# Patient Record
Sex: MALE | Race: WHITE | NOT HISPANIC OR LATINO | URBAN - METROPOLITAN AREA
[De-identification: names, ages, dates, MRNs, and addresses within clinical notes are randomized per-mention and may not be internally consistent; named-entity substitution may affect disease eponyms.]

---

## 2017-01-13 ENCOUNTER — AMBULATORY - HEALTHEAST (OUTPATIENT)
Dept: CARDIOLOGY | Facility: CLINIC | Age: 82
End: 2017-01-13

## 2017-01-13 DIAGNOSIS — I72.9 ANEURYSM (H): ICD-10-CM

## 2017-02-06 ENCOUNTER — AMBULATORY - HEALTHEAST (OUTPATIENT)
Dept: CARDIOLOGY | Facility: CLINIC | Age: 82
End: 2017-02-06

## 2017-02-10 ENCOUNTER — COMMUNICATION - HEALTHEAST (OUTPATIENT)
Dept: CARDIOLOGY | Facility: CLINIC | Age: 82
End: 2017-02-10

## 2017-02-10 DIAGNOSIS — I48.91 A-FIB (H): ICD-10-CM

## 2017-02-17 ENCOUNTER — AMBULATORY - HEALTHEAST (OUTPATIENT)
Dept: CARDIOLOGY | Facility: CLINIC | Age: 82
End: 2017-02-17

## 2017-02-17 DIAGNOSIS — I72.9 ANEURYSM (H): ICD-10-CM

## 2017-02-20 ENCOUNTER — AMBULATORY - HEALTHEAST (OUTPATIENT)
Dept: CARDIOLOGY | Facility: CLINIC | Age: 82
End: 2017-02-20

## 2017-02-20 DIAGNOSIS — I72.9 ANEURYSM (H): ICD-10-CM

## 2017-03-06 ENCOUNTER — AMBULATORY - HEALTHEAST (OUTPATIENT)
Dept: CARDIOLOGY | Facility: CLINIC | Age: 82
End: 2017-03-06

## 2017-03-06 DIAGNOSIS — I72.9 ANEURYSM (H): ICD-10-CM

## 2017-04-03 ENCOUNTER — AMBULATORY - HEALTHEAST (OUTPATIENT)
Dept: CARDIOLOGY | Facility: CLINIC | Age: 82
End: 2017-04-03

## 2017-04-03 ENCOUNTER — COMMUNICATION - HEALTHEAST (OUTPATIENT)
Dept: CARDIOLOGY | Facility: CLINIC | Age: 82
End: 2017-04-03

## 2017-04-03 DIAGNOSIS — I72.9 ANEURYSM (H): ICD-10-CM

## 2017-04-03 DIAGNOSIS — I48.91 A-FIB (H): ICD-10-CM

## 2017-05-01 ENCOUNTER — AMBULATORY - HEALTHEAST (OUTPATIENT)
Dept: CARDIOLOGY | Facility: CLINIC | Age: 82
End: 2017-05-01

## 2017-05-01 DIAGNOSIS — I72.9 ANEURYSM (H): ICD-10-CM

## 2017-05-15 ENCOUNTER — AMBULATORY - HEALTHEAST (OUTPATIENT)
Dept: CARDIOLOGY | Facility: CLINIC | Age: 82
End: 2017-05-15

## 2017-05-15 DIAGNOSIS — Z95.810 ICD (IMPLANTABLE CARDIOVERTER-DEFIBRILLATOR), DUAL, IN SITU: ICD-10-CM

## 2017-05-16 LAB — HCC DEVICE COMMENTS: NORMAL

## 2017-05-25 ENCOUNTER — COMMUNICATION - HEALTHEAST (OUTPATIENT)
Dept: CARDIOLOGY | Facility: CLINIC | Age: 82
End: 2017-05-25

## 2017-05-25 DIAGNOSIS — I48.91 A-FIB (H): ICD-10-CM

## 2017-06-05 ENCOUNTER — AMBULATORY - HEALTHEAST (OUTPATIENT)
Dept: CARDIOLOGY | Facility: CLINIC | Age: 82
End: 2017-06-05

## 2017-06-05 DIAGNOSIS — I72.9 ANEURYSM (H): ICD-10-CM

## 2017-07-10 ENCOUNTER — AMBULATORY - HEALTHEAST (OUTPATIENT)
Dept: CARDIOLOGY | Facility: CLINIC | Age: 82
End: 2017-07-10

## 2017-07-10 DIAGNOSIS — I72.9 ANEURYSM (H): ICD-10-CM

## 2017-08-04 ENCOUNTER — AMBULATORY - HEALTHEAST (OUTPATIENT)
Dept: CARDIOLOGY | Facility: CLINIC | Age: 82
End: 2017-08-04

## 2017-08-04 DIAGNOSIS — I47.29 PAROXYSMAL VT (H): ICD-10-CM

## 2017-08-04 DIAGNOSIS — I72.9 ANEURYSM (H): ICD-10-CM

## 2017-08-21 ENCOUNTER — AMBULATORY - HEALTHEAST (OUTPATIENT)
Dept: CARDIOLOGY | Facility: CLINIC | Age: 82
End: 2017-08-21

## 2017-08-21 DIAGNOSIS — Z95.810 ICD (IMPLANTABLE CARDIOVERTER-DEFIBRILLATOR), DUAL, IN SITU: ICD-10-CM

## 2017-08-21 LAB — HCC DEVICE COMMENTS: NORMAL

## 2017-08-22 ENCOUNTER — COMMUNICATION - HEALTHEAST (OUTPATIENT)
Dept: CARDIOLOGY | Facility: CLINIC | Age: 82
End: 2017-08-22

## 2017-08-22 DIAGNOSIS — I48.91 A-FIB (H): ICD-10-CM

## 2017-08-23 ENCOUNTER — RECORDS - HEALTHEAST (OUTPATIENT)
Dept: LAB | Facility: CLINIC | Age: 82
End: 2017-08-23

## 2017-08-23 LAB
CHOLEST SERPL-MCNC: 136 MG/DL
FASTING STATUS PATIENT QL REPORTED: NORMAL
HDLC SERPL-MCNC: 40 MG/DL
LDLC SERPL CALC-MCNC: 74 MG/DL
TRIGL SERPL-MCNC: 110 MG/DL

## 2017-09-08 ENCOUNTER — AMBULATORY - HEALTHEAST (OUTPATIENT)
Dept: CARDIOLOGY | Facility: CLINIC | Age: 82
End: 2017-09-08

## 2017-09-08 DIAGNOSIS — I47.29 PAROXYSMAL VT (H): ICD-10-CM

## 2017-09-20 ENCOUNTER — AMBULATORY - HEALTHEAST (OUTPATIENT)
Dept: CARDIOLOGY | Facility: CLINIC | Age: 82
End: 2017-09-20

## 2017-09-20 DIAGNOSIS — I25.738: ICD-10-CM

## 2017-09-21 ENCOUNTER — COMMUNICATION - HEALTHEAST (OUTPATIENT)
Dept: CARDIOLOGY | Facility: CLINIC | Age: 82
End: 2017-09-21

## 2017-09-25 ENCOUNTER — COMMUNICATION - HEALTHEAST (OUTPATIENT)
Dept: CARDIOLOGY | Facility: CLINIC | Age: 82
End: 2017-09-25

## 2017-09-29 ENCOUNTER — RECORDS - HEALTHEAST (OUTPATIENT)
Dept: ADMINISTRATIVE | Facility: OTHER | Age: 82
End: 2017-09-29

## 2017-10-02 ENCOUNTER — AMBULATORY - HEALTHEAST (OUTPATIENT)
Dept: CARDIOLOGY | Facility: CLINIC | Age: 82
End: 2017-10-02

## 2017-10-02 ENCOUNTER — RECORDS - HEALTHEAST (OUTPATIENT)
Dept: ADMINISTRATIVE | Facility: OTHER | Age: 82
End: 2017-10-02

## 2017-10-09 ENCOUNTER — AMBULATORY - HEALTHEAST (OUTPATIENT)
Dept: CARDIOLOGY | Facility: CLINIC | Age: 82
End: 2017-10-09

## 2017-10-09 DIAGNOSIS — I47.29 PAROXYSMAL VT (H): ICD-10-CM

## 2017-10-12 ENCOUNTER — RECORDS - HEALTHEAST (OUTPATIENT)
Dept: ADMINISTRATIVE | Facility: OTHER | Age: 82
End: 2017-10-12

## 2017-10-12 ENCOUNTER — OFFICE VISIT - HEALTHEAST (OUTPATIENT)
Dept: PULMONOLOGY | Facility: OTHER | Age: 82
End: 2017-10-12

## 2017-10-12 DIAGNOSIS — R05.3 COUGH, PERSISTENT: ICD-10-CM

## 2017-10-12 DIAGNOSIS — J90 PLEURAL EFFUSION, LEFT: ICD-10-CM

## 2017-10-12 DIAGNOSIS — J45.20 MILD INTERMITTENT ASTHMA WITHOUT COMPLICATION: ICD-10-CM

## 2017-10-12 DIAGNOSIS — R06.00 DYSPNEA: ICD-10-CM

## 2017-10-12 ASSESSMENT — MIFFLIN-ST. JEOR: SCORE: 1438.28

## 2017-10-23 ENCOUNTER — COMMUNICATION - HEALTHEAST (OUTPATIENT)
Dept: PULMONOLOGY | Facility: OTHER | Age: 82
End: 2017-10-23

## 2017-10-24 ENCOUNTER — HOSPITAL ENCOUNTER (OUTPATIENT)
Dept: RESPIRATORY THERAPY | Facility: CLINIC | Age: 82
Discharge: HOME | End: 2017-10-24
Attending: INTERNAL MEDICINE

## 2017-10-24 DIAGNOSIS — J90 PLEURAL EFFUSION, LEFT: ICD-10-CM

## 2017-10-24 DIAGNOSIS — R09.02 HYPOXIA: ICD-10-CM

## 2017-10-24 DIAGNOSIS — R05.3 CHRONIC COUGH: ICD-10-CM

## 2017-10-24 DIAGNOSIS — R06.03 RESPIRATORY DISTRESS: ICD-10-CM

## 2017-10-24 DIAGNOSIS — R06.00 DYSPNEA: ICD-10-CM

## 2017-10-26 ENCOUNTER — OFFICE VISIT - HEALTHEAST (OUTPATIENT)
Dept: CARDIOLOGY | Facility: CLINIC | Age: 82
End: 2017-10-26

## 2017-10-26 DIAGNOSIS — I42.9 CARDIOMYOPATHY (H): ICD-10-CM

## 2017-10-26 DIAGNOSIS — I10 ESSENTIAL HYPERTENSION: ICD-10-CM

## 2017-10-26 DIAGNOSIS — E78.5 DYSLIPIDEMIA: ICD-10-CM

## 2017-10-26 DIAGNOSIS — I25.10 CORONARY ARTERY DISEASE INVOLVING NATIVE CORONARY ARTERY OF NATIVE HEART WITHOUT ANGINA PECTORIS: ICD-10-CM

## 2017-10-26 DIAGNOSIS — R06.02 SOB (SHORTNESS OF BREATH) ON EXERTION: ICD-10-CM

## 2017-10-26 DIAGNOSIS — I35.0 NONRHEUMATIC AORTIC VALVE STENOSIS: ICD-10-CM

## 2017-10-26 ASSESSMENT — MIFFLIN-ST. JEOR: SCORE: 1433.29

## 2017-10-30 ENCOUNTER — COMMUNICATION - HEALTHEAST (OUTPATIENT)
Dept: PULMONOLOGY | Facility: OTHER | Age: 82
End: 2017-10-30

## 2017-11-01 ENCOUNTER — COMMUNICATION - HEALTHEAST (OUTPATIENT)
Dept: CARDIOLOGY | Facility: CLINIC | Age: 82
End: 2017-11-01

## 2017-11-06 ENCOUNTER — AMBULATORY - HEALTHEAST (OUTPATIENT)
Dept: CARDIOLOGY | Facility: CLINIC | Age: 82
End: 2017-11-06

## 2017-11-06 DIAGNOSIS — I47.29 PAROXYSMAL VT (H): ICD-10-CM

## 2017-11-16 ENCOUNTER — HOSPITAL ENCOUNTER (OUTPATIENT)
Dept: CT IMAGING | Facility: CLINIC | Age: 82
Discharge: HOME | End: 2017-11-16
Attending: INTERNAL MEDICINE

## 2017-11-16 DIAGNOSIS — J45.20 MILD INTERMITTENT ASTHMA WITHOUT COMPLICATION: ICD-10-CM

## 2017-11-16 DIAGNOSIS — R06.00 DYSPNEA: ICD-10-CM

## 2017-11-16 DIAGNOSIS — R05.3 COUGH, PERSISTENT: ICD-10-CM

## 2017-11-29 ENCOUNTER — AMBULATORY - HEALTHEAST (OUTPATIENT)
Dept: CARDIOLOGY | Facility: CLINIC | Age: 82
End: 2017-11-29

## 2017-11-29 DIAGNOSIS — Z95.810 ICD (IMPLANTABLE CARDIOVERTER-DEFIBRILLATOR), DUAL, IN SITU: ICD-10-CM

## 2017-11-29 DIAGNOSIS — I35.0 NONRHEUMATIC AORTIC VALVE STENOSIS: ICD-10-CM

## 2017-11-29 DIAGNOSIS — I47.29 PAROXYSMAL VT (H): ICD-10-CM

## 2017-11-29 DIAGNOSIS — N18.30 STAGE 3 CHRONIC KIDNEY DISEASE (H): ICD-10-CM

## 2017-11-29 LAB — HCC DEVICE COMMENTS: NORMAL

## 2017-11-29 ASSESSMENT — MIFFLIN-ST. JEOR: SCORE: 1453.71

## 2017-11-30 ENCOUNTER — AMBULATORY - HEALTHEAST (OUTPATIENT)
Dept: CARDIOLOGY | Facility: CLINIC | Age: 82
End: 2017-11-30

## 2017-11-30 DIAGNOSIS — I47.29 PAROXYSMAL VT (H): ICD-10-CM

## 2017-12-01 ENCOUNTER — OFFICE VISIT - HEALTHEAST (OUTPATIENT)
Dept: PULMONOLOGY | Facility: OTHER | Age: 82
End: 2017-12-01

## 2017-12-01 ENCOUNTER — COMMUNICATION - HEALTHEAST (OUTPATIENT)
Dept: CARDIOLOGY | Facility: CLINIC | Age: 82
End: 2017-12-01

## 2017-12-01 DIAGNOSIS — J98.01 BRONCHOSPASM: ICD-10-CM

## 2017-12-01 DIAGNOSIS — R05.3 COUGH, PERSISTENT: ICD-10-CM

## 2017-12-01 DIAGNOSIS — R05.3 CHRONIC COUGH: ICD-10-CM

## 2017-12-01 DIAGNOSIS — J45.20 MILD INTERMITTENT ASTHMA WITHOUT COMPLICATION: ICD-10-CM

## 2017-12-01 DIAGNOSIS — J47.9 BRONCHIECTASIS WITHOUT COMPLICATION (H): ICD-10-CM

## 2017-12-01 DIAGNOSIS — I48.91 A-FIB (H): ICD-10-CM

## 2017-12-29 ENCOUNTER — AMBULATORY - HEALTHEAST (OUTPATIENT)
Dept: CARDIOLOGY | Facility: CLINIC | Age: 82
End: 2017-12-29

## 2017-12-29 DIAGNOSIS — I47.29 PAROXYSMAL VT (H): ICD-10-CM

## 2018-01-08 ENCOUNTER — AMBULATORY - HEALTHEAST (OUTPATIENT)
Dept: PULMONOLOGY | Facility: OTHER | Age: 83
End: 2018-01-08

## 2018-01-08 DIAGNOSIS — J45.909 ASTHMA: ICD-10-CM

## 2018-01-16 ENCOUNTER — RECORDS - HEALTHEAST (OUTPATIENT)
Dept: LAB | Facility: CLINIC | Age: 83
End: 2018-01-16

## 2018-01-16 LAB
ANION GAP SERPL CALCULATED.3IONS-SCNC: 9 MMOL/L (ref 5–18)
BUN SERPL-MCNC: 38 MG/DL (ref 8–28)
CALCIUM SERPL-MCNC: 9.3 MG/DL (ref 8.5–10.5)
CHLORIDE BLD-SCNC: 105 MMOL/L (ref 98–107)
CO2 SERPL-SCNC: 28 MMOL/L (ref 22–31)
CREAT SERPL-MCNC: 1.8 MG/DL (ref 0.7–1.3)
GFR SERPL CREATININE-BSD FRML MDRD: 36 ML/MIN/1.73M2
GLUCOSE BLD-MCNC: 88 MG/DL (ref 70–125)
POTASSIUM BLD-SCNC: 4.2 MMOL/L (ref 3.5–5)
SODIUM SERPL-SCNC: 142 MMOL/L (ref 136–145)
URATE SERPL-MCNC: 8.1 MG/DL (ref 3–8)

## 2018-01-17 ENCOUNTER — AMBULATORY - HEALTHEAST (OUTPATIENT)
Dept: PULMONOLOGY | Facility: OTHER | Age: 83
End: 2018-01-17

## 2018-01-17 ENCOUNTER — RECORDS - HEALTHEAST (OUTPATIENT)
Dept: ADMINISTRATIVE | Facility: OTHER | Age: 83
End: 2018-01-17

## 2018-01-17 ENCOUNTER — COMMUNICATION - HEALTHEAST (OUTPATIENT)
Dept: PULMONOLOGY | Facility: OTHER | Age: 83
End: 2018-01-17

## 2018-01-17 DIAGNOSIS — B37.0 THRUSH: ICD-10-CM

## 2018-01-21 ENCOUNTER — COMMUNICATION - HEALTHEAST (OUTPATIENT)
Dept: CARDIOLOGY | Facility: CLINIC | Age: 83
End: 2018-01-21

## 2018-01-21 DIAGNOSIS — I48.91 A-FIB (H): ICD-10-CM

## 2018-01-26 ENCOUNTER — AMBULATORY - HEALTHEAST (OUTPATIENT)
Dept: CARDIOLOGY | Facility: CLINIC | Age: 83
End: 2018-01-26

## 2018-01-26 DIAGNOSIS — I42.9 CARDIOMYOPATHY (H): ICD-10-CM

## 2018-01-26 DIAGNOSIS — I47.29 PAROXYSMAL VT (H): ICD-10-CM

## 2018-01-26 LAB — INTERNATIONAL NORMALIZATION RATIO, POC - HISTORICAL: 2.9

## 2018-01-31 ENCOUNTER — COMMUNICATION - HEALTHEAST (OUTPATIENT)
Dept: PULMONOLOGY | Facility: OTHER | Age: 83
End: 2018-01-31

## 2018-02-11 ENCOUNTER — COMMUNICATION - HEALTHEAST (OUTPATIENT)
Dept: SCHEDULING | Facility: CLINIC | Age: 83
End: 2018-02-11

## 2018-02-11 DIAGNOSIS — J45.909 ASTHMA: ICD-10-CM

## 2018-02-11 DIAGNOSIS — J11.1 INFLUENZA: ICD-10-CM

## 2018-02-11 DIAGNOSIS — J98.8 RESPIRATORY TRACT INFECTION: ICD-10-CM

## 2018-02-15 ENCOUNTER — COMMUNICATION - HEALTHEAST (OUTPATIENT)
Dept: PULMONOLOGY | Facility: OTHER | Age: 83
End: 2018-02-15

## 2018-02-21 ENCOUNTER — HOME CARE/HOSPICE - HEALTHEAST (OUTPATIENT)
Dept: HOME HEALTH SERVICES | Facility: HOME HEALTH | Age: 83
End: 2018-02-21

## 2018-02-24 ENCOUNTER — INFUSION - HEALTHEAST (OUTPATIENT)
Dept: INFUSION THERAPY | Facility: CLINIC | Age: 83
End: 2018-02-24

## 2018-02-24 DIAGNOSIS — B95.2 ENTEROCOCCAL BACTEREMIA: ICD-10-CM

## 2018-02-24 DIAGNOSIS — R78.81 ENTEROCOCCAL BACTEREMIA: ICD-10-CM

## 2018-02-25 ENCOUNTER — INFUSION - HEALTHEAST (OUTPATIENT)
Dept: INFUSION THERAPY | Facility: CLINIC | Age: 83
End: 2018-02-25

## 2018-02-25 DIAGNOSIS — B95.2 ENTEROCOCCAL BACTEREMIA: ICD-10-CM

## 2018-02-25 DIAGNOSIS — R78.81 ENTEROCOCCAL BACTEREMIA: ICD-10-CM

## 2018-02-26 ENCOUNTER — INFUSION - HEALTHEAST (OUTPATIENT)
Dept: INFUSION THERAPY | Facility: CLINIC | Age: 83
End: 2018-02-26

## 2018-02-26 ENCOUNTER — AMBULATORY - HEALTHEAST (OUTPATIENT)
Dept: CARDIOLOGY | Facility: CLINIC | Age: 83
End: 2018-02-26

## 2018-02-26 DIAGNOSIS — I47.29 PAROXYSMAL VT (H): ICD-10-CM

## 2018-02-26 DIAGNOSIS — R78.81 ENTEROCOCCAL BACTEREMIA: ICD-10-CM

## 2018-02-26 DIAGNOSIS — B95.2 ENTEROCOCCAL BACTEREMIA: ICD-10-CM

## 2018-02-26 LAB — INTERNATIONAL NORMALIZATION RATIO, POC - HISTORICAL: 2.3

## 2018-02-27 ENCOUNTER — INFUSION - HEALTHEAST (OUTPATIENT)
Dept: INFUSION THERAPY | Facility: CLINIC | Age: 83
End: 2018-02-27

## 2018-02-27 DIAGNOSIS — B95.2 ENTEROCOCCAL BACTEREMIA: ICD-10-CM

## 2018-02-27 DIAGNOSIS — R78.81 ENTEROCOCCAL BACTEREMIA: ICD-10-CM

## 2018-02-28 ENCOUNTER — RECORDS - HEALTHEAST (OUTPATIENT)
Dept: ADMINISTRATIVE | Facility: OTHER | Age: 83
End: 2018-02-28

## 2018-02-28 ENCOUNTER — INFUSION - HEALTHEAST (OUTPATIENT)
Dept: INFUSION THERAPY | Facility: CLINIC | Age: 83
End: 2018-02-28

## 2018-02-28 DIAGNOSIS — R78.81 ENTEROCOCCAL BACTEREMIA: ICD-10-CM

## 2018-02-28 DIAGNOSIS — B95.2 ENTEROCOCCAL BACTEREMIA: ICD-10-CM

## 2018-02-28 LAB
ALBUMIN SERPL-MCNC: 2.9 G/DL (ref 3.5–5)
ALP SERPL-CCNC: 74 U/L (ref 45–120)
ALT SERPL W P-5'-P-CCNC: 20 U/L (ref 0–45)
ANION GAP SERPL CALCULATED.3IONS-SCNC: 9 MMOL/L (ref 5–18)
AST SERPL W P-5'-P-CCNC: 29 U/L (ref 0–40)
BASOPHILS # BLD AUTO: 0 THOU/UL (ref 0–0.2)
BASOPHILS NFR BLD AUTO: 0 % (ref 0–2)
BILIRUB SERPL-MCNC: 0.5 MG/DL (ref 0–1)
BUN SERPL-MCNC: 28 MG/DL (ref 8–28)
CALCIUM SERPL-MCNC: 8.5 MG/DL (ref 8.5–10.5)
CHLORIDE BLD-SCNC: 107 MMOL/L (ref 98–107)
CK-MB: 5 NG/ML (ref 0–7)
CO2 SERPL-SCNC: 25 MMOL/L (ref 22–31)
CREAT SERPL-MCNC: 1.48 MG/DL (ref 0.7–1.3)
EOSINOPHIL # BLD AUTO: 0.1 THOU/UL (ref 0–0.4)
EOSINOPHIL NFR BLD AUTO: 1 % (ref 0–6)
ERYTHROCYTE [DISTWIDTH] IN BLOOD BY AUTOMATED COUNT: 16.8 % (ref 11–14.5)
GFR SERPL CREATININE-BSD FRML MDRD: 45 ML/MIN/1.73M2
GLUCOSE BLD-MCNC: 130 MG/DL (ref 70–125)
HCT VFR BLD AUTO: 37.6 % (ref 40–54)
HGB BLD-MCNC: 12.1 G/DL (ref 14–18)
LYMPHOCYTES # BLD AUTO: 1.4 THOU/UL (ref 0.8–4.4)
LYMPHOCYTES NFR BLD AUTO: 14 % (ref 20–40)
MCH RBC QN AUTO: 26.4 PG (ref 27–34)
MCHC RBC AUTO-ENTMCNC: 32.2 G/DL (ref 32–36)
MCV RBC AUTO: 82 FL (ref 80–100)
MONOCYTES # BLD AUTO: 0.8 THOU/UL (ref 0–0.9)
MONOCYTES NFR BLD AUTO: 8 % (ref 2–10)
NEUTROPHILS # BLD AUTO: 7.8 THOU/UL (ref 2–7.7)
NEUTROPHILS NFR BLD AUTO: 77 % (ref 50–70)
PLATELET # BLD AUTO: 165 THOU/UL (ref 140–440)
PMV BLD AUTO: 10.3 FL (ref 8.5–12.5)
POTASSIUM BLD-SCNC: 4.3 MMOL/L (ref 3.5–5)
PROT SERPL-MCNC: 6.1 G/DL (ref 6–8)
RBC # BLD AUTO: 4.59 MILL/UL (ref 4.4–6.2)
SODIUM SERPL-SCNC: 141 MMOL/L (ref 136–145)
WBC: 10.2 THOU/UL (ref 4–11)

## 2018-03-01 ENCOUNTER — INFUSION - HEALTHEAST (OUTPATIENT)
Dept: INFUSION THERAPY | Facility: CLINIC | Age: 83
End: 2018-03-01

## 2018-03-01 ENCOUNTER — RECORDS - HEALTHEAST (OUTPATIENT)
Dept: ADMINISTRATIVE | Facility: OTHER | Age: 83
End: 2018-03-01

## 2018-03-01 DIAGNOSIS — R78.81 ENTEROCOCCAL BACTEREMIA: ICD-10-CM

## 2018-03-01 DIAGNOSIS — B95.2 ENTEROCOCCAL BACTEREMIA: ICD-10-CM

## 2018-03-02 ENCOUNTER — INFUSION - HEALTHEAST (OUTPATIENT)
Dept: INFUSION THERAPY | Facility: CLINIC | Age: 83
End: 2018-03-02

## 2018-03-02 DIAGNOSIS — R78.81 ENTEROCOCCAL BACTEREMIA: ICD-10-CM

## 2018-03-02 DIAGNOSIS — B95.2 ENTEROCOCCAL BACTEREMIA: ICD-10-CM

## 2018-03-06 ENCOUNTER — AMBULATORY - HEALTHEAST (OUTPATIENT)
Dept: CARDIOLOGY | Facility: CLINIC | Age: 83
End: 2018-03-06

## 2018-03-06 ENCOUNTER — OFFICE VISIT - HEALTHEAST (OUTPATIENT)
Dept: PULMONOLOGY | Facility: OTHER | Age: 83
End: 2018-03-06

## 2018-03-06 DIAGNOSIS — J90 PLEURAL EFFUSION, BILATERAL: ICD-10-CM

## 2018-03-06 DIAGNOSIS — J45.20 MILD INTERMITTENT ASTHMA WITHOUT COMPLICATION: ICD-10-CM

## 2018-03-06 DIAGNOSIS — Z95.810 ICD (IMPLANTABLE CARDIOVERTER-DEFIBRILLATOR) IN PLACE: ICD-10-CM

## 2018-03-06 DIAGNOSIS — J47.9 BRONCHIECTASIS (H): ICD-10-CM

## 2018-03-06 DIAGNOSIS — J18.9 PNEUMONIA, COMMUNITY ACQUIRED: ICD-10-CM

## 2018-03-06 DIAGNOSIS — J18.9 CAP (COMMUNITY ACQUIRED PNEUMONIA): ICD-10-CM

## 2018-03-06 LAB
ANION GAP SERPL CALCULATED.3IONS-SCNC: 9 MMOL/L (ref 5–18)
BASOPHILS # BLD AUTO: 0.1 THOU/UL (ref 0–0.2)
BASOPHILS NFR BLD AUTO: 1 % (ref 0–2)
BUN SERPL-MCNC: 25 MG/DL (ref 8–28)
CALCIUM SERPL-MCNC: 9.1 MG/DL (ref 8.5–10.5)
CHLORIDE BLD-SCNC: 104 MMOL/L (ref 98–107)
CO2 SERPL-SCNC: 27 MMOL/L (ref 22–31)
CREAT SERPL-MCNC: 1.5 MG/DL (ref 0.7–1.3)
EOSINOPHIL # BLD AUTO: 0.4 THOU/UL (ref 0–0.4)
EOSINOPHIL NFR BLD AUTO: 5 % (ref 0–6)
ERYTHROCYTE [DISTWIDTH] IN BLOOD BY AUTOMATED COUNT: 16.8 % (ref 11–14.5)
GFR SERPL CREATININE-BSD FRML MDRD: 44 ML/MIN/1.73M2
GLUCOSE BLD-MCNC: 103 MG/DL (ref 70–125)
HCC DEVICE COMMENTS: NORMAL
HCT VFR BLD AUTO: 40.7 % (ref 40–54)
HGB BLD-MCNC: 12.7 G/DL (ref 14–18)
LYMPHOCYTES # BLD AUTO: 0.7 THOU/UL (ref 0.8–4.4)
LYMPHOCYTES NFR BLD AUTO: 9 % (ref 20–40)
MCH RBC QN AUTO: 26 PG (ref 27–34)
MCHC RBC AUTO-ENTMCNC: 31.2 G/DL (ref 32–36)
MCV RBC AUTO: 83 FL (ref 80–100)
MONOCYTES # BLD AUTO: 1 THOU/UL (ref 0–0.9)
MONOCYTES NFR BLD AUTO: 12 % (ref 2–10)
NEUTROPHILS # BLD AUTO: 6.3 THOU/UL (ref 2–7.7)
NEUTROPHILS NFR BLD AUTO: 74 % (ref 50–70)
PLATELET # BLD AUTO: 156 THOU/UL (ref 140–440)
PMV BLD AUTO: 10.2 FL (ref 8.5–12.5)
POTASSIUM BLD-SCNC: 4 MMOL/L (ref 3.5–5)
PROCALCITONIN SERPL-MCNC: 0.12 NG/ML (ref 0–0.49)
RBC # BLD AUTO: 4.88 MILL/UL (ref 4.4–6.2)
SODIUM SERPL-SCNC: 140 MMOL/L (ref 136–145)
WBC: 8.5 THOU/UL (ref 4–11)

## 2018-03-07 ENCOUNTER — COMMUNICATION - HEALTHEAST (OUTPATIENT)
Dept: PULMONOLOGY | Facility: OTHER | Age: 83
End: 2018-03-07

## 2018-03-12 ENCOUNTER — AMBULATORY - HEALTHEAST (OUTPATIENT)
Dept: CARDIOLOGY | Facility: CLINIC | Age: 83
End: 2018-03-12

## 2018-03-12 ENCOUNTER — RECORDS - HEALTHEAST (OUTPATIENT)
Dept: ADMINISTRATIVE | Facility: OTHER | Age: 83
End: 2018-03-12

## 2018-03-12 DIAGNOSIS — I47.29 PAROXYSMAL VT (H): ICD-10-CM

## 2018-03-12 LAB — INTERNATIONAL NORMALIZATION RATIO, POC - HISTORICAL: 2.9

## 2018-03-21 ENCOUNTER — COMMUNICATION - HEALTHEAST (OUTPATIENT)
Dept: PULMONOLOGY | Facility: OTHER | Age: 83
End: 2018-03-21

## 2018-03-22 ENCOUNTER — COMMUNICATION - HEALTHEAST (OUTPATIENT)
Dept: PULMONOLOGY | Facility: OTHER | Age: 83
End: 2018-03-22

## 2018-03-23 ENCOUNTER — AMBULATORY - HEALTHEAST (OUTPATIENT)
Dept: PULMONOLOGY | Facility: OTHER | Age: 83
End: 2018-03-23

## 2018-03-23 ENCOUNTER — RECORDS - HEALTHEAST (OUTPATIENT)
Dept: ADMINISTRATIVE | Facility: OTHER | Age: 83
End: 2018-03-23

## 2018-03-23 ENCOUNTER — COMMUNICATION - HEALTHEAST (OUTPATIENT)
Dept: CARDIOLOGY | Facility: CLINIC | Age: 83
End: 2018-03-23

## 2018-03-23 DIAGNOSIS — J47.9 BRONCHIECTASIS (H): ICD-10-CM

## 2018-03-26 ENCOUNTER — COMMUNICATION - HEALTHEAST (OUTPATIENT)
Dept: CARDIOLOGY | Facility: CLINIC | Age: 83
End: 2018-03-26

## 2018-03-26 DIAGNOSIS — I25.10 CORONARY ARTERY DISEASE DUE TO LIPID RICH PLAQUE: ICD-10-CM

## 2018-03-26 DIAGNOSIS — I25.83 CORONARY ARTERY DISEASE DUE TO LIPID RICH PLAQUE: ICD-10-CM

## 2018-03-27 ENCOUNTER — RECORDS - HEALTHEAST (OUTPATIENT)
Dept: ADMINISTRATIVE | Facility: OTHER | Age: 83
End: 2018-03-27

## 2018-03-27 ENCOUNTER — AMBULATORY - HEALTHEAST (OUTPATIENT)
Dept: CARDIOLOGY | Facility: CLINIC | Age: 83
End: 2018-03-27

## 2018-03-28 ENCOUNTER — OFFICE VISIT - HEALTHEAST (OUTPATIENT)
Dept: CARDIOLOGY | Facility: CLINIC | Age: 83
End: 2018-03-28

## 2018-03-28 DIAGNOSIS — I25.10 CORONARY ARTERY DISEASE INVOLVING NATIVE CORONARY ARTERY OF NATIVE HEART WITHOUT ANGINA PECTORIS: ICD-10-CM

## 2018-03-28 DIAGNOSIS — E78.5 DYSLIPIDEMIA: ICD-10-CM

## 2018-03-28 DIAGNOSIS — I42.9 CARDIOMYOPATHY (H): ICD-10-CM

## 2018-03-28 DIAGNOSIS — I10 ESSENTIAL HYPERTENSION: ICD-10-CM

## 2018-03-28 ASSESSMENT — MIFFLIN-ST. JEOR: SCORE: 1440.1

## 2018-03-29 ENCOUNTER — AMBULATORY - HEALTHEAST (OUTPATIENT)
Dept: PULMONOLOGY | Facility: OTHER | Age: 83
End: 2018-03-29

## 2018-03-29 DIAGNOSIS — J47.9 BRONCHIECTASIS (H): ICD-10-CM

## 2018-03-29 DIAGNOSIS — R05.3 CHRONIC COUGH: ICD-10-CM

## 2018-03-30 ENCOUNTER — AMBULATORY - HEALTHEAST (OUTPATIENT)
Dept: PULMONOLOGY | Facility: OTHER | Age: 83
End: 2018-03-30

## 2018-03-30 DIAGNOSIS — J47.9 BRONCHIECTASIS (H): ICD-10-CM

## 2018-04-02 ENCOUNTER — HOSPITAL ENCOUNTER (OUTPATIENT)
Dept: CT IMAGING | Facility: CLINIC | Age: 83
Discharge: HOME | End: 2018-04-02
Attending: INTERNAL MEDICINE

## 2018-04-02 ENCOUNTER — COMMUNICATION - HEALTHEAST (OUTPATIENT)
Dept: PULMONOLOGY | Facility: OTHER | Age: 83
End: 2018-04-02

## 2018-04-02 DIAGNOSIS — J90 PLEURAL EFFUSION, BILATERAL: ICD-10-CM

## 2018-04-02 DIAGNOSIS — J18.9 CAP (COMMUNITY ACQUIRED PNEUMONIA): ICD-10-CM

## 2018-04-02 DIAGNOSIS — J47.9 BRONCHIECTASIS (H): ICD-10-CM

## 2018-04-04 ENCOUNTER — OFFICE VISIT - HEALTHEAST (OUTPATIENT)
Dept: PULMONOLOGY | Facility: OTHER | Age: 83
End: 2018-04-04

## 2018-04-04 DIAGNOSIS — J18.9 COMMUNITY ACQUIRED PNEUMONIA, UNSPECIFIED LATERALITY: ICD-10-CM

## 2018-04-04 DIAGNOSIS — J47.9 BRONCHIECTASIS WITHOUT COMPLICATION (H): ICD-10-CM

## 2018-04-04 DIAGNOSIS — J47.9 BRONCHIECTASIS (H): ICD-10-CM

## 2018-04-04 DIAGNOSIS — J90 PLEURAL EFFUSION, BILATERAL: ICD-10-CM

## 2018-04-04 DIAGNOSIS — J45.20 MILD INTERMITTENT ASTHMA WITHOUT COMPLICATION: ICD-10-CM

## 2018-04-04 DIAGNOSIS — R05.3 CHRONIC COUGH: ICD-10-CM
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
## 2018-04-09 ENCOUNTER — AMBULATORY - HEALTHEAST (OUTPATIENT)
Dept: PULMONOLOGY | Facility: OTHER | Age: 83
End: 2018-04-09

## 2018-04-09 ENCOUNTER — AMBULATORY - HEALTHEAST (OUTPATIENT)
Dept: CARDIOLOGY | Facility: CLINIC | Age: 83
End: 2018-04-09

## 2018-04-09 DIAGNOSIS — I47.29 PAROXYSMAL VT (H): ICD-10-CM

## 2018-04-09 LAB — INTERNATIONAL NORMALIZATION RATIO, POC - HISTORICAL: 4.1

## 2018-04-23 ENCOUNTER — AMBULATORY - HEALTHEAST (OUTPATIENT)
Dept: CARDIOLOGY | Facility: CLINIC | Age: 83
End: 2018-04-23

## 2018-04-23 DIAGNOSIS — I47.29 PAROXYSMAL VT (H): ICD-10-CM

## 2018-04-23 LAB — INTERNATIONAL NORMALIZATION RATIO, POC - HISTORICAL: 2.8

## 2018-04-24 ENCOUNTER — COMMUNICATION - HEALTHEAST (OUTPATIENT)
Dept: CARDIOLOGY | Facility: CLINIC | Age: 83
End: 2018-04-24

## 2018-04-24 DIAGNOSIS — I48.91 A-FIB (H): ICD-10-CM

## 2018-05-04 ENCOUNTER — AMBULATORY - HEALTHEAST (OUTPATIENT)
Dept: PULMONOLOGY | Facility: OTHER | Age: 83
End: 2018-05-04

## 2018-05-04 ENCOUNTER — OFFICE VISIT - HEALTHEAST (OUTPATIENT)
Dept: PULMONOLOGY | Facility: OTHER | Age: 83
End: 2018-05-04

## 2018-05-04 ENCOUNTER — HOME CARE/HOSPICE - HEALTHEAST (OUTPATIENT)
Dept: HOME HEALTH SERVICES | Facility: HOME HEALTH | Age: 83
End: 2018-05-04

## 2018-05-04 DIAGNOSIS — J47.9 BRONCHIECTASIS WITHOUT COMPLICATION (H): ICD-10-CM

## 2018-05-04 DIAGNOSIS — J45.20 MILD INTERMITTENT ASTHMA WITHOUT COMPLICATION: ICD-10-CM

## 2018-05-04 DIAGNOSIS — I25.5 ISCHEMIC CARDIOMYOPATHY: ICD-10-CM
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
## 2018-05-06 ENCOUNTER — HOME CARE/HOSPICE - HEALTHEAST (OUTPATIENT)
Dept: HOME HEALTH SERVICES | Facility: HOME HEALTH | Age: 83
End: 2018-05-06

## 2018-05-07 ENCOUNTER — HOME CARE/HOSPICE - HEALTHEAST (OUTPATIENT)
Dept: HOME HEALTH SERVICES | Facility: HOME HEALTH | Age: 83
End: 2018-05-07

## 2018-05-09 ENCOUNTER — HOME CARE/HOSPICE - HEALTHEAST (OUTPATIENT)
Dept: HOME HEALTH SERVICES | Facility: HOME HEALTH | Age: 83
End: 2018-05-09

## 2018-05-14 ENCOUNTER — HOME CARE/HOSPICE - HEALTHEAST (OUTPATIENT)
Dept: HOME HEALTH SERVICES | Facility: HOME HEALTH | Age: 83
End: 2018-05-14

## 2018-05-16 ENCOUNTER — HOME CARE/HOSPICE - HEALTHEAST (OUTPATIENT)
Dept: HOME HEALTH SERVICES | Facility: HOME HEALTH | Age: 83
End: 2018-05-16

## 2018-05-17 ENCOUNTER — COMMUNICATION - HEALTHEAST (OUTPATIENT)
Dept: PULMONOLOGY | Facility: OTHER | Age: 83
End: 2018-05-17

## 2018-05-18 ENCOUNTER — HOME CARE/HOSPICE - HEALTHEAST (OUTPATIENT)
Dept: HOME HEALTH SERVICES | Facility: HOME HEALTH | Age: 83
End: 2018-05-18

## 2018-05-21 ENCOUNTER — AMBULATORY - HEALTHEAST (OUTPATIENT)
Dept: CARDIOLOGY | Facility: CLINIC | Age: 83
End: 2018-05-21

## 2018-05-21 ENCOUNTER — HOME CARE/HOSPICE - HEALTHEAST (OUTPATIENT)
Dept: HOME HEALTH SERVICES | Facility: HOME HEALTH | Age: 83
End: 2018-05-21

## 2018-05-21 DIAGNOSIS — I47.29 PAROXYSMAL VT (H): ICD-10-CM

## 2018-05-21 LAB — INTERNATIONAL NORMALIZATION RATIO, POC - HISTORICAL: 2.9

## 2018-05-22 ENCOUNTER — HOME CARE/HOSPICE - HEALTHEAST (OUTPATIENT)
Dept: HOME HEALTH SERVICES | Facility: HOME HEALTH | Age: 83
End: 2018-05-22

## 2018-05-23 ENCOUNTER — HOME CARE/HOSPICE - HEALTHEAST (OUTPATIENT)
Dept: HOME HEALTH SERVICES | Facility: HOME HEALTH | Age: 83
End: 2018-05-23

## 2018-05-24 ENCOUNTER — AMBULATORY - HEALTHEAST (OUTPATIENT)
Dept: PULMONOLOGY | Facility: OTHER | Age: 83
End: 2018-05-24

## 2018-05-24 ENCOUNTER — HOME CARE/HOSPICE - HEALTHEAST (OUTPATIENT)
Dept: HOME HEALTH SERVICES | Facility: HOME HEALTH | Age: 83
End: 2018-05-24

## 2018-05-24 DIAGNOSIS — J47.9 BRONCHIECTASIS (H): ICD-10-CM

## 2018-05-29 ENCOUNTER — HOME CARE/HOSPICE - HEALTHEAST (OUTPATIENT)
Dept: HOME HEALTH SERVICES | Facility: HOME HEALTH | Age: 83
End: 2018-05-29

## 2018-05-30 ENCOUNTER — HOME CARE/HOSPICE - HEALTHEAST (OUTPATIENT)
Dept: HOME HEALTH SERVICES | Facility: HOME HEALTH | Age: 83
End: 2018-05-30

## 2018-05-30 ENCOUNTER — OFFICE VISIT - HEALTHEAST (OUTPATIENT)
Dept: PULMONOLOGY | Facility: OTHER | Age: 83
End: 2018-05-30

## 2018-05-30 DIAGNOSIS — J45.20 MILD INTERMITTENT ASTHMA WITHOUT COMPLICATION: ICD-10-CM

## 2018-05-30 DIAGNOSIS — J47.9 BRONCHIECTASIS WITHOUT COMPLICATION (H): ICD-10-CM

## 2018-05-30 DIAGNOSIS — J98.4 RESTRICTIVE LUNG DISEASE: ICD-10-CM

## 2018-05-30 DIAGNOSIS — I25.5 ISCHEMIC CARDIOMYOPATHY: ICD-10-CM

## 2018-05-31 ENCOUNTER — HOME CARE/HOSPICE - HEALTHEAST (OUTPATIENT)
Dept: HOME HEALTH SERVICES | Facility: HOME HEALTH | Age: 83
End: 2018-05-31

## 2018-06-01 ENCOUNTER — HOME CARE/HOSPICE - HEALTHEAST (OUTPATIENT)
Dept: HOME HEALTH SERVICES | Facility: HOME HEALTH | Age: 83
End: 2018-06-01

## 2018-06-04 ENCOUNTER — HOME CARE/HOSPICE - HEALTHEAST (OUTPATIENT)
Dept: HOME HEALTH SERVICES | Facility: HOME HEALTH | Age: 83
End: 2018-06-04

## 2018-06-05 ENCOUNTER — HOME CARE/HOSPICE - HEALTHEAST (OUTPATIENT)
Dept: HOME HEALTH SERVICES | Facility: HOME HEALTH | Age: 83
End: 2018-06-05

## 2018-06-06 ENCOUNTER — AMBULATORY - HEALTHEAST (OUTPATIENT)
Dept: PULMONOLOGY | Facility: OTHER | Age: 83
End: 2018-06-06

## 2018-06-06 ENCOUNTER — HOME CARE/HOSPICE - HEALTHEAST (OUTPATIENT)
Dept: HOME HEALTH SERVICES | Facility: HOME HEALTH | Age: 83
End: 2018-06-06

## 2018-06-06 ENCOUNTER — RECORDS - HEALTHEAST (OUTPATIENT)
Dept: ADMINISTRATIVE | Facility: OTHER | Age: 83
End: 2018-06-06

## 2018-06-06 ENCOUNTER — COMMUNICATION - HEALTHEAST (OUTPATIENT)
Dept: HOME HEALTH SERVICES | Facility: HOME HEALTH | Age: 83
End: 2018-06-06

## 2018-06-06 DIAGNOSIS — R05.3 CHRONIC COUGH: ICD-10-CM

## 2018-06-07 ENCOUNTER — HOME CARE/HOSPICE - HEALTHEAST (OUTPATIENT)
Dept: HOME HEALTH SERVICES | Facility: HOME HEALTH | Age: 83
End: 2018-06-07

## 2018-06-08 ENCOUNTER — HOME CARE/HOSPICE - HEALTHEAST (OUTPATIENT)
Dept: HOME HEALTH SERVICES | Facility: HOME HEALTH | Age: 83
End: 2018-06-08

## 2018-06-08 ENCOUNTER — COMMUNICATION - HEALTHEAST (OUTPATIENT)
Dept: CARDIOLOGY | Facility: CLINIC | Age: 83
End: 2018-06-08

## 2018-06-11 ENCOUNTER — HOME CARE/HOSPICE - HEALTHEAST (OUTPATIENT)
Dept: HOME HEALTH SERVICES | Facility: HOME HEALTH | Age: 83
End: 2018-06-11

## 2018-06-12 ENCOUNTER — HOME CARE/HOSPICE - HEALTHEAST (OUTPATIENT)
Dept: HOME HEALTH SERVICES | Facility: HOME HEALTH | Age: 83
End: 2018-06-12

## 2018-06-12 ENCOUNTER — AMBULATORY - HEALTHEAST (OUTPATIENT)
Dept: CARDIOLOGY | Facility: CLINIC | Age: 83
End: 2018-06-12

## 2018-06-12 DIAGNOSIS — Z95.810 ICD (IMPLANTABLE CARDIOVERTER-DEFIBRILLATOR) IN PLACE: ICD-10-CM

## 2018-06-12 LAB
HCC DEVICE COMMENTS: NORMAL
HCC DEVICE IMPLANTING PROVIDER: NORMAL
HCC DEVICE MANUFACTURE: NORMAL
HCC DEVICE MODEL: NORMAL
HCC DEVICE SERIAL NUMBER: NORMAL
HCC DEVICE TYPE: NORMAL

## 2018-06-13 ENCOUNTER — RECORDS - HEALTHEAST (OUTPATIENT)
Dept: LAB | Facility: CLINIC | Age: 83
End: 2018-06-13

## 2018-06-13 ENCOUNTER — RECORDS - HEALTHEAST (OUTPATIENT)
Dept: ADMINISTRATIVE | Facility: OTHER | Age: 83
End: 2018-06-13

## 2018-06-13 LAB
ANION GAP SERPL CALCULATED.3IONS-SCNC: 10 MMOL/L (ref 5–18)
BUN SERPL-MCNC: 63 MG/DL (ref 8–28)
C REACTIVE PROTEIN LHE: 3.5 MG/DL (ref 0–0.8)
CALCIUM SERPL-MCNC: 8.9 MG/DL (ref 8.5–10.5)
CHLORIDE BLD-SCNC: 109 MMOL/L (ref 98–107)
CO2 SERPL-SCNC: 20 MMOL/L (ref 22–31)
CREAT SERPL-MCNC: 1.43 MG/DL (ref 0.7–1.3)
ERYTHROCYTE [SEDIMENTATION RATE] IN BLOOD BY WESTERGREN METHOD: 4 MM/HR (ref 0–15)
GFR SERPL CREATININE-BSD FRML MDRD: 47 ML/MIN/1.73M2
GLUCOSE BLD-MCNC: 98 MG/DL (ref 70–125)
POTASSIUM BLD-SCNC: 4.4 MMOL/L (ref 3.5–5)
SODIUM SERPL-SCNC: 139 MMOL/L (ref 136–145)

## 2018-06-15 ENCOUNTER — HOME CARE/HOSPICE - HEALTHEAST (OUTPATIENT)
Dept: HOME HEALTH SERVICES | Facility: HOME HEALTH | Age: 83
End: 2018-06-15

## 2018-06-18 ENCOUNTER — COMMUNICATION - HEALTHEAST (OUTPATIENT)
Dept: HOME HEALTH SERVICES | Facility: HOME HEALTH | Age: 83
End: 2018-06-18

## 2018-06-18 ENCOUNTER — AMBULATORY - HEALTHEAST (OUTPATIENT)
Dept: CARDIOLOGY | Facility: CLINIC | Age: 83
End: 2018-06-18

## 2018-06-18 ENCOUNTER — HOME CARE/HOSPICE - HEALTHEAST (OUTPATIENT)
Dept: HOME HEALTH SERVICES | Facility: HOME HEALTH | Age: 83
End: 2018-06-18

## 2018-06-18 DIAGNOSIS — I47.29 PAROXYSMAL VT (H): ICD-10-CM

## 2018-06-18 LAB — INTERNATIONAL NORMALIZATION RATIO, POC - HISTORICAL: 2.1

## 2018-06-19 ENCOUNTER — RECORDS - HEALTHEAST (OUTPATIENT)
Dept: LAB | Facility: CLINIC | Age: 83
End: 2018-06-19

## 2018-06-19 ENCOUNTER — AMBULATORY - HEALTHEAST (OUTPATIENT)
Dept: PULMONOLOGY | Facility: OTHER | Age: 83
End: 2018-06-19

## 2018-06-19 LAB
ANION GAP SERPL CALCULATED.3IONS-SCNC: 10 MMOL/L (ref 5–18)
BUN SERPL-MCNC: 43 MG/DL (ref 8–28)
CALCIUM SERPL-MCNC: 9.2 MG/DL (ref 8.5–10.5)
CHLORIDE BLD-SCNC: 106 MMOL/L (ref 98–107)
CO2 SERPL-SCNC: 27 MMOL/L (ref 22–31)
CREAT SERPL-MCNC: 1.3 MG/DL (ref 0.7–1.3)
GFR SERPL CREATININE-BSD FRML MDRD: 52 ML/MIN/1.73M2
GLUCOSE BLD-MCNC: 61 MG/DL (ref 70–125)
POTASSIUM BLD-SCNC: 4.1 MMOL/L (ref 3.5–5)
SODIUM SERPL-SCNC: 143 MMOL/L (ref 136–145)

## 2018-06-20 ENCOUNTER — HOME CARE/HOSPICE - HEALTHEAST (OUTPATIENT)
Dept: HOME HEALTH SERVICES | Facility: HOME HEALTH | Age: 83
End: 2018-06-20

## 2018-06-21 ENCOUNTER — HOME CARE/HOSPICE - HEALTHEAST (OUTPATIENT)
Dept: HOME HEALTH SERVICES | Facility: HOME HEALTH | Age: 83
End: 2018-06-21

## 2018-06-22 ENCOUNTER — HOME CARE/HOSPICE - HEALTHEAST (OUTPATIENT)
Dept: HOME HEALTH SERVICES | Facility: HOME HEALTH | Age: 83
End: 2018-06-22

## 2018-06-26 ENCOUNTER — HOME CARE/HOSPICE - HEALTHEAST (OUTPATIENT)
Dept: HOME HEALTH SERVICES | Facility: HOME HEALTH | Age: 83
End: 2018-06-26

## 2018-06-26 ENCOUNTER — AMBULATORY - HEALTHEAST (OUTPATIENT)
Dept: CARDIOLOGY | Facility: CLINIC | Age: 83
End: 2018-06-26

## 2018-06-26 DIAGNOSIS — I47.29 PAROXYSMAL VT (H): ICD-10-CM

## 2018-06-26 LAB — INTERNATIONAL NORMALIZATION RATIO, POC - HISTORICAL: 2.1

## 2018-06-27 ENCOUNTER — COMMUNICATION - HEALTHEAST (OUTPATIENT)
Dept: HOME HEALTH SERVICES | Facility: HOME HEALTH | Age: 83
End: 2018-06-27

## 2018-06-27 ENCOUNTER — HOME CARE/HOSPICE - HEALTHEAST (OUTPATIENT)
Dept: HOME HEALTH SERVICES | Facility: HOME HEALTH | Age: 83
End: 2018-06-27

## 2018-06-28 ENCOUNTER — HOME CARE/HOSPICE - HEALTHEAST (OUTPATIENT)
Dept: HOME HEALTH SERVICES | Facility: HOME HEALTH | Age: 83
End: 2018-06-28

## 2018-07-01 ENCOUNTER — HOME CARE/HOSPICE - HEALTHEAST (OUTPATIENT)
Dept: HOME HEALTH SERVICES | Facility: HOME HEALTH | Age: 83
End: 2018-07-01

## 2018-07-02 ENCOUNTER — COMMUNICATION - HEALTHEAST (OUTPATIENT)
Dept: SCHEDULING | Facility: CLINIC | Age: 83
End: 2018-07-02

## 2018-07-05 ENCOUNTER — HOME CARE/HOSPICE - HEALTHEAST (OUTPATIENT)
Dept: HOME HEALTH SERVICES | Facility: HOME HEALTH | Age: 83
End: 2018-07-05

## 2018-07-06 ENCOUNTER — RECORDS - HEALTHEAST (OUTPATIENT)
Dept: ADMINISTRATIVE | Facility: OTHER | Age: 83
End: 2018-07-06

## 2018-07-17 ENCOUNTER — RECORDS - HEALTHEAST (OUTPATIENT)
Dept: LAB | Facility: CLINIC | Age: 83
End: 2018-07-17

## 2018-07-17 LAB
ALBUMIN SERPL-MCNC: 3.3 G/DL (ref 3.5–5)
ALP SERPL-CCNC: 82 U/L (ref 45–120)
ALT SERPL W P-5'-P-CCNC: 12 U/L (ref 0–45)
ANION GAP SERPL CALCULATED.3IONS-SCNC: 10 MMOL/L (ref 5–18)
AST SERPL W P-5'-P-CCNC: 20 U/L (ref 0–40)
BILIRUB SERPL-MCNC: 0.6 MG/DL (ref 0–1)
BUN SERPL-MCNC: 23 MG/DL (ref 8–28)
CALCIUM SERPL-MCNC: 9.4 MG/DL (ref 8.5–10.5)
CHLORIDE BLD-SCNC: 104 MMOL/L (ref 98–107)
CO2 SERPL-SCNC: 26 MMOL/L (ref 22–31)
CREAT SERPL-MCNC: 1.25 MG/DL (ref 0.7–1.3)
GFR SERPL CREATININE-BSD FRML MDRD: 55 ML/MIN/1.73M2
GLUCOSE BLD-MCNC: 87 MG/DL (ref 70–125)
LIPASE SERPL-CCNC: 23 U/L (ref 0–52)
POTASSIUM BLD-SCNC: 4.2 MMOL/L (ref 3.5–5)
PROT SERPL-MCNC: 6.8 G/DL (ref 6–8)
SODIUM SERPL-SCNC: 140 MMOL/L (ref 136–145)

## 2018-07-25 ENCOUNTER — COMMUNICATION - HEALTHEAST (OUTPATIENT)
Dept: CARDIOLOGY | Facility: CLINIC | Age: 83
End: 2018-07-25

## 2018-07-31 ENCOUNTER — COMMUNICATION - HEALTHEAST (OUTPATIENT)
Dept: CARDIOLOGY | Facility: CLINIC | Age: 83
End: 2018-07-31

## 2018-07-31 DIAGNOSIS — I48.91 A-FIB (H): ICD-10-CM

## 2018-08-01 ENCOUNTER — COMMUNICATION - HEALTHEAST (OUTPATIENT)
Dept: CARDIOLOGY | Facility: CLINIC | Age: 83
End: 2018-08-01

## 2018-08-02 ENCOUNTER — AMBULATORY - HEALTHEAST (OUTPATIENT)
Dept: CARDIOLOGY | Facility: CLINIC | Age: 83
End: 2018-08-02

## 2018-08-02 DIAGNOSIS — I47.29 PAROXYSMAL VT (H): ICD-10-CM

## 2018-08-02 LAB — INTERNATIONAL NORMALIZATION RATIO, POC - HISTORICAL: 2.9

## 2018-08-16 ENCOUNTER — AMBULATORY - HEALTHEAST (OUTPATIENT)
Dept: CARDIOLOGY | Facility: CLINIC | Age: 83
End: 2018-08-16

## 2018-08-16 DIAGNOSIS — I47.29 PAROXYSMAL VT (H): ICD-10-CM

## 2018-08-16 LAB — INTERNATIONAL NORMALIZATION RATIO, POC - HISTORICAL: 2.7

## 2018-08-17 ENCOUNTER — OFFICE VISIT - HEALTHEAST (OUTPATIENT)
Dept: PULMONOLOGY | Facility: OTHER | Age: 83
End: 2018-08-17

## 2018-08-17 DIAGNOSIS — J47.9 BRONCHIECTASIS WITHOUT COMPLICATION (H): ICD-10-CM

## 2018-08-17 DIAGNOSIS — J98.4 RESTRICTIVE LUNG DISEASE: ICD-10-CM

## 2018-08-17 DIAGNOSIS — J45.20 MILD INTERMITTENT ASTHMA WITHOUT COMPLICATION: ICD-10-CM

## 2018-08-21 ENCOUNTER — RECORDS - HEALTHEAST (OUTPATIENT)
Dept: ADMINISTRATIVE | Facility: OTHER | Age: 83
End: 2018-08-21

## 2018-08-23 ENCOUNTER — COMMUNICATION - HEALTHEAST (OUTPATIENT)
Dept: CARDIOLOGY | Facility: CLINIC | Age: 83
End: 2018-08-23

## 2018-08-23 ENCOUNTER — AMBULATORY - HEALTHEAST (OUTPATIENT)
Dept: CARDIOLOGY | Facility: CLINIC | Age: 83
End: 2018-08-23

## 2018-08-23 DIAGNOSIS — Z95.810 ICD (IMPLANTABLE CARDIOVERTER-DEFIBRILLATOR), DUAL, IN SITU: ICD-10-CM

## 2018-08-24 ENCOUNTER — AMBULATORY - HEALTHEAST (OUTPATIENT)
Dept: PULMONOLOGY | Facility: OTHER | Age: 83
End: 2018-08-24

## 2018-08-24 DIAGNOSIS — J47.0 BRONCHIECTASIS WITH ACUTE LOWER RESPIRATORY INFECTION (H): ICD-10-CM

## 2018-08-28 ENCOUNTER — AMBULATORY - HEALTHEAST (OUTPATIENT)
Dept: CARE COORDINATION | Facility: CLINIC | Age: 83
End: 2018-08-28

## 2018-08-30 ENCOUNTER — RECORDS - HEALTHEAST (OUTPATIENT)
Dept: LAB | Facility: CLINIC | Age: 83
End: 2018-08-30

## 2018-08-30 LAB
ANION GAP SERPL CALCULATED.3IONS-SCNC: 9 MMOL/L (ref 5–18)
BUN SERPL-MCNC: 45 MG/DL (ref 8–28)
CALCIUM SERPL-MCNC: 9.6 MG/DL (ref 8.5–10.5)
CHLORIDE BLD-SCNC: 103 MMOL/L (ref 98–107)
CO2 SERPL-SCNC: 29 MMOL/L (ref 22–31)
CREAT SERPL-MCNC: 1.63 MG/DL (ref 0.7–1.3)
GFR SERPL CREATININE-BSD FRML MDRD: 40 ML/MIN/1.73M2
GLUCOSE BLD-MCNC: 135 MG/DL (ref 70–125)
POTASSIUM BLD-SCNC: 3.6 MMOL/L (ref 3.5–5)
SODIUM SERPL-SCNC: 141 MMOL/L (ref 136–145)

## 2018-09-04 ENCOUNTER — AMBULATORY - HEALTHEAST (OUTPATIENT)
Dept: CARDIOLOGY | Facility: CLINIC | Age: 83
End: 2018-09-04

## 2018-09-04 DIAGNOSIS — I47.29 PAROXYSMAL VT (H): ICD-10-CM

## 2018-09-04 LAB
ANION GAP SERPL CALCULATED.3IONS-SCNC: 10 MMOL/L (ref 3–14)
BUN SERPL-MCNC: 42 MG/DL (ref 7–30)
CALCIUM SERPL-MCNC: 8.7 MG/DL (ref 8.5–10.1)
CHLORIDE SERPL-SCNC: 104 MMOL/L (ref 94–109)
CO2 SERPL-SCNC: 30 MMOL/L (ref 20–32)
CREAT SERPL-MCNC: 1.53 MG/DL (ref 0.66–1.25)
GFR SERPL CREATININE-BSD FRML MDRD: 43 ML/MIN/1.7M2
GLUCOSE SERPL-MCNC: 107 MG/DL (ref 70–99)
INTERNATIONAL NORMALIZATION RATIO, POC - HISTORICAL: 2.5
POTASSIUM SERPL-SCNC: 4 MMOL/L (ref 3.4–5.3)
SODIUM SERPL-SCNC: 144 MMOL/L (ref 133–144)

## 2018-09-04 PROCEDURE — 80048 BASIC METABOLIC PNL TOTAL CA: CPT | Performed by: FAMILY MEDICINE

## 2018-09-06 ENCOUNTER — RECORDS - HEALTHEAST (OUTPATIENT)
Dept: ADMINISTRATIVE | Facility: OTHER | Age: 83
End: 2018-09-06

## 2018-09-06 ENCOUNTER — AMBULATORY - HEALTHEAST (OUTPATIENT)
Dept: CARDIOLOGY | Facility: CLINIC | Age: 83
End: 2018-09-06

## 2018-09-10 ENCOUNTER — COMMUNICATION - HEALTHEAST (OUTPATIENT)
Dept: CARDIOLOGY | Facility: CLINIC | Age: 83
End: 2018-09-10

## 2018-09-11 ENCOUNTER — OFFICE VISIT - HEALTHEAST (OUTPATIENT)
Dept: CARDIOLOGY | Facility: CLINIC | Age: 83
End: 2018-09-11

## 2018-09-11 ENCOUNTER — AMBULATORY - HEALTHEAST (OUTPATIENT)
Dept: CARDIOLOGY | Facility: CLINIC | Age: 83
End: 2018-09-11

## 2018-09-11 ENCOUNTER — COMMUNICATION - HEALTHEAST (OUTPATIENT)
Dept: CARDIOLOGY | Facility: CLINIC | Age: 83
End: 2018-09-11

## 2018-09-11 DIAGNOSIS — I50.30 HEART FAILURE WITH PRESERVED EJECTION FRACTION (H): ICD-10-CM

## 2018-09-11 DIAGNOSIS — I25.5 ISCHEMIC CARDIOMYOPATHY: ICD-10-CM

## 2018-09-11 DIAGNOSIS — Z95.810 ICD (IMPLANTABLE CARDIOVERTER-DEFIBRILLATOR) IN PLACE: ICD-10-CM

## 2018-09-11 LAB
ANION GAP SERPL CALCULATED.3IONS-SCNC: 9 MMOL/L (ref 5–18)
BNP SERPL-MCNC: 1379 PG/ML (ref 0–93)
BUN SERPL-MCNC: 51 MG/DL (ref 8–28)
CALCIUM SERPL-MCNC: 9.6 MG/DL (ref 8.5–10.5)
CHLORIDE BLD-SCNC: 104 MMOL/L (ref 98–107)
CO2 SERPL-SCNC: 28 MMOL/L (ref 22–31)
CREAT SERPL-MCNC: 1.74 MG/DL (ref 0.7–1.3)
GFR SERPL CREATININE-BSD FRML MDRD: 37 ML/MIN/1.73M2
GLUCOSE BLD-MCNC: 87 MG/DL (ref 70–125)
HCC DEVICE COMMENTS: NORMAL
HCC DEVICE IMPLANTING PROVIDER: NORMAL
HCC DEVICE MANUFACTURE: NORMAL
HCC DEVICE MODEL: NORMAL
HCC DEVICE SERIAL NUMBER: NORMAL
HCC DEVICE TYPE: NORMAL
POTASSIUM BLD-SCNC: 4.2 MMOL/L (ref 3.5–5)
SODIUM SERPL-SCNC: 141 MMOL/L (ref 136–145)

## 2018-09-11 ASSESSMENT — MIFFLIN-ST. JEOR: SCORE: 1404.27

## 2018-09-12 ENCOUNTER — COMMUNICATION - HEALTHEAST (OUTPATIENT)
Dept: CARDIOLOGY | Facility: CLINIC | Age: 83
End: 2018-09-12

## 2018-09-12 ENCOUNTER — SURGERY - HEALTHEAST (OUTPATIENT)
Dept: CARDIOLOGY | Facility: CLINIC | Age: 83
End: 2018-09-12

## 2018-09-12 ENCOUNTER — AMBULATORY - HEALTHEAST (OUTPATIENT)
Dept: CARDIOLOGY | Facility: CLINIC | Age: 83
End: 2018-09-12

## 2018-09-12 DIAGNOSIS — I48.91 ATRIAL FIBRILLATION (H): ICD-10-CM

## 2018-09-13 ENCOUNTER — COMMUNICATION - HEALTHEAST (OUTPATIENT)
Dept: CARDIOLOGY | Facility: CLINIC | Age: 83
End: 2018-09-13

## 2018-09-13 ENCOUNTER — AMBULATORY - HEALTHEAST (OUTPATIENT)
Dept: PULMONOLOGY | Facility: OTHER | Age: 83
End: 2018-09-13

## 2018-09-13 ENCOUNTER — RECORDS - HEALTHEAST (OUTPATIENT)
Dept: ADMINISTRATIVE | Facility: OTHER | Age: 83
End: 2018-09-13

## 2018-09-13 DIAGNOSIS — J47.0 BRONCHIECTASIS WITH ACUTE LOWER RESPIRATORY INFECTION (H): ICD-10-CM

## 2018-09-17 ENCOUNTER — ANESTHESIA - HEALTHEAST (OUTPATIENT)
Dept: CARDIOLOGY | Facility: CLINIC | Age: 83
End: 2018-09-17

## 2018-09-25 ENCOUNTER — AMBULATORY - HEALTHEAST (OUTPATIENT)
Dept: PULMONOLOGY | Facility: OTHER | Age: 83
End: 2018-09-25

## 2018-09-25 ENCOUNTER — AMBULATORY - HEALTHEAST (OUTPATIENT)
Dept: CARDIOLOGY | Facility: CLINIC | Age: 83
End: 2018-09-25

## 2018-09-25 DIAGNOSIS — B37.0 THRUSH: ICD-10-CM

## 2018-09-25 DIAGNOSIS — I48.19 PERSISTENT ATRIAL FIBRILLATION (H): ICD-10-CM

## 2018-09-28 ENCOUNTER — RECORDS - HEALTHEAST (OUTPATIENT)
Dept: LAB | Facility: CLINIC | Age: 83
End: 2018-09-28

## 2018-09-28 LAB
ANION GAP SERPL CALCULATED.3IONS-SCNC: 14 MMOL/L (ref 5–18)
BUN SERPL-MCNC: 68 MG/DL (ref 8–28)
CALCIUM SERPL-MCNC: 9.2 MG/DL (ref 8.5–10.5)
CHLORIDE BLD-SCNC: 91 MMOL/L (ref 98–107)
CO2 SERPL-SCNC: 33 MMOL/L (ref 22–31)
CREAT SERPL-MCNC: 1.68 MG/DL (ref 0.7–1.3)
GFR SERPL CREATININE-BSD FRML MDRD: 39 ML/MIN/1.73M2
GLUCOSE BLD-MCNC: 118 MG/DL (ref 70–125)
POTASSIUM BLD-SCNC: 4.1 MMOL/L (ref 3.5–5)
SODIUM SERPL-SCNC: 138 MMOL/L (ref 136–145)

## 2018-10-02 ENCOUNTER — COMMUNICATION - HEALTHEAST (OUTPATIENT)
Dept: PHARMACY | Facility: CLINIC | Age: 83
End: 2018-10-02

## 2018-10-04 ENCOUNTER — OFFICE VISIT - HEALTHEAST (OUTPATIENT)
Dept: CARDIOLOGY | Facility: CLINIC | Age: 83
End: 2018-10-04

## 2018-10-04 ENCOUNTER — AMBULATORY - HEALTHEAST (OUTPATIENT)
Dept: CARDIOLOGY | Facility: CLINIC | Age: 83
End: 2018-10-04

## 2018-10-04 DIAGNOSIS — I34.0 NON-RHEUMATIC MITRAL REGURGITATION: ICD-10-CM

## 2018-10-04 DIAGNOSIS — I35.0 AORTIC STENOSIS: ICD-10-CM

## 2018-10-04 DIAGNOSIS — N18.30 STAGE 3 CHRONIC KIDNEY DISEASE (H): ICD-10-CM

## 2018-10-04 DIAGNOSIS — I50.41 ACUTE COMBINED SYSTOLIC AND DIASTOLIC CONGESTIVE HEART FAILURE (H): ICD-10-CM

## 2018-10-04 LAB
ANION GAP SERPL CALCULATED.3IONS-SCNC: 8 MMOL/L (ref 5–18)
BUN SERPL-MCNC: 42 MG/DL (ref 8–28)
CALCIUM SERPL-MCNC: 9.3 MG/DL (ref 8.5–10.5)
CHLORIDE BLD-SCNC: 96 MMOL/L (ref 98–107)
CO2 SERPL-SCNC: 34 MMOL/L (ref 22–31)
CREAT SERPL-MCNC: 1.34 MG/DL (ref 0.7–1.3)
GFR SERPL CREATININE-BSD FRML MDRD: 50 ML/MIN/1.73M2
GLUCOSE BLD-MCNC: 96 MG/DL (ref 70–125)
POTASSIUM BLD-SCNC: 4.5 MMOL/L (ref 3.5–5)
SODIUM SERPL-SCNC: 138 MMOL/L (ref 136–145)

## 2018-10-04 ASSESSMENT — MIFFLIN-ST. JEOR: SCORE: 1367.53

## 2018-10-05 ENCOUNTER — COMMUNICATION - HEALTHEAST (OUTPATIENT)
Dept: CARDIOLOGY | Facility: CLINIC | Age: 83
End: 2018-10-05

## 2018-10-09 ENCOUNTER — AMBULATORY - HEALTHEAST (OUTPATIENT)
Dept: CARDIOLOGY | Facility: CLINIC | Age: 83
End: 2018-10-09

## 2018-10-09 DIAGNOSIS — I34.0 SEVERE MITRAL REGURGITATION: ICD-10-CM

## 2018-10-09 DIAGNOSIS — I48.91 ATRIAL FIBRILLATION (H): ICD-10-CM

## 2018-10-09 DIAGNOSIS — I35.0 AORTIC STENOSIS: ICD-10-CM

## 2018-10-09 LAB — INTERNATIONAL NORMALIZATION RATIO, POC - HISTORICAL: 1.9

## 2018-10-10 ENCOUNTER — AMBULATORY - HEALTHEAST (OUTPATIENT)
Dept: CARDIOLOGY | Facility: CLINIC | Age: 83
End: 2018-10-10

## 2018-10-10 ENCOUNTER — OFFICE VISIT - HEALTHEAST (OUTPATIENT)
Dept: CARDIOLOGY | Facility: CLINIC | Age: 83
End: 2018-10-10

## 2018-10-10 DIAGNOSIS — I35.0 SEVERE AORTIC STENOSIS: ICD-10-CM

## 2018-10-10 DIAGNOSIS — I34.0 SEVERE MITRAL REGURGITATION: ICD-10-CM

## 2018-10-10 DIAGNOSIS — I35.0 AORTIC STENOSIS: ICD-10-CM

## 2018-10-10 LAB
ALBUMIN SERPL-MCNC: 2.9 G/DL (ref 3.5–5)
ALP SERPL-CCNC: 99 U/L (ref 45–120)
ALT SERPL W P-5'-P-CCNC: 32 U/L (ref 0–45)
ANION GAP SERPL CALCULATED.3IONS-SCNC: 8 MMOL/L (ref 5–18)
AST SERPL W P-5'-P-CCNC: 35 U/L (ref 0–40)
BILIRUB SERPL-MCNC: 0.4 MG/DL (ref 0–1)
BUN SERPL-MCNC: 44 MG/DL (ref 8–28)
CALCIUM SERPL-MCNC: 9.4 MG/DL (ref 8.5–10.5)
CHLORIDE BLD-SCNC: 95 MMOL/L (ref 98–107)
CO2 SERPL-SCNC: 33 MMOL/L (ref 22–31)
CREAT SERPL-MCNC: 1.43 MG/DL (ref 0.7–1.3)
ERYTHROCYTE [DISTWIDTH] IN BLOOD BY AUTOMATED COUNT: 17.2 % (ref 11–14.5)
GFR SERPL CREATININE-BSD FRML MDRD: 47 ML/MIN/1.73M2
GLUCOSE BLD-MCNC: 122 MG/DL (ref 70–125)
HCT VFR BLD AUTO: 41 % (ref 40–54)
HGB BLD-MCNC: 12.4 G/DL (ref 14–18)
MCH RBC QN AUTO: 25.8 PG (ref 27–34)
MCHC RBC AUTO-ENTMCNC: 30.2 G/DL (ref 32–36)
MCV RBC AUTO: 85 FL (ref 80–100)
PLATELET # BLD AUTO: 189 THOU/UL (ref 140–440)
PMV BLD AUTO: 10.2 FL (ref 8.5–12.5)
POTASSIUM BLD-SCNC: 4.6 MMOL/L (ref 3.5–5)
PROT SERPL-MCNC: 7.4 G/DL (ref 6–8)
RBC # BLD AUTO: 4.8 MILL/UL (ref 4.4–6.2)
SODIUM SERPL-SCNC: 136 MMOL/L (ref 136–145)
WBC: 7.7 THOU/UL (ref 4–11)

## 2018-10-10 ASSESSMENT — MIFFLIN-ST. JEOR: SCORE: 1363.9

## 2018-10-12 ENCOUNTER — COMMUNICATION - HEALTHEAST (OUTPATIENT)
Dept: PULMONOLOGY | Facility: OTHER | Age: 83
End: 2018-10-12

## 2018-10-16 ENCOUNTER — OFFICE VISIT - HEALTHEAST (OUTPATIENT)
Dept: CARDIOLOGY | Facility: CLINIC | Age: 83
End: 2018-10-16

## 2018-10-16 DIAGNOSIS — I35.0 NONRHEUMATIC AORTIC VALVE STENOSIS: ICD-10-CM

## 2018-10-16 ASSESSMENT — MIFFLIN-ST. JEOR: SCORE: 1362.99

## 2018-10-17 ENCOUNTER — SURGERY - HEALTHEAST (OUTPATIENT)
Dept: CARDIOLOGY | Facility: CLINIC | Age: 83
End: 2018-10-17

## 2018-10-18 ENCOUNTER — SURGERY - HEALTHEAST (OUTPATIENT)
Dept: CARDIOLOGY | Facility: CLINIC | Age: 83
End: 2018-10-18

## 2018-10-18 ASSESSMENT — MIFFLIN-ST. JEOR: SCORE: 1356.38

## 2018-10-19 ENCOUNTER — HOME CARE/HOSPICE - HEALTHEAST (OUTPATIENT)
Dept: HOME HEALTH SERVICES | Facility: HOME HEALTH | Age: 83
End: 2018-10-19

## 2018-10-19 ASSESSMENT — MIFFLIN-ST. JEOR: SCORE: 1336.91

## 2018-10-22 ENCOUNTER — AMBULATORY - HEALTHEAST (OUTPATIENT)
Dept: LAB | Facility: CLINIC | Age: 83
End: 2018-10-22

## 2018-10-22 DIAGNOSIS — N18.30 CHRONIC KIDNEY DISEASE, STAGE III (MODERATE) (H): ICD-10-CM

## 2018-10-22 DIAGNOSIS — I35.0 AS (AORTIC STENOSIS): ICD-10-CM

## 2018-10-22 DIAGNOSIS — I48.91 ATRIAL FIBRILLATION (H): ICD-10-CM

## 2018-10-22 DIAGNOSIS — I10 ESSENTIAL HYPERTENSION, MALIGNANT: ICD-10-CM

## 2018-10-22 DIAGNOSIS — I50.9 HEART FAILURE, UNSPECIFIED (H): ICD-10-CM

## 2018-10-22 DIAGNOSIS — I25.10 CORONARY ATHEROSCLEROSIS OF NATIVE CORONARY ARTERY: ICD-10-CM

## 2018-10-22 LAB
ANION GAP SERPL CALCULATED.3IONS-SCNC: 10 MMOL/L (ref 5–18)
BUN SERPL-MCNC: 42 MG/DL (ref 8–28)
CALCIUM SERPL-MCNC: 9.7 MG/DL (ref 8.5–10.5)
CHLORIDE BLD-SCNC: 100 MMOL/L (ref 98–107)
CO2 SERPL-SCNC: 33 MMOL/L (ref 22–31)
CREAT SERPL-MCNC: 1.58 MG/DL (ref 0.7–1.3)
GFR SERPL CREATININE-BSD FRML MDRD: 42 ML/MIN/1.73M2
GLUCOSE BLD-MCNC: 103 MG/DL (ref 70–125)
POTASSIUM BLD-SCNC: 4.8 MMOL/L (ref 3.5–5)
SODIUM SERPL-SCNC: 143 MMOL/L (ref 136–145)

## 2018-10-25 ENCOUNTER — OFFICE VISIT - HEALTHEAST (OUTPATIENT)
Dept: CARDIOLOGY | Facility: CLINIC | Age: 83
End: 2018-10-25

## 2018-10-25 ENCOUNTER — HOSPITAL ENCOUNTER (OUTPATIENT)
Dept: CT IMAGING | Facility: CLINIC | Age: 83
Discharge: HOME | End: 2018-10-25
Attending: INTERNAL MEDICINE

## 2018-10-25 DIAGNOSIS — I35.0 SEVERE AORTIC STENOSIS: ICD-10-CM

## 2018-10-25 LAB
CREAT BLD-MCNC: 1.6 MG/DL
POC GFR AMER AF HE - HISTORICAL: 49 ML/MIN/1.73M2
POC GFR NON AMER AF HE - HISTORICAL: 41 ML/MIN/1.73M2

## 2018-10-25 ASSESSMENT — MIFFLIN-ST. JEOR: SCORE: 1353.92

## 2018-10-29 ENCOUNTER — AMBULATORY - HEALTHEAST (OUTPATIENT)
Dept: CARDIOLOGY | Facility: CLINIC | Age: 83
End: 2018-10-29

## 2018-10-29 ENCOUNTER — AMBULATORY - HEALTHEAST (OUTPATIENT)
Dept: LAB | Facility: CLINIC | Age: 83
End: 2018-10-29

## 2018-10-29 DIAGNOSIS — I48.91 ATRIAL FIBRILLATION (H): ICD-10-CM

## 2018-10-29 DIAGNOSIS — I50.9 HEART FAILURE, UNSPECIFIED (H): ICD-10-CM

## 2018-10-29 DIAGNOSIS — I25.10 CORONARY ATHEROSCLEROSIS OF NATIVE CORONARY ARTERY: ICD-10-CM

## 2018-10-29 DIAGNOSIS — N18.30 CHRONIC KIDNEY DISEASE, STAGE III (MODERATE) (H): ICD-10-CM

## 2018-10-29 DIAGNOSIS — I10 ESSENTIAL HYPERTENSION, MALIGNANT: ICD-10-CM

## 2018-10-29 LAB
ANION GAP SERPL CALCULATED.3IONS-SCNC: 10 MMOL/L (ref 5–18)
BUN SERPL-MCNC: 44 MG/DL (ref 8–28)
CALCIUM SERPL-MCNC: 9.4 MG/DL (ref 8.5–10.5)
CHLORIDE BLD-SCNC: 100 MMOL/L (ref 98–107)
CO2 SERPL-SCNC: 31 MMOL/L (ref 22–31)
CREAT SERPL-MCNC: 1.59 MG/DL (ref 0.7–1.3)
GFR SERPL CREATININE-BSD FRML MDRD: 41 ML/MIN/1.73M2
GLUCOSE BLD-MCNC: 125 MG/DL (ref 70–125)
INTERNATIONAL NORMALIZATION RATIO, POC - HISTORICAL: 1.5
POTASSIUM BLD-SCNC: 3.8 MMOL/L (ref 3.5–5)
SODIUM SERPL-SCNC: 141 MMOL/L (ref 136–145)

## 2018-11-01 ENCOUNTER — COMMUNICATION - HEALTHEAST (OUTPATIENT)
Dept: CARDIOLOGY | Facility: CLINIC | Age: 83
End: 2018-11-01

## 2018-11-01 DIAGNOSIS — I35.0 SEVERE AORTIC STENOSIS: ICD-10-CM

## 2018-11-02 ENCOUNTER — OFFICE VISIT - HEALTHEAST (OUTPATIENT)
Dept: CARDIOLOGY | Facility: CLINIC | Age: 83
End: 2018-11-02

## 2018-11-02 ENCOUNTER — AMBULATORY - HEALTHEAST (OUTPATIENT)
Dept: CARDIOLOGY | Facility: CLINIC | Age: 83
End: 2018-11-02

## 2018-11-02 DIAGNOSIS — I25.10 CORONARY ARTERY DISEASE DUE TO LIPID RICH PLAQUE: ICD-10-CM

## 2018-11-02 DIAGNOSIS — I48.91 ATRIAL FIBRILLATION (H): ICD-10-CM

## 2018-11-02 DIAGNOSIS — I25.83 CORONARY ARTERY DISEASE DUE TO LIPID RICH PLAQUE: ICD-10-CM

## 2018-11-02 DIAGNOSIS — I34.0 NON-RHEUMATIC MITRAL REGURGITATION: ICD-10-CM

## 2018-11-02 DIAGNOSIS — I35.0 SEVERE AORTIC STENOSIS: ICD-10-CM

## 2018-11-02 LAB — INTERNATIONAL NORMALIZATION RATIO, POC - HISTORICAL: 3

## 2018-11-02 ASSESSMENT — MIFFLIN-ST. JEOR: SCORE: 1362.99

## 2018-11-05 ENCOUNTER — AMBULATORY - HEALTHEAST (OUTPATIENT)
Dept: CARDIOLOGY | Facility: CLINIC | Age: 83
End: 2018-11-05

## 2018-11-05 ENCOUNTER — HOSPITAL ENCOUNTER (OUTPATIENT)
Dept: CT IMAGING | Facility: CLINIC | Age: 83
Discharge: HOME | End: 2018-11-05
Attending: INTERNAL MEDICINE

## 2018-11-05 DIAGNOSIS — I35.0 SEVERE AORTIC STENOSIS: ICD-10-CM

## 2018-11-05 DIAGNOSIS — N18.30 CKD (CHRONIC KIDNEY DISEASE) STAGE 3, GFR 30-59 ML/MIN (H): ICD-10-CM

## 2018-11-07 ENCOUNTER — COMMUNICATION - HEALTHEAST (OUTPATIENT)
Dept: CARDIOLOGY | Facility: CLINIC | Age: 83
End: 2018-11-07

## 2018-11-12 ENCOUNTER — OFFICE VISIT - HEALTHEAST (OUTPATIENT)
Dept: GERIATRICS | Facility: CLINIC | Age: 83
End: 2018-11-12

## 2018-11-12 ENCOUNTER — COMMUNICATION - HEALTHEAST (OUTPATIENT)
Dept: GERIATRICS | Facility: CLINIC | Age: 83
End: 2018-11-12

## 2018-11-12 ENCOUNTER — RECORDS - HEALTHEAST (OUTPATIENT)
Dept: LAB | Facility: CLINIC | Age: 83
End: 2018-11-12

## 2018-11-12 DIAGNOSIS — K59.04 CHRONIC IDIOPATHIC CONSTIPATION: ICD-10-CM

## 2018-11-12 DIAGNOSIS — J96.11 CHRONIC HYPOXEMIC RESPIRATORY FAILURE (H): ICD-10-CM

## 2018-11-12 DIAGNOSIS — Z95.810 ICD (IMPLANTABLE CARDIOVERTER-DEFIBRILLATOR) IN PLACE: ICD-10-CM

## 2018-11-12 DIAGNOSIS — J98.4 RESTRICTIVE LUNG DISEASE: ICD-10-CM

## 2018-11-12 DIAGNOSIS — E87.6 HYPOKALEMIA: ICD-10-CM

## 2018-11-12 DIAGNOSIS — N17.9 ACUTE KIDNEY INJURY SUPERIMPOSED ON CHRONIC KIDNEY DISEASE (H): ICD-10-CM

## 2018-11-12 DIAGNOSIS — I48.0 PAROXYSMAL ATRIAL FIBRILLATION (H): ICD-10-CM

## 2018-11-12 DIAGNOSIS — I50.43 ACUTE ON CHRONIC COMBINED SYSTOLIC AND DIASTOLIC CONGESTIVE HEART FAILURE (H): ICD-10-CM

## 2018-11-12 DIAGNOSIS — J47.9 BRONCHIECTASIS WITHOUT ACUTE EXACERBATION (H): ICD-10-CM

## 2018-11-12 DIAGNOSIS — J96.21 ACUTE ON CHRONIC RESPIRATORY FAILURE WITH HYPOXIA (H): ICD-10-CM

## 2018-11-12 DIAGNOSIS — I35.0 SEVERE AORTIC STENOSIS: ICD-10-CM

## 2018-11-12 DIAGNOSIS — N18.9 ACUTE KIDNEY INJURY SUPERIMPOSED ON CHRONIC KIDNEY DISEASE (H): ICD-10-CM

## 2018-11-12 DIAGNOSIS — I95.0 IDIOPATHIC HYPOTENSION: ICD-10-CM

## 2018-11-12 LAB
ANION GAP SERPL CALCULATED.3IONS-SCNC: 10 MMOL/L (ref 5–18)
BUN SERPL-MCNC: 36 MG/DL (ref 8–28)
CALCIUM SERPL-MCNC: 9.4 MG/DL (ref 8.5–10.5)
CHLORIDE BLD-SCNC: 99 MMOL/L (ref 98–107)
CO2 SERPL-SCNC: 31 MMOL/L (ref 22–31)
CREAT SERPL-MCNC: 1.62 MG/DL (ref 0.7–1.3)
GFR SERPL CREATININE-BSD FRML MDRD: 41 ML/MIN/1.73M2
GLUCOSE BLD-MCNC: 98 MG/DL (ref 70–125)
INR PPP: 2.57 (ref 0.9–1.1)
POTASSIUM BLD-SCNC: 4.1 MMOL/L (ref 3.5–5)
SODIUM SERPL-SCNC: 140 MMOL/L (ref 136–145)

## 2018-11-13 ENCOUNTER — RECORDS - HEALTHEAST (OUTPATIENT)
Dept: LAB | Facility: CLINIC | Age: 83
End: 2018-11-13

## 2018-11-13 LAB
BASOPHILS # BLD AUTO: 0.1 THOU/UL (ref 0–0.2)
BASOPHILS NFR BLD AUTO: 1 % (ref 0–2)
EOSINOPHIL # BLD AUTO: 0.3 THOU/UL (ref 0–0.4)
EOSINOPHIL NFR BLD AUTO: 3 % (ref 0–6)
ERYTHROCYTE [DISTWIDTH] IN BLOOD BY AUTOMATED COUNT: 17.3 % (ref 11–14.5)
HCT VFR BLD AUTO: 35.8 % (ref 40–54)
HGB BLD-MCNC: 10.9 G/DL (ref 14–18)
LYMPHOCYTES # BLD AUTO: 1 THOU/UL (ref 0.8–4.4)
LYMPHOCYTES NFR BLD AUTO: 9 % (ref 20–40)
MCH RBC QN AUTO: 25.2 PG (ref 27–34)
MCHC RBC AUTO-ENTMCNC: 30.4 G/DL (ref 32–36)
MCV RBC AUTO: 83 FL (ref 80–100)
MONOCYTES # BLD AUTO: 1.2 THOU/UL (ref 0–0.9)
MONOCYTES NFR BLD AUTO: 11 % (ref 2–10)
NEUTROPHILS # BLD AUTO: 8.4 THOU/UL (ref 2–7.7)
NEUTROPHILS NFR BLD AUTO: 77 % (ref 50–70)
PLATELET # BLD AUTO: 186 THOU/UL (ref 140–440)
PMV BLD AUTO: 10.7 FL (ref 8.5–12.5)
RBC # BLD AUTO: 4.33 MILL/UL (ref 4.4–6.2)
WBC: 11.1 THOU/UL (ref 4–11)

## 2018-11-14 ENCOUNTER — OFFICE VISIT - HEALTHEAST (OUTPATIENT)
Dept: GERIATRICS | Facility: CLINIC | Age: 83
End: 2018-11-14

## 2018-11-14 DIAGNOSIS — I48.19 PERSISTENT ATRIAL FIBRILLATION (H): ICD-10-CM

## 2018-11-14 DIAGNOSIS — N18.30 STAGE 3 CHRONIC KIDNEY DISEASE (H): ICD-10-CM

## 2018-11-14 DIAGNOSIS — I10 ESSENTIAL HYPERTENSION: ICD-10-CM

## 2018-11-14 DIAGNOSIS — J98.4 RESTRICTIVE LUNG DISEASE: ICD-10-CM

## 2018-11-14 DIAGNOSIS — N17.9 ACUTE KIDNEY INJURY SUPERIMPOSED ON CHRONIC KIDNEY DISEASE (H): ICD-10-CM

## 2018-11-14 DIAGNOSIS — J96.21 ACUTE ON CHRONIC RESPIRATORY FAILURE WITH HYPOXIA (H): ICD-10-CM

## 2018-11-14 DIAGNOSIS — I48.0 PAROXYSMAL ATRIAL FIBRILLATION (H): ICD-10-CM

## 2018-11-14 DIAGNOSIS — N18.9 ACUTE KIDNEY INJURY SUPERIMPOSED ON CHRONIC KIDNEY DISEASE (H): ICD-10-CM

## 2018-11-14 DIAGNOSIS — Z95.810 ICD (IMPLANTABLE CARDIOVERTER-DEFIBRILLATOR) IN PLACE: ICD-10-CM

## 2018-11-14 DIAGNOSIS — J96.11 CHRONIC HYPOXEMIC RESPIRATORY FAILURE (H): ICD-10-CM

## 2018-11-14 DIAGNOSIS — J47.9 BRONCHIECTASIS WITHOUT ACUTE EXACERBATION (H): ICD-10-CM

## 2018-11-14 DIAGNOSIS — I50.43 ACUTE ON CHRONIC COMBINED SYSTOLIC AND DIASTOLIC CONGESTIVE HEART FAILURE (H): ICD-10-CM

## 2018-11-15 ENCOUNTER — RECORDS - HEALTHEAST (OUTPATIENT)
Dept: LAB | Facility: CLINIC | Age: 83
End: 2018-11-15

## 2018-11-15 ENCOUNTER — COMMUNICATION - HEALTHEAST (OUTPATIENT)
Dept: GERIATRICS | Facility: CLINIC | Age: 83
End: 2018-11-15

## 2018-11-15 ENCOUNTER — OFFICE VISIT - HEALTHEAST (OUTPATIENT)
Dept: GERIATRICS | Facility: CLINIC | Age: 83
End: 2018-11-15

## 2018-11-15 DIAGNOSIS — Z95.810 ICD (IMPLANTABLE CARDIOVERTER-DEFIBRILLATOR) IN PLACE: ICD-10-CM

## 2018-11-15 DIAGNOSIS — J96.21 ACUTE ON CHRONIC RESPIRATORY FAILURE WITH HYPOXIA (H): ICD-10-CM

## 2018-11-15 DIAGNOSIS — J98.4 RESTRICTIVE LUNG DISEASE: ICD-10-CM

## 2018-11-15 DIAGNOSIS — J47.9 BRONCHIECTASIS WITHOUT ACUTE EXACERBATION (H): ICD-10-CM

## 2018-11-15 DIAGNOSIS — I10 ESSENTIAL HYPERTENSION: ICD-10-CM

## 2018-11-15 DIAGNOSIS — I34.0 NON-RHEUMATIC MITRAL REGURGITATION: ICD-10-CM

## 2018-11-15 DIAGNOSIS — I50.43 ACUTE ON CHRONIC COMBINED SYSTOLIC AND DIASTOLIC CONGESTIVE HEART FAILURE (H): ICD-10-CM

## 2018-11-15 DIAGNOSIS — I48.19 PERSISTENT ATRIAL FIBRILLATION (H): ICD-10-CM

## 2018-11-15 DIAGNOSIS — I35.0 SEVERE AORTIC STENOSIS: ICD-10-CM

## 2018-11-15 LAB — INR PPP: 2.98 (ref 0.9–1.1)

## 2018-11-16 RX ORDER — CETIRIZINE HYDROCHLORIDE 10 MG/1
10 TABLET ORAL DAILY PRN
Status: SHIPPED | COMMUNITY
Start: 2018-11-16

## 2018-11-19 ENCOUNTER — AMBULATORY - HEALTHEAST (OUTPATIENT)
Dept: GERIATRICS | Facility: CLINIC | Age: 83
End: 2018-11-19

## 2018-11-19 ENCOUNTER — AMBULATORY - HEALTHEAST (OUTPATIENT)
Dept: CARDIOLOGY | Facility: CLINIC | Age: 83
End: 2018-11-19

## 2018-11-19 DIAGNOSIS — I50.9 CHF (CONGESTIVE HEART FAILURE) (H): ICD-10-CM

## 2018-11-19 DIAGNOSIS — I35.0 SEVERE AORTIC STENOSIS: ICD-10-CM

## 2018-11-20 ENCOUNTER — AMBULATORY - HEALTHEAST (OUTPATIENT)
Dept: PULMONOLOGY | Facility: OTHER | Age: 83
End: 2018-11-20

## 2018-11-20 ENCOUNTER — SURGERY - HEALTHEAST (OUTPATIENT)
Dept: CARDIOLOGY | Facility: CLINIC | Age: 83
End: 2018-11-20

## 2018-11-20 ENCOUNTER — COMMUNICATION - HEALTHEAST (OUTPATIENT)
Dept: PULMONOLOGY | Facility: OTHER | Age: 83
End: 2018-11-20

## 2018-11-20 DIAGNOSIS — J47.9 BRONCHIECTASIS (H): ICD-10-CM

## 2018-11-21 ENCOUNTER — RECORDS - HEALTHEAST (OUTPATIENT)
Dept: LAB | Facility: CLINIC | Age: 83
End: 2018-11-21

## 2018-11-21 LAB
ANION GAP SERPL CALCULATED.3IONS-SCNC: 11 MMOL/L (ref 5–18)
BUN SERPL-MCNC: 41 MG/DL (ref 8–28)
CALCIUM SERPL-MCNC: 9.4 MG/DL (ref 8.5–10.5)
CHLORIDE BLD-SCNC: 104 MMOL/L (ref 98–107)
CO2 SERPL-SCNC: 29 MMOL/L (ref 22–31)
CREAT SERPL-MCNC: 1.53 MG/DL (ref 0.7–1.3)
GFR SERPL CREATININE-BSD FRML MDRD: 43 ML/MIN/1.73M2
GLUCOSE BLD-MCNC: 86 MG/DL (ref 70–125)
POTASSIUM BLD-SCNC: 4.8 MMOL/L (ref 3.5–5)
SODIUM SERPL-SCNC: 144 MMOL/L (ref 136–145)

## 2018-11-23 ENCOUNTER — COMMUNICATION - HEALTHEAST (OUTPATIENT)
Dept: CARDIOLOGY | Facility: CLINIC | Age: 83
End: 2018-11-23

## 2018-11-24 ENCOUNTER — ANESTHESIA - HEALTHEAST (OUTPATIENT)
Dept: CARDIOLOGY | Facility: CLINIC | Age: 83
End: 2018-11-24

## 2018-11-26 ENCOUNTER — AMBULATORY - HEALTHEAST (OUTPATIENT)
Dept: CARDIOLOGY | Facility: CLINIC | Age: 83
End: 2018-11-26

## 2018-11-26 ENCOUNTER — HOSPITAL ENCOUNTER (OUTPATIENT)
Dept: RADIOLOGY | Facility: CLINIC | Age: 83
Discharge: HOME | End: 2018-11-26
Attending: INTERNAL MEDICINE

## 2018-11-26 ENCOUNTER — OFFICE VISIT - HEALTHEAST (OUTPATIENT)
Dept: CARDIOLOGY | Facility: CLINIC | Age: 83
End: 2018-11-26

## 2018-11-26 DIAGNOSIS — I35.0 SEVERE AORTIC STENOSIS: ICD-10-CM

## 2018-11-26 DIAGNOSIS — I10 ESSENTIAL HYPERTENSION: ICD-10-CM

## 2018-11-26 DIAGNOSIS — J96.11 CHRONIC HYPOXEMIC RESPIRATORY FAILURE (H): ICD-10-CM

## 2018-11-26 DIAGNOSIS — I50.9 CHF (CONGESTIVE HEART FAILURE) (H): ICD-10-CM

## 2018-11-26 DIAGNOSIS — I48.0 PAROXYSMAL ATRIAL FIBRILLATION (H): ICD-10-CM

## 2018-11-26 LAB
ABO/RH(D): ABNORMAL
ALBUMIN SERPL-MCNC: 3 G/DL (ref 3.5–5)
ALP SERPL-CCNC: 125 U/L (ref 45–120)
ALT SERPL W P-5'-P-CCNC: 14 U/L (ref 0–45)
ANION GAP SERPL CALCULATED.3IONS-SCNC: 10 MMOL/L (ref 5–18)
ANTIBODY SCREEN: POSITIVE
AST SERPL W P-5'-P-CCNC: 26 U/L (ref 0–40)
BASOPHILS # BLD AUTO: 0.1 THOU/UL (ref 0–0.2)
BASOPHILS NFR BLD AUTO: 1 % (ref 0–2)
BILIRUB SERPL-MCNC: 0.9 MG/DL (ref 0–1)
BNP SERPL-MCNC: 499 PG/ML (ref 0–93)
BUN SERPL-MCNC: 36 MG/DL (ref 8–28)
CALCIUM SERPL-MCNC: 9.6 MG/DL (ref 8.5–10.5)
CHLORIDE BLD-SCNC: 102 MMOL/L (ref 98–107)
CO2 SERPL-SCNC: 29 MMOL/L (ref 22–31)
CREAT SERPL-MCNC: 1.54 MG/DL (ref 0.7–1.3)
EOSINOPHIL # BLD AUTO: 0.3 THOU/UL (ref 0–0.4)
EOSINOPHIL NFR BLD AUTO: 3 % (ref 0–6)
ERYTHROCYTE [DISTWIDTH] IN BLOOD BY AUTOMATED COUNT: 19.4 % (ref 11–14.5)
GFR SERPL CREATININE-BSD FRML MDRD: 43 ML/MIN/1.73M2
GLUCOSE BLD-MCNC: 106 MG/DL (ref 70–125)
HCT VFR BLD AUTO: 40.3 % (ref 40–54)
HGB BLD-MCNC: 12 G/DL (ref 14–18)
INR PPP: 1.23 (ref 0.9–1.1)
LYMPHOCYTES # BLD AUTO: 1.4 THOU/UL (ref 0.8–4.4)
LYMPHOCYTES NFR BLD AUTO: 13 % (ref 20–40)
MCH RBC QN AUTO: 25.2 PG (ref 27–34)
MCHC RBC AUTO-ENTMCNC: 29.8 G/DL (ref 32–36)
MCV RBC AUTO: 85 FL (ref 80–100)
MONOCYTES # BLD AUTO: 1.1 THOU/UL (ref 0–0.9)
MONOCYTES NFR BLD AUTO: 10 % (ref 2–10)
NEUTROPHILS # BLD AUTO: 7.8 THOU/UL (ref 2–7.7)
NEUTROPHILS NFR BLD AUTO: 73 % (ref 50–70)
PLATELET # BLD AUTO: 231 THOU/UL (ref 140–440)
PMV BLD AUTO: 9.7 FL (ref 8.5–12.5)
POTASSIUM BLD-SCNC: 4 MMOL/L (ref 3.5–5)
PROT SERPL-MCNC: 7.9 G/DL (ref 6–8)
RBC # BLD AUTO: 4.77 MILL/UL (ref 4.4–6.2)
SODIUM SERPL-SCNC: 141 MMOL/L (ref 136–145)
WBC: 10.7 THOU/UL (ref 4–11)

## 2018-11-26 ASSESSMENT — MIFFLIN-ST. JEOR: SCORE: 1353.92

## 2018-11-27 ENCOUNTER — AMBULATORY - HEALTHEAST (OUTPATIENT)
Dept: CARDIOLOGY | Facility: CLINIC | Age: 83
End: 2018-11-27

## 2018-11-27 ENCOUNTER — RECORDS - HEALTHEAST (OUTPATIENT)
Dept: ADMINISTRATIVE | Facility: OTHER | Age: 83
End: 2018-11-27

## 2018-11-27 ENCOUNTER — SURGERY - HEALTHEAST (OUTPATIENT)
Dept: CARDIOLOGY | Facility: CLINIC | Age: 83
End: 2018-11-27

## 2018-11-27 DIAGNOSIS — Z95.810 ICD (IMPLANTABLE CARDIOVERTER-DEFIBRILLATOR) IN PLACE: ICD-10-CM

## 2018-11-27 LAB
ATRIAL RATE - MUSE: 92 BPM
BACTERIA SPEC CULT: NORMAL
DIASTOLIC BLOOD PRESSURE - MUSE: NORMAL MMHG
INTERPRETATION ECG - MUSE: NORMAL
P AXIS - MUSE: 47 DEGREES
PR INTERVAL - MUSE: 174 MS
QRS DURATION - MUSE: 136 MS
QT - MUSE: 404 MS
QTC - MUSE: 499 MS
R AXIS - MUSE: -50 DEGREES
SYSTOLIC BLOOD PRESSURE - MUSE: NORMAL MMHG
T AXIS - MUSE: 118 DEGREES
VENTRICULAR RATE- MUSE: 92 BPM

## 2018-11-27 ASSESSMENT — MIFFLIN-ST. JEOR
SCORE: 1342.58
SCORE: 1342.58

## 2018-11-28 ENCOUNTER — AMBULATORY - HEALTHEAST (OUTPATIENT)
Dept: CARDIOLOGY | Facility: CLINIC | Age: 83
End: 2018-11-28

## 2018-11-28 ENCOUNTER — RECORDS - HEALTHEAST (OUTPATIENT)
Dept: ADMINISTRATIVE | Facility: OTHER | Age: 83
End: 2018-11-28

## 2018-11-28 DIAGNOSIS — Z95.810 ICD (IMPLANTABLE CARDIOVERTER-DEFIBRILLATOR), DUAL, IN SITU: ICD-10-CM

## 2018-11-28 ASSESSMENT — MIFFLIN-ST. JEOR
SCORE: 1363.84
SCORE: 1338.95
SCORE: 1338.95
SCORE: 1363.84

## 2018-11-29 LAB
BLD PROD TYP BPU: NORMAL
BLD PROD TYP BPU: NORMAL
BLOOD EXPIRATION DATE: NORMAL
BLOOD EXPIRATION DATE: NORMAL
BLOOD TYPE: 5100
BLOOD TYPE: 5100
CODING SYSTEM: NORMAL
CODING SYSTEM: NORMAL
COMPONENT (HISTORICAL CONVERSION): NORMAL
COMPONENT (HISTORICAL CONVERSION): NORMAL
CROSSMATCH: NORMAL
CROSSMATCH: NORMAL
ISSUE DATE AND TIME: NORMAL
ISSUE DATE AND TIME: NORMAL
STATUS (HISTORICAL CONVERSION): NORMAL
STATUS (HISTORICAL CONVERSION): NORMAL
UNIT ABO/RH (HISTORICAL CONVERSION): NORMAL
UNIT ABO/RH (HISTORICAL CONVERSION): NORMAL
UNIT NUMBER: NORMAL
UNIT NUMBER: NORMAL

## 2018-11-30 ENCOUNTER — RECORDS - HEALTHEAST (OUTPATIENT)
Dept: ADMINISTRATIVE | Facility: OTHER | Age: 83
End: 2018-11-30

## 2018-11-30 ASSESSMENT — MIFFLIN-ST. JEOR
SCORE: 1369.34
SCORE: 1369.34

## 2018-12-01 ENCOUNTER — RECORDS - HEALTHEAST (OUTPATIENT)
Dept: ADMINISTRATIVE | Facility: OTHER | Age: 83
End: 2018-12-01

## 2018-12-01 ASSESSMENT — MIFFLIN-ST. JEOR
SCORE: 1362.54
SCORE: 1362.54

## 2018-12-02 ENCOUNTER — RECORDS - HEALTHEAST (OUTPATIENT)
Dept: ADMINISTRATIVE | Facility: OTHER | Age: 83
End: 2018-12-02

## 2018-12-02 ASSESSMENT — MIFFLIN-ST. JEOR
SCORE: 1359.36
SCORE: 1359.36

## 2018-12-03 ENCOUNTER — RECORDS - HEALTHEAST (OUTPATIENT)
Dept: ADMINISTRATIVE | Facility: OTHER | Age: 83
End: 2018-12-03

## 2018-12-03 ASSESSMENT — MIFFLIN-ST. JEOR
SCORE: 1343.49
SCORE: 1343.49

## 2018-12-04 ENCOUNTER — SURGERY - HEALTHEAST (OUTPATIENT)
Dept: CARDIOLOGY | Facility: CLINIC | Age: 83
End: 2018-12-04

## 2018-12-04 ENCOUNTER — RECORDS - HEALTHEAST (OUTPATIENT)
Dept: ADMINISTRATIVE | Facility: OTHER | Age: 83
End: 2018-12-04

## 2018-12-04 ASSESSMENT — MIFFLIN-ST. JEOR
SCORE: 1341.22
SCORE: 1341.22

## 2018-12-05 ENCOUNTER — RECORDS - HEALTHEAST (OUTPATIENT)
Dept: ADMINISTRATIVE | Facility: OTHER | Age: 83
End: 2018-12-05

## 2018-12-05 ASSESSMENT — MIFFLIN-ST. JEOR
SCORE: 1340.77
SCORE: 1340.77

## 2018-12-06 ENCOUNTER — RECORDS - HEALTHEAST (OUTPATIENT)
Dept: LAB | Facility: CLINIC | Age: 83
End: 2018-12-06

## 2018-12-06 ENCOUNTER — OFFICE VISIT - HEALTHEAST (OUTPATIENT)
Dept: GERIATRICS | Facility: CLINIC | Age: 83
End: 2018-12-06

## 2018-12-06 DIAGNOSIS — N18.30 STAGE 3 CHRONIC KIDNEY DISEASE (H): ICD-10-CM

## 2018-12-06 DIAGNOSIS — J98.4 RESTRICTIVE LUNG DISEASE: ICD-10-CM

## 2018-12-06 DIAGNOSIS — I50.22 CHRONIC HFREF (HEART FAILURE WITH REDUCED EJECTION FRACTION) (H): ICD-10-CM

## 2018-12-06 DIAGNOSIS — Z95.2 S/P TAVR (TRANSCATHETER AORTIC VALVE REPLACEMENT): ICD-10-CM

## 2018-12-06 DIAGNOSIS — I35.0 SEVERE AORTIC STENOSIS: ICD-10-CM

## 2018-12-06 DIAGNOSIS — Z95.810 ICD (IMPLANTABLE CARDIOVERTER-DEFIBRILLATOR) IN PLACE: ICD-10-CM

## 2018-12-06 DIAGNOSIS — I10 ESSENTIAL HYPERTENSION: ICD-10-CM

## 2018-12-06 DIAGNOSIS — J96.11 CHRONIC HYPOXEMIC RESPIRATORY FAILURE (H): ICD-10-CM

## 2018-12-06 LAB — INR PPP: 1.35 (ref 0.9–1.1)

## 2018-12-07 ENCOUNTER — RECORDS - HEALTHEAST (OUTPATIENT)
Dept: LAB | Facility: CLINIC | Age: 83
End: 2018-12-07

## 2018-12-08 ENCOUNTER — AMBULATORY - HEALTHEAST (OUTPATIENT)
Dept: GERIATRICS | Facility: CLINIC | Age: 83
End: 2018-12-08

## 2018-12-10 ENCOUNTER — OFFICE VISIT - HEALTHEAST (OUTPATIENT)
Dept: GERIATRICS | Facility: CLINIC | Age: 83
End: 2018-12-10

## 2018-12-10 ENCOUNTER — COMMUNICATION - HEALTHEAST (OUTPATIENT)
Dept: GERIATRICS | Facility: CLINIC | Age: 83
End: 2018-12-10

## 2018-12-10 DIAGNOSIS — I35.0 SEVERE AORTIC STENOSIS: ICD-10-CM

## 2018-12-10 DIAGNOSIS — J96.11 CHRONIC HYPOXEMIC RESPIRATORY FAILURE (H): ICD-10-CM

## 2018-12-10 DIAGNOSIS — Z95.810 ICD (IMPLANTABLE CARDIOVERTER-DEFIBRILLATOR) IN PLACE: ICD-10-CM

## 2018-12-10 DIAGNOSIS — Z95.2 S/P TAVR (TRANSCATHETER AORTIC VALVE REPLACEMENT): ICD-10-CM

## 2018-12-10 DIAGNOSIS — H40.9 GLAUCOMA OF BOTH EYES, UNSPECIFIED GLAUCOMA TYPE: ICD-10-CM

## 2018-12-10 DIAGNOSIS — J47.9 BRONCHIECTASIS WITHOUT ACUTE EXACERBATION (H): ICD-10-CM

## 2018-12-10 DIAGNOSIS — N18.30 STAGE 3 CHRONIC KIDNEY DISEASE (H): ICD-10-CM

## 2018-12-10 DIAGNOSIS — I95.0 IDIOPATHIC HYPOTENSION: ICD-10-CM

## 2018-12-10 DIAGNOSIS — I48.19 PERSISTENT ATRIAL FIBRILLATION (H): ICD-10-CM

## 2018-12-10 DIAGNOSIS — G47.33 OSA (OBSTRUCTIVE SLEEP APNEA): ICD-10-CM

## 2018-12-10 DIAGNOSIS — F33.41 RECURRENT MAJOR DEPRESSIVE DISORDER, IN PARTIAL REMISSION (H): ICD-10-CM

## 2018-12-10 DIAGNOSIS — I50.22 CHRONIC HFREF (HEART FAILURE WITH REDUCED EJECTION FRACTION) (H): ICD-10-CM

## 2018-12-10 LAB — INR PPP: 1.82 (ref 0.9–1.1)

## 2018-12-11 ENCOUNTER — COMMUNICATION - HEALTHEAST (OUTPATIENT)
Dept: GERIATRICS | Facility: CLINIC | Age: 83
End: 2018-12-11

## 2018-12-11 ENCOUNTER — RECORDS - HEALTHEAST (OUTPATIENT)
Dept: LAB | Facility: CLINIC | Age: 83
End: 2018-12-11

## 2018-12-11 ENCOUNTER — OFFICE VISIT - HEALTHEAST (OUTPATIENT)
Dept: GERIATRICS | Facility: CLINIC | Age: 83
End: 2018-12-11

## 2018-12-11 DIAGNOSIS — J47.9 BRONCHIECTASIS WITHOUT ACUTE EXACERBATION (H): ICD-10-CM

## 2018-12-11 DIAGNOSIS — M10.071 ACUTE IDIOPATHIC GOUT INVOLVING TOE OF RIGHT FOOT: ICD-10-CM

## 2018-12-11 DIAGNOSIS — N18.30 STAGE 3 CHRONIC KIDNEY DISEASE (H): ICD-10-CM

## 2018-12-11 DIAGNOSIS — D62 ACUTE BLOOD LOSS ANEMIA: ICD-10-CM

## 2018-12-11 DIAGNOSIS — J96.11 CHRONIC HYPOXEMIC RESPIRATORY FAILURE (H): ICD-10-CM

## 2018-12-11 DIAGNOSIS — I95.0 IDIOPATHIC HYPOTENSION: ICD-10-CM

## 2018-12-11 DIAGNOSIS — I50.22 CHRONIC HFREF (HEART FAILURE WITH REDUCED EJECTION FRACTION) (H): ICD-10-CM

## 2018-12-11 DIAGNOSIS — Z95.2 S/P TAVR (TRANSCATHETER AORTIC VALVE REPLACEMENT): ICD-10-CM

## 2018-12-11 DIAGNOSIS — I48.19 PERSISTENT ATRIAL FIBRILLATION (H): ICD-10-CM

## 2018-12-11 DIAGNOSIS — I35.0 SEVERE AORTIC STENOSIS: ICD-10-CM

## 2018-12-11 LAB
ANION GAP SERPL CALCULATED.3IONS-SCNC: 7 MMOL/L (ref 5–18)
BASOPHILS # BLD AUTO: 0.1 THOU/UL (ref 0–0.2)
BASOPHILS NFR BLD AUTO: 1 % (ref 0–2)
BUN SERPL-MCNC: 42 MG/DL (ref 8–28)
CALCIUM SERPL-MCNC: 9.1 MG/DL (ref 8.5–10.5)
CHLORIDE BLD-SCNC: 104 MMOL/L (ref 98–107)
CO2 SERPL-SCNC: 29 MMOL/L (ref 22–31)
CREAT SERPL-MCNC: 1.38 MG/DL (ref 0.7–1.3)
EOSINOPHIL # BLD AUTO: 0.4 THOU/UL (ref 0–0.4)
EOSINOPHIL NFR BLD AUTO: 4 % (ref 0–6)
ERYTHROCYTE [DISTWIDTH] IN BLOOD BY AUTOMATED COUNT: 19.7 % (ref 11–14.5)
GFR SERPL CREATININE-BSD FRML MDRD: 49 ML/MIN/1.73M2
GLUCOSE BLD-MCNC: 93 MG/DL (ref 70–125)
HCT VFR BLD AUTO: 32.9 % (ref 40–54)
HGB BLD-MCNC: 9.9 G/DL (ref 14–18)
LYMPHOCYTES # BLD AUTO: 1.4 THOU/UL (ref 0.8–4.4)
LYMPHOCYTES NFR BLD AUTO: 13 % (ref 20–40)
MCH RBC QN AUTO: 26.5 PG (ref 27–34)
MCHC RBC AUTO-ENTMCNC: 30.1 G/DL (ref 32–36)
MCV RBC AUTO: 88 FL (ref 80–100)
MONOCYTES # BLD AUTO: 1.2 THOU/UL (ref 0–0.9)
MONOCYTES NFR BLD AUTO: 11 % (ref 2–10)
NEUTROPHILS # BLD AUTO: 7.7 THOU/UL (ref 2–7.7)
NEUTROPHILS NFR BLD AUTO: 72 % (ref 50–70)
PLATELET # BLD AUTO: 172 THOU/UL (ref 140–440)
PMV BLD AUTO: 10.8 FL (ref 8.5–12.5)
POTASSIUM BLD-SCNC: 4.1 MMOL/L (ref 3.5–5)
RBC # BLD AUTO: 3.73 MILL/UL (ref 4.4–6.2)
SODIUM SERPL-SCNC: 140 MMOL/L (ref 136–145)
WBC: 10.9 THOU/UL (ref 4–11)

## 2018-12-13 ENCOUNTER — AMBULATORY - HEALTHEAST (OUTPATIENT)
Dept: CARDIOLOGY | Facility: CLINIC | Age: 83
End: 2018-12-13

## 2018-12-13 ENCOUNTER — COMMUNICATION - HEALTHEAST (OUTPATIENT)
Dept: GERIATRICS | Facility: CLINIC | Age: 83
End: 2018-12-13

## 2018-12-13 ENCOUNTER — OFFICE VISIT - HEALTHEAST (OUTPATIENT)
Dept: CARDIOLOGY | Facility: CLINIC | Age: 83
End: 2018-12-13

## 2018-12-13 ENCOUNTER — OFFICE VISIT - HEALTHEAST (OUTPATIENT)
Dept: GERIATRICS | Facility: CLINIC | Age: 83
End: 2018-12-13

## 2018-12-13 DIAGNOSIS — I35.0 SEVERE AORTIC STENOSIS: ICD-10-CM

## 2018-12-13 DIAGNOSIS — J47.9 BRONCHIECTASIS WITHOUT ACUTE EXACERBATION (H): ICD-10-CM

## 2018-12-13 DIAGNOSIS — Z95.2 S/P TAVR (TRANSCATHETER AORTIC VALVE REPLACEMENT): ICD-10-CM

## 2018-12-13 DIAGNOSIS — I48.91 ATRIAL FIBRILLATION (H): ICD-10-CM

## 2018-12-13 DIAGNOSIS — I10 ESSENTIAL HYPERTENSION: ICD-10-CM

## 2018-12-13 DIAGNOSIS — Z95.810 BIVENTRICULAR ICD (IMPLANTABLE CARDIOVERTER-DEFIBRILLATOR) IN PLACE: ICD-10-CM

## 2018-12-13 DIAGNOSIS — Z95.810 ICD (IMPLANTABLE CARDIOVERTER-DEFIBRILLATOR) IN PLACE: ICD-10-CM

## 2018-12-13 DIAGNOSIS — G47.33 OSA (OBSTRUCTIVE SLEEP APNEA): ICD-10-CM

## 2018-12-13 DIAGNOSIS — E78.5 DYSLIPIDEMIA, GOAL LDL BELOW 70: ICD-10-CM

## 2018-12-13 DIAGNOSIS — I50.22 CHRONIC HFREF (HEART FAILURE WITH REDUCED EJECTION FRACTION) (H): ICD-10-CM

## 2018-12-13 DIAGNOSIS — J98.4 RESTRICTIVE LUNG DISEASE: ICD-10-CM

## 2018-12-13 DIAGNOSIS — H40.9 GLAUCOMA OF BOTH EYES, UNSPECIFIED GLAUCOMA TYPE: ICD-10-CM

## 2018-12-13 DIAGNOSIS — N18.30 STAGE 3 CHRONIC KIDNEY DISEASE (H): ICD-10-CM

## 2018-12-13 DIAGNOSIS — D62 ACUTE BLOOD LOSS ANEMIA: ICD-10-CM

## 2018-12-13 DIAGNOSIS — I48.0 PAROXYSMAL ATRIAL FIBRILLATION (H): ICD-10-CM

## 2018-12-13 DIAGNOSIS — M10.071 ACUTE IDIOPATHIC GOUT INVOLVING TOE OF RIGHT FOOT: ICD-10-CM

## 2018-12-13 LAB
HCC DEVICE COMMENTS: NORMAL
HCC DEVICE IMPLANTING PROVIDER: NORMAL
HCC DEVICE MANUFACTURE: NORMAL
HCC DEVICE MODEL: NORMAL
HCC DEVICE SERIAL NUMBER: NORMAL
HCC DEVICE TYPE: NORMAL
POC INR - HE - HISTORICAL: 2.7 (ref 0.9–1.1)

## 2018-12-13 ASSESSMENT — MIFFLIN-ST. JEOR: SCORE: 1375.69

## 2018-12-14 ENCOUNTER — COMMUNICATION - HEALTHEAST (OUTPATIENT)
Dept: CARDIOLOGY | Facility: CLINIC | Age: 83
End: 2018-12-14

## 2018-12-14 ENCOUNTER — HOME CARE/HOSPICE - HEALTHEAST (OUTPATIENT)
Dept: HOME HEALTH SERVICES | Facility: HOME HEALTH | Age: 83
End: 2018-12-14

## 2018-12-17 ENCOUNTER — AMBULATORY - HEALTHEAST (OUTPATIENT)
Dept: GERIATRICS | Facility: CLINIC | Age: 83
End: 2018-12-17

## 2018-12-19 ENCOUNTER — AMBULATORY - HEALTHEAST (OUTPATIENT)
Dept: CARDIOLOGY | Facility: CLINIC | Age: 83
End: 2018-12-19

## 2018-12-19 ENCOUNTER — RECORDS - HEALTHEAST (OUTPATIENT)
Dept: LAB | Facility: CLINIC | Age: 83
End: 2018-12-19

## 2018-12-19 ENCOUNTER — COMMUNICATION - HEALTHEAST (OUTPATIENT)
Dept: CARDIOLOGY | Facility: CLINIC | Age: 83
End: 2018-12-19

## 2018-12-19 DIAGNOSIS — I48.91 ATRIAL FIBRILLATION (H): ICD-10-CM

## 2018-12-19 LAB
ANION GAP SERPL CALCULATED.3IONS-SCNC: 9 MMOL/L (ref 5–18)
BUN SERPL-MCNC: 35 MG/DL (ref 8–28)
CALCIUM SERPL-MCNC: 9.3 MG/DL (ref 8.5–10.5)
CHLORIDE BLD-SCNC: 103 MMOL/L (ref 98–107)
CO2 SERPL-SCNC: 30 MMOL/L (ref 22–31)
CREAT SERPL-MCNC: 1.47 MG/DL (ref 0.7–1.3)
GFR SERPL CREATININE-BSD FRML MDRD: 45 ML/MIN/1.73M2
GLUCOSE BLD-MCNC: 99 MG/DL (ref 70–125)
INTERNATIONAL NORMALIZATION RATIO, POC - HISTORICAL: 2.8
POTASSIUM BLD-SCNC: 4.1 MMOL/L (ref 3.5–5)
SODIUM SERPL-SCNC: 142 MMOL/L (ref 136–145)

## 2018-12-20 ENCOUNTER — OFFICE VISIT - HEALTHEAST (OUTPATIENT)
Dept: PULMONOLOGY | Facility: OTHER | Age: 83
End: 2018-12-20

## 2018-12-20 DIAGNOSIS — J47.9 BRONCHIECTASIS WITHOUT ACUTE EXACERBATION (H): ICD-10-CM

## 2018-12-20 DIAGNOSIS — G47.33 OSA (OBSTRUCTIVE SLEEP APNEA): ICD-10-CM

## 2018-12-20 DIAGNOSIS — J96.11 CHRONIC HYPOXEMIC RESPIRATORY FAILURE (H): ICD-10-CM

## 2018-12-20 DIAGNOSIS — J98.4 RESTRICTIVE LUNG DISEASE: ICD-10-CM

## 2018-12-20 ASSESSMENT — MIFFLIN-ST. JEOR: SCORE: 1362.99

## 2018-12-24 ENCOUNTER — AMBULATORY - HEALTHEAST (OUTPATIENT)
Dept: CARDIOLOGY | Facility: CLINIC | Age: 83
End: 2018-12-24

## 2018-12-24 DIAGNOSIS — Z95.2 S/P TAVR (TRANSCATHETER AORTIC VALVE REPLACEMENT): ICD-10-CM

## 2018-12-26 ENCOUNTER — COMMUNICATION - HEALTHEAST (OUTPATIENT)
Dept: CARDIOLOGY | Facility: CLINIC | Age: 83
End: 2018-12-26

## 2018-12-26 ENCOUNTER — AMBULATORY - HEALTHEAST (OUTPATIENT)
Dept: CARDIOLOGY | Facility: CLINIC | Age: 83
End: 2018-12-26

## 2018-12-26 DIAGNOSIS — I48.91 ATRIAL FIBRILLATION (H): ICD-10-CM

## 2018-12-26 DIAGNOSIS — I48.91 A-FIB (H): ICD-10-CM

## 2018-12-26 LAB — INTERNATIONAL NORMALIZATION RATIO, POC - HISTORICAL: 2.6

## 2018-12-27 ENCOUNTER — AMBULATORY - HEALTHEAST (OUTPATIENT)
Dept: CARDIOLOGY | Facility: CLINIC | Age: 83
End: 2018-12-27

## 2018-12-27 ENCOUNTER — OFFICE VISIT - HEALTHEAST (OUTPATIENT)
Dept: CARDIOLOGY | Facility: CLINIC | Age: 83
End: 2018-12-27

## 2018-12-27 DIAGNOSIS — I25.10 CORONARY ARTERY DISEASE INVOLVING NATIVE CORONARY ARTERY OF NATIVE HEART WITHOUT ANGINA PECTORIS: ICD-10-CM

## 2018-12-27 DIAGNOSIS — I48.0 PAROXYSMAL ATRIAL FIBRILLATION (H): ICD-10-CM

## 2018-12-27 DIAGNOSIS — I35.0 SEVERE AORTIC STENOSIS: ICD-10-CM

## 2018-12-27 DIAGNOSIS — I50.22 CHRONIC HFREF (HEART FAILURE WITH REDUCED EJECTION FRACTION) (H): ICD-10-CM

## 2018-12-27 DIAGNOSIS — Z95.2 S/P TAVR (TRANSCATHETER AORTIC VALVE REPLACEMENT): ICD-10-CM

## 2018-12-27 ASSESSMENT — MIFFLIN-ST. JEOR: SCORE: 1358.46

## 2019-01-01 ENCOUNTER — HOSPITAL ENCOUNTER (OUTPATIENT)
Dept: CT IMAGING | Facility: CLINIC | Age: 84
Discharge: HOME | End: 2019-06-19
Attending: INTERNAL MEDICINE

## 2019-01-01 ENCOUNTER — RECORDS - HEALTHEAST (OUTPATIENT)
Dept: ADMINISTRATIVE | Facility: OTHER | Age: 84
End: 2019-01-01

## 2019-01-01 ENCOUNTER — ANESTHESIA - HEALTHEAST (OUTPATIENT)
Dept: CARDIOLOGY | Facility: CLINIC | Age: 84
End: 2019-01-01

## 2019-01-01 ENCOUNTER — AMBULATORY - HEALTHEAST (OUTPATIENT)
Dept: CARDIOLOGY | Facility: CLINIC | Age: 84
End: 2019-01-01

## 2019-01-01 ENCOUNTER — COMMUNICATION - HEALTHEAST (OUTPATIENT)
Dept: CARDIOLOGY | Facility: CLINIC | Age: 84
End: 2019-01-01

## 2019-01-01 ENCOUNTER — COMMUNICATION - HEALTHEAST (OUTPATIENT)
Dept: PULMONOLOGY | Facility: OTHER | Age: 84
End: 2019-01-01

## 2019-01-01 ENCOUNTER — HOSPITAL ENCOUNTER (OUTPATIENT)
Dept: CARDIOLOGY | Facility: CLINIC | Age: 84
Discharge: HOME | End: 2019-03-28
Attending: INTERNAL MEDICINE

## 2019-01-01 ENCOUNTER — COMMUNICATION - HEALTHEAST (OUTPATIENT)
Dept: SCHEDULING | Facility: CLINIC | Age: 84
End: 2019-01-01

## 2019-01-01 ENCOUNTER — OFFICE VISIT - HEALTHEAST (OUTPATIENT)
Dept: CARDIOLOGY | Facility: CLINIC | Age: 84
End: 2019-01-01

## 2019-01-01 ENCOUNTER — COMMUNICATION - HEALTHEAST (OUTPATIENT)
Dept: ANTICOAGULATION | Facility: CLINIC | Age: 84
End: 2019-01-01

## 2019-01-01 ENCOUNTER — AMBULATORY - HEALTHEAST (OUTPATIENT)
Dept: PULMONOLOGY | Facility: OTHER | Age: 84
End: 2019-01-01

## 2019-01-01 ENCOUNTER — SURGERY - HEALTHEAST (OUTPATIENT)
Dept: CARDIOLOGY | Facility: CLINIC | Age: 84
End: 2019-01-01

## 2019-01-01 ENCOUNTER — AMBULATORY - HEALTHEAST (OUTPATIENT)
Dept: LAB | Facility: CLINIC | Age: 84
End: 2019-01-01

## 2019-01-01 ENCOUNTER — OFFICE VISIT - HEALTHEAST (OUTPATIENT)
Dept: PULMONOLOGY | Facility: OTHER | Age: 84
End: 2019-01-01

## 2019-01-01 ENCOUNTER — HOSPITAL ENCOUNTER (OUTPATIENT)
Dept: CARDIOLOGY | Facility: CLINIC | Age: 84
Discharge: HOME | End: 2019-03-28
Attending: INTERNAL MEDICINE | Admitting: INTERNAL MEDICINE

## 2019-01-01 ENCOUNTER — COMMUNICATION - HEALTHEAST (OUTPATIENT)
Dept: ADMINISTRATIVE | Facility: CLINIC | Age: 84
End: 2019-01-01

## 2019-01-01 ENCOUNTER — HOSPITAL ENCOUNTER (OUTPATIENT)
Dept: CARDIOLOGY | Facility: CLINIC | Age: 84
Discharge: HOME | End: 2019-12-30
Attending: INTERNAL MEDICINE

## 2019-01-01 DIAGNOSIS — R06.02 SHORTNESS OF BREATH: ICD-10-CM

## 2019-01-01 DIAGNOSIS — I48.19 PERSISTENT ATRIAL FIBRILLATION (H): ICD-10-CM

## 2019-01-01 DIAGNOSIS — I44.7 LEFT BUNDLE BRANCH BLOCK: ICD-10-CM

## 2019-01-01 DIAGNOSIS — I25.5 ISCHEMIC CARDIOMYOPATHY: ICD-10-CM

## 2019-01-01 DIAGNOSIS — I50.22 CHRONIC HFREF (HEART FAILURE WITH REDUCED EJECTION FRACTION) (H): ICD-10-CM

## 2019-01-01 DIAGNOSIS — N18.30 STAGE 3 CHRONIC KIDNEY DISEASE (H): ICD-10-CM

## 2019-01-01 DIAGNOSIS — I50.22 CHRONIC SYSTOLIC CONGESTIVE HEART FAILURE (H): ICD-10-CM

## 2019-01-01 DIAGNOSIS — Z95.2 S/P TAVR (TRANSCATHETER AORTIC VALVE REPLACEMENT): ICD-10-CM

## 2019-01-01 DIAGNOSIS — Z95.810 BIVENTRICULAR ICD (IMPLANTABLE CARDIOVERTER-DEFIBRILLATOR) IN PLACE: ICD-10-CM

## 2019-01-01 DIAGNOSIS — J47.9 BRONCHIECTASIS WITHOUT ACUTE EXACERBATION (H): ICD-10-CM

## 2019-01-01 DIAGNOSIS — J96.11 CHRONIC HYPOXEMIC RESPIRATORY FAILURE (H): ICD-10-CM

## 2019-01-01 DIAGNOSIS — I25.10 CORONARY ARTERY DISEASE DUE TO CALCIFIED CORONARY LESION: ICD-10-CM

## 2019-01-01 DIAGNOSIS — I25.10 CORONARY ARTERY DISEASE INVOLVING NATIVE CORONARY ARTERY OF NATIVE HEART WITHOUT ANGINA PECTORIS: ICD-10-CM

## 2019-01-01 DIAGNOSIS — Z95.810 ICD (IMPLANTABLE CARDIOVERTER-DEFIBRILLATOR) IN PLACE: ICD-10-CM

## 2019-01-01 DIAGNOSIS — I42.8 OTHER CARDIOMYOPATHY (H): ICD-10-CM

## 2019-01-01 DIAGNOSIS — I48.91 A-FIB (H): ICD-10-CM

## 2019-01-01 DIAGNOSIS — I35.0 SEVERE AORTIC STENOSIS: ICD-10-CM

## 2019-01-01 DIAGNOSIS — I48.0 PAROXYSMAL ATRIAL FIBRILLATION (H): ICD-10-CM

## 2019-01-01 DIAGNOSIS — E78.5 DYSLIPIDEMIA: ICD-10-CM

## 2019-01-01 DIAGNOSIS — J84.9 ILD (INTERSTITIAL LUNG DISEASE) (H): ICD-10-CM

## 2019-01-01 DIAGNOSIS — R06.02 SOB (SHORTNESS OF BREATH): ICD-10-CM

## 2019-01-01 DIAGNOSIS — Z95.2 STATUS POST AORTIC VALVE REPLACEMENT: ICD-10-CM

## 2019-01-01 DIAGNOSIS — J98.4 RESTRICTIVE LUNG DISEASE: ICD-10-CM

## 2019-01-01 DIAGNOSIS — I48.91 ATRIAL FIBRILLATION (H): ICD-10-CM

## 2019-01-01 DIAGNOSIS — G47.33 OSA (OBSTRUCTIVE SLEEP APNEA): ICD-10-CM

## 2019-01-01 DIAGNOSIS — I25.84 CORONARY ARTERY DISEASE DUE TO CALCIFIED CORONARY LESION: ICD-10-CM

## 2019-01-01 DIAGNOSIS — Z95.810 ICD (IMPLANTABLE CARDIOVERTER-DEFIBRILLATOR), DUAL, IN SITU: ICD-10-CM

## 2019-01-01 DIAGNOSIS — I51.81 STRESS-INDUCED CARDIOMYOPATHY: ICD-10-CM

## 2019-01-01 DIAGNOSIS — I50.23 ACUTE ON CHRONIC SYSTOLIC CONGESTIVE HEART FAILURE (H): ICD-10-CM

## 2019-01-01 DIAGNOSIS — I95.9 HYPOTENSION, UNSPECIFIED HYPOTENSION TYPE: ICD-10-CM

## 2019-01-01 DIAGNOSIS — I50.23 ACUTE ON CHRONIC SYSTOLIC (CONGESTIVE) HEART FAILURE (H): ICD-10-CM

## 2019-01-01 DIAGNOSIS — J45.31 MILD PERSISTENT ASTHMA WITH EXACERBATION: ICD-10-CM

## 2019-01-01 DIAGNOSIS — Z79.899 LONG TERM USE OF DRUG: ICD-10-CM

## 2019-01-01 DIAGNOSIS — I34.0 NON-RHEUMATIC MITRAL REGURGITATION: ICD-10-CM

## 2019-01-01 LAB
ANION GAP SERPL CALCULATED.3IONS-SCNC: 10 MMOL/L (ref 5–18)
ANION GAP SERPL CALCULATED.3IONS-SCNC: 10 MMOL/L (ref 5–18)
ANION GAP SERPL CALCULATED.3IONS-SCNC: 11 MMOL/L (ref 5–18)
ANION GAP SERPL CALCULATED.3IONS-SCNC: 7 MMOL/L (ref 5–18)
ANION GAP SERPL CALCULATED.3IONS-SCNC: 7 MMOL/L (ref 5–18)
ANION GAP SERPL CALCULATED.3IONS-SCNC: 8 MMOL/L (ref 5–18)
ANION GAP SERPL CALCULATED.3IONS-SCNC: 9 MMOL/L (ref 5–18)
ANION GAP SERPL CALCULATED.3IONS-SCNC: 9 MMOL/L (ref 5–18)
AORTIC ROOT: 3 CM
AORTIC VALVE MEAN VELOCITY: 123 CM/S
AR DECEL SLOPE: 2180 MM/S2
AR PEAK VELOCITY: 409 CM/S
ASCENDING AORTA: 3.8 CM
ATRIAL RATE - MUSE: 77 BPM
ATRIAL RATE - MUSE: 79 BPM
AV DIMENSIONLESS INDEX VTI: 0.5
AV MEAN GRADIENT: 7 MMHG
AV PEAK GRADIENT: 13.5 MMHG
AV REGURGITANT PEAK GRADIENT: 66.9 MMHG
AV REGURGITATION PRESSURE HALF TIME: 561 MS
AV VALVE AREA: 1.7 CM2
AV VELOCITY RATIO: 0.4
BNP SERPL-MCNC: 1091 PG/ML (ref 0–93)
BNP SERPL-MCNC: 1298 PG/ML (ref 0–93)
BNP SERPL-MCNC: 1448 PG/ML (ref 0–93)
BNP SERPL-MCNC: 1510 PG/ML (ref 0–93)
BNP SERPL-MCNC: 1585 PG/ML (ref 0–93)
BNP SERPL-MCNC: 1871 PG/ML (ref 0–93)
BSA FOR ECHO PROCEDURE: 1.82 M2
BSA FOR ECHO PROCEDURE: 1.85 M2
BUN SERPL-MCNC: 35 MG/DL (ref 8–28)
BUN SERPL-MCNC: 37 MG/DL (ref 8–28)
BUN SERPL-MCNC: 37 MG/DL (ref 8–28)
BUN SERPL-MCNC: 40 MG/DL (ref 8–28)
BUN SERPL-MCNC: 43 MG/DL (ref 8–28)
BUN SERPL-MCNC: 45 MG/DL (ref 8–28)
BUN SERPL-MCNC: 46 MG/DL (ref 8–28)
BUN SERPL-MCNC: 49 MG/DL (ref 8–28)
BUN SERPL-MCNC: 51 MG/DL (ref 8–28)
BUN SERPL-MCNC: 52 MG/DL (ref 8–28)
CALCIUM SERPL-MCNC: 10.1 MG/DL (ref 8.5–10.5)
CALCIUM SERPL-MCNC: 9.1 MG/DL (ref 8.5–10.5)
CALCIUM SERPL-MCNC: 9.2 MG/DL (ref 8.5–10.5)
CALCIUM SERPL-MCNC: 9.4 MG/DL (ref 8.5–10.5)
CALCIUM SERPL-MCNC: 9.5 MG/DL (ref 8.5–10.5)
CALCIUM SERPL-MCNC: 9.6 MG/DL (ref 8.5–10.5)
CALCIUM SERPL-MCNC: 9.7 MG/DL (ref 8.5–10.5)
CALCIUM SERPL-MCNC: 9.7 MG/DL (ref 8.5–10.5)
CALCIUM SERPL-MCNC: 9.8 MG/DL (ref 8.5–10.5)
CALCIUM SERPL-MCNC: 9.8 MG/DL (ref 8.5–10.5)
CALCIUM SERPL-MCNC: 9.9 MG/DL (ref 8.5–10.5)
CALCIUM SERPL-MCNC: 9.9 MG/DL (ref 8.5–10.5)
CHLORIDE BLD-SCNC: 100 MMOL/L (ref 98–107)
CHLORIDE BLD-SCNC: 101 MMOL/L (ref 98–107)
CHLORIDE BLD-SCNC: 101 MMOL/L (ref 98–107)
CHLORIDE BLD-SCNC: 103 MMOL/L (ref 98–107)
CHLORIDE BLD-SCNC: 103 MMOL/L (ref 98–107)
CHLORIDE BLD-SCNC: 104 MMOL/L (ref 98–107)
CHLORIDE BLD-SCNC: 98 MMOL/L (ref 98–107)
CHLORIDE BLD-SCNC: 99 MMOL/L (ref 98–107)
CO2 SERPL-SCNC: 29 MMOL/L (ref 22–31)
CO2 SERPL-SCNC: 31 MMOL/L (ref 22–31)
CO2 SERPL-SCNC: 31 MMOL/L (ref 22–31)
CO2 SERPL-SCNC: 32 MMOL/L (ref 22–31)
CO2 SERPL-SCNC: 33 MMOL/L (ref 22–31)
CO2 SERPL-SCNC: 35 MMOL/L (ref 22–31)
CO2 SERPL-SCNC: 35 MMOL/L (ref 22–31)
CO2 SERPL-SCNC: 36 MMOL/L (ref 22–31)
CO2 SERPL-SCNC: 37 MMOL/L (ref 22–31)
CREAT SERPL-MCNC: 1.32 MG/DL (ref 0.7–1.3)
CREAT SERPL-MCNC: 1.32 MG/DL (ref 0.7–1.3)
CREAT SERPL-MCNC: 1.33 MG/DL (ref 0.7–1.3)
CREAT SERPL-MCNC: 1.33 MG/DL (ref 0.7–1.3)
CREAT SERPL-MCNC: 1.36 MG/DL (ref 0.7–1.3)
CREAT SERPL-MCNC: 1.53 MG/DL (ref 0.7–1.3)
CREAT SERPL-MCNC: 1.63 MG/DL (ref 0.7–1.3)
CREAT SERPL-MCNC: 1.63 MG/DL (ref 0.7–1.3)
CREAT SERPL-MCNC: 1.65 MG/DL (ref 0.7–1.3)
CREAT SERPL-MCNC: 1.68 MG/DL (ref 0.7–1.3)
CREAT SERPL-MCNC: 1.71 MG/DL (ref 0.7–1.3)
CREAT SERPL-MCNC: 1.77 MG/DL (ref 0.7–1.3)
CV BLOOD PRESSURE: NORMAL MMHG
CV ECHO HEIGHT: 69 IN
CV ECHO WEIGHT: 151 LBS
DIASTOLIC BLOOD PRESSURE - MUSE: NORMAL
DIASTOLIC BLOOD PRESSURE - MUSE: NORMAL MMHG
DOP CALC AO PEAK VEL: 184 CM/S
DOP CALC AO VTI: 38 CM
DOP CALC LVOT AREA: 3.8 CM2
DOP CALC LVOT DIAMETER: 2.2 CM
DOP CALC LVOT PEAK VEL: 82 CM/S
DOP CALC LVOT STROKE VOLUME: 65.3 CM3
DOP CALCLVOT PEAK VEL VTI: 17.2 CM
ERYTHROCYTE [DISTWIDTH] IN BLOOD BY AUTOMATED COUNT: 18.3 % (ref 11–14.5)
GFR SERPL CREATININE-BSD FRML MDRD: 36 ML/MIN/1.73M2
GFR SERPL CREATININE-BSD FRML MDRD: 38 ML/MIN/1.73M2
GFR SERPL CREATININE-BSD FRML MDRD: 39 ML/MIN/1.73M2
GFR SERPL CREATININE-BSD FRML MDRD: 40 ML/MIN/1.73M2
GFR SERPL CREATININE-BSD FRML MDRD: 43 ML/MIN/1.73M2
GFR SERPL CREATININE-BSD FRML MDRD: 50 ML/MIN/1.73M2
GFR SERPL CREATININE-BSD FRML MDRD: 51 ML/MIN/1.73M2
GLUCOSE BLD-MCNC: 103 MG/DL (ref 70–125)
GLUCOSE BLD-MCNC: 103 MG/DL (ref 70–125)
GLUCOSE BLD-MCNC: 105 MG/DL (ref 70–125)
GLUCOSE BLD-MCNC: 108 MG/DL (ref 70–125)
GLUCOSE BLD-MCNC: 113 MG/DL (ref 70–125)
GLUCOSE BLD-MCNC: 120 MG/DL (ref 70–125)
GLUCOSE BLD-MCNC: 126 MG/DL (ref 70–125)
GLUCOSE BLD-MCNC: 126 MG/DL (ref 70–125)
GLUCOSE BLD-MCNC: 83 MG/DL (ref 70–125)
GLUCOSE BLD-MCNC: 87 MG/DL (ref 70–125)
GLUCOSE BLD-MCNC: 88 MG/DL (ref 70–125)
GLUCOSE BLD-MCNC: 96 MG/DL (ref 70–125)
HCC DEVICE COMMENTS: NORMAL
HCC DEVICE IMPLANTING PROVIDER: NORMAL
HCC DEVICE MANUFACTURE: NORMAL
HCC DEVICE MODEL: NORMAL
HCC DEVICE SERIAL NUMBER: NORMAL
HCC DEVICE TYPE: NORMAL
HCT VFR BLD AUTO: 44.2 % (ref 40–54)
HGB BLD-MCNC: 13.7 G/DL (ref 14–18)
INR PPP: 1.5 (ref 0.9–1.1)
INR PPP: 1.82 (ref 0.9–1.1)
INR PPP: 1.91 (ref 0.9–1.1)
INR PPP: 2.91 (ref 0.9–1.1)
INR PPP: 3.1 (ref 0.9–1.1)
INR PPP: 4 (ref 0.9–1.1)
INTERNATIONAL NORMALIZATION RATIO, POC - HISTORICAL: 1.5
INTERNATIONAL NORMALIZATION RATIO, POC - HISTORICAL: 1.7
INTERNATIONAL NORMALIZATION RATIO, POC - HISTORICAL: 1.8
INTERNATIONAL NORMALIZATION RATIO, POC - HISTORICAL: 2.5
INTERNATIONAL NORMALIZATION RATIO, POC - HISTORICAL: 3.4
INTERNATIONAL NORMALIZATION RATIO, POC - HISTORICAL: 3.4
INTERPRETATION ECG - MUSE: NORMAL
INTERPRETATION ECG - MUSE: NORMAL
LA AREA 1: 33.4 CM2
LA AREA 2: 27.1 CM2
LEFT ATRIUM LENGTH: 5.8 CM
LEFT ATRIUM VOLUME INDEX: 71.7 ML/M2
LEFT ATRIUM VOLUME: 132.6 ML
LEFT VENTRICLE CARDIAC INDEX: 2.5 L/MIN/M2
LEFT VENTRICLE CARDIAC OUTPUT: 4.6 L/MIN
LEFT VENTRICLE HEART RATE: 70 BPM
LEFT VENTRICLE HEART RATE: 80 BPM
LEFT VENTRICULAR OUTFLOW TRACT MEAN GRADIENT: 1 MMHG
LEFT VENTRICULAR OUTFLOW TRACT MEAN VELOCITY: 51.8 CM/S
LEFT VENTRICULAR OUTFLOW TRACT PEAK GRADIENT: 3 MMHG
LV STROKE VOLUME INDEX: 35.3 ML/M2
MAGNESIUM SERPL-MCNC: 2.4 MG/DL (ref 1.8–2.6)
MCH RBC QN AUTO: 27.7 PG (ref 27–34)
MCHC RBC AUTO-ENTMCNC: 31 G/DL (ref 32–36)
MCV RBC AUTO: 90 FL (ref 80–100)
MITRAL REGURGITANT VELOCITY TIME INTEGRAL: 144 CM
MITRAL VALVE DECELERATION SLOPE: 4670 MM/S2
MITRAL VALVE E/A RATIO: 2.3
MITRAL VALVE PRESSURE HALF-TIME: 63 MS
MR FLOW: 47 CM3
MR MEAN GRADIENT: 66 MMHG
MR MEAN VELOCITY: 368 CM/S
MR PEAK GRADIENT: 95.6 MMHG
MR PISA EROA: 0.3 CM2
MR PISA RADIUS: 0.8 CM
MR PISA VN NYQUIST: 38.5 CM/S
MV AVERAGE E/E' RATIO: 28.2 CM/S
MV DECELERATION TIME: 215 MS
MV E'TISSUE VEL-LAT: 4.03 CM/S
MV E'TISSUE VEL-MED: 3.05 CM/S
MV LATERAL E/E' RATIO: 24.8
MV MEDIAL E/E' RATIO: 32.8
MV PEAK A VELOCITY: 42.7 CM/S
MV PEAK E VELOCITY: 100 CM/S
MV REGURGITANT VOLUME: 45.6 CC
MV VALVE AREA PRESSURE 1/2 METHOD: 3.5 CM2
NUC REST DIASTOLIC VOLUME INDEX: 2416 LBS
NUC REST SYSTOLIC VOLUME INDEX: 69 IN
P AXIS - MUSE: 71 DEGREES
P AXIS - MUSE: NORMAL
PISA MR PEAK VEL: 489 CM/S
PLATELET # BLD AUTO: 128 THOU/UL (ref 140–440)
PMV BLD AUTO: 10.9 FL (ref 8.5–12.5)
POC INR - HE - HISTORICAL: 1.7 (ref 0.9–1.1)
POC INR - HE - HISTORICAL: 1.8 (ref 0.9–1.1)
POC INR - HE - HISTORICAL: 1.9 (ref 0.9–1.1)
POC INR - HE - HISTORICAL: 1.9 (ref 0.9–1.1)
POC INR - HE - HISTORICAL: 2 (ref 0.9–1.1)
POC INR - HE - HISTORICAL: 2 (ref 0.9–1.1)
POC INR - HE - HISTORICAL: 2.1 (ref 0.9–1.1)
POC INR - HE - HISTORICAL: 2.4 (ref 0.9–1.1)
POC INR - HE - HISTORICAL: 2.5 (ref 0.9–1.1)
POC INR - HE - HISTORICAL: 2.9 (ref 0.9–1.1)
POC INR - HE - HISTORICAL: 3 (ref 0.9–1.1)
POC INR - HE - HISTORICAL: 3.2 (ref 0.9–1.1)
POC INR - HE - HISTORICAL: 3.3 (ref 0.9–1.1)
POC INR - HE - HISTORICAL: 3.3 (ref 0.9–1.1)
POC INR - HE - HISTORICAL: 3.4 (ref 0.9–1.1)
POC INR - HE - HISTORICAL: 3.6 (ref 0.9–1.1)
POC INR - HE - HISTORICAL: 4.1 (ref 0.9–1.1)
POTASSIUM BLD-SCNC: 3.8 MMOL/L (ref 3.5–5)
POTASSIUM BLD-SCNC: 3.9 MMOL/L (ref 3.5–5)
POTASSIUM BLD-SCNC: 4 MMOL/L (ref 3.5–5)
POTASSIUM BLD-SCNC: 4.1 MMOL/L (ref 3.5–5)
POTASSIUM BLD-SCNC: 4.2 MMOL/L (ref 3.5–5)
POTASSIUM BLD-SCNC: 4.4 MMOL/L (ref 3.5–5)
POTASSIUM BLD-SCNC: 4.4 MMOL/L (ref 3.5–5)
PR INTERVAL - MUSE: 218 MS
PR INTERVAL - MUSE: NORMAL
PR MAX PG: 2 MMHG
PR PEAK VELOCITY: 71.7 CM/S
PV PEAK GRADIENT: 2.8 MMHG
PV VMAX: 83.6 CM/S
QRS DURATION - MUSE: 114 MS
QRS DURATION - MUSE: 154 MS
QT - MUSE: 456 MS
QT - MUSE: 470 MS
QTC - MUSE: 522 MS
QTC - MUSE: 531 MS
R AXIS - MUSE: 131 DEGREES
R AXIS - MUSE: 268 DEGREES
RBC # BLD AUTO: 4.94 MILL/UL (ref 4.4–6.2)
SODIUM SERPL-SCNC: 141 MMOL/L (ref 136–145)
SODIUM SERPL-SCNC: 141 MMOL/L (ref 136–145)
SODIUM SERPL-SCNC: 142 MMOL/L (ref 136–145)
SODIUM SERPL-SCNC: 142 MMOL/L (ref 136–145)
SODIUM SERPL-SCNC: 143 MMOL/L (ref 136–145)
SODIUM SERPL-SCNC: 144 MMOL/L (ref 136–145)
SODIUM SERPL-SCNC: 144 MMOL/L (ref 136–145)
SODIUM SERPL-SCNC: 145 MMOL/L (ref 136–145)
SODIUM SERPL-SCNC: 146 MMOL/L (ref 136–145)
SODIUM SERPL-SCNC: 146 MMOL/L (ref 136–145)
SYSTOLIC BLOOD PRESSURE - MUSE: NORMAL
SYSTOLIC BLOOD PRESSURE - MUSE: NORMAL MMHG
T AXIS - MUSE: -63 DEGREES
T AXIS - MUSE: 173 DEGREES
TRICUSPID REGURGITATION PEAK PRESSURE GRADIENT: 25 MMHG
TRICUSPID REGURGITATION PEAK PRESSURE GRADIENT: 34.8 MMHG
TRICUSPID VALVE ANULAR PLANE SYSTOLIC EXCURSION: 1.1 CM
TRICUSPID VALVE PEAK REGURGITANT VELOCITY: 250 CM/S
TRICUSPID VALVE PEAK REGURGITANT VELOCITY: 295 CM/S
VENTRICULAR RATE- MUSE: 77 BPM
VENTRICULAR RATE- MUSE: 79 BPM
WBC: 14.1 THOU/UL (ref 4–11)

## 2019-01-01 ASSESSMENT — MIFFLIN-ST. JEOR
SCORE: 1353.92
SCORE: 1326.7
SCORE: 1313.1
SCORE: 1362.99
SCORE: 1331.24
SCORE: 1367.53
SCORE: 1326.7
SCORE: 1340.31
SCORE: 1358.46
SCORE: 1313.1
SCORE: 1326.7
SCORE: 1349.38
SCORE: 1362.99
SCORE: 1340.31
SCORE: 1331.24
SCORE: 1362.99

## 2019-01-04 ENCOUNTER — RECORDS - HEALTHEAST (OUTPATIENT)
Dept: LAB | Facility: CLINIC | Age: 84
End: 2019-01-04

## 2019-01-04 LAB
ANION GAP SERPL CALCULATED.3IONS-SCNC: 14 MMOL/L (ref 5–18)
BUN SERPL-MCNC: 39 MG/DL (ref 8–28)
CALCIUM SERPL-MCNC: 8.7 MG/DL (ref 8.5–10.5)
CHLORIDE BLD-SCNC: 102 MMOL/L (ref 98–107)
CO2 SERPL-SCNC: 24 MMOL/L (ref 22–31)
CREAT SERPL-MCNC: 1.65 MG/DL (ref 0.7–1.3)
GFR SERPL CREATININE-BSD FRML MDRD: 40 ML/MIN/1.73M2
GLUCOSE BLD-MCNC: 90 MG/DL (ref 70–125)
POTASSIUM BLD-SCNC: 4 MMOL/L (ref 3.5–5)
SODIUM SERPL-SCNC: 140 MMOL/L (ref 136–145)

## 2019-01-08 ENCOUNTER — HOSPITAL ENCOUNTER (OUTPATIENT)
Dept: LAB | Age: 84
Setting detail: SPECIMEN
Discharge: HOME | End: 2019-01-08

## 2019-01-08 ENCOUNTER — OFFICE VISIT - HEALTHEAST (OUTPATIENT)
Dept: CARDIOLOGY | Facility: CLINIC | Age: 84
End: 2019-01-08

## 2019-01-08 DIAGNOSIS — Z95.2 S/P TAVR (TRANSCATHETER AORTIC VALVE REPLACEMENT): ICD-10-CM

## 2019-01-08 LAB
ERYTHROCYTE [DISTWIDTH] IN BLOOD BY AUTOMATED COUNT: 17.7 % (ref 11–14.5)
HCT VFR BLD AUTO: 34.9 % (ref 40–54)
HGB BLD-MCNC: 10.5 G/DL (ref 14–18)
MCH RBC QN AUTO: 26.4 PG (ref 27–34)
MCHC RBC AUTO-ENTMCNC: 30.1 G/DL (ref 32–36)
MCV RBC AUTO: 88 FL (ref 80–100)
PLATELET # BLD AUTO: 149 THOU/UL (ref 140–440)
PMV BLD AUTO: 10.8 FL (ref 8.5–12.5)
RBC # BLD AUTO: 3.97 MILL/UL (ref 4.4–6.2)
WBC: 11.9 THOU/UL (ref 4–11)

## 2019-01-08 ASSESSMENT — MIFFLIN-ST. JEOR: SCORE: 1353.92

## 2019-01-10 LAB
ATRIAL RATE - MUSE: 82 BPM
DIASTOLIC BLOOD PRESSURE - MUSE: NORMAL MMHG
INTERPRETATION ECG - MUSE: NORMAL
P AXIS - MUSE: 87 DEGREES
PR INTERVAL - MUSE: 118 MS
QRS DURATION - MUSE: 138 MS
QT - MUSE: 450 MS
QTC - MUSE: 525 MS
R AXIS - MUSE: 159 DEGREES
SYSTOLIC BLOOD PRESSURE - MUSE: NORMAL MMHG
T AXIS - MUSE: -4 DEGREES
VENTRICULAR RATE- MUSE: 82 BPM

## 2019-01-14 ENCOUNTER — AMBULATORY - HEALTHEAST (OUTPATIENT)
Dept: CARDIOLOGY | Facility: CLINIC | Age: 84
End: 2019-01-14

## 2019-01-14 ENCOUNTER — OFFICE VISIT - HEALTHEAST (OUTPATIENT)
Dept: CARDIOLOGY | Facility: CLINIC | Age: 84
End: 2019-01-14

## 2019-01-14 ENCOUNTER — COMMUNICATION - HEALTHEAST (OUTPATIENT)
Dept: CARDIOLOGY | Facility: CLINIC | Age: 84
End: 2019-01-14

## 2019-01-14 DIAGNOSIS — Z95.810 ICD (IMPLANTABLE CARDIOVERTER-DEFIBRILLATOR) IN PLACE: ICD-10-CM

## 2019-01-14 DIAGNOSIS — G47.33 OSA (OBSTRUCTIVE SLEEP APNEA): ICD-10-CM

## 2019-01-14 DIAGNOSIS — J96.11 CHRONIC HYPOXEMIC RESPIRATORY FAILURE (H): ICD-10-CM

## 2019-01-14 DIAGNOSIS — I50.22 CHRONIC HFREF (HEART FAILURE WITH REDUCED EJECTION FRACTION) (H): ICD-10-CM

## 2019-01-14 DIAGNOSIS — I48.91 ATRIAL FIBRILLATION (H): ICD-10-CM

## 2019-01-14 DIAGNOSIS — N18.30 STAGE 3 CHRONIC KIDNEY DISEASE (H): ICD-10-CM

## 2019-01-14 DIAGNOSIS — I95.0 IDIOPATHIC HYPOTENSION: ICD-10-CM

## 2019-01-14 LAB — INTERNATIONAL NORMALIZATION RATIO, POC - HISTORICAL: 2.1

## 2019-01-14 ASSESSMENT — MIFFLIN-ST. JEOR: SCORE: 1367.53

## 2019-01-15 ENCOUNTER — AMBULATORY - HEALTHEAST (OUTPATIENT)
Dept: PULMONOLOGY | Facility: OTHER | Age: 84
End: 2019-01-15

## 2019-01-15 ENCOUNTER — COMMUNICATION - HEALTHEAST (OUTPATIENT)
Dept: CARDIOLOGY | Facility: CLINIC | Age: 84
End: 2019-01-15

## 2019-01-15 DIAGNOSIS — J96.11 CHRONIC HYPOXEMIC RESPIRATORY FAILURE (H): ICD-10-CM

## 2019-01-15 DIAGNOSIS — J98.4 RESTRICTIVE LUNG DISEASE: ICD-10-CM

## 2019-01-16 ENCOUNTER — AMBULATORY - HEALTHEAST (OUTPATIENT)
Dept: PULMONOLOGY | Facility: OTHER | Age: 84
End: 2019-01-16

## 2019-01-16 DIAGNOSIS — J96.11 CHRONIC HYPOXEMIC RESPIRATORY FAILURE (H): ICD-10-CM

## 2019-01-16 DIAGNOSIS — J98.4 RESTRICTIVE LUNG DISEASE: ICD-10-CM

## 2019-01-21 ENCOUNTER — AMBULATORY - HEALTHEAST (OUTPATIENT)
Dept: LAB | Facility: CLINIC | Age: 84
End: 2019-01-21

## 2019-01-21 DIAGNOSIS — I25.10 CORONARY ATHEROSCLEROSIS OF NATIVE CORONARY ARTERY: ICD-10-CM

## 2019-01-21 DIAGNOSIS — I35.0 NODULAR CALCIFIC AORTIC VALVE STENOSIS: ICD-10-CM

## 2019-01-21 DIAGNOSIS — I10 ESSENTIAL HYPERTENSION, MALIGNANT: ICD-10-CM

## 2019-01-21 DIAGNOSIS — N18.30 CHRONIC KIDNEY DISEASE, STAGE III (MODERATE) (H): ICD-10-CM

## 2019-01-21 DIAGNOSIS — I50.9 HEART FAILURE, UNSPECIFIED (H): ICD-10-CM

## 2019-01-21 DIAGNOSIS — I48.91 ATRIAL FIBRILLATION (H): ICD-10-CM

## 2019-01-21 LAB
ANION GAP SERPL CALCULATED.3IONS-SCNC: 13 MMOL/L (ref 5–18)
BUN SERPL-MCNC: 33 MG/DL (ref 8–28)
CALCIUM SERPL-MCNC: 8.6 MG/DL (ref 8.5–10.5)
CHLORIDE BLD-SCNC: 104 MMOL/L (ref 98–107)
CO2 SERPL-SCNC: 23 MMOL/L (ref 22–31)
CREAT SERPL-MCNC: 1.21 MG/DL (ref 0.7–1.3)
CREAT UR-MCNC: 32.2 MG/DL
FERRITIN SERPL-MCNC: 198 NG/ML (ref 27–300)
GFR SERPL CREATININE-BSD FRML MDRD: 57 ML/MIN/1.73M2
GLUCOSE BLD-MCNC: 91 MG/DL (ref 70–125)
HGB BLD-MCNC: 10.3 G/DL (ref 14–18)
IRON SATN MFR SERPL: 16 % (ref 20–50)
IRON SERPL-MCNC: 41 UG/DL (ref 42–175)
PHOSPHATE SERPL-MCNC: 2.9 MG/DL (ref 2.5–4.5)
POTASSIUM BLD-SCNC: 4.2 MMOL/L (ref 3.5–5)
PROTEIN, RANDOM URINE - HISTORICAL: <7 MG/DL
PROTEIN/CREAT RATIO, RANDOM UR: NORMAL
PTH-INTACT SERPL-MCNC: 109 PG/ML (ref 10–86)
SODIUM SERPL-SCNC: 140 MMOL/L (ref 136–145)
TIBC SERPL-MCNC: 264 UG/DL (ref 313–563)
TRANSFERRIN SERPL-MCNC: 211 MG/DL (ref 212–360)

## 2019-01-22 LAB — 25(OH)D3 SERPL-MCNC: 40 NG/ML (ref 30–80)

## 2019-01-25 ENCOUNTER — RECORDS - HEALTHEAST (OUTPATIENT)
Dept: ADMINISTRATIVE | Facility: OTHER | Age: 84
End: 2019-01-25

## 2019-01-25 ENCOUNTER — AMBULATORY - HEALTHEAST (OUTPATIENT)
Dept: CARDIOLOGY | Facility: CLINIC | Age: 84
End: 2019-01-25

## 2019-01-29 ENCOUNTER — COMMUNICATION - HEALTHEAST (OUTPATIENT)
Dept: CARDIOLOGY | Facility: CLINIC | Age: 84
End: 2019-01-29

## 2019-01-31 ENCOUNTER — AMBULATORY - HEALTHEAST (OUTPATIENT)
Dept: CARDIOLOGY | Facility: CLINIC | Age: 84
End: 2019-01-31

## 2019-01-31 DIAGNOSIS — Z95.810 BIVENTRICULAR ICD (IMPLANTABLE CARDIOVERTER-DEFIBRILLATOR) IN PLACE: ICD-10-CM

## 2019-01-31 ASSESSMENT — MIFFLIN-ST. JEOR: SCORE: 1362.99

## 2019-02-05 ENCOUNTER — AMBULATORY - HEALTHEAST (OUTPATIENT)
Dept: CARDIOLOGY | Facility: CLINIC | Age: 84
End: 2019-02-05

## 2019-02-05 DIAGNOSIS — I48.91 ATRIAL FIBRILLATION (H): ICD-10-CM

## 2019-02-05 LAB — INR PPP: 2.4 (ref 0.9–1.1)

## 2019-02-06 ENCOUNTER — AMBULATORY - HEALTHEAST (OUTPATIENT)
Dept: CARDIAC REHAB | Facility: CLINIC | Age: 84
End: 2019-02-06

## 2019-02-18 ENCOUNTER — AMBULATORY - HEALTHEAST (OUTPATIENT)
Dept: CARDIOLOGY | Facility: CLINIC | Age: 84
End: 2019-02-18

## 2019-02-18 DIAGNOSIS — I48.91 ATRIAL FIBRILLATION (H): ICD-10-CM

## 2019-02-18 LAB — INR PPP: 2.3 (ref 0.9–1.1)

## 2019-02-19 ENCOUNTER — AMBULATORY - HEALTHEAST (OUTPATIENT)
Dept: PULMONOLOGY | Facility: OTHER | Age: 84
End: 2019-02-19

## 2019-02-19 DIAGNOSIS — J47.9 BRONCHIECTASIS WITHOUT ACUTE EXACERBATION (H): ICD-10-CM

## 2019-02-21 ENCOUNTER — OFFICE VISIT - HEALTHEAST (OUTPATIENT)
Dept: PULMONOLOGY | Facility: OTHER | Age: 84
End: 2019-02-21

## 2019-02-21 ENCOUNTER — HOSPITAL ENCOUNTER (OUTPATIENT)
Dept: LAB | Age: 84
Setting detail: SPECIMEN
Discharge: HOME | End: 2019-02-21

## 2019-02-21 DIAGNOSIS — J47.9 BRONCHIECTASIS WITHOUT ACUTE EXACERBATION (H): ICD-10-CM

## 2019-02-21 DIAGNOSIS — J96.11 CHRONIC HYPOXEMIC RESPIRATORY FAILURE (H): ICD-10-CM

## 2019-02-21 DIAGNOSIS — I50.22 CHRONIC SYSTOLIC CONGESTIVE HEART FAILURE (H): ICD-10-CM

## 2019-02-21 DIAGNOSIS — J98.4 RESTRICTIVE LUNG DISEASE: ICD-10-CM

## 2019-02-21 LAB
ANION GAP SERPL CALCULATED.3IONS-SCNC: 11 MMOL/L (ref 5–18)
BNP SERPL-MCNC: 1139 PG/ML (ref 0–93)
BUN SERPL-MCNC: 35 MG/DL (ref 8–28)
CALCIUM SERPL-MCNC: 9 MG/DL (ref 8.5–10.5)
CHLORIDE BLD-SCNC: 102 MMOL/L (ref 98–107)
CO2 SERPL-SCNC: 29 MMOL/L (ref 22–31)
CREAT SERPL-MCNC: 1.47 MG/DL (ref 0.7–1.3)
GFR SERPL CREATININE-BSD FRML MDRD: 45 ML/MIN/1.73M2
GLUCOSE BLD-MCNC: 81 MG/DL (ref 70–125)
POTASSIUM BLD-SCNC: 4.4 MMOL/L (ref 3.5–5)
PROCALCITONIN SERPL-MCNC: 0.12 NG/ML (ref 0–0.49)
SODIUM SERPL-SCNC: 142 MMOL/L (ref 136–145)

## 2019-02-22 ENCOUNTER — COMMUNICATION - HEALTHEAST (OUTPATIENT)
Dept: PULMONOLOGY | Facility: OTHER | Age: 84
End: 2019-02-22

## 2019-02-25 ENCOUNTER — AMBULATORY - HEALTHEAST (OUTPATIENT)
Dept: CARDIOLOGY | Facility: CLINIC | Age: 84
End: 2019-02-25

## 2019-02-25 ENCOUNTER — OFFICE VISIT - HEALTHEAST (OUTPATIENT)
Dept: CARDIOLOGY | Facility: CLINIC | Age: 84
End: 2019-02-25

## 2019-02-25 DIAGNOSIS — I50.22 CHRONIC HFREF (HEART FAILURE WITH REDUCED EJECTION FRACTION) (H): ICD-10-CM

## 2019-02-25 DIAGNOSIS — I95.2 HYPOTENSION DUE TO DRUGS: ICD-10-CM

## 2019-02-25 DIAGNOSIS — J96.11 CHRONIC HYPOXEMIC RESPIRATORY FAILURE (H): ICD-10-CM

## 2019-02-25 DIAGNOSIS — I50.23 ACUTE ON CHRONIC SYSTOLIC CONGESTIVE HEART FAILURE (H): ICD-10-CM

## 2019-02-25 DIAGNOSIS — N18.30 STAGE 3 CHRONIC KIDNEY DISEASE (H): ICD-10-CM

## 2019-02-25 DIAGNOSIS — I44.7 LEFT BUNDLE BRANCH BLOCK: ICD-10-CM

## 2019-02-25 DIAGNOSIS — Z95.810 BIVENTRICULAR ICD (IMPLANTABLE CARDIOVERTER-DEFIBRILLATOR) IN PLACE: ICD-10-CM

## 2019-02-25 LAB
ANION GAP SERPL CALCULATED.3IONS-SCNC: 8 MMOL/L (ref 5–18)
BNP SERPL-MCNC: 1731 PG/ML (ref 0–93)
BUN SERPL-MCNC: 39 MG/DL (ref 8–28)
CALCIUM SERPL-MCNC: 9.1 MG/DL (ref 8.5–10.5)
CHLORIDE BLD-SCNC: 101 MMOL/L (ref 98–107)
CO2 SERPL-SCNC: 30 MMOL/L (ref 22–31)
CREAT SERPL-MCNC: 1.28 MG/DL (ref 0.7–1.3)
GFR SERPL CREATININE-BSD FRML MDRD: 53 ML/MIN/1.73M2
GLUCOSE BLD-MCNC: 77 MG/DL (ref 70–125)
POTASSIUM BLD-SCNC: 3.9 MMOL/L (ref 3.5–5)
SODIUM SERPL-SCNC: 139 MMOL/L (ref 136–145)

## 2019-02-25 ASSESSMENT — MIFFLIN-ST. JEOR: SCORE: 1381.14

## 2019-02-27 ENCOUNTER — AMBULATORY - HEALTHEAST (OUTPATIENT)
Dept: CARDIOLOGY | Facility: CLINIC | Age: 84
End: 2019-02-27

## 2019-02-27 DIAGNOSIS — Z95.810 BIVENTRICULAR ICD (IMPLANTABLE CARDIOVERTER-DEFIBRILLATOR) IN PLACE: ICD-10-CM

## 2019-02-28 ENCOUNTER — OFFICE VISIT - HEALTHEAST (OUTPATIENT)
Dept: CARDIOLOGY | Facility: CLINIC | Age: 84
End: 2019-02-28

## 2019-02-28 DIAGNOSIS — I50.22 CHRONIC HFREF (HEART FAILURE WITH REDUCED EJECTION FRACTION) (H): ICD-10-CM

## 2019-02-28 LAB
ANION GAP SERPL CALCULATED.3IONS-SCNC: 8 MMOL/L (ref 5–18)
BUN SERPL-MCNC: 37 MG/DL (ref 8–28)
CALCIUM SERPL-MCNC: 9 MG/DL (ref 8.5–10.5)
CHLORIDE BLD-SCNC: 101 MMOL/L (ref 98–107)
CO2 SERPL-SCNC: 30 MMOL/L (ref 22–31)
CREAT SERPL-MCNC: 1.33 MG/DL (ref 0.7–1.3)
GFR SERPL CREATININE-BSD FRML MDRD: 51 ML/MIN/1.73M2
GLUCOSE BLD-MCNC: 115 MG/DL (ref 70–125)
POTASSIUM BLD-SCNC: 3.5 MMOL/L (ref 3.5–5)
SODIUM SERPL-SCNC: 139 MMOL/L (ref 136–145)

## 2019-02-28 ASSESSMENT — MIFFLIN-ST. JEOR: SCORE: 1340.31

## 2019-03-05 ENCOUNTER — COMMUNICATION - HEALTHEAST (OUTPATIENT)
Dept: PULMONOLOGY | Facility: OTHER | Age: 84
End: 2019-03-05

## 2019-03-05 DIAGNOSIS — J47.9 BRONCHIECTASIS (H): ICD-10-CM

## 2019-03-07 ENCOUNTER — AMBULATORY - HEALTHEAST (OUTPATIENT)
Dept: CARDIOLOGY | Facility: CLINIC | Age: 84
End: 2019-03-07

## 2019-03-07 ENCOUNTER — OFFICE VISIT - HEALTHEAST (OUTPATIENT)
Dept: CARDIOLOGY | Facility: CLINIC | Age: 84
End: 2019-03-07

## 2019-03-07 DIAGNOSIS — E78.5 DYSLIPIDEMIA: ICD-10-CM

## 2019-03-07 DIAGNOSIS — I10 ESSENTIAL HYPERTENSION: ICD-10-CM

## 2019-03-07 DIAGNOSIS — Z95.2 STATUS POST AORTIC VALVE REPLACEMENT: ICD-10-CM

## 2019-03-07 DIAGNOSIS — I42.8 OTHER CARDIOMYOPATHY (H): ICD-10-CM

## 2019-03-07 DIAGNOSIS — I48.91 ATRIAL FIBRILLATION (H): ICD-10-CM

## 2019-03-07 DIAGNOSIS — I48.0 PAROXYSMAL ATRIAL FIBRILLATION (H): ICD-10-CM

## 2019-03-07 DIAGNOSIS — I25.10 CORONARY ARTERY DISEASE INVOLVING NATIVE CORONARY ARTERY OF NATIVE HEART WITHOUT ANGINA PECTORIS: ICD-10-CM

## 2019-03-07 LAB — INR PPP: 1.53 (ref 0.9–1.1)

## 2019-03-11 ENCOUNTER — AMBULATORY - HEALTHEAST (OUTPATIENT)
Dept: CARDIOLOGY | Facility: CLINIC | Age: 84
End: 2019-03-11

## 2019-03-11 ENCOUNTER — COMMUNICATION - HEALTHEAST (OUTPATIENT)
Dept: CARDIOLOGY | Facility: CLINIC | Age: 84
End: 2019-03-11

## 2019-03-11 DIAGNOSIS — Z95.810 ICD (IMPLANTABLE CARDIOVERTER-DEFIBRILLATOR) IN PLACE: ICD-10-CM

## 2020-01-01 ENCOUNTER — OFFICE VISIT - HEALTHEAST (OUTPATIENT)
Dept: PULMONOLOGY | Facility: OTHER | Age: 85
End: 2020-01-01

## 2020-01-01 ENCOUNTER — COMMUNICATION - HEALTHEAST (OUTPATIENT)
Dept: CARDIOLOGY | Facility: CLINIC | Age: 85
End: 2020-01-01

## 2020-01-01 ENCOUNTER — HOME CARE/HOSPICE - HEALTHEAST (OUTPATIENT)
Dept: HOSPICE | Facility: HOSPICE | Age: 85
End: 2020-01-01

## 2020-01-01 ENCOUNTER — HOME CARE/HOSPICE - HEALTHEAST (OUTPATIENT)
Dept: HOME HEALTH SERVICES | Facility: HOME HEALTH | Age: 85
End: 2020-01-01

## 2020-01-01 ENCOUNTER — AMBULATORY - HEALTHEAST (OUTPATIENT)
Dept: PULMONOLOGY | Facility: OTHER | Age: 85
End: 2020-01-01

## 2020-01-01 ENCOUNTER — COMMUNICATION - HEALTHEAST (OUTPATIENT)
Dept: PULMONOLOGY | Facility: OTHER | Age: 85
End: 2020-01-01

## 2020-01-01 ENCOUNTER — AMBULATORY - HEALTHEAST (OUTPATIENT)
Dept: HOSPICE | Facility: HOSPICE | Age: 85
End: 2020-01-01

## 2020-01-01 ENCOUNTER — AMBULATORY - HEALTHEAST (OUTPATIENT)
Dept: FAMILY MEDICINE | Facility: CLINIC | Age: 85
End: 2020-01-01

## 2020-01-01 ENCOUNTER — RECORDS - HEALTHEAST (OUTPATIENT)
Dept: ADMINISTRATIVE | Facility: OTHER | Age: 85
End: 2020-01-01

## 2020-01-01 ENCOUNTER — SURGERY - HEALTHEAST (OUTPATIENT)
Dept: CARDIOLOGY | Facility: CLINIC | Age: 85
End: 2020-01-01

## 2020-01-01 ENCOUNTER — COMMUNICATION - HEALTHEAST (OUTPATIENT)
Dept: ADMINISTRATIVE | Facility: CLINIC | Age: 85
End: 2020-01-01

## 2020-01-01 ENCOUNTER — RECORDS - HEALTHEAST (OUTPATIENT)
Dept: LAB | Facility: CLINIC | Age: 85
End: 2020-01-01

## 2020-01-01 ENCOUNTER — COMMUNICATION - HEALTHEAST (OUTPATIENT)
Dept: ANTICOAGULATION | Facility: CLINIC | Age: 85
End: 2020-01-01

## 2020-01-01 ENCOUNTER — AMBULATORY - HEALTHEAST (OUTPATIENT)
Dept: CARDIOLOGY | Facility: CLINIC | Age: 85
End: 2020-01-01

## 2020-01-01 ENCOUNTER — COMMUNICATION - HEALTHEAST (OUTPATIENT)
Dept: SCHEDULING | Facility: CLINIC | Age: 85
End: 2020-01-01

## 2020-01-01 ENCOUNTER — OFFICE VISIT - HEALTHEAST (OUTPATIENT)
Dept: CARDIOLOGY | Facility: CLINIC | Age: 85
End: 2020-01-01

## 2020-01-01 DIAGNOSIS — I48.19 PERSISTENT ATRIAL FIBRILLATION (H): ICD-10-CM

## 2020-01-01 DIAGNOSIS — J84.9 ILD (INTERSTITIAL LUNG DISEASE) (H): ICD-10-CM

## 2020-01-01 DIAGNOSIS — R06.00 DYSPNEA, UNSPECIFIED: ICD-10-CM

## 2020-01-01 DIAGNOSIS — G47.33 OSA (OBSTRUCTIVE SLEEP APNEA): ICD-10-CM

## 2020-01-01 DIAGNOSIS — J98.4 RESTRICTIVE LUNG DISEASE: ICD-10-CM

## 2020-01-01 DIAGNOSIS — Z95.2 STATUS POST AORTIC VALVE REPLACEMENT: ICD-10-CM

## 2020-01-01 DIAGNOSIS — I50.22 CHRONIC SYSTOLIC CONGESTIVE HEART FAILURE (H): ICD-10-CM

## 2020-01-01 DIAGNOSIS — I25.10 CORONARY ARTERY DISEASE INVOLVING NATIVE CORONARY ARTERY OF NATIVE HEART WITHOUT ANGINA PECTORIS: ICD-10-CM

## 2020-01-01 DIAGNOSIS — I48.11 LONGSTANDING PERSISTENT ATRIAL FIBRILLATION (H): ICD-10-CM

## 2020-01-01 DIAGNOSIS — I10 ESSENTIAL HYPERTENSION: ICD-10-CM

## 2020-01-01 DIAGNOSIS — J47.9 BRONCHIECTASIS WITHOUT ACUTE EXACERBATION (H): ICD-10-CM

## 2020-01-01 DIAGNOSIS — I48.91 A-FIB (H): ICD-10-CM

## 2020-01-01 DIAGNOSIS — I25.5 ISCHEMIC CARDIOMYOPATHY: ICD-10-CM

## 2020-01-01 DIAGNOSIS — I42.9 CARDIOMYOPATHY, UNSPECIFIED TYPE (H): ICD-10-CM

## 2020-01-01 DIAGNOSIS — J96.11 CHRONIC HYPOXEMIC RESPIRATORY FAILURE (H): ICD-10-CM

## 2020-01-01 DIAGNOSIS — Z95.810 BIVENTRICULAR ICD (IMPLANTABLE CARDIOVERTER-DEFIBRILLATOR) IN PLACE: ICD-10-CM

## 2020-01-01 DIAGNOSIS — R06.02 SHORTNESS OF BREATH: ICD-10-CM

## 2020-01-01 LAB
ANION GAP SERPL CALCULATED.3IONS-SCNC: 10 MMOL/L (ref 5–18)
ANION GAP SERPL CALCULATED.3IONS-SCNC: 14 MMOL/L (ref 5–18)
BNP SERPL-MCNC: 2384 PG/ML (ref 0–93)
BUN SERPL-MCNC: 59 MG/DL (ref 8–28)
BUN SERPL-MCNC: 80 MG/DL (ref 8–28)
CALCIUM SERPL-MCNC: 8.8 MG/DL (ref 8.5–10.5)
CALCIUM SERPL-MCNC: 9.5 MG/DL (ref 8.5–10.5)
CHLORIDE BLD-SCNC: 100 MMOL/L (ref 98–107)
CHLORIDE BLD-SCNC: 101 MMOL/L (ref 98–107)
CO2 SERPL-SCNC: 27 MMOL/L (ref 22–31)
CO2 SERPL-SCNC: 32 MMOL/L (ref 22–31)
CREAT SERPL-MCNC: 1.7 MG/DL (ref 0.7–1.3)
CREAT SERPL-MCNC: 1.99 MG/DL (ref 0.7–1.3)
GFR SERPL CREATININE-BSD FRML MDRD: 32 ML/MIN/1.73M2
GFR SERPL CREATININE-BSD FRML MDRD: 38 ML/MIN/1.73M2
GLUCOSE BLD-MCNC: 104 MG/DL (ref 70–125)
GLUCOSE BLD-MCNC: 110 MG/DL (ref 70–125)
HCC DEVICE COMMENTS: NORMAL
HCC DEVICE COMMENTS: NORMAL
HCC DEVICE IMPLANTING PROVIDER: NORMAL
HCC DEVICE IMPLANTING PROVIDER: NORMAL
HCC DEVICE MANUFACTURE: NORMAL
HCC DEVICE MANUFACTURE: NORMAL
HCC DEVICE MODEL: NORMAL
HCC DEVICE MODEL: NORMAL
HCC DEVICE SERIAL NUMBER: NORMAL
HCC DEVICE SERIAL NUMBER: NORMAL
HCC DEVICE TYPE: NORMAL
HCC DEVICE TYPE: NORMAL
INR PPP: 1.6 (ref 0.9–1.1)
INR PPP: 1.6 (ref 0.9–1.1)
INR PPP: 1.8 (ref 0.9–1.1)
INR PPP: 1.9 (ref 0.9–1.1)
INR PPP: 2 (ref 0.9–1.1)
INR PPP: 2.2 (ref 0.9–1.1)
INR PPP: 2.5 (ref 0.9–1.1)
INR PPP: 2.6 (ref 0.9–1.1)
INR PPP: 3.1 (ref 0.9–1.1)
INR PPP: 3.2 (ref 0.9–1.1)
INR PPP: 3.2 (ref 0.9–1.1)
INR PPP: 3.8 (ref 0.9–1.1)
POC INR - HE - HISTORICAL: 1.9 (ref 0.9–1.1)
POC INR - HE - HISTORICAL: 2.5 (ref 0.9–1.1)
POC INR - HE - HISTORICAL: 2.6 (ref 0.9–1.1)
POC INR - HE - HISTORICAL: 3.8 (ref 0.9–1.1)
POTASSIUM BLD-SCNC: 4 MMOL/L (ref 3.5–5)
POTASSIUM BLD-SCNC: 4.2 MMOL/L (ref 3.5–5)
SODIUM SERPL-SCNC: 141 MMOL/L (ref 136–145)
SODIUM SERPL-SCNC: 143 MMOL/L (ref 136–145)

## 2020-01-01 RX ORDER — LISINOPRIL 5 MG/1
5 TABLET ORAL DAILY
Status: SHIPPED | COMMUNITY
Start: 2020-01-01

## 2020-01-01 RX ORDER — FORMOTEROL FUMARATE DIHYDRATE 20 UG/2ML
20 SOLUTION RESPIRATORY (INHALATION) EVERY 12 HOURS
Status: SHIPPED | COMMUNITY
Start: 2020-01-01

## 2020-01-01 RX ORDER — LORAZEPAM 2 MG/ML
1 CONCENTRATE ORAL
Status: SHIPPED | COMMUNITY
Start: 2020-01-01

## 2020-01-01 RX ORDER — LORAZEPAM 2 MG/ML
0.5 CONCENTRATE ORAL EVERY 4 HOURS PRN
Status: SHIPPED | COMMUNITY
Start: 2020-01-01

## 2020-01-01 RX ORDER — BUDESONIDE 0.5 MG/2ML
0.5 INHALANT ORAL 2 TIMES DAILY
Status: SHIPPED | COMMUNITY
Start: 2020-01-01

## 2020-01-01 RX ORDER — FUROSEMIDE 40 MG
40 TABLET ORAL
Status: SHIPPED | COMMUNITY
Start: 2020-01-01

## 2020-01-01 RX ORDER — LOPERAMIDE HYDROCHLORIDE 2 MG/1
2 TABLET ORAL PRN
Status: SHIPPED | COMMUNITY
Start: 2020-01-01

## 2020-01-01 RX ORDER — LOSARTAN POTASSIUM 25 MG/1
25 TABLET ORAL DAILY
Status: SHIPPED | COMMUNITY
Start: 2020-01-01

## 2020-01-01 RX ORDER — HYDROMORPHONE HYDROCHLORIDE 1 MG/ML
2 SOLUTION ORAL EVERY 4 HOURS
Status: SHIPPED | COMMUNITY
Start: 2020-01-01

## 2020-01-01 RX ORDER — ALBUTEROL SULFATE 0.83 MG/ML
2.5 SOLUTION RESPIRATORY (INHALATION) EVERY 4 HOURS PRN
Status: SHIPPED | COMMUNITY
Start: 2020-01-01

## 2020-01-01 RX ORDER — HYDROMORPHONE HYDROCHLORIDE 1 MG/ML
2 SOLUTION ORAL
Status: SHIPPED | COMMUNITY
Start: 2020-01-01

## 2020-01-01 RX ORDER — WARFARIN SODIUM 2 MG/1
2-4 TABLET ORAL DAILY
Qty: 180 TABLET | Refills: 1 | Status: SHIPPED | OUTPATIENT
Start: 2020-01-01

## 2020-01-01 ASSESSMENT — MIFFLIN-ST. JEOR
SCORE: 1344.85
SCORE: 1349.38
SCORE: 1346.21
SCORE: 1390.21
SCORE: 1343.03
SCORE: 1347.12
SCORE: 1340.34

## 2020-03-19 ENCOUNTER — HOME CARE/HOSPICE - HEALTHEAST (OUTPATIENT)
Dept: HOSPICE | Facility: HOSPICE | Age: 85
End: 2020-03-19

## 2020-03-31 ENCOUNTER — HOME CARE/HOSPICE - HEALTHEAST (OUTPATIENT)
Dept: HOSPICE | Facility: HOSPICE | Age: 85
End: 2020-03-31

## 2021-05-25 ENCOUNTER — RECORDS - HEALTHEAST (OUTPATIENT)
Dept: ADMINISTRATIVE | Facility: CLINIC | Age: 86
End: 2021-05-25

## 2021-05-26 VITALS
DIASTOLIC BLOOD PRESSURE: 50 MMHG | HEART RATE: 97 BPM | RESPIRATION RATE: 22 BRPM | SYSTOLIC BLOOD PRESSURE: 82 MMHG | OXYGEN SATURATION: 97 %

## 2021-05-26 VITALS
SYSTOLIC BLOOD PRESSURE: 92 MMHG | OXYGEN SATURATION: 91 % | RESPIRATION RATE: 16 BRPM | DIASTOLIC BLOOD PRESSURE: 64 MMHG | HEART RATE: 75 BPM

## 2021-05-26 VITALS
HEART RATE: 70 BPM | SYSTOLIC BLOOD PRESSURE: 107 MMHG | TEMPERATURE: 96.7 F | DIASTOLIC BLOOD PRESSURE: 64 MMHG | OXYGEN SATURATION: 98 % | RESPIRATION RATE: 18 BRPM

## 2021-05-26 VITALS — DIASTOLIC BLOOD PRESSURE: 75 MMHG | SYSTOLIC BLOOD PRESSURE: 115 MMHG | OXYGEN SATURATION: 96 % | HEART RATE: 82 BPM

## 2021-05-26 VITALS
SYSTOLIC BLOOD PRESSURE: 88 MMHG | DIASTOLIC BLOOD PRESSURE: 48 MMHG | HEART RATE: 83 BPM | RESPIRATION RATE: 18 BRPM | OXYGEN SATURATION: 100 %

## 2021-05-26 VITALS
HEART RATE: 76 BPM | RESPIRATION RATE: 18 BRPM | SYSTOLIC BLOOD PRESSURE: 88 MMHG | OXYGEN SATURATION: 98 % | DIASTOLIC BLOOD PRESSURE: 66 MMHG

## 2021-05-26 VITALS
OXYGEN SATURATION: 93 % | SYSTOLIC BLOOD PRESSURE: 98 MMHG | RESPIRATION RATE: 18 BRPM | DIASTOLIC BLOOD PRESSURE: 58 MMHG | HEART RATE: 81 BPM

## 2021-05-27 VITALS
DIASTOLIC BLOOD PRESSURE: 52 MMHG | OXYGEN SATURATION: 99 % | SYSTOLIC BLOOD PRESSURE: 90 MMHG | RESPIRATION RATE: 20 BRPM | HEART RATE: 87 BPM

## 2021-05-27 VITALS — HEART RATE: 75 BPM | OXYGEN SATURATION: 92 %

## 2021-05-27 VITALS
OXYGEN SATURATION: 96 % | DIASTOLIC BLOOD PRESSURE: 60 MMHG | RESPIRATION RATE: 20 BRPM | SYSTOLIC BLOOD PRESSURE: 89 MMHG | HEART RATE: 77 BPM

## 2021-05-27 VITALS
DIASTOLIC BLOOD PRESSURE: 62 MMHG | SYSTOLIC BLOOD PRESSURE: 110 MMHG | HEART RATE: 75 BPM | RESPIRATION RATE: 18 BRPM | OXYGEN SATURATION: 97 %

## 2021-05-27 VITALS
HEART RATE: 90 BPM | SYSTOLIC BLOOD PRESSURE: 92 MMHG | OXYGEN SATURATION: 100 % | DIASTOLIC BLOOD PRESSURE: 63 MMHG | RESPIRATION RATE: 18 BRPM

## 2021-05-27 VITALS — SYSTOLIC BLOOD PRESSURE: 106 MMHG | HEART RATE: 82 BPM | DIASTOLIC BLOOD PRESSURE: 70 MMHG | OXYGEN SATURATION: 97 %

## 2021-05-27 VITALS
SYSTOLIC BLOOD PRESSURE: 102 MMHG | HEART RATE: 65 BPM | DIASTOLIC BLOOD PRESSURE: 64 MMHG | OXYGEN SATURATION: 94 % | RESPIRATION RATE: 19 BRPM | TEMPERATURE: 98.1 F

## 2021-05-27 VITALS
TEMPERATURE: 97.8 F | DIASTOLIC BLOOD PRESSURE: 78 MMHG | SYSTOLIC BLOOD PRESSURE: 118 MMHG | HEART RATE: 77 BPM | OXYGEN SATURATION: 98 % | RESPIRATION RATE: 18 BRPM

## 2021-05-27 VITALS
SYSTOLIC BLOOD PRESSURE: 114 MMHG | HEART RATE: 79 BPM | RESPIRATION RATE: 17 BRPM | OXYGEN SATURATION: 97 % | TEMPERATURE: 98.2 F | DIASTOLIC BLOOD PRESSURE: 68 MMHG

## 2021-05-27 VITALS
HEART RATE: 79 BPM | SYSTOLIC BLOOD PRESSURE: 126 MMHG | DIASTOLIC BLOOD PRESSURE: 78 MMHG | OXYGEN SATURATION: 96 % | TEMPERATURE: 97.4 F

## 2021-05-27 VITALS — OXYGEN SATURATION: 93 % | DIASTOLIC BLOOD PRESSURE: 75 MMHG | SYSTOLIC BLOOD PRESSURE: 110 MMHG | HEART RATE: 64 BPM

## 2021-05-27 VITALS — OXYGEN SATURATION: 98 % | DIASTOLIC BLOOD PRESSURE: 72 MMHG | SYSTOLIC BLOOD PRESSURE: 110 MMHG | HEART RATE: 82 BPM

## 2021-05-27 VITALS
RESPIRATION RATE: 24 BRPM | SYSTOLIC BLOOD PRESSURE: 99 MMHG | DIASTOLIC BLOOD PRESSURE: 52 MMHG | HEART RATE: 79 BPM | OXYGEN SATURATION: 90 %

## 2021-05-27 VITALS — DIASTOLIC BLOOD PRESSURE: 65 MMHG | SYSTOLIC BLOOD PRESSURE: 115 MMHG | HEART RATE: 84 BPM | OXYGEN SATURATION: 91 %

## 2021-05-27 VITALS — HEART RATE: 81 BPM | RESPIRATION RATE: 22 BRPM | OXYGEN SATURATION: 98 %

## 2021-05-27 VITALS
RESPIRATION RATE: 18 BRPM | DIASTOLIC BLOOD PRESSURE: 70 MMHG | SYSTOLIC BLOOD PRESSURE: 122 MMHG | TEMPERATURE: 97.4 F | HEART RATE: 76 BPM | OXYGEN SATURATION: 98 %

## 2021-05-27 VITALS — OXYGEN SATURATION: 97 % | HEART RATE: 81 BPM | RESPIRATION RATE: 18 BRPM

## 2021-05-27 VITALS
SYSTOLIC BLOOD PRESSURE: 101 MMHG | OXYGEN SATURATION: 99 % | DIASTOLIC BLOOD PRESSURE: 50 MMHG | HEART RATE: 87 BPM | RESPIRATION RATE: 22 BRPM

## 2021-05-27 VITALS — OXYGEN SATURATION: 90 % | HEART RATE: 87 BPM | DIASTOLIC BLOOD PRESSURE: 70 MMHG | SYSTOLIC BLOOD PRESSURE: 122 MMHG

## 2021-05-27 NOTE — TELEPHONE ENCOUNTER
" Patient called in with complaints of ongoing shortness of breath and some palpitations. States he was just in to see Dr. Cadet and his Toprol XL was doubled to help control HR while in AF. States he is supposed to follow up with Dr. Cadet on 5/29/19 to discuss Ablation but is concerned and states \"I cannot wait that long, I will be dead by then.\" He is asking about getting a Cardioversion in the meantime because he feels so short of breath. States his weights seem stable, but noticed a bit more swelling in feet today so he took extra Lasix (total 120 mg today) per patient.   Patient accidentally sent in a remote today (no official report processed). I can see that BiV pacing is up from 81% since last OV on 3/20 to 87% today.      Will review with Dr. Cadet.   Ratna Lyn RN    "

## 2021-05-27 NOTE — PROGRESS NOTES
INR 3.1 pt with home health Irvin MCDOWELL. Pt not sure of dosing in hospital and will take 2 mg daily and retest Friday am. After talking with pt and discussing history of greens/salads and medication change. Pt will  continue  with current diet and dosing of Warfarin.  Continue with moderation of Vit K and green leafy vegetables. Cautioned to call with increase bruising or bleeding. Reminded to call with medication change especially antibiotic. Call with any questions or concerns or any up coming procedures. Cautioned about using Herbal medication.

## 2021-05-27 NOTE — ANESTHESIA PREPROCEDURE EVALUATION
Anesthesia Evaluation      Patient summary reviewed   No history of anesthetic complications     Airway   Mallampati: II  Neck ROM: full   Pulmonary    (+) shortness of breath, sleep apnea, decreased breath sounds,     ROS comment: Restrictive lung disease on home oxygen.                         Cardiovascular - normal exam  Exercise tolerance: < 4 METS  (+) hypertension, valvular problems/murmurs AS, CAD, dysrhythmias, cardiomyopathy, hypercholesterolemia,     ECG reviewed        Neuro/Psych    (+) anxiety/panic attacks,     Endo/Other - negative ROS      GI/Hepatic/Renal    (+) GERD well controlled,   chronic renal disease,           Dental    (+) poor dentition                       Anesthesia Plan  Planned anesthetic: general mask    ASA 4   Induction: intravenous   Anesthetic plan and risks discussed with: patient and spouse    Post-op plan: routine recovery  DNR/DNI status   DNR/DNI status discussed with patient and spouse.  Plan is for suspension of DNR

## 2021-05-27 NOTE — PROGRESS NOTES
Not feeling well  Shortness of breath on minimal exertion and little endurance  Not much swelling, takes furosemide 80 mg a.m. 40 mg p.m.  We recently increase his metoprolol to allow more biventricular pacing    Long telephone conversation with Myron    I think we are headed for AV node ablation  He would like to try another cardioversion  Clearly he is worse with atrial fibrillation  INRs have not been therapeutic  Plan  Schedule  patient for transesophageal echo guided cardioversion  [employer] should be there  Start sotalol 120 mg daily 3 days prior to cardioversion  Reduce metoprolol by 50% with start of the sotalol; I believe he is now on 50 mg twice daily though he should be reduced to 50 mg once daily  Has not been on amiodarone but he has poor lungs and I would not recommend amiodarone because of lack of pulmonary reserve-uses home oxygen  Anticipate he will have a successful conversion ; but I am concerned that he will have  early relapse;  then  We will proceed to AV node ablation

## 2021-05-27 NOTE — TELEPHONE ENCOUNTER
Patient missed this afternoons appointment and tomorrows has been cancelled.    Irvin is going to speak with his case manger this afternoon and will attempt to get him in sooner with HF NP.    Encouraged to return the call with further questions or concerns.

## 2021-05-27 NOTE — TELEPHONE ENCOUNTER
Spoke with patient, patient is short of breath even while talking to him.    He is s/p cardioversion on Thursday and thinks he has felt worse everyday since then.    Normally his oxygen is set at 2, right now he has had to turn it up to 5 and he still can't barely move without gasping.    We discussed a device check to confirm rhythm and further treatment pending this.    Patient does not feel he can wait that long, I suggested ER.  Patient verbalizes agreement and has a ride to the ER.  NASRA

## 2021-05-27 NOTE — ANESTHESIA CARE TRANSFER NOTE
Last vitals:   Vitals:    03/28/19 0934   SpO2: 95%     Patient's level of consciousness is drowsy  Spontaneous respirations: yes  Maintains airway independently: yes  Dentition unchanged: yes  Oropharynx: oropharynx clear of all foreign objects    QCDR Measures:  ASA# 20 - Surgical Safety Checklist: WHO surgical safety checklist completed prior to induction    PQRS# 430 - Adult PONV Prevention: NA - Not adult patient, not GA or 3 or more risk factors NOT present  ASA# 8 - Peds PONV Prevention: NA - Not pediatric patient, not GA or 2 or more risk factors NOT present  PQRS# 424 - Lilia-op Temp Management: NA - MAC anesthesia or case < 60 minutes  PQRS# 426 - PACU Transfer Protocol: - Transfer of care checklist used  ASA# 14 - Acute Post-op Pain: NA - Patient under age 10y or did not go to PACU

## 2021-05-27 NOTE — TELEPHONE ENCOUNTER
----- Message from Jason Singh sent at 4/8/2019  1:43 PM CDT -----  Contact: [facility]   General phone call:    Caller: Irvin (male)    Primary cardiologist: Dr Thakkar    Detailed reason for call: Pt was just at [facility] for SOB.  Call from homecare nurse with some updates: Vitals: /68 , HR 71, 02 97% on 1.5 L , temp 98.2, - Pt was DC with FUROSEMIDE: 80mg AM and 40mg PM .  No Edema , But wanted to make  aware -- Pt is not on potassium FYI.     Please call Irvin back  [facility] - Irvin: [phone]    New or active symptoms? Na    Best phone number: na  Best time to contact: any  Ok to leave a detailed message? yes  Device? yes    Additional Info:

## 2021-05-27 NOTE — TELEPHONE ENCOUNTER
Left voicemail returning patient's call regarding shortness of breath.  Contact information left.  JW

## 2021-05-27 NOTE — PROGRESS NOTES
INR 1.7 at OV today. Will increase dose to 3 mg Sun and thurs and have Home health do an INR next week. Left VM message with return phone number for questions and concerns. [phone]

## 2021-05-27 NOTE — TELEPHONE ENCOUNTER
Phone call from patient stating that he continues to feel poorly, spoke with Dr. Cadet on Monday and is wanting to schedule his JOSE guided cardioversion.    Chart reviewed, will request that scheduling call patient to set up a time.  Instructed patient to go to the ER with increasing shortness of breath, chest pain, dizziness or lightheadedness.  Pt states understanding.  Reviewed contact information for further questions or concerns.

## 2021-05-27 NOTE — PROGRESS NOTES
Patient has discharged to home accompanied by his wife and son after successful cardioversion  Vital signs remained stable throughout the day  Atrial paced  Consumed a good lunch without difficulty  Discharge instructions included review of medications and follow up appointments  Post procedure cares explained and understood

## 2021-05-27 NOTE — ANESTHESIA POSTPROCEDURE EVALUATION
Patient: Myron Sunshine  * No procedures listed *  Anesthesia type: general    Patient location: Telemetry/Step Down Unit  Last vitals:   Vitals:    03/28/19 1150   BP: 130/71   Pulse: 78   Resp:    Temp:    SpO2:      Post vital signs: stable  Level of consciousness: awake and responds to simple questions  Post-anesthesia pain: pain controlled  Post-anesthesia nausea and vomiting: no  Pulmonary: unassisted, nasal cannula  Cardiovascular: stable and blood pressure at baseline  Hydration: adequate  Anesthetic events: no    QCDR Measures:  ASA# 11 - Lilia-op Cardiac Arrest: ASA11B - Patient did NOT experience unanticipated cardiac arrest  ASA# 12 - Lilia-op Mortality Rate: ASA12B - Patient did NOT die  ASA# 13 - PACU Re-Intubation Rate: NA - No ETT / LMA used for case  ASA# 10 - Composite Anes Safety: ASA10A - No serious adverse event    Additional Notes:

## 2021-05-27 NOTE — PATIENT INSTRUCTIONS - HE
Brianna calling in INR test from homecare visit today.  INR = 1.5  -[phone]     Phone call back to Brianna, she reports no changes in diet or medication, patient has been taking warfarin as instructed.    Will increase dose to 3mg every day and recheck on Monday 4/22.  NASRA

## 2021-05-27 NOTE — PROGRESS NOTES
"Transesophageal echocardiogram completed, full note to follow.  Procedure performed in upright seated position, no complications.  1 mg Versed, 50 mcg fentanyl administered for sedation along with topical benzocaine spray  Dense \"smoke\" demonstrated in the left atrial appendage, no thrombus evident.  Normal functioning aortic bioprosthesis with small perivalvular leak  Moderate tricuspid insufficiency with upper normal right heart pressures.  Moderate aortic plaque  No atrial septal defect identified color Doppler assessment.  Abdulaziz Branch MD  "

## 2021-05-27 NOTE — PROGRESS NOTES
6/12/1931  Home:[phone] (home) Cell:There is no such number on file (mobile).  Emergency Contact: Eulalia Sunshine [phone]    +++Important patient information for [facility]/Cath Lab staff : JOSE guided cardioversion+++    [facility] EP Cath Lab Procedure Order   Cardioversion:  Cardioversion-JOSE guided    Diagnosis:  AF  Anticipated Case Duration:  Standard  Scheduling Needs/Timeframe:  Next Available    Current Device: BIV ICD  Device Company/Device Rep Needed for Procedure: Medtronic    Anesthesia:  General-CV Only  Research Protocol:  No    [facility] EP Cath Lab Prep   Ordering Provider: Dr Cadet  Ordering Date: 3/26/2019  Orders Status: Intial order placed and Order set placed    H&P:  Compled by GAG on 3/20/19 if scheduled within 30 days, pt to schedule with PMD if procedure outside of this timeframe  PCP: Patricia Gerardo PA-C, [phone]    Pre-op Labs: Ordered AM of procedure    Medical Records Pertinent for Procedure:  JOSE prior to cardioversion, Echo 12/31/18, EKG 12/30/18, Device Implant Record 12/4/18 and Surgery Record TAVR 11/27/18    Patient Education:    Teach with Patient: Will be completed via phone prior to procedure, and letter was also sent to pt via mail/[employer] with written pre-procedural instructions.    Risks Reviewed:     Cardioversion    >90% acute success rate, <10% failure to convert or   reverts shortly after cardioversion.    <1% embolic event of (CVA, pulmonary embolism, or   other site).    75% risk for superficial burn.  Risks associated with general anesthesia will be addressed by the Anesthesiology Department    Consent: Will be obtained in [facility] day of procedure    Pre-Procedure Instructions that were Reviewed with Patient:  NPO after midnight, Remove all jewelry prior to coming in for procedure, Shower prior to arrival, Notified patient of time and date of procedure by CV , Transportation arrangements needed s/p procedure, Post-procedure follow up process, Sedation plan/orders  and Pre-procedure letter was sent to pt by CV     Pre-Procedure Medication Instructions:  Instructions given to pt regarding anticoagulants: Coumadin- instructed to continue anticoagulation uninterrupted through their procedure  Instructions given to pt regarding antiarrhythmic medication: cut metoprolol dose in half and start sotalol 120mg 3 days prior to procedure; Pt instructed to continue medication prior to procedure  Instructions for medication, other than anticoagulants/antiarrhythmics listed above, given to pt: to take all morning medications with small sips of water, with the exception of OTC supplements and MVI    Allergies   Allergen Reactions     Blood-Group Specific Substance      Anti-Josep/Barnes.  Expect delays in obtaining blood for transfusion.  Collect 2 lavender top tubes and 1 red top tube for all blood bank orders.        Current Outpatient Medications:      acetaminophen (TYLENOL EXTRA STRENGTH) 500 MG tablet, Take 1 tablet (500 mg total) by mouth every 6 (six) hours as needed for pain., Disp: , Rfl: 0     albuterol (PROAIR HFA;PROVENTIL HFA;VENTOLIN HFA) 90 mcg/actuation inhaler, Inhale 2 puffs every 6 (six) hours as needed for shortness of breath., Disp: 1 Inhaler, Rfl: 11     albuterol (PROVENTIL) 2.5 mg /3 mL (0.083 %) nebulizer solution, Take 3 mL (2.5 mg total) by nebulization every 4 (four) hours as needed for wheezing., Disp: 360 mL, Rfl: 6     allopurinol (ZYLOPRIM) 100 MG tablet, Take 100 mg by mouth daily., Disp: , Rfl:      atorvastatin (LIPITOR) 80 MG tablet, Take 1 tablet (80 mg total) by mouth daily., Disp: 30 tablet, Rfl: 3     budesonide (PULMICORT) 0.5 mg/2 mL nebulizer solution, Take 2 mL (0.5 mg total) by nebulization 2 (two) times a day., Disp: 120 mL, Rfl: 6     budesonide-formoterol (SYMBICORT) 160-4.5 mcg/actuation inhaler, Inhale 2 puffs 2 (two) times a day., Disp: 1 Inhaler, Rfl: 12     cetirizine (ZYRTEC) 10 MG tablet, Take 10 mg by mouth daily as needed.     , Disp: , Rfl:      clopidogrel (PLAVIX) 75 mg tablet, Take 300 mg (4 pills) Friday morning.  Starting Saturday, take 75 mg (1 pill) daily (Patient taking differently: Take 75 mg by mouth daily. Take 300 mg (4 pills) Friday morning.  Starting Saturday, take 75 mg (1 pill) daily   ), Disp: 30 tablet, Rfl: 12     fluticasone (FLONASE) 50 mcg/actuation nasal spray, Apply 1 spray into each nostril daily as needed for rhinitis., Disp: , Rfl:      formoterol fumarate (PERFOROMIST) 20 mcg/2 mL nebulizer solution, Take 2 mL (20 mcg total) by nebulization daily., Disp: , Rfl: 0     furosemide (LASIX) 80 MG tablet, Take 1 tablet (80 mg) every morning, and take 1/2 tablet (40 mg) every afternoon. (Patient taking differently: Take 80 mg by mouth daily. Take 1 tablet (80 mg) every morning, and take 1/2 tablet (40 mg) every afternoon.   ), Disp: 30 tablet, Rfl: 0     guaiFENesin ER (MUCINEX) 600 mg 12 hr tablet, Take 1 tablet (600 mg total) by mouth 2 (two) times a day. (Patient taking differently: Take 600 mg by mouth 2 (two) times a day as needed.    ), Disp: , Rfl: 0     hydrocortisone 1 % cream, Apply 1 application topically every 6 (six) hours as needed., Disp: , Rfl:      latanoprost (XALATAN) 0.005 % ophthalmic solution, Administer 1 drop to the right eye at bedtime., Disp: 2.5 mL, Rfl: 12     metoprolol succinate (TOPROL XL) 50 MG 24 hr tablet, Take 1 tablet (50 mg total) by mouth 2 (two) times a day., Disp: 90 tablet, Rfl: 3     multivitamin therapeutic (THERAGRAN) tablet, Take 1 tablet by mouth daily., Disp: , Rfl: 0     nebulizer and compressor Radha, For chronic cough, Disp: 1 each, Rfl: 0     nitroglycerin (NITROSTAT) 0.4 MG SL tablet, Place 1 tablet (0.4 mg total) under the tongue every 5 (five) minutes as needed for chest pain., Disp: , Rfl: 0     OXYGEN-AIR DELIVERY SYSTEMS MISC, into each nostril continuous. 1.5 liters with rest and 3 liters with activity  May use up to 1.5 liters at noc., Disp: , Rfl:       pantoprazole (PROTONIX) 20 MG tablet, Take 1 tablet (20 mg total) by mouth daily. (Patient taking differently: Take 20 mg by mouth daily as needed.    ), Disp: 30 tablet, Rfl: 0     predniSONE (DELTASONE) 10 mg tablet, 40 mg x 3 days, 30 mg x 3 days, 20 mg x 3 days, 10 mg x 3 days. Then back to his 5 mg a day.., Disp: 30 tablet, Rfl: 0     predniSONE (DELTASONE) 5 mg/mL Conc concentrated solution, Take 5 mg by mouth daily., Disp: , Rfl:      sertraline (ZOLOFT) 50 MG tablet, Take 0.5 tablets (25 mg total) by mouth at bedtime., Disp: , Rfl: 0     warfarin (COUMADIN/JANTOVEN) 2 MG tablet, Take 1 tablet (2 mg total) by mouth See Admin Instructions. Take 4 mg daily, Disp: 180 tablet, Rfl: 0    Documentation Date:3/26/2019 9:35 AM  Ivory Abel RN

## 2021-05-27 NOTE — PATIENT INSTRUCTIONS - HE
Myron Sunshine,    It was a pleasure to see you today at the [facility].     My recommendations after this visit include:  - Continue lasix (furosemide) 80 mg daily.    - Continue to monitor for signs of retaining fluid (increasing weights, shortness of breath, swelling) and call with any concerns. [phone]  - Zohreh will call about your INR today.  - You will be called with the results of your other labs later today or tomorrow.     Marielos Landaverde, CNP

## 2021-05-27 NOTE — PROGRESS NOTES
Brianna calling in INR test from homecare visit today.  INR = 1.5  -[phone]     Phone call back to Brianna, she reports no changes in diet or medication, patient has been taking warfarin as instructed.    Will increase dose to 3mg every day and recheck on Monday 4/22.  NASRA

## 2021-05-28 NOTE — PATIENT INSTRUCTIONS - HE
Myron Sunshine,    It was a pleasure to see you today at the [facility].     My recommendations after this visit include:    - No medications changes made today     - I did some lab work today  and will call you with results when available     - Stay on low sodium diet < 2000 mg/day, monitor daily weight (bring weight log book for each visit) and stay physically active as tolerated    -Please call if you experience rapid weight gain 2-3 lbs two days in a row or 5 lbs in a week with worsening shortness of breath, lightheaded, dizziness, abdominal bloating, and leg swelling     - Follow up in Heart Failure Clinic with Lissette in 2-3 weeks    - Please call Shannen Basurto RN on [phone], if you have any questions or concerns    Lissette Gordon CNP

## 2021-05-28 NOTE — PROGRESS NOTES
INR 2.5 continue 4 mg daily and retest in one week. After talking with pt and discussing history of greens/salads and medication change. Pt will  continue  with current diet and dosing of Warfarin.  Continue with moderation of Vit K and green leafy vegetables. Cautioned to call with increase bruising or bleeding. Reminded to call with medication change especially antibiotic. Call with any questions or concerns or any up coming procedures. Cautioned about using Herbal medication.

## 2021-05-28 NOTE — PATIENT INSTRUCTIONS - HE
INR 2.7 After talking with pt and discussing history of greens/salads and medication change. Pt will  continue  with current diet and dosing of Warfarin.  Continue with moderation of Vit K and green leafy vegetables. Cautioned to call with increase bruising or bleeding. Reminded to call with medication change especially antibiotic. Call with any questions or concerns or any up coming procedures. Cautioned about using Herbal medication.

## 2021-05-28 NOTE — TELEPHONE ENCOUNTER
----- Message from Lissette Gordon NP sent at 4/22/2019 12:27 PM CDT -----  Shannen Basurto,  His BNP is up again in 1500's. Cr stable and Na+ is mildly elevated. Would like to increase Furosemide from 80 mg daily to 80 mg in AM and 40 mg in PM. Would like to see him in a week  with repeat BMP check. Should go to ED if worsening shortness of breath.  Thank you  CY

## 2021-05-28 NOTE — PATIENT INSTRUCTIONS - HE
Myron Sunshine,    It was a pleasure to see you today at the [facility].     My recommendations after this visit include:    - No medications changes made today     - I did some lab work today and will call you with results when available     - Follow up with Dr. Cadet in May as scheduled    - Follow up with Dr. Thakkar in 8 weeks     - Follow up in Heart Failure Clinic with Lissette in 2 weeks    - Please call Shannen Basurto RN on [phone], if you have any questions or concerns    Lissette Gordon CNP

## 2021-05-28 NOTE — TELEPHONE ENCOUNTER
Mg+ and K+ ordered.  Pt will have drawn 4/26/19.  -medina    ----- Message -----  From: Lissette Gordon NP  Sent: 4/25/2019   3:54 PM  To: Shannen Eastman, RN    Shannen Basurto,  His most recent Mg+ and K+ look stable. Can he get K+ and Mg+ check today or tomorrow? We can start the supplement if low.   Thanks  CY

## 2021-05-28 NOTE — TELEPHONE ENCOUNTER
----- Message from Jason Singh sent at 5/16/2019  2:52 PM CDT -----  Contact: Pt  General phone call:    Caller: Pt    Primary cardiologist: Dr Thakkar / Lissette HUNT    Detailed reason for call: Pt states having significant shortness of breath - worse last 2 days.  Tiredness.     New or active symptoms? Ongoing, but more severe lately    Best phone number: [phone]     Best time to contact: any    Ok to leave a detailed message? Yes    Device? Yes    Additional Info:

## 2021-05-28 NOTE — TELEPHONE ENCOUNTER
----- Message from Patricia Ricardo RN sent at 4/25/2019  2:04 PM CDT -----  Contact: Pt  Hi,  CY patient - looks like she has changed diuretics recently.  Thank you!  ----- Message -----  From: Jason Singh  Sent: 4/25/2019   1:59 PM  To: Patricia Ricardo RN    General phone call:    Caller: Pt    Primary cardiologist: Dr Thakkar     Detailed reason for call: Pt taking Diuretics - Pt states having cramps, spasms (particular in feet) Painful and wake him up at night.  He was told by PCP to perhaps take potassium but was asked to contact heart care to discuss    New or active symptoms? New       Best phone number: [phone]    Best time to contact: today if possible    Ok to leave a detailed message? Yes    Device? YES    Additional Info: thank you

## 2021-05-28 NOTE — PROGRESS NOTES
In clinic device check with Dr. Cadet.  Please see link for full device report.  Patient was informed of results and next follow up during today's visit.

## 2021-05-28 NOTE — PATIENT INSTRUCTIONS - HE
INR 1.7 increase dose to 4 mg daily and retest in 2 weeks. After talking with pt and discussing history of greens/salads and medication change. Pt will  continue  with current diet and dosing of Warfarin.  Continue with moderation of Vit K and green leafy vegetables. Cautioned to call with increase bruising or bleeding. Reminded to call with medication change especially antibiotic. Call with any questions or concerns or any up coming procedures. Cautioned about using Herbal medication.

## 2021-05-28 NOTE — TELEPHONE ENCOUNTER
"Pt states shortness of breath is worsening.    Weight is 143.2#.  Pt states ankles and legs are skinny, but belly is bloated and his breathing is shallow.  Pt reports he is sleeping all the time and he is weak and tired.    Pt wears 2L O2 at home, and recently took extra 40 mg furosemide with his usual am 40 mg dose so he had 80 mg in the morning and 40 mg in the afternoon for 2 days.    Chime - do you have any recommendations?  -[facility]      Pt LVM stating BP is 121/76, HR is good and O2 is good, but he \"feels like crap.\"  Pt left another  stating he took his afternoon furosemide.    "

## 2021-05-28 NOTE — TELEPHONE ENCOUNTER
Lissette - we recently increased furosemide to 80 mg in AM and 40 mg in PM.  He is having foot cramps and spasms.  PCP suggested potassium but asked pt to contact Heart Care Clinic.    What do you recommend?  He has appt with you 4/29/19.  -[facility]ina

## 2021-05-28 NOTE — PROGRESS NOTES
INR 1.5  Pt had been only taking 3 mg daily instead of 6 mg. Will increase dose to 2 mg Sunday and thur and 4 mg all other days. After talking with pt and discussing history of greens/salads and medication change. Pt will  continue  with current diet and dosing of Warfarin.  Continue with moderation of Vit K and green leafy vegetables. Cautioned to call with increase bruising or bleeding. Reminded to call with medication change especially antibiotic. Call with any questions or concerns or any up coming procedures. Cautioned about using Herbal medication.

## 2021-05-28 NOTE — PATIENT INSTRUCTIONS - HE
INR 2.5 After talking with pt and discussing history of greens/salads and medication change. Pt will  continue  with current diet and dosing of Warfarin.  Continue with moderation of Vit K and green leafy vegetables. Cautioned to call with increase bruising or bleeding. Reminded to call with medication change especially antibiotic. Call with any questions or concerns or any up coming procedures. Cautioned about using Herbal medication.

## 2021-05-28 NOTE — TELEPHONE ENCOUNTER
Recommendations relayed to pt.  Pt verbalized understanding and had no questions.  Lab ordered, rx updated.  Call transferred to scheduling for lab only appointment.  -[facility]    ----- Message -----  From: Lissette Gordon NP  Sent: 5/16/2019   4:06 PM  To: Shannen Eastman, RN    Shannen Basurto,  I would suggest to continue Lasix 80 mg in AM and 40 mg in PM and BMP check in 1 week. Reinforce to stay compliant with diuretic therapy (sometimes he skips the dose when he feels better). Visit ED, if worsening shortness of breath.  Thank you  CY

## 2021-05-28 NOTE — PROGRESS NOTES
INR 3.4 decrease dose to 2 mg Monday and Friday and 4 mg all other days ad retest in 2 weeks. After talking with pt and discussing history of greens/salads and medication change. Pt will  continue  with current diet and dosing of Warfarin.  Continue with moderation of Vit K and green leafy vegetables. Cautioned to call with increase bruising or bleeding. Reminded to call with medication change especially antibiotic. Call with any questions or concerns or any up coming procedures. Cautioned about using Herbal medication.

## 2021-05-28 NOTE — PATIENT INSTRUCTIONS - HE
INR 3.4 After talking with pt and discussing history of greens/salads and medication change. Pt will  continue  with current diet and dosing of Warfarin.  Continue with moderation of Vit K and green leafy vegetables. Cautioned to call with increase bruising or bleeding. Reminded to call with medication change especially antibiotic. Call with any questions or concerns or any up coming procedures. Cautioned about using Herbal medication.

## 2021-05-28 NOTE — PATIENT INSTRUCTIONS - HE
Myron Sunshine    Thank you for coming to the [facility] today for a cardiac evaluation  It was my pleasure to see you today  A good contact for any questions would be Ivory Portillo  RN @ [phone]    Currently you are doing quite well on current medical program   Agree with taking furosemide 80 mg in the morning and take an extra 40 mg on occasions of fluid retention  Blood work April 29, 2019 showed normal electrolyte pattern and kidney function was good  Device interrogation today shows that you have had no recurrence of atrial fibrillation  Battery good for another 7.9 years  Continue to follow-up in the heart failure clinic  Return in 3 months or sooner as need arises

## 2021-05-28 NOTE — PROGRESS NOTES
[facility] Heart Care Note  Assessment  1. 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            left circumflex occlusion, thrombosed stent requiring repeat  stenting 11/4/2001.    4. ICD implant 12/2001, secondary indication (clinical VT);        generator replacement  7/19/2007,         generator replacement 6/18/2014.  [employer] DDDR/ICD  Upgrade to CRT-D December 4, 2018  5.  Moderate aortic stenosis        Mean aortic valve gradient 22 mm on October 2, 2017  TAVR 11-   abdominal aortic aneurysm with repair 12/2008.     Chronic moderate renal insufficiency with creatinine   Left ventricular dysfunction with ejection fraction about 35% with large inferior wall motion abnormality.         Echocardiogram  is similar       Echocardiogram October 2, 2017; ejection fraction       Echocardiogram 40%, infero-posterior aneurysm  Ejection fraction 22% by echocardiogram November 28, 2018  Echocardiogram December 31, 2018 ejection fraction 44%  Chronic anticoagulation with warfarin. Also ASA   Sinus bradycardia.  TIA; comprehensive evaluation was unremarkable November 2015  Left pleural effusion with thoracentesis-850 cc exudate 2017  Atrial fibrillation March 9, 2018-persistent  External cardioversion March 28, 2019      Plan:    Currently you are doing quite well on current medical program   Agree with taking furosemide 80 mg in the morning and take an extra 40 mg on occasions of fluid retention  Blood work April 29, 2019 showed normal electrolyte pattern and kidney function was good  Device interrogation today shows that you have had no recurrence of atrial fibrillation  Battery good for another 7.9 years  Continue to follow-up in the heart failure clinic  Return in 3 months or sooner as need arises    Subjective:    I had the opportunity to see.Myron Sunshine , who is a 87 y.o. male with a known history of  Sahni cardiomyopathy  He spent quite a bit of time in the hospital with decompensated heart failure, atrial fibrillation  Had a successful cardioversion of atrial fibrillation March 28, 2019 and is remained out of atrial fibrillation since that time  On his current medical program he is now been feeling quite well over the past 2 weeks  Uses continuous home oxygen  He has a fair amount of energy he can walk a distance, drives  He has reduced his furosemide to 80 mg in a.m. does not take his p.m. dose since he does not appear to have fluid retention  No awareness of arrhythmias no syncope    Did have comprehensive laboratory evaluation April 29, 2019  Creatinine 1.33  Electrolytes normal  BNP elevated equals 1298    He wonders about prednisone 5 mg daily.  This was prescribed by his pulmonary specialist I recommend he continue on the prednisone until the pulmonary doctors feel that it should be stopped      Problem List:  Patient Active Problem List   Diagnosis     Ischemic cardiomyopathy     Stage 3 chronic kidney disease (H)     Biventricular ICD (implantable cardioverter-defibrillator) in place     Dyslipidemia, goal LDL below 70     Left ventricular aneurysm     Restrictive lung disease; moderate by PFT     DNAR (do not attempt resuscitation)     Chronic hypoxemic respiratory failure (H)     Persistent atrial fibrillation (H)     Severe aortic stenosis     Non-rheumatic mitral regurgitation     Left bundle branch block     Chronic systolic congestive heart failure (H)     VT (ventricular tachycardia) (H)     S/P TAVR (transcatheter aortic valve replacement)     Glaucoma     MIS (obstructive sleep apnea)     Depression     Gout flare     Shortness of breath     Medical History:  Past Medical History:   Diagnosis Date     Anxiety      Bowel obstruction (H)      Bronchiectasis without acute exacerbation (H) 12/1/2017     Chronic hypoxemic respiratory failure (H)      CKD (chronic kidney disease) stage 3, GFR 30-59  ml/min (H)      Coronary artery disease due to lipid rich plaque      DNAR (do not attempt resuscitation)      Dyslipidemia, goal LDL below 70      ED (erectile dysfunction)      Essential hypertension      GERD (gastroesophageal reflux disease)      Ischemic cardiomyopathy      Mild aortic stenosis      Noncompliance with medication regimen 9/11/2018     Paroxysmal VT (H)      Persistent atrial fibrillation (H) 8/22/2018     Psoriasis      Restrictive lung disease; moderate by PFT 10/24/2017    FVC 1.9 456% FEV1 1.4 457% ratio 74%.  No significant response to bronchodilators.  TLC 40% predicted DLCO corrected 47% predicted Assessment: No obstruction.  Moderate restriction.  Moderate diffusion defect.     Seasonal allergies      Sleep apnea      TIA (transient ischemic attack) 8/09     Ventricular aneurysm     Long-term coumadin therapy s/p ventricular patch 2001     Vitamin D deficiency      Surgical History:  Past Surgical History:   Procedure Laterality Date     ABDOMINAL AORTIC ANEURYSM REPAIR       APPENDECTOMY      Perforated     CARDIAC CATHETERIZATION       CARDIAC DEFIBRILLATOR PLACEMENT  7/19/2007     CORONARY STENT PLACEMENT  2001    LCX     CV CORONARY ANGIOGRAM N/A 10/18/2018    Procedure: Coronary Angiogram;  Surgeon: Elijah Liu MD;  Location: [facility] Cath Lab;  Service:      CV TRANSFEMORAL TRANSCATHETER VALVE REPLACEMENT N/A 11/27/2018    Procedure: Transfemoral Transcatheter Aortic Valve Replacement;  Surgeon: Elijah Liu MD;  Location: [facility] Cath Lab;  Service: Structural Heart     ICD Generator Change  6/18/14     IR EXTREMITY VENOGRAM LEFT  12/3/2018     PICC  11/27/2018          PICC AND MIDLINE TEAM LINE INSERTION  2/23/2018          RESECTION SMALL BOWEL / CLOSURE ILEOSTOMY       THORACENTESIS       VENTRICULAR RESECTION / REPAIR ANEURYSM       Social History:  Social History     Socioeconomic History     Marital status:      Spouse name: Eulalia     Number of  children: Not on file     Years of education: Not on file     Highest education level: Not on file   Occupational History     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/, 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   Social Needs     Financial resource strain: Not on file     Food insecurity:     Worry: Not on file     Inability: Not on file     Transportation needs:     Medical: Not on file     Non-medical: Not on file   Tobacco Use     Smoking status: Former Smoker     Packs/day: 1.00     Years: 8.00     Pack years: 8.00     Types: Cigarettes     Last attempt to quit: 1955     Years since quittin.0     Smokeless tobacco: Never Used   Substance and Sexual Activity     Alcohol use: Yes     Alcohol/week: 0.6 oz     Types: 1 Glasses of wine per week     Comment: social     Drug use: No     Sexual activity: Not on file   Lifestyle     Physical activity:     Days per week: 0 days     Minutes per session: 0 min     Stress: Not on file   Relationships     Social connections:     Talks on phone: Not on file     Gets together: Not on file     Attends Mosque service: Not on file     Active member of club or organization: Not on file     Attends meetings of clubs or organizations: Not on file     Relationship status: Not on file     Intimate partner violence:     Fear of current or ex partner: Not on file     Emotionally abused: Not on file     Physically abused: Not on file     Forced sexual activity: Not on file   Other Topics Concern     Not on file   Social History Narrative    Lives with his wife, Eulalia, who he states has advanced arthritis. 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         Review of Systems:      General: WNL  Eyes: WNL  Ears/Nose/Throat: WNL  Lungs: WNL  Heart: WNL  Stomach: WNL  Bladder: WNL  Muscle/Joints: WNL  Skin: WNL  Nervous System: WNL  Mental Health: WNL     Blood: WNL        Family History:  Family History   Problem Relation Age of Onset     Stroke Mother      Thyroid disease Mother      Cancer  Brother      No Medical Problems Son      No Medical Problems Son      No Medical Problems Son      Lung disease Neg Hx          Allergies:  Allergies   Allergen Reactions     Blood-Group Specific Substance      Anti-Josep/Barnes.  Expect delays in obtaining blood for transfusion.  Collect 2 lavender top tubes and 1 red top tube for all blood bank orders.        Medications:  Current Outpatient Medications   Medication Sig Dispense Refill     acetaminophen (TYLENOL EXTRA STRENGTH) 500 MG tablet Take 1 tablet (500 mg total) by mouth every 6 (six) hours as needed for pain.  0     allopurinol (ZYLOPRIM) 100 MG tablet Take 100 mg by mouth daily.       atorvastatin (LIPITOR) 80 MG tablet Take 1 tablet (80 mg total) by mouth daily. 30 tablet 3     calcium, as carbonate, (TUMS) 200 mg calcium (500 mg) chewable tablet Chew 1 tablet daily as needed for heartburn.       cetirizine (ZYRTEC) 10 MG tablet Take 10 mg by mouth daily as needed.              clopidogrel (PLAVIX) 75 mg tablet Take 1 tablet (75 mg total) by mouth daily. take 75 mg (1 pill) daily       fluticasone (FLONASE) 50 mcg/actuation nasal spray Apply 1 spray into each nostril daily as needed for rhinitis.       formoterol fumarate (PERFOROMIST) 20 mcg/2 mL nebulizer solution Take 2 mL (20 mcg total) by nebulization daily.  0     furosemide (LASIX) 80 MG tablet Take 1 tablet (80 mg) every morning.  Take 1/2 tablet (40 mg) every evening. 126 tablet 3     hydrocortisone 1 % cream Apply 1 application topically every 6 (six) hours as needed.       latanoprost (XALATAN) 0.005 % ophthalmic solution Administer 1 drop to the right eye at bedtime. 2.5 mL 12     metoprolol succinate (TOPROL XL) 50 MG 24 hr tablet Take 1 tablet (50 mg total) by mouth 2 (two) times a day. 90 tablet 3     nebulizer and compressor Radha For chronic cough 1 each 0     nitroglycerin (NITROSTAT) 0.4 MG SL tablet Place 1 tablet (0.4 mg total) under the tongue every 5 (five) minutes as needed for  "chest pain.  0     OXYGEN-AIR DELIVERY SYSTEMS MISC into each nostril continuous. 1.5 liters with rest and 3 liters with activity  May use up to 1.5 liters at noc.       pantoprazole (PROTONIX) 20 MG tablet Take 1 tablet (20 mg total) by mouth daily. (Patient taking differently: Take 20 mg by mouth daily as needed.       ) 30 tablet 0     predniSONE (DELTASONE) 5 MG tablet Take 5 mg by mouth daily.       sertraline (ZOLOFT) 50 MG tablet Take 0.5 tablets (25 mg total) by mouth at bedtime.  0     warfarin (COUMADIN/JANTOVEN) 2 MG tablet Take 1 tablet (2 mg total) by mouth See Admin Instructions. Take 2 mg daily check INR on 4/9/2019 180 tablet 0     albuterol (PROVENTIL) 2.5 mg /3 mL (0.083 %) nebulizer solution Take 3 mL (2.5 mg total) by nebulization every 4 (four) hours as needed for wheezing. 360 mL 6     budesonide (PULMICORT) 0.5 mg/2 mL nebulizer solution Take 2 mL (0.5 mg total) by nebulization 2 (two) times a day. 120 mL 6     No current facility-administered medications for this visit.          Surgical site  ICD is well-healed to left subclavicular region    Device interrogation  Is had no recurrence of atrial fibrillation since his cardioversion  Underlying rhythm is sinus bradycardia in the 40s with bundle branch block  37% atrial pacing  92% ventricular pacing  Minimal ventricular arrhythmias he did have one episode of VT at about 180 bpm  We made slight adjustments, changes lower rate to 70 bpm increase LV output to assure good capture    Objective:   Vital signs:  BP 98/54 (Patient Site: Left Arm, Patient Position: Sitting, Cuff Size: Adult Regular)   Pulse 70   Resp 20   Ht 5' 9\" (1.753 m)   Wt 145 lb (65.8 kg)   BMI 21.41 kg/m        Physical Exam:  Is an articulate elderly man  Using nasal oxygen    GENERAL APPEARANCE: Alert, cooperative and in no acute distress.  HEENT: No scleral icterus. No Xanthelasma. Oral mucous membranes pink and moist.  NECK: JVP  Flat cm. No Hepatojugular reflux. " Thyroid not  Palpable  CHEST: clear to auscultation and percussion  CARDIOVASCULAR: S1, S2 without murmur     ABDOMEN: Non tender. BS+. No bruits.  EXTREMITIES: No cyanosis, clubbing or edema.    Lab Results:  LIPIDS:  Lab Results   Component Value Date    CHOL 114 11/08/2018    CHOL 136 08/23/2017    CHOL 133 09/19/2016     Lab Results   Component Value Date    HDL 39 (L) 11/08/2018    HDL 40 08/23/2017    HDL 39 (L) 09/19/2016     Lab Results   Component Value Date    LDLCALC 53 11/08/2018    LDLCALC 74 08/23/2017    LDLCALC 69 09/19/2016     Lab Results   Component Value Date    TRIG 109 11/08/2018    TRIG 110 08/23/2017    TRIG 125 09/19/2016     No components found for: CHOLHDL    BMP:  Lab Results   Component Value Date    CREATININE 1.33 (H) 04/29/2019    BUN 37 (H) 04/29/2019     04/29/2019    K 4.1 04/29/2019     04/29/2019    CO2 36 (H) 04/29/2019         This note has been dictated using voice recognition software. Any grammatical or context distortions are unintentional and inherent to the software.  Donal Cadet MD  [facility]  [phone]

## 2021-05-28 NOTE — TELEPHONE ENCOUNTER
Recommendations and results called to pt.  Pt verbalized understanding and had no questions.  Rx updated, lab ordered.  Call transferred to scheduling to move up 5/8/19 appt to 4/29/19.  kendra

## 2021-05-28 NOTE — PROGRESS NOTES
Furosemide updated.     Lissette - pt wants to know what you think about prednisone.  He is on 5 mg daily and he would like to stop taking if possible.  Dr Cadet instructed him to discuss with pulm, but pt wanted me to ask you.  -[facility]    Notes recorded by Lissette Gordon, NP on 5/6/2019 at 10:59 AM CDT  Shannen Basurto,  His BUN has gone up a little. If his breathing has improved with no other HF symptoms and stable weight, we can cut back the Furosemide to 80 mg daily. Otherwise, continue same and will re-evaluate during his next appt.  Thank you  CY

## 2021-05-28 NOTE — PATIENT INSTRUCTIONS - HE
Myron Sunshine,    It was a pleasure to see you today at the [facility].     My recommendations after this visit include:    - Take Lasix (Furosemide) 80 mg in AM and 40 mg in PM for 2 days starting today. Please call if no improvement in your weight and heart failure symptoms     - Stay on low sodium diet < 2000 mg/day, monitor daily weight (bring weight log book for each visit) and stay physically active as tolerated    -Please call if you experience rapid weight gain 2-3 lbs two days in a row or 5 lbs in a week with worsening shortness of breath, lightheaded, dizziness, abdominal bloating, and leg swelling     - Follow up with Dr. Thakkar in June as scheduled    - Follow up in Heart Failure Clinic with Lissette in 8 weeks    - Please call Shannen Basurto RN on [phone], if you have any questions or concerns    Lissette Gordon, CNP

## 2021-05-29 NOTE — TELEPHONE ENCOUNTER
----- Message from Krystian Cobos sent at 6/20/2019  9:55 AM CDT -----  Contact: Patient   General phone call:    Caller: Patient  Primary cardiologist: Lissette Gordon  Detailed reason for call: Patient called to let Lissette know that his INR results from yesterday is 2.5  New or active symptoms? no  Best phone number: [phone]  Best time to contact: anytime   Ok to leave a detailed message? Yes   Device? YES    Additional Info:

## 2021-05-29 NOTE — TELEPHONE ENCOUNTER
Instructed pt to continue to monitor.  If bleeding occurs or spots get larger, pt will contact clinic again.  Pt had no other questions at this time.  kendra

## 2021-05-29 NOTE — PROGRESS NOTES
Pulmonary Clinic Follow Up    Cc: follow up dyspnea.    HPI: 86yoM with history of asthma as well as cardiomyopathy, EF 44% s/p TAVR for severe AS, restrictive lung disease presents for follow up.    He underwent TAVR 11/27 and upgrade of his BiV AICD. Has been at home since December. Was hospitalized 12/30-1/2 for CHF exacerbation, ?pneumonia with a procalcitonin of 11. Required bipap initially. Discharged home.     He continues to take his prednisone at 5mng daily. He feels better at higher doses but knows that cannot go on indefinitely.    He is a former smoker, but quit in 1956. 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    He continues to use his oxygen with ambulation. He wears CPAP at night with 1L Oxygen bled in. 3L activity, 2L rest.     Current regimen: Budesonide nebs, perforomist which he takes once daily and albuterol nebs.      Current Outpatient Medications   Medication Sig Note     acetaminophen (TYLENOL EXTRA STRENGTH) 500 MG tablet Take 1 tablet (500 mg total) by mouth every 6 (six) hours as needed for pain.      allopurinol (ZYLOPRIM) 100 MG tablet Take 100 mg by mouth daily.      atorvastatin (LIPITOR) 80 MG tablet Take 1 tablet (80 mg total) by mouth daily.      calcium, as carbonate, (TUMS) 200 mg calcium (500 mg) chewable tablet Chew 1 tablet daily as needed for heartburn.      cetirizine (ZYRTEC) 10 MG tablet Take 10 mg by mouth daily as needed.             clopidogrel (PLAVIX) 75 mg tablet Take 1 tablet (75 mg total) by mouth daily. take 75 mg (1 pill) daily      fluticasone (FLONASE) 50 mcg/actuation nasal spray Apply 1 spray into each nostril daily as needed for rhinitis.      furosemide (LASIX) 80 MG tablet Take 80 mg in AM and 40 mg in PM. (Patient taking differently: Take 80 mg by mouth daily.       )      hydrocortisone 1 % cream Apply 1 application topically every 6 (six) hours as needed.      latanoprost (XALATAN)  0.005 % ophthalmic solution Administer 1 drop to the right eye at bedtime.      metoprolol succinate (TOPROL XL) 50 MG 24 hr tablet Take 1 tablet (50 mg total) by mouth 2 (two) times a day. (Patient taking differently: Take 50 mg by mouth daily.       )      nebulizer and compressor Radha For chronic cough      nitroglycerin (NITROSTAT) 0.4 MG SL tablet Place 1 tablet (0.4 mg total) under the tongue every 5 (five) minutes as needed for chest pain.      OXYGEN-AIR DELIVERY SYSTEMS MISC into each nostril continuous. 1.5 liters with rest and 3 liters with activity  May use up to 1.5 liters at [facility].  [employer]            pantoprazole (PROTONIX) 20 MG tablet Take 1 tablet (20 mg total) by mouth daily. (Patient taking differently: Take 20 mg by mouth daily as needed.       )      PERFOROMIST 20 mcg/2 mL nebulizer solution USE 1 VIAL IN NEBULIZER EVERY 12 HOURS      predniSONE (DELTASONE) 5 MG tablet Take 5 mg by mouth daily. 4/1/2019: NOT PRESCRIBED, taking old Rx on his own.     sertraline (ZOLOFT) 50 MG tablet Take 0.5 tablets (25 mg total) by mouth at bedtime.      warfarin (COUMADIN/JANTOVEN) 2 MG tablet Take 1 tablet (2 mg total) by mouth See Admin Instructions. Take 2 mg daily check INR on 4/9/2019      albuterol (PROVENTIL) 2.5 mg /3 mL (0.083 %) nebulizer solution Take 3 mL (2.5 mg total) by nebulization every 4 (four) hours as needed for wheezing.      budesonide (PULMICORT) 0.5 mg/2 mL nebulizer solution Take 2 mL (0.5 mg total) by nebulization 2 (two) times a day.      Vitals:    06/06/19 0908   BP: 102/54   Pulse: 72   Resp: 24   SpO2: 97%   RA  Gen: NAD, elderly, frail  HEENT: no thrush or oral lesions   Neck: No lymphadenopathy  C/V: RRR,  S1 and S2. No longer any murmur  Resp: Rales at both bases  Ext: new swelling in legs, right-sided  Neuro: Tremor at rest.   Skin: no rashes        ASSESSMENT/PLAN:  1) Restrictive lung disease-Combination of trapped lung and mild fibrosis that may be slowly  progressing.  -Continue O2  -Prednisone 5mg for now.    2) Bronchiectasis-seen at bases on CT scan.    -If not will consider repeating CT scan of chest to better understand process    3) Asthma,  -Continue perforomist and budesonide nebulized. Tried an inhaler, didn't like it.   -PRN albuterol    5) iCMP, EF 44%   -Currently euvolemic. Continue close attention to daily weights.  RTC 3 months      Albina Christensen MD  >50% of this 30 minute visit spent in direct patient counseling and coordination of care.

## 2021-05-29 NOTE — TELEPHONE ENCOUNTER
Myron is calling for his Ct scan results. Informed that there is a mild increase in the diffuse intersititual prominence which may be related to his Fibrosis. Per Dr Mariscal and informed that Dr. Christensen will go over it in detail at his next visit.

## 2021-05-29 NOTE — PROGRESS NOTES
Remote Heart Failure ICM    Thoracic Impedance: 10    Optivol Fluid Index: 56ohms / reference 59 ohsm    Arrhythmias: Since 19, 9 mode switches, all <1minute, no EGMs (duration too short). No VT/VF detected.     % CRT Pacin.5%   AP: 92%  Night Heart rate trends: Day HR ~80bpm and night ~75bpm.   Heart Rate variability: Mostly paced rhythm.   Patient Activity: 0.6hr/day.     Intervention: None. OptiVol at 10 (Routing criteria is >/=60). Normal device function.       Technician Signature/Date: LANE Meza, EP, ICD/Pacemaker Specialist

## 2021-05-29 NOTE — TELEPHONE ENCOUNTER
ANTICOAGULATION  MANAGEMENT    Assessment     Today's INR result of 2.1 is Therapeutic (goal INR of 2.0-3.0)        Warfarin taken differently than instructed, but no impact to total weekly dose    No new diet changes affecting INR    No new medication/supplements affecting INR    Continues to tolerate warfarin with no reported s/s of bleeding or thromboembolism     Previous INR was Supratherapeutic     Patient introduced to [facility] Program    Plan:     Spoke with Myron regarding INR result and instructed:     Warfarin Dosing Instructions:  Continue current warfarin dose    2 mg every Tue, Fri; 4 mg all other days      (0 % change)    Instructed patient to follow up no later than: 2 weeks, appointment made.    Education provided: importance of therapeutic range, target INR goal and significance of current INR result and importance of taking warfarin as instructed    Myron verbalizes understanding and agrees to warfarin dosing plan.    Instructed to call the [facility] for any changes, questions or concerns. (#[phone])   ?   Natasha Grijalva RN    Subjective/Objective:      Myron Sunshine, a 87 y.o. male is on warfarin.     Myron reports:     Home warfarin dose: as updated on anticoagulation calendar per template     Missed doses: No     Medication changes:  No     S/S of bleeding or thromboembolism:  No     New Injury or illness:  No     Changes in diet or alcohol consumption:  No     Upcoming surgery, procedure or cardioversion:  No    Anticoagulation Episode Summary     Current INR goal:   2.0-3.0   TTR:   84.4 % (4.2 y)   Next INR check:   6/19/2019   INR from last check:   2.10 (6/5/2019)   Weekly max warfarin dose:      Target end date:      INR check location:      Preferred lab:      Send INR reminders to:   [facility]    Indications    Persistent atrial fibrillation (H) [I48.1]           Comments:   new order INR 2/5/19         Anticoagulation Care Providers     Provider Role Specialty  Phone number    Donna Thakkar MD Referring Cardiology [phone]

## 2021-05-29 NOTE — TELEPHONE ENCOUNTER
----- Message from Susie Romero sent at 6/20/2019  9:53 AM CDT -----  Contact: Pt  General phone call:    Caller: Pt  Primary cardiologist: Heidi  Detailed reason for call: Pt is getting dime size purple spots on his arms- approx 13 spots on one arm- pt is questioning if any of his meds could be causing this?  New or active symptoms? yes  Best phone number: [phone]  Best time to contact:   Ok to leave a detailed message?   Device? yes    Additional Info:

## 2021-05-30 ENCOUNTER — RECORDS - HEALTHEAST (OUTPATIENT)
Dept: ADMINISTRATIVE | Facility: CLINIC | Age: 86
End: 2021-05-30

## 2021-05-30 NOTE — PROGRESS NOTES
Furosemide updated, BMP ordered.  -[facility]    Result Notes for Basic metabolic panel   Notes recorded by Lissette Gordon NP on 7/8/2019 at 11:15 AM CDT  Juan Basurto,  His renal function has worsened. Cardiosight report showed no evidence of fluid retention. Let's have him cut back on Lasix from 80 mg to 40 mg daily and repeat BMP in 2 weeks. Call us if persistent wt gain with worsening HF symptoms.  Thank you  CY

## 2021-05-30 NOTE — TELEPHONE ENCOUNTER
ANTICOAGULATION  MANAGEMENT    Assessment     Today's INR result of 2.5 is Therapeutic (goal INR of 2.0-3.0)        Warfarin taken as previously instructed    No new diet changes affecting INR    No new medication/supplements affecting INR    Continues to tolerate warfarin with no reported s/s of bleeding or thromboembolism     Previous INR was Therapeutic    Plan:     Spoke with Myron regarding INR result and instructed:     Warfarin Dosing Instructions:  Continue current warfarin dose    2 mg every Tue, Fri; 4 mg all other days     (0 % change)    Instructed patient to follow up no later than: 4 weeks - Appointment made.    Education provided: importance of therapeutic range    Myron verbalizes understanding and agrees to warfarin dosing plan.    Instructed to call the [facility] for any changes, questions or concerns. (#[phone])   ?   Per [facility] Protocol/ Natasha Grijalva RN ,ACN / Diaz Parks        Subjective/Objective:      Myron Sunshine, a 88 y.o. male is on warfarin.     Myron reports:     Home warfarin dose: as updated on anticoagulation calendar per template     Missed doses: No     Medication changes:  No     S/S of bleeding or thromboembolism:  No     New Injury or illness:  No     Changes in diet or alcohol consumption:  No     Upcoming surgery, procedure or cardioversion:  No    Anticoagulation Episode Summary     Current INR goal:   2.0-3.0   TTR:   84.8 % (4.3 y)   Next INR check:   8/14/2019   INR from last check:   2.50 (7/17/2019)   Weekly max warfarin dose:      Target end date:      INR check location:      Preferred lab:      Send INR reminders to:   [facility]    Indications    Persistent atrial fibrillation (H) [I48.1]           Comments:   new order INR 2/5/19         Anticoagulation Care Providers     Provider Role Specialty Phone number    Donna Thakkar MD Referring Cardiology [phone]

## 2021-05-30 NOTE — TELEPHONE ENCOUNTER
----- Message from Erin Chavez sent at 7/18/2019  1:09 PM CDT -----  Contact: Pt  General phone call:    Caller: Pt  Primary cardiologist: Heidi  Detailed reason for call: Pt had blood work done and would like to know the results. Please call back.  Best phone number: [phone]  Best time to contact: Any  Ok to leave a detailed message? Yes  Device? Yes    Additional Info:

## 2021-05-30 NOTE — TELEPHONE ENCOUNTER
----- Message from Zohreh aG, RN sent at 7/25/2019 12:44 PM CDT -----  Contact: Pharmacist  Warfarin refill  ----- Message -----  From: Jeanine Jimenez  Sent: 7/25/2019  11:59 AM  To: Zohreh Ga, RN    Patient has already contacted their pharmacy. The medication or refill issue is below:    Primary Cardiologist: Dr. Thakkar    Medication: Warfarin    Issue / Concern: Myron is out of warfarin.     Preferred Pharmacy: [facility] at [address], [address] [phone]    Best Phone Number for Patient: [phone]    Additional Info: Myron has appt sched w/Dr. Thakkar on 8/29/19.

## 2021-05-30 NOTE — TELEPHONE ENCOUNTER
Results and recommendations relayed to pt.  Pt verbalized understanding and had no questions.  -medina

## 2021-05-30 NOTE — TELEPHONE ENCOUNTER
Lab Results   Component Value Date    INR 2.50 (!) 07/17/2019    INR 2.40 (!) 06/19/2019    INR 2.10 (!) 06/05/2019       Patient's current Warfarin doses:    2 mg every Tue, Fri; 4 mg all other days       Next INR check is on 8/14/19      Patient's last OV with PCP was on 7/8/19    Warfarin prescription 3 month supply and one refill sent to patient's pharmacy today.  Patient made aware.    Natasha Grijalva RN

## 2021-05-30 NOTE — PROGRESS NOTES
Remote Heart Failure ICM    Thoracic Impedance: Daily and Reference at 60ohms    Optivol Fluid Index: 0    Arrhythmias: Since 19, no VT/VF detected. 24 mode switches detected, all <1minute, no EGMs for review (duration too short).     % CRT Pacin.6% (slight decrease, routine remote 19 will review at this time). AP: 85.1%  Night Heart rate trends: Day HR ~80bpm and Night HR ~78bpm.   Heart Rate variability: ~90-100ms.   Patient Activity: 0.5hr/day.     Intervention: None. OptiVol at 0 (Routing criteria is >/=60). Normal device function.     Technician Signature/Date: LANE Meza, EP, ICD/Pacemaker Specialist

## 2021-05-30 NOTE — PATIENT INSTRUCTIONS - HE
Myron Sunshine,    It was a pleasure to see you today at the [facility].     My recommendations after this visit include:    - Take Metoprolol Succinate 75 mg daily (take one and half tablet of 50 mg Metoprolol succinate). Please call if you feel lightheaded or dizzy.     - I did some lab work today and will call you with results when available     - Follow up with Dr. Cadet in 4 weeks and Dr. Thakkar in 8 weeks as recommended    - Follow up in Heart Failure Clinic with Lissette in 3-4 months or sooner if needed    - Please call Sahnnen Basurto RN on [phone], if you have any questions or concerns    Lissette Gordon, CNP

## 2021-05-31 VITALS — WEIGHT: 177.3 LBS | BODY MASS INDEX: 26.96 KG/M2

## 2021-05-31 VITALS — WEIGHT: 179.5 LBS | BODY MASS INDEX: 27.2 KG/M2 | HEIGHT: 68 IN

## 2021-05-31 VITALS — WEIGHT: 175 LBS | HEIGHT: 68 IN | BODY MASS INDEX: 26.52 KG/M2

## 2021-05-31 VITALS — WEIGHT: 176.1 LBS | HEIGHT: 68 IN | BODY MASS INDEX: 26.69 KG/M2

## 2021-05-31 VITALS — BODY MASS INDEX: 25.4 KG/M2 | WEIGHT: 172 LBS

## 2021-05-31 VITALS — BODY MASS INDEX: 25.44 KG/M2 | WEIGHT: 172.25 LBS

## 2021-05-31 VITALS — BODY MASS INDEX: 25.72 KG/M2 | WEIGHT: 174.19 LBS

## 2021-05-31 NOTE — TELEPHONE ENCOUNTER
Myron called to say that the higher dose of prednisone really helped his breathing and he is feeling better. He is a little nervous about going back to the 5 mg today.Instructed to start taking 10 mg of prednisone a day and see if that is more helpful. Will call with any changes.

## 2021-05-31 NOTE — PROGRESS NOTES
Pt seen by Dr Thakkar today CV ordered pt has a [employer] ICD  I spoke to pt reviewing needs weekly INR's x 4 therapeutic then can go on for CV  Pt has my direct number for contact  [facility] INR nurse team will be notified

## 2021-05-31 NOTE — TELEPHONE ENCOUNTER
Myron called with shortness of breath ,chest tightness and increased cough.  Will increase his prednisone to 30 mg x 5 days and watch his weight and ankle swelling , call if increases. Per Dr. Haro. If no improvement or gets worse to go to the ED to be evaluated. He agreed with the plan

## 2021-05-31 NOTE — TELEPHONE ENCOUNTER
"----- Message from Rola Martinezec sent at 8/26/2019  6:14 AM CDT -----  Regarding: device RN review  [facility]  Type: CRT-D alert remote transmission for long AT/AF.   Presenting rhythm: atrial fibrillation with biVP 78 bpm.  Battery/lead status: stable.  Arrhythmias: since 8/1/19; one AF episode in progress since 8/22/19 7:37am, v-rates >/=120bpm 5%, AF burden 7.2%. One NSVT detected.  Anticoagulant: warfarin.  Comments: appears to be normal ICD function. Device biVP 83.5%( 5.8% ineffective).       Alert transmission received 8/23/19 for AF. Repeat remote today shows that A.fib has been in progress since morning of 8/22/19 with V-rates controlled. Current  while in AF is 84% (81.8% Effective).  Has known PAF with Hx of ECV in past, last ECV was on 3/28/19 which correlates with last significant AF episode per burden graph. Previous CV was done due to complaints of shortness of breath an fluid retention.      Reviewed remote findings of ongoing AF with patient this afternoon. He states he has noticed some intermittent shortness of breath over the weekend, but thought it was more weather related. Also noted a \"couple pounds\" weight gain recently but thinks it is maybe because he bought a new scale, no notable edema in legs per patient. Confirms Warfarin compliance but states sometimes he decreases his dose based on food intake for that day ,states sometimes he really doesn't eat much.     Last INR on 8/14/19 shows 1/9. Reports being confused with Toprol XL, reviewed with him that he called about this a couple days ago and was told to take 1.5 tab (75mg total) daily but he again forgot this and was only taking 50 mg. Advised patient to bring in meds/list so we can clarify as needed at upcoming visit with Dr. Thakkar on 8/29/19. He agrees.      Patient denies any chest discomfort, unusual lightheadedness. Again has some intermittent BARGER. Currently states he feels well but is laying down and increased his daytime oxygen " from 2L to 3L for now. At bedtime uses CPAP with 1L bled in. Advised when to seek more urgent care. He agrees, Will update Dr. Thakkar and Dr. Cdaet who manages his AF/Device and has follow up with patient in Device Clinic on 9/5/19. Full device report from today (8/26/19) scanned in for review.     Ratna Lyn RN

## 2021-05-31 NOTE — TELEPHONE ENCOUNTER
----- Message from Jessica Coronel sent at 8/30/2019  8:03 AM CDT -----  Contact: Patient  General phone call:    Caller: Patient  Primary cardiologist: Lissette  Detailed reason for call: Patient has extreme shortness of breath which is worsening, upset stomach and left arm pain  New or active symptoms? extreme shortness of breath which is worsening, upset stomach and left arm pain  Best phone number: [phone]  Best time to contact: Anytime  Ok to leave a detailed message? Yes  Device? no    Additional Info:

## 2021-05-31 NOTE — TELEPHONE ENCOUNTER
----- Message from Jason Singh sent at 8/14/2019 11:50 AM CDT -----  Contact: Pt  General phone call:    Caller: Pt    Primary cardiologist: Dr Thakkar / Lissette HUNT    Detailed reason for call: Pt has some medication questions - needs to clarify directions - also having some breathing troubles - would like a call back please    New or active symptoms? Ongoing - but worsened  Best phone number: home  Best time to contact: Any  Ok to leave a detailed message? yes  Device? yes    Additional Info:

## 2021-05-31 NOTE — TELEPHONE ENCOUNTER
Juan Templeton,  Patient seen in clinic by Dr. Thakkar today.  DCCV recommended. INR drawn in clinic today. His last INR was subtherapeutic at 1.9 on 8/14/18.    Per Dr. Colby clinic note 8/29/19:    Patient does have follow-up scheduled with Dr. Donal Cadet, electrophysiology, 5 September 2019 and would be interested in garnering his opinion regarding antiarrhythmic therapy.    In the meantime, we will plan on arranging for repeat attempt at direct-current cardioversion after confirmation of 3-4 weeks of therapeutic INRs.His last INR was somewhat subtherapeutic and will obtain an INR today.  Patient also instructed to obtain weekly INRs until cardioversion performed.    Thank you,  Patricia     none

## 2021-05-31 NOTE — TELEPHONE ENCOUNTER
ACN spoke with [place] with [facility] and upated her that ACN will be calling the patient to set up weekly INR's as prep for cardioversion procedure.    Natasha Grijalva RN

## 2021-05-31 NOTE — TELEPHONE ENCOUNTER
Maine is calling from [facility] and would like to speak with an INR nurse to update them on the patient.  The patient had a recent of INR result of 1.82 and Heart Care will be monitoring him weekly until he reaches therapeutic levels four times in a row.  Please call Maine back today if possible.

## 2021-05-31 NOTE — TELEPHONE ENCOUNTER
Return call to patient. Patient calling to report worsening shortness of breath, palpitations, stomach pain as well as left arm and shoulder pain this morning. Patient states his shortness of breath has been severe. Patient audibly short of breath while speaking to writer on telephone, having to pause every few words to take breaths.      Patient was advised to seek evaluation of his symptoms in the nearest ED. Patient verbalized understanding and agreement with plan. -edwardb

## 2021-05-31 NOTE — TELEPHONE ENCOUNTER
ANTICOAGULATION  MANAGEMENT    Assessment     Today's INR result of 1.9 is Subtherapeutic (goal INR of 2.0-3.0)        Less warfarin taken than instructed which may be affecting INR     Patient reported that he self adjusted his doses due to his INR goes really high when he is taking prednisone. Discussed the importance of reporting medication changes and importance of checking INR on day 3 or 4 after taking prednisone.    No new diet changes affecting INR     Interaction between Prednisone, Sertraline and warfarin may be affecting INR    Continues to tolerate warfarin with no reported s/s of bleeding or thromboembolism    Reported shortness of breathing- stated that symptom is better with Prednisone.     Previous INR was Therapeutic    Plan:     Spoke with Myron regarding INR result and instructed:     Warfarin Dosing Instructions:  Continue current warfarin dose    2 mg every Tue, Fri; 4 mg all other days      (0 % change)    Instructed patient to follow up no later than: Patient has a scheduled visit with [facility] at  on 8/29 made aware that POCT INR is not available at  - he stated that he will go to hospital lab to have his INR done.    Education provided: importance of therapeutic range, target INR goal and significance of current INR result, importance of taking warfarin as instructed, potential interaction between warfarin and prednisone,sertraline and importance of reporting medication changes and NOT self adjusting warfarin doses    Myron verbalizes understanding and agrees to warfarin dosing plan.    Instructed to call the [facility] for any changes, questions or concerns. (#[phone])   ?   Natasha Grijalva RN    Subjective/Objective:      Myron MOHSEN Sunshine, a 88 y.o. male is on warfarin.     Myron reports:     Home warfarin dose: as updated on anticoagulation calendar per template     Missed doses: No     Medication changes:  Yes: Prednisone , Sertraline has been taking it for a long time then quit for  about 3 months. He stated that he took a dose yesterday but is planning not to take any more doses moving forward.     S/S of bleeding or thromboembolism:  Yes- shortness of breathing- stated that symptoms I a little better with Prednisone.     New Injury or illness:  No     Changes in diet or alcohol consumption:  No     Upcoming surgery, procedure or cardioversion:  No    Anticoagulation Episode Summary     Current INR goal:   2.0-3.0   TTR:   84.8 % (4.4 y)   Next INR check:   8/14/2019   INR from last check:   2.50 (7/17/2019)   Most recent INR:    1.90! (8/14/2019)   Weekly max warfarin dose:      Target end date:      INR check location:      Preferred lab:      Send INR reminders to:   [facility]ART [facility]    Indications    Persistent atrial fibrillation (H) [I48.1]           Comments:   new order INR 2/5/19         Anticoagulation Care Providers     Provider Role Specialty Phone number    Donna Thakkar MD Referring Cardiology [phone]

## 2021-05-31 NOTE — TELEPHONE ENCOUNTER
Contacted patient today to discuss his questions regarding his medication list. He asked to go through the list to eliminate bottles that he has in his home and to be more clear on the directions of his current regimen.     He was advised that he should be taking the full dose of 75mg Metoprolol XL at one time in the AM, as this is an extended release tablet and not a split dosed medication.  He will correct this as was taking a half in the pm.    Otherwise some medications were eliminated from his list today as he stated he was no longer taking them      Updated the Prednisone dose that pulmonary ordered for him in the presence of his dyspnea.   He was appreciate of the time in med review today.     Sk/JEREMIAS

## 2021-05-31 NOTE — TELEPHONE ENCOUNTER
ACN called and spoke with patient and he reported that he is now scheduled for weekly INR's for upcoming cardioversion procedure. Instructed to take warfarin doses as instructed.    Patient verbalized understanding and agrees to plan.    Natasha Grijalva RN

## 2021-06-01 ENCOUNTER — RECORDS - HEALTHEAST (OUTPATIENT)
Dept: ADMINISTRATIVE | Facility: CLINIC | Age: 86
End: 2021-06-01

## 2021-06-01 VITALS — BODY MASS INDEX: 25.62 KG/M2 | HEIGHT: 69 IN | WEIGHT: 173 LBS

## 2021-06-01 VITALS — WEIGHT: 174 LBS | BODY MASS INDEX: 25.7 KG/M2

## 2021-06-01 VITALS — BODY MASS INDEX: 22.25 KG/M2 | WEIGHT: 150.25 LBS | HEIGHT: 69 IN

## 2021-06-01 VITALS — WEIGHT: 156 LBS | BODY MASS INDEX: 23.11 KG/M2 | HEIGHT: 69 IN

## 2021-06-01 VITALS — HEIGHT: 69 IN | BODY MASS INDEX: 23.25 KG/M2 | WEIGHT: 157 LBS

## 2021-06-01 VITALS — WEIGHT: 171.6 LBS | BODY MASS INDEX: 25.34 KG/M2

## 2021-06-01 VITALS — BODY MASS INDEX: 25.64 KG/M2 | WEIGHT: 173.6 LBS

## 2021-06-01 VITALS — WEIGHT: 165.1 LBS | BODY MASS INDEX: 24.45 KG/M2 | HEIGHT: 69 IN

## 2021-06-01 VITALS — BODY MASS INDEX: 24.07 KG/M2 | WEIGHT: 163 LBS

## 2021-06-01 VITALS — BODY MASS INDEX: 23.13 KG/M2 | WEIGHT: 156.2 LBS | HEIGHT: 69 IN

## 2021-06-01 NOTE — TELEPHONE ENCOUNTER
ANTICOAGULATION  MANAGEMENT    Assessment     Today's INR result of 4.0 is Supratherapeutic (goal INR of 2.0-3.0)        More warfarin taken than instructed which may be affecting INR    No new diet changes affecting INR     Patient reported that he only took one dose of Amiodarone and stopped due to side effects.    Interaction between Amiodarone and warfarin may be affecting INR    Continues to tolerate warfarin with no reported s/s of bleeding or thromboembolism     Previous INR was Subtherapeutic most likely due to held dose while inpatient.    Patient is being prepped for Cardioversion Procedure and needs weekly INR's    Plan:     Spoke with Myron regarding INR result and instructed:     Warfarin Dosing Instructions:  hold warfarin dose today then continue current warfarin dose    2 mg every Tue; 4 mg all other days      (0 % change)    Instructed patient to follow up no later than: weekly    Education provided: impact of vitamin K foods on INR, importance of therapeutic range, target INR goal and significance of current INR result and potential interaction between warfarin and amiodarone    Myron verbalizes understanding and agrees to warfarin dosing plan.    Instructed to call the [facility] for any changes, questions or concerns. (#[phone])   ?   Natasha Grijalva RN    Subjective/Objective:      Myron Sunshine, a 88 y.o. male is on warfarin.     Myron reports:     Home warfarin dose: verbally confirmed home dose with Myron and updated on anticoagulation calendar     Missed doses: No     Medication changes:  Yes: Amiodarone took one dose only     S/S of bleeding or thromboembolism:  No     New Injury or illness:  No     Changes in diet or alcohol consumption:  No     Upcoming surgery, procedure or cardioversion:  No    Anticoagulation Episode Summary     Current INR goal:   2.0-3.0   TTR:   60.4 %   Next INR check:   9/19/2019   INR from last check:   4.00! (9/11/2019)   Weekly max warfarin dose:       Target end date:      INR check location:      Preferred lab:      Send INR reminders to:   [facility]    Indications    Persistent atrial fibrillation (H) [I48.1]           Comments:   new order INR 2/5/19         Anticoagulation Care Providers     Provider Role Specialty Phone number    Donna Thakkar MD Referring Cardiology [phone]

## 2021-06-01 NOTE — PATIENT INSTRUCTIONS - HE
Flu shot in October    OK to trial prednisone at 5mg Daily instead of 10mg. If you feel worse, ok to increase back to 10mg.    Call Susie if issues with breathing [phone] M-F, 8am-4pm

## 2021-06-01 NOTE — TELEPHONE ENCOUNTER
Myron called to let Dr. hCristensen know that he went back up to 10 mg of prednisone a day. Said the five did not work for him. Will update .

## 2021-06-01 NOTE — PATIENT INSTRUCTIONS - HE
Myron Sunshine    Thank you for coming to the [facility] today for a cardiac evaluation  It was my pleasure to see you today  A good contact for any questions would be Ivory Portillo  RN @ [phone]    Although you recently discharged, you continue to have shortness of breath  You remain in atrial fibrillation/flutter; were unable to pace terminate  Perhaps he would feel better if we could get you back in a regular rhythm  Start amiodarone 200 mg twice daily  Have INR checked on a weekly basis; will be checked today  Continue current medications  On signs of 2-3 pound weight gain or increase fluid take additional furosemide 40 mg  Anticipate that we  will do a cardioversion 2 to 3 weeks if your INRs are satisfactory; allow the amiodarone to build up in your system

## 2021-06-01 NOTE — TELEPHONE ENCOUNTER
ANTICOAGULATION  MANAGEMENT    Assessment     Today's INR result of 1.8 is Subtherapeutic (goal INR of 2.0-3.0)    8/30-9/2 Hospital admission for shortness of breath and chest pain       Patient only recieved 20 mg out 26 mg weekly dose due held dose while at the hospital - anticoagulation calendar updated with doses received while inpatient.    No new diet changes affecting INR     OV with [facility] today with order for patient to start Amiodarone    Potential interaction between amiodarone and warfarin which may affect subsequent INRs    Continues to tolerate warfarin with no reported s/s of bleeding or thromboembolism     Previous INR was Therapeutic     Patient is being prepped for cardioversion.    Plan:     Spoke with Myron regarding INR result and instructed:     Warfarin Dosing Instructions:  Continue current warfarin dose    2 mg every Tue; 4 mg all other days     (0 % change)    Instructed patient to follow up no later than: one week/ weekly due to amiodarone dose and cardioversion prep.    Education provided: importance of therapeutic range, target INR goal and significance of current INR result and potential interaction between warfarin and amiodarone    Myron verbalizes understanding and agrees to warfarin dosing plan.    Instructed to call the [facility] for any changes, questions or concerns. (#[phone])   ?   Natasha Grijalva RN    Subjective/Objective:      Myron MOHSEN Sunshine, a 88 y.o. male is on warfarin.     Myron reports:     Home warfarin dose: verbally confirmed home dose with Myron and updated on anticoagulation calendar     Missed doses: No     Medication changes:  No     S/S of bleeding or thromboembolism:  No     New Injury or illness:  Yes: recently discharged.     Changes in diet or alcohol consumption:  No     Upcoming surgery, procedure or cardioversion:  No    Anticoagulation Episode Summary     Current INR goal:   2.0-3.0   TTR:   60.3 %   Next INR check:   9/12/2019   INR from last check:    1.80! (9/5/2019)   Weekly max warfarin dose:      Target end date:      INR check location:      Preferred lab:      Send INR reminders to:   [facility] HEART [facility]    Indications    Persistent atrial fibrillation (H) [I48.1]           Comments:   new order INR 2/5/19         Anticoagulation Care Providers     Provider Role Specialty Phone number    Donna Thakkar MD Referring Cardiology [phone]

## 2021-06-01 NOTE — PROGRESS NOTES
Pt is currently being prepped for CV, once he has 3 weeks of therapeutic INR he will be scheduled for Cardioversion.  Please see additional notes, Amiodarone has been discontinued.

## 2021-06-01 NOTE — TELEPHONE ENCOUNTER
"----- Message from Patricia Ricardo RN sent at 9/11/2019  3:24 PM CDT -----  INR patient of yours.  Thank you.  ----- Message -----  From: Susie Romero  Sent: 9/11/2019   3:07 PM  To: Patricia Ricardo RN    General phone call:    Caller: Pt  Primary cardiologist: Bijan  Detailed reason for call: Pt had INR taken at [facility] today- would like to discuss- states that it is a \"4\"  New or active symptoms?   Best phone number: [phone]  Best time to contact:   Ok to leave a detailed message?   Device? yes    Additional Info:       "

## 2021-06-01 NOTE — TELEPHONE ENCOUNTER
Contacted Myron to discuss his questions today regarding fluid intake. He was advised to stay within the parameter of 50 ounces of fluid intake daily. He states that he now recalls this discussion and will make sure that this is his goal.    Denies further questions at this time.sk/RN

## 2021-06-01 NOTE — PROGRESS NOTES
Phone call from patient stating that he took one tablet of amiodarone on Friday after seeing Dr. Cadet on Thursday 9-5-19, and felt increasing anxiety, shortness of breath and unsteady on his feet.  He only took the one tablet and has not taken it since.  He did feel an increase in weight on Saturday 9-7-19 and took an extra lasix.  He states that has been feeling pretty good since that time.    He read the insert for amiodarone after he took his first dose and felt that his symptoms were related to amio.  He is very uncomfortable taking amiodarone and would like to try a cardioversion without it first and if it doesn't work then he would reconsider taking it.  Spent some time discussing the side effects of Amiodarone and that it is a slow acting medication and likely not the culprit of his symptoms after one pill.    He continues to be uncomfortable.  Will review with Dr. Cadet- Ok to proceed with CV without amiodarone once INR's are therapeutic      Plan   Scheduled for cardioversion when INR status is satisfactory-therapeutic for 3 weeks  Should rechallenge amiodarone I think his symptoms are not related to amiodarone which rarely causes early symptoms: Would like him to take amiodarone 200 mg twice daily until the cardioversion has been done  Is important to keep him out of atrial fibrillation; when he goes in atrial fibrillation he does poorly

## 2021-06-01 NOTE — TELEPHONE ENCOUNTER
----- Message from Jason Singh sent at 10/1/2019 10:14 AM CDT -----  Regarding: Question about recent visit  General phone call:    Caller: Pt    Primary cardiologist: alix HUNT yesterday     Detailed reason for call: Has question about fluid intake     New or active symptoms? Na    Best phone number: [phone]    Best time to contact: any    Ok to leave a detailed message? Yes    Device? yes    Additional Info:

## 2021-06-01 NOTE — TELEPHONE ENCOUNTER
ACN called PCP office to get INR result as reported by patient below.    Spoke with Hattie from Medical records and she reported INR of 4.0  She will fax result to  as requested.

## 2021-06-01 NOTE — PROGRESS NOTES
[facility] Heart Care Note  Assessment  1. 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            left circumflex occlusion, thrombosed stent requiring repeat  stenting 11/4/2001.    4. ICD implant 12/2001, secondary indication (clinical VT);        generator replacement  7/19/2007,         generator replacement 6/18/2014.  [employer] DDDR/ICD  Upgrade to CRT-D December 4, 2018  5.  Moderate aortic stenosis        Mean aortic valve gradient 22 mm on October 2, 2017  TAVR 11-   abdominal aortic aneurysm with repair 12/2008.     Chronicrenal insufficiency   Left ventricular dysfunction with ejection fraction about 35% with large inferior wall motion abnormality.         Echocardiogram  is similar       Echocardiogram October 2, 2017; ejection fraction       Echocardiogram 40%, infero-posterior aneurysm  Ejection fraction 22% by echocardiogram November 28, 2018  Echocardiogram December 31, 2018 ejection fraction 44%  Chronic anticoagulation with warfarin. Also ASA   Sinus bradycardia.  TIA; comprehensive evaluation was unremarkable November 2015  Left pleural effusion with thoracentesis-850 cc exudate 2017  Atrial fibrillation March 9, 2018-persistent  External cardioversion March 28, 2019  Persistent atrial fibrillation August 2019    Plan:    Although you recently discharged, you continue to have shortness of breath  You remain in atrial fibrillation/flutter; were unable to pace terminate  Perhaps he would feel better if we could get you back in a regular rhythm  Start amiodarone 200 mg twice daily  Have INR checked on a weekly basis; will be checked today  Continue current medications  On signs of 2-3 pound weight gain or increase fluid take additional furosemide 40 mg  Anticipate that we  will do a cardioversion 2 to 3 weeks if your INRs are satisfactory; allow the amiodarone to build up in your  system      Subjective:    I had the opportunity to see.Myron Sunshine , who is a 88 y.o. male with a known history of complex heart disease including coronary artery disease, aortic valve replacement, atrial fibrillation  He has not been doing well  Recently hospitalized for congestive heart failure breathlessness and fluid overload and was noted to be in atrial fibrillation with controlled ventricular response  Summary   Echo cardiogram August 31, 2019    Left ventricular ejection fraction is mildly decreased. The estimated left ventricular ejection fraction is 45%. Prior left ventricular aneurysm patch.    Dyskinetic: basal inferior, basal inferolateral, basal anterolateral, mid inferior, mid inferolateral and mid anterolateral.    Normal right ventricular size and systolic function.    Bioprosthetic Kd 3 valve is present. There is no aortic regurgitation. The prosthetic valve function is normal.    Moderate mitral regurgitation.    Left atrial volume is severely increased.    When compared to the previous study dated 12/31/2018, no significant change     Medications were adjusted and patient was discharged.    He has not been doing well.  He uses continuous home oxygen  Remains short of breath on minimal exertion  No chest pains  No awareness of palpitations or arrhythmias    Many of his episodes of decompensation have been related to atrial fibrillation  His last cardioversion was March 28.  He remained out of atrial fibrillation for at least 5 months and that time seem to do better    Do not believe he has been on amiodarone in the past he does not recall amiodarone in reviewing the records cannot find amiodarone  A biventricular device but does have intrinsic conduction of his atrial fibrillation with episodes of bradycardia  During atrial fibrillation his intrinsic  QRS is fairly narrow  We tried doing some pacemaker changes but with the intrinsic atrial fib and bradycardia he had quite a few more PVCs  so we have ended up programming him DDDR lower rate 70 upper rate 120 with non-adaptive pacing    I attempted to atrial overdrive pace terminate his flutter but this was unsuccessful at the end he was more disorganized more like an atrial fibrillation pattern      I thought we should try to get him back in sinus rhythm.    We will start amiodarone and schedule him for cardioversion about 3 weeks  We will need to follow INR closely with amiodarone which could lead to higher INR levels      Problem List:  Patient Active Problem List   Diagnosis     Ischemic cardiomyopathy     Stage 3 chronic kidney disease (H)     Biventricular ICD (implantable cardioverter-defibrillator) in place     Dyslipidemia, goal LDL below 70     Left ventricular aneurysm     Restrictive lung disease; moderate by PFT     DNAR (do not attempt resuscitation)     Chronic hypoxemic respiratory failure (H)     Persistent atrial fibrillation (H)     Non-rheumatic mitral regurgitation     Left bundle branch block     Chronic systolic congestive heart failure (H)     Glaucoma     MIS (obstructive sleep apnea)     Depression     Shortness of breath     Coronary artery disease due to calcified coronary lesion     Medical History:  Past Medical History:   Diagnosis Date     Anxiety      Bowel obstruction (H)      Bronchiectasis without acute exacerbation (H) 12/1/2017     Chronic hypoxemic respiratory failure (H)      CKD (chronic kidney disease) stage 3, GFR 30-59 ml/min (H)      Coronary artery disease due to lipid rich plaque      DNAR (do not attempt resuscitation)      Dyslipidemia, goal LDL below 70      ED (erectile dysfunction)      Essential hypertension      GERD (gastroesophageal reflux disease)      Gout flare 12/13/2018     Ischemic cardiomyopathy      Mild aortic stenosis      Noncompliance with medication regimen 9/11/2018     Paroxysmal VT (H)      Persistent atrial fibrillation (H) 8/22/2018     Psoriasis      Restrictive lung disease;  moderate by PFT 10/24/2017    FVC 1.9 456% FEV1 1.4 457% ratio 74%.  No significant response to bronchodilators.  TLC 40% predicted DLCO corrected 47% predicted Assessment: No obstruction.  Moderate restriction.  Moderate diffusion defect.     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     Seasonal allergies      Severe aortic stenosis 10/10/2018    Added automatically from request for surgery [id number]     Sleep apnea      TIA (transient ischemic attack) 8/09     Ventricular aneurysm     Long-term coumadin therapy s/p ventricular patch 2001     Vitamin D deficiency      VT (ventricular tachycardia) (H)      Surgical History:  Past Surgical History:   Procedure Laterality Date     ABDOMINAL AORTIC ANEURYSM REPAIR       APPENDECTOMY      Perforated     CARDIAC CATHETERIZATION       CARDIAC DEFIBRILLATOR PLACEMENT  7/19/2007     CORONARY STENT PLACEMENT  2001    LCX     CV CORONARY ANGIOGRAM N/A 10/18/2018    Procedure: Coronary Angiogram;  Surgeon: Elijah Liu MD;  Location: [facility] Cath Lab;  Service:      CV TRANSFEMORAL TRANSCATHETER VALVE REPLACEMENT N/A 11/27/2018    Transfemoral Transcatheter Aortic Valve Replacement;  Surgeon: Elijah Liu MD;  Location: 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/, 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   Social Needs     Financial resource strain: Not on file     Food insecurity:     Worry: Not  on file     Inability: Not on file     Transportation needs:     Medical: Not on file     Non-medical: Not on file   Tobacco Use     Smoking status: Former Smoker     Packs/day: 1.00     Years: 8.00     Pack years: 8.00     Types: Cigarettes     Last attempt to quit: 1955     Years since quittin.3     Smokeless tobacco: Never Used   Substance and Sexual Activity     Alcohol use: Yes     Alcohol/week: 0.6 oz     Types: 1 Glasses of wine per week     Comment: social     Drug use: No     Sexual activity: Not on file   Lifestyle     Physical activity:     Days per week: 0 days     Minutes per session: 0 min     Stress: Not on file   Relationships     Social connections:     Talks on phone: Not on file     Gets together: Not on file     Attends [employer]ism service: Not on file     Active member of club or organization: Not on file     Attends meetings of clubs or organizations: Not on file     Relationship status: Not on file     Intimate partner violence:     Fear of current or ex partner: Not on file     Emotionally abused: Not on file     Physically abused: Not on file     Forced sexual activity: Not on file   Other Topics Concern     Not on file   Social History Narrative    Lives with his wife, Eulalia, who he states has advanced arthritis. 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         Review of Systems:      General: WNL  Eyes: WNL  Ears/Nose/Throat: WNL  Lungs: WNL  Heart: WNL  Stomach: WNL  Bladder: WNL  Muscle/Joints: WNL  Skin: WNL  Nervous System: WNL  Mental Health: WNL     Blood: WNL        Family History:  Family History   Problem Relation Age of Onset     Stroke Mother      Thyroid disease Mother      Cancer Brother      No Medical Problems Son      No Medical Problems Son      No Medical Problems Son      Lung disease Neg Hx          Allergies:  Allergies   Allergen Reactions     Bumex [Bumetanide] Anxiety     Blood-Group Specific Substance      Anti-Josep/Barnes.  Expect delays in obtaining  blood for transfusion.  Collect 2 lavender top tubes and 1 red top tube for all blood bank orders.        Medications:  Current Outpatient Medications   Medication Sig Dispense Refill     acetaminophen (TYLENOL EXTRA STRENGTH) 500 MG tablet Take 1 tablet (500 mg total) by mouth every 6 (six) hours as needed for pain.  0     atorvastatin (LIPITOR) 80 MG tablet Take 1 tablet (80 mg total) by mouth daily. 30 tablet 3     calcium, as carbonate, (TUMS) 200 mg calcium (500 mg) chewable tablet Chew 2-4 tablets as needed for heartburn. Do not take more than 15 tablets in 24 hours.             cetirizine (ZYRTEC) 10 MG tablet Take 10 mg by mouth daily as needed.              clopidogrel (PLAVIX) 75 mg tablet Take 1 tablet (75 mg total) by mouth daily. take 75 mg (1 pill) daily       fluticasone (FLONASE) 50 mcg/actuation nasal spray Apply 1 spray into each nostril daily as needed for rhinitis.       furosemide (LASIX) 40 MG tablet Take 2 tablets (80 mg total) by mouth daily. 60 tablet 0     hydrocortisone 1 % cream Apply 1 application topically daily as needed.              Lactobacillus acidophilus (PROBIOTIC ORAL) Take 1 capsule by mouth daily.       latanoprost (XALATAN) 0.005 % ophthalmic solution Administer 1 drop to the right eye at bedtime. 2.5 mL 12     metoprolol succinate (TOPROL-XL) 50 MG 24 hr tablet Take 25-50 mg by mouth see administration instructions. Take 50 mg in the morning and 25 mg in the evening.       multivitamin with minerals (THERA-M) 9 mg iron-400 mcg Tab tablet Take 1 tablet by mouth daily.       nebulizer and compressor Radha For chronic cough 1 each 0     OXYGEN-AIR DELIVERY SYSTEMS MISC into each nostril continuous. 1.5 liters with rest and 3 liters with activity  May use up to 1.5 liters at [facility].  [employer]             pantoprazole (PROTONIX) 20 MG tablet Take 1 tablet (20 mg total) by mouth daily. 30 tablet 0     PERFOROMIST 20 mcg/2 mL nebulizer solution USE 1 VIAL IN NEBULIZER EVERY 12 HOURS  "(Patient taking differently: USE 1 VIAL IN NEBULIZER EVERY DAY IN THE AFTERNOON) 120 mL 0     predniSONE (DELTASONE) 10 mg tablet Take 10 mg by mouth daily. 30 tablet 6     warfarin (COUMADIN/JANTOVEN) 2 MG tablet Take 2-4 mg by mouth See Admin Instructions. Take 2 mg one day weekly (on Tuesdays) and 4 mg six days weekly (on all other days of the week). Adjust dose based on INR results as directed.       albuterol (PROVENTIL) 2.5 mg /3 mL (0.083 %) nebulizer solution Take 3 mL (2.5 mg total) by nebulization every 4 (four) hours as needed for wheezing. 360 mL 6     budesonide (PULMICORT) 0.5 mg/2 mL nebulizer solution Take 2 mL (0.5 mg total) by nebulization 2 (two) times a day. 120 mL 6     nitroglycerin (NITROSTAT) 0.4 MG SL tablet Place 1 tablet (0.4 mg total) under the tongue every 5 (five) minutes as needed for chest pain.  0     No current facility-administered medications for this visit.          Surgical site  The ICD is well-healed in the subclavicular region    Device interrogation  Intrinsic rhythm is atrial fibrillation with ventricular escape rhythm in the 60s  86% biventricular pacing  Frequent PVCs are noted and several short bursts of nonsustained ventricular tachycardia also identified  Lead function satisfactory  Battery good for another 7 years    Objective:   Vital signs:  BP 98/52 (Patient Site: Left Arm, Patient Position: Sitting, Cuff Size: Adult Regular)   Pulse 68   Resp 16   Ht 5' 9\" (1.753 m)   Wt 148 lb (67.1 kg)   BMI 21.86 kg/m        Physical Exam:  Chronic chronic ill appearing elderly man  Wearing nasal oxygen with oxygen concentrator nearby    GENERAL APPEARANCE: Alert, cooperative and in no acute distress.  HEENT: No scleral icterus. No Xanthelasma. Oral mucous membranes pink and moist.  NECK: JVP normal cm. No Hepatojugular reflux. Thyroid not  Palpable  CHEST: clear to auscultation and percussion  CARDIOVASCULAR: S1, S2 without murmur    Brachial, radial  pulses are intact " and symmetric.   No carotid bruits noted.  ABDOMEN: Non tender. BS+. No bruits.  EXTREMITIES: No cyanosis, clubbing or edema.    Lab Results:  LIPIDS:  Lab Results   Component Value Date    CHOL 114 11/08/2018    CHOL 136 08/23/2017    CHOL 133 09/19/2016     Lab Results   Component Value Date    HDL 39 (L) 11/08/2018    HDL 40 08/23/2017    HDL 39 (L) 09/19/2016     Lab Results   Component Value Date    LDLCALC 53 11/08/2018    LDLCALC 74 08/23/2017    LDLCALC 69 09/19/2016     Lab Results   Component Value Date    TRIG 109 11/08/2018    TRIG 110 08/23/2017    TRIG 125 09/19/2016     No components found for: CHOLHDL    BMP:  Lab Results   Component Value Date    CREATININE 1.73 (H) 09/02/2019    BUN 59 (H) 09/02/2019     09/02/2019    K 3.9 09/02/2019    CL 99 09/02/2019    CO2 30 09/02/2019         This note has been dictated using voice recognition software. Any grammatical or context distortions are unintentional and inherent to the software.  Donal Cadet MD  [facility] HEART [facility]  [phone]

## 2021-06-01 NOTE — PROGRESS NOTES
In clinic device check with Device RN and follow-up with Dr. Cadet.  Please see link for full device report.  Patient was informed of results and next follow up during today's visit.

## 2021-06-01 NOTE — PROGRESS NOTES
Pt's chart was reviewed for INR following for CV  [facility]  Reviewed with pt he must follow their instructions do not dose self pt states he will do.

## 2021-06-01 NOTE — TELEPHONE ENCOUNTER
Spoke to patient. He is having weekly INR checks D/T upcoming cardioversion    Lab Results   Component Value Date    INR 1.90 (!) 09/19/2019    INR 4.00 (!) 09/11/2019    INR 1.80 (!) 09/05/2019     INRs recently out of range D/T patient self adjusting dose. He states he is now aware not to do this.    Patient calling to inform ACN that he is now back on prednisone 10mg daily. Historically he says this has made his INR number go too high. He started this yesterday.    Recommended sooner recheck, he is agreeable. Appointment changed from Thur to Mon, patient verbalized understanding and had no other questions.

## 2021-06-01 NOTE — TELEPHONE ENCOUNTER
Return call from patient who is uncomfortable using amiodarone.  Message sent to GAG to see if we can proceed with CV without amiodarone.    HE anticoagulation team is aware that patient is being prepped for CV.  And monitoring INR's.    Will postpone message for one week and see if pt is moving forward with CV and check INR.

## 2021-06-01 NOTE — TELEPHONE ENCOUNTER
FYI - Status Update  Who is Calling: Patient  Update: Calling to speak with ANNAMARIA Barahona. Please call again.  Okay to leave a detailed message?:  Yes

## 2021-06-01 NOTE — PATIENT INSTRUCTIONS - HE
Myron Sunshine,    It was a pleasure to see you today at the [facility].     My recommendations after this visit include:    -I increased your furosemide from 80 mg to 80 mg in a.m. and 40 mg p.m. Please call us if you are not feeling improvement in your shortness of breath and fatigue in the next 3 to 5 days.    - You should  visit emergency department if worsening shortness of breath or fatigue or lightheadedness or dizziness    -Stay on a high potassium diet while you are taking extra dose of furosemide.    - Stay on low sodium diet < 2000 mg/day, and monitor daily weight (bring weight log)    -Please call if you experience rapid weight gain 2-3 lbs two days in a row or 5 lbs in a week with worsening shortness of breath, lightheaded, dizziness, abdominal bloating, and leg swelling     - Follow up with Dr. Thakkar in 4 weeks     - Follow up in Heart Failure Clinic with Lissette in 1 week    - Please call Alberta Oliveira [phone], if you have any questions or concerns    Lissette Gordon, CNP

## 2021-06-01 NOTE — TELEPHONE ENCOUNTER
Received a faxed INR result for Myron Sunshine  From [facility]  INR result dated 9/11/2019 is 4.0

## 2021-06-01 NOTE — TELEPHONE ENCOUNTER
----- Message from Donal Cadet MD sent at 9/5/2019  6:12 PM CDT -----  Regarding: RE: Amio dose  200 mg two times a day until he has his cardioversion; should be < 1 month  ----- Message -----  From: Zohreh Ga RN  Sent: 9/5/2019   2:29 PM  To: Donal Cadet MD  Subject: Amio dose                                        Pharm questioning dose. Is it 200 mg bid  for 21 day and then 200 mg daily?   It is in as 200 mg 2 times a day and then continuing two times a day.

## 2021-06-01 NOTE — PROGRESS NOTES
Pulmonary Clinic Follow Up    Cc: follow up dyspnea.    HPI: 86yoM with history of asthma as well as cardiomyopathy, EF 44% s/p TAVR for severe AS, restrictive lung disease presents for follow up.    He underwent TAVR 11/27 and upgrade of his BiV AICD. Was hospitalized 12/30-1/2 for CHF exacerbation and then again 8/30-9/2. This most recent hospitalization, his weights were going up but he thought it was not fluid but fat since he was eating more. Developed worsening abdominal distention and shortness of breath. Diuresed with bumex IV and then discharged. He is now planning to be more trusting of the scale. Breathing back to baseline now that fluid is off.      He has been taking prednisone at 10 mg daily. He wonders about a trial of 5mg daily again to see how he feels.     He is a former smoker, but quit in 1956. 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    He continues to use his oxygen with ambulation. He wears CPAP at night with 1L Oxygen bled in. 3L activity, 2L rest.     Current regimen: Budesonide nebs, perforomist which he takes once daily and albuterol nebs.      Current Outpatient Medications   Medication Sig Note     acetaminophen (TYLENOL EXTRA STRENGTH) 500 MG tablet Take 1 tablet (500 mg total) by mouth every 6 (six) hours as needed for pain.      amiodarone (PACERONE) 200 MG tablet Take 1 tablet (200 mg total) by mouth 2 (two) times a day.      atorvastatin (LIPITOR) 80 MG tablet Take 1 tablet (80 mg total) by mouth daily.      calcium, as carbonate, (TUMS) 200 mg calcium (500 mg) chewable tablet Chew 2-4 tablets as needed for heartburn. Do not take more than 15 tablets in 24 hours.            cetirizine (ZYRTEC) 10 MG tablet Take 10 mg by mouth daily as needed.             clopidogrel (PLAVIX) 75 mg tablet Take 1 tablet (75 mg total) by mouth daily. take 75 mg (1 pill) daily       fluticasone (FLONASE) 50 mcg/actuation nasal spray Apply 1 spray into each nostril daily as needed for rhinitis.      furosemide (LASIX) 40 MG tablet Take 2 tablets (80 mg total) by mouth daily.      hydrocortisone 1 % cream Apply 1 application topically daily as needed.             Lactobacillus acidophilus (PROBIOTIC ORAL) Take 1 capsule by mouth daily.      latanoprost (XALATAN) 0.005 % ophthalmic solution Administer 1 drop to the right eye at bedtime.      metoprolol succinate (TOPROL-XL) 50 MG 24 hr tablet Take 25-50 mg by mouth see administration instructions. Take 50 mg in the morning and 25 mg in the evening.      multivitamin with minerals (THERA-M) 9 mg iron-400 mcg Tab tablet Take 1 tablet by mouth daily.      nebulizer and compressor Radha For chronic cough      nitroglycerin (NITROSTAT) 0.4 MG SL tablet Place 1 tablet (0.4 mg total) under the tongue every 5 (five) minutes as needed for chest pain. 8/30/2019: 8/30/2019: Please address if the patient should receive a new prescription for nitroglycerin.     OXYGEN-AIR DELIVERY SYSTEMS MISC into each nostril continuous. 1.5 liters with rest and 3 liters with activity  May use up to 1.5 liters at [facility].  [employer]            pantoprazole (PROTONIX) 20 MG tablet Take 1 tablet (20 mg total) by mouth daily.      PERFOROMIST 20 mcg/2 mL nebulizer solution USE 1 VIAL IN NEBULIZER EVERY 12 HOURS (Patient taking differently: USE 1 VIAL IN NEBULIZER EVERY DAY IN THE AFTERNOON) 8/30/2019: 8/30/2019: Please address if the patient should start using Perforomist every 12 hours.     predniSONE (DELTASONE) 10 mg tablet Take 10 mg by mouth daily.      warfarin (COUMADIN/JANTOVEN) 2 MG tablet Take 2-4 mg by mouth See Admin Instructions. Take 2 mg one day weekly (on Tuesdays) and 4 mg six days weekly (on all other days of the week). Adjust dose based on INR results as directed.      albuterol (PROVENTIL) 2.5 mg /3 mL (0.083 %) nebulizer solution Take 3 mL (2.5 mg total) by  nebulization every 4 (four) hours as needed for wheezing.      budesonide (PULMICORT) 0.5 mg/2 mL nebulizer solution Take 2 mL (0.5 mg total) by nebulization 2 (two) times a day.      Vitals:    09/10/19 0926   BP: 92/50   Pulse: 86   Resp: 20   SpO2: 96%   RA  Gen: NAD, elderly, frail  HEENT: no thrush or oral lesions   Neck: No lymphadenopathy  C/V: RRR,  S1 and S2. No longer any murmur  Resp: Rales at both bases  Ext: no peripheral edema.   Neuro: Tremor at rest.   Skin: no rashes        ASSESSMENT/PLAN:  1) Restrictive lung disease-Combination of trapped lung and mild fibrosis that may be slowly progressing.  -Continue O2  -Prednisone 10mg-->5mg if he can tolerate it. .    2) Bronchiectasis-seen at bases on CT scan.    -No current exacerbation.     3) Asthma,  -Continue perforomist and budesonide nebulized. Tried an inhaler, didn't like it.   -PRN albuterol    4) New Afib/Flutter  -He did not like how he felt on the amiodarone. I have some concerns about this choice of medication given his interstitial lung disease. He is waiting to hear from cardiology about trial of cardioversion without the amio.     5) iCMP, EF 44%   -Currently euvolemic. Continue close attention to daily weights.  RTC 3 months      Albina Christensen MD  >50% of this 30 minute visit spent in direct patient counseling and coordination of care.

## 2021-06-01 NOTE — PROGRESS NOTES
Remote Heart Failure ICM    Thoracic Impedance: 70 ohms / reference at 60 ohms    Optivol Fluid Index: 0    Arrhythmias: Since 19, no VT/VF detected. AF episode in progress since 19    % CRT Pacin.6%   AP: 0.1%  Night Heart rate trends: HR during the day and night ~85bpm.  Heart Rate variability: minimal.   Patient Activity: 0.2hr/day.     Intervention: None. OptiVol at 0 (Routing criteria is >/=60). Normal device function.     Technician Signature/Date: LANE Meza, EP, ICD/Pacemaker Specialist

## 2021-06-01 NOTE — TELEPHONE ENCOUNTER
Phone call from patient stating that he took one tablet of amiodarone on Friday after seeing Dr. Cadet on Thursday 9-5-19, and felt increasing anxiety, shortness of breath and unsteady on his feet.  He only took the one tablet and has not taken it since.  He did feel an increase in weight on Saturday 9-7-19 and took an extra lasix.  He states that has been feeling pretty good since that time.    He read the insert for amiodarone after he took his first dose and felt that his symptoms were related to amio.  He is very uncomfortable taking amiodarone and would like to try a cardioversion without it first and if it doesn't work then he would reconsider taking it.  Spent some time discussing the side effects of Amiodarone and that it is a slow acting medication and likely not the culprit of his symptoms after one pill.    He continues to be uncomfortable.  Will review with Dr. Cadet- Ok to proceed with CV without amiodarone once INR's are therapeutic?

## 2021-06-02 ENCOUNTER — RECORDS - HEALTHEAST (OUTPATIENT)
Dept: ADMINISTRATIVE | Facility: CLINIC | Age: 86
End: 2021-06-02

## 2021-06-02 VITALS — HEIGHT: 69 IN | WEIGHT: 151 LBS | BODY MASS INDEX: 22.36 KG/M2

## 2021-06-02 VITALS — BODY MASS INDEX: 21.92 KG/M2 | HEIGHT: 69 IN | WEIGHT: 148 LBS

## 2021-06-02 VITALS — BODY MASS INDEX: 23.02 KG/M2 | WEIGHT: 155.9 LBS

## 2021-06-02 VITALS — WEIGHT: 156 LBS | HEIGHT: 69 IN | BODY MASS INDEX: 23.11 KG/M2

## 2021-06-02 VITALS — BODY MASS INDEX: 23.57 KG/M2 | HEIGHT: 68 IN | BODY MASS INDEX: 22.89 KG/M2 | WEIGHT: 155 LBS

## 2021-06-02 VITALS — BODY MASS INDEX: 22.36 KG/M2 | WEIGHT: 151 LBS | HEIGHT: 69 IN

## 2021-06-02 VITALS
WEIGHT: 151.1 LBS | HEIGHT: 69 IN | BODY MASS INDEX: 22.33 KG/M2 | WEIGHT: 151.2 LBS | HEIGHT: 69 IN | BODY MASS INDEX: 22.38 KG/M2 | WEIGHT: 151.1 LBS | BODY MASS INDEX: 22.38 KG/M2

## 2021-06-02 VITALS — WEIGHT: 156 LBS | BODY MASS INDEX: 23.04 KG/M2

## 2021-06-02 VITALS
HEIGHT: 68 IN | WEIGHT: 154.2 LBS | BODY MASS INDEX: 23.57 KG/M2 | HEIGHT: 69 IN | BODY MASS INDEX: 22.89 KG/M2 | BODY MASS INDEX: 23.06 KG/M2 | WEIGHT: 156.19 LBS | BODY MASS INDEX: 23.45 KG/M2

## 2021-06-02 VITALS — HEIGHT: 69 IN | WEIGHT: 149 LBS | BODY MASS INDEX: 22.07 KG/M2

## 2021-06-02 VITALS — BODY MASS INDEX: 22.81 KG/M2 | HEIGHT: 69 IN | WEIGHT: 154 LBS

## 2021-06-02 VITALS — WEIGHT: 160.2 LBS | BODY MASS INDEX: 23.66 KG/M2

## 2021-06-02 VITALS — WEIGHT: 157.4 LBS | BODY MASS INDEX: 23.24 KG/M2

## 2021-06-02 VITALS — HEIGHT: 69 IN | BODY MASS INDEX: 21.48 KG/M2 | WEIGHT: 145 LBS

## 2021-06-02 VITALS — WEIGHT: 153 LBS | BODY MASS INDEX: 22.59 KG/M2

## 2021-06-02 VITALS — BODY MASS INDEX: 22.4 KG/M2 | WEIGHT: 151.7 LBS

## 2021-06-02 VITALS — HEIGHT: 69 IN | BODY MASS INDEX: 22.36 KG/M2 | WEIGHT: 151 LBS

## 2021-06-02 VITALS — WEIGHT: 160 LBS | HEIGHT: 69 IN | BODY MASS INDEX: 23.7 KG/M2

## 2021-06-02 VITALS — WEIGHT: 152 LBS | BODY MASS INDEX: 22.45 KG/M2

## 2021-06-02 VITALS — WEIGHT: 158.8 LBS | HEIGHT: 69 IN | BODY MASS INDEX: 23.52 KG/M2

## 2021-06-02 VITALS — WEIGHT: 151.1 LBS | BODY MASS INDEX: 22.31 KG/M2

## 2021-06-02 VITALS — WEIGHT: 157 LBS | HEIGHT: 69 IN | BODY MASS INDEX: 23.25 KG/M2

## 2021-06-02 VITALS — HEIGHT: 69 IN | BODY MASS INDEX: 22.96 KG/M2 | WEIGHT: 155 LBS

## 2021-06-02 VITALS — HEIGHT: 69 IN | WEIGHT: 156 LBS | BODY MASS INDEX: 23.11 KG/M2

## 2021-06-02 VITALS — HEIGHT: 69 IN | BODY MASS INDEX: 23.11 KG/M2 | WEIGHT: 156 LBS

## 2021-06-02 VITALS — BODY MASS INDEX: 23.04 KG/M2 | WEIGHT: 156 LBS

## 2021-06-02 VITALS — BODY MASS INDEX: 21.48 KG/M2 | WEIGHT: 145 LBS | HEIGHT: 69 IN

## 2021-06-02 VITALS — WEIGHT: 149.8 LBS | BODY MASS INDEX: 22.12 KG/M2

## 2021-06-02 VITALS — BODY MASS INDEX: 23.25 KG/M2 | HEIGHT: 69 IN | WEIGHT: 157 LBS

## 2021-06-02 VITALS — WEIGHT: 155.2 LBS | BODY MASS INDEX: 22.92 KG/M2

## 2021-06-02 NOTE — TELEPHONE ENCOUNTER
ANTICOAGULATION  MANAGEMENT    Assessment     Today's INR result of 3.3 is Supratherapeutic (goal INR of 2.0-3.0)        Warfarin taken differently than instructed, but no impact to total weekly dose    No new diet changes affecting INR    Interaction between Sertraline and warfarin may be affecting INR    Continues to tolerate warfarin with no reported s/s of bleeding or thromboembolism     Previous INR was Supratherapeutic     10/14 Cardioversion procedure    Plan:     Spoke with Myron regarding INR result and instructed:     Warfarin Dosing Instructions:  take one time lower dose of 2 mg today then continue current warfarin dose    2 mg every Tue, Fri; 4 mg all other days     (0 % change)    Instructed patient to follow up no later than: patient would like to have INR rechecked on 10/11 - appointment made.    Education provided: importance of therapeutic range, target INR goal and significance of current INR result and importance of taking warfarin as instructed    Myron verbalizes understanding and agrees to warfarin dosing plan.    Instructed to call the [facility] for any changes, questions or concerns. (#[phone])   ?   Natasha Grijalva RN    Subjective/Objective:      Myron Sunshine, a 88 y.o. male is on warfarin.     Myron reports:     Home warfarin dose: template incorrect; verbally confirmed home dose with Myron and updated on anticoagulation calendar     Missed doses: Yes: as instructed on 9/30     Medication changes:  Yes, sertraline     S/S of bleeding or thromboembolism:  No     New Injury or illness:  No     Changes in diet or alcohol consumption:  No     Upcoming surgery, procedure or cardioversion:  Yes: 10/14 cardioversion    Anticoagulation Episode Summary     Current INR goal:   2.0-3.0   TTR:   58.3 %   Next INR check:   10/7/2019   INR from last check:   3.30! (10/7/2019)   Weekly max warfarin dose:      Target end date:      INR check location:      Preferred lab:      Send INR reminders  to:   [facility]    Indications    Persistent atrial fibrillation [I48.19]           Comments:   new order INR 2/5/19         Anticoagulation Care Providers     Provider Role Specialty Phone number    Donna Thakkar MD Referring Cardiology [phone]

## 2021-06-02 NOTE — TELEPHONE ENCOUNTER
Anticoagulation Annual Referral Renewal Review    Myron Sunshine's chart reviewed for annual renewal of referral to anticoagulation monitoring.        Criteria for anticoagulation nurse and/or pharmacist renewal met   Warfarin indication: Atrial Fibrillation Yes, per indication   Current with INR monitoring/compliant Yes Yes   Date of last office visit 10/16/19 Yes, had office visit within last year   Time in Therapeutic Range (TTR) Not available at this time        Myron Sunshine met all criteria for anticoagulation management program initiated renewal.  New INR standing orders and anticoagulation referral renewal placed.      Natasha Grijalva RN  5:13 PM

## 2021-06-02 NOTE — ANESTHESIA CARE TRANSFER NOTE
Last vitals:   Vitals:    10/14/19 1320   BP:    Resp: 15   Temp:    SpO2:      Patient's level of consciousness is drowsy  Spontaneous respirations: yes  Maintains airway independently: yes  Dentition unchanged: yes  Oropharynx: oropharynx clear of all foreign objects    QCDR Measures:  ASA# 20 - Surgical Safety Checklist: WHO surgical safety checklist completed prior to induction    PQRS# 430 - Adult PONV Prevention: NA - Not adult patient, not GA or 3 or more risk factors NOT present  ASA# 8 - Peds PONV Prevention: NA - Not pediatric patient, not GA or 2 or more risk factors NOT present  PQRS# 424 - Lilia-op Temp Management: NA - MAC anesthesia or case < 60 minutes  PQRS# 426 - PACU Transfer Protocol: - Transfer of care checklist used  ASA# 14 - Acute Post-op Pain: ASA14B - Patient did NOT experience pain >= 7 out of 10

## 2021-06-02 NOTE — TELEPHONE ENCOUNTER
----- Message from Lissette Gordon NP sent at 10/7/2019 11:39 AM CDT -----  Alberta,  His BNP is still elevated with very slight improvement. Let's increase his Furosemide from 80 mg in Am and 40 mg in PM to 80 mg two times a day. Visit ED if worsening symptoms. Please review my instruction on AVS with him again. He tends to forget easily. I will see him as scheduled in a week.   Lissette

## 2021-06-02 NOTE — TELEPHONE ENCOUNTER
ANTICOAGULATION  MANAGEMENT    Assessment     Today's INR result of 4.1 is Supratherapeutic (goal INR of 2.0-3.0)        Warfarin taken as previously instructed    No new diet changes affecting INR    No new medication/supplements affecting INR    Note: patient started taking Prednisone on 9/23 and is currently taking 10 mg daily ( per patient, prednisone makes INR really high)    Continues to tolerate warfarin with no reported s/s of bleeding or thromboembolism     Previous INR was Supratherapeutic     10/14 Cardioversion Procedure    Plan:     Spoke with Myron regarding INR result and instructed:      Warfarin Dosing Instructions:  take 1 mg dose today then change warfarin dose to    2 mg every Tue, Thu, Sat; 4 mg all other days        (8 % change)    Instructed patient to follow up no later than: 10/14    Education provided: importance of therapeutic range, target INR goal and significance of current INR result, potential interaction between warfarin and sertraline and monitoring for bleeding signs and symptoms    Myron verbalizes understanding and agrees to warfarin dosing plan.    Instructed to call the [facility] for any changes, questions or concerns. (#[phone])   ?   Natasha Grijalva RN    Subjective/Objective:      Myron Sunshine, a 88 y.o. male is on warfarin.     Myron reports:     Home warfarin dose: as updated on anticoagulation calendar per template     Missed doses: No     Medication changes:  No     S/S of bleeding or thromboembolism:  No     New Injury or illness:  No     Changes in diet or alcohol consumption:  No     Upcoming surgery, procedure or cardioversion:  Yes: cardioversion    Anticoagulation Episode Summary     Current INR goal:   2.0-3.0   TTR:   57.9 %   Next INR check:   10/14/2019   INR from last check:   4.10! (10/11/2019)   Weekly max warfarin dose:      Target end date:      INR check location:      Preferred lab:      Send INR reminders to:   [facility]     Indications    Persistent atrial fibrillation [I48.19]           Comments:   new order INR 2/5/19         Anticoagulation Care Providers     Provider Role Specialty Phone number    Donna Thakkar MD Referring Cardiology [phone]

## 2021-06-02 NOTE — TELEPHONE ENCOUNTER
----- Message from Jeanine Jimenez sent at 11/1/2019  4:05 PM CDT -----  Regarding: MAYANK  Caller: Myron    Primary cardiologist: Dr. Thakkar    Detailed reason for call: Myron had labs yesterday, stating nobody has called him back. Please call with results.     Best phone number: [phone]    Best time to contact: Today    Ok to leave a detailed message? Yes    Device? Yes

## 2021-06-02 NOTE — PROGRESS NOTES
6/12/1931  Home:[phone] (home) Cell:There is no such number on file (mobile).  Emergency Contact: Eulalia Sunshine [phone]    +++Important patient information for CSC/Cath Lab staff : PT IS ON COUMADIN, PT HAS A [employer] ICD, PT IS IMS/CPAP, PT FAILED AMIODARONE+++    [facility] EP Cath Lab Procedure Order     Cardioversion:  Cardioversion     10/7=3.3  9/30=3.4  9/26=3.4  9/23=3.00  9/19=1.9  9/11=4.0      Diagnosis:  AF  Anticipated Case Duration:  Standard  Scheduling Needs/Timeframe:  PT IS SET FOR MONDAY 10/14/2019 AT 12 WITH CASSIA MARKS CNP     Current Device: BIV ICD  Device Company/Device Rep Needed for Procedure: Medtronic    Anesthesia:  General-CV Only  Research Protocol:  No    [facility] EP Cath Lab Prep   Ordering Provider: DR ECHAVARRIA  Ordering Date: 10/7/2019  Orders Status: Intial order placed and Order set placed    H&P:  Compled by AMBER BECK CNP on 10/7/2019 if scheduled within 30 days, pt to schedule with PMD if procedure outside of this timeframe  PCP: Patricia Gerardo PA-C, [phone]    Pre-op Labs: N/A for procedure    Medical Records Pertinent for Procedure:  N/A    Patient Education:    PT HAS A  FOR PROCEDURE THAT WILL STAY WITH HIM AND BE PRESENT FOR DISCHARGE AND POST CV 24 HR MONITORING  PT INSTRUCTED TO HOLD ANY VITAMINS,MINERALS, CALCIUM, IRON OR SUPPLEMENTS THE MORNING OF CV  PT INSTRUCTED TO BATHE OR SHOWER BEFORE COMING IN  PT INSTRUCTED TO LEAVE JEWELRY AT HOME  PT IS ON COUMADIN AND INR'S ARE IN MEDIA UNDER LAB TAB  PT IS MIS/CPAP  ALL MEDICATION REVIEWED BY CASSIA MARKS CNP AND NO CHANGES ANANDA  PT FAILED BEING ON AMIODARONE  PT HAS A [employer] ICD  PT  HAS A FOLLOW UP WITH DR ECHAVARRIA 10/31 AND SHOULD KEEP THAT       Teach with Patient: Completed via phone on 10/7/2019    Risks Reviewed:     Cardioversion    >90% acute success rate, <10% failure to convert or   reverts shortly after cardioversion.    <1% embolic event of (CVA, pulmonary embolism, or   1. other  site).    75% risk for superficial burn.  Risks associated with general anesthesia will be addressed by the Anesthesiology Department    Consent: Will be obtained in CSC day of procedure    Pre-Procedure Instructions that were Reviewed with Patient:  NPO after midnight, Remove all jewelry prior to coming in for procedure, Shower prior to arrival, Transportation arrangements needed s/p procedure, Post-procedure follow up process and Sedation plan/orders    Pre-Procedure Medication Instructions:  Instructions given to pt regarding anticoagulants: Coumadin- instructed to continue anticoagulation uninterrupted through their procedure  Instructions given to pt regarding antiarrhythmic medication: Beta Blocker; Pt instructed to continue medication prior to procedure  Instructions for medication, other than anticoagulants/antiarrhythmics listed above, given to pt: to take all morning medications with small sips of water, with the exception of OTC supplements and MVI    Allergies   Allergen Reactions     Bumex [Bumetanide] Anxiety     Blood-Group Specific Substance      Anti-Josep/Barnes.  Expect delays in obtaining blood for transfusion.  Collect 2 lavender top tubes and 1 red top tube for all blood bank orders.        Current Outpatient Medications:      acetaminophen (TYLENOL EXTRA STRENGTH) 500 MG tablet, Take 1 tablet (500 mg total) by mouth every 6 (six) hours as needed for pain., Disp: , Rfl: 0     albuterol (PROVENTIL) 2.5 mg /3 mL (0.083 %) nebulizer solution, Take 3 mL (2.5 mg total) by nebulization every 4 (four) hours as needed for wheezing., Disp: 360 mL, Rfl: 6     atorvastatin (LIPITOR) 80 MG tablet, Take 1 tablet (80 mg total) by mouth daily., Disp: 30 tablet, Rfl: 3     budesonide (PULMICORT) 0.5 mg/2 mL nebulizer solution, Take 2 mL (0.5 mg total) by nebulization 2 (two) times a day., Disp: 120 mL, Rfl: 6     calcium, as carbonate, (TUMS) 200 mg calcium (500 mg) chewable tablet, Chew 2-4 tablets as needed  for heartburn. Do not take more than 15 tablets in 24 hours.   , Disp: , Rfl:      cetirizine (ZYRTEC) 10 MG tablet, Take 10 mg by mouth daily as needed.    , Disp: , Rfl:      clopidogrel (PLAVIX) 75 mg tablet, Take 1 tablet (75 mg total) by mouth daily. take 75 mg (1 pill) daily, Disp: , Rfl:      fluticasone (FLONASE) 50 mcg/actuation nasal spray, Apply 1 spray into each nostril daily as needed for rhinitis., Disp: , Rfl:      formoterol fumarate (PERFOROMIST) 20 mcg/2 mL nebulizer solution, Take 2 mL (20 mcg total) by nebulization every 12 (twelve) hours., Disp: 160 mL, Rfl: 11     furosemide (LASIX) 40 MG tablet, Take 4 tablets (160 mg total) by mouth daily. Take 80 mg in AM and 80mg in PM starting 10/7/19, Disp: 90 tablet, Rfl: 0     hydrocortisone 1 % cream, Apply 1 application topically daily as needed.    , Disp: , Rfl:      Lactobacillus acidophilus (PROBIOTIC ORAL), Take 1 capsule by mouth daily., Disp: , Rfl:      latanoprost (XALATAN) 0.005 % ophthalmic solution, Administer 1 drop to the right eye at bedtime., Disp: 2.5 mL, Rfl: 12     metoprolol succinate (TOPROL-XL) 50 MG 24 hr tablet, Take 25-50 mg by mouth see administration instructions. Take 50 mg in the morning and 25 mg in the evening., Disp: , Rfl:      multivitamin with minerals (THERA-M) 9 mg iron-400 mcg Tab tablet, Take 1 tablet by mouth daily., Disp: , Rfl:      nebulizer and compressor Radha, For chronic cough, Disp: 1 each, Rfl: 0     nitroglycerin (NITROSTAT) 0.4 MG SL tablet, Place 1 tablet (0.4 mg total) under the tongue every 5 (five) minutes as needed for chest pain., Disp: , Rfl: 0     OXYGEN-AIR DELIVERY SYSTEMS MISC, into each nostril continuous. 1.5 liters with rest and 3 liters with activity  May use up to 1.5 liters at [facility]. Alex   , Disp: , Rfl:      pantoprazole (PROTONIX) 20 MG tablet, Take 1 tablet (20 mg total) by mouth daily., Disp: 30 tablet, Rfl: 0     PERFOROMIST 20 mcg/2 mL nebulizer solution, USE 1 VIAL IN  NEBULIZER EVERY 12 HOURS, Disp: 120 mL, Rfl: 0     predniSONE (DELTASONE) 5 MG tablet, Take 1 tablet by mouth daily. May increase to 2 tabs daily if needed.. (Patient taking differently: Take 10 mg by mouth daily Take 1 tablet by mouth daily. May increase to 2 tabs daily if needed..), Disp: 60 tablet, Rfl: 1     sertraline (ZOLOFT) 25 MG tablet, Take 25 mg by mouth daily., Disp: , Rfl:      warfarin (COUMADIN/JANTOVEN) 2 MG tablet, Take 2-4 mg by mouth See Admin Instructions. Take 2 mg one day weekly (on Tuesdays) and 4 mg six days weekly (on all other days of the week). Adjust dose based on INR results as directed., Disp: , Rfl:     Documentation Date:10/7/2019 2:34 PM  Stu Pierce LPN

## 2021-06-02 NOTE — ANESTHESIA POSTPROCEDURE EVALUATION
Patient: Myron Sunshine  * No procedures listed *  Anesthesia type: general    [place]: [facility] [place]  Last vitals:   Vitals Value Taken Time   /61 10/14/2019  2:00 PM   Temp  10/14/2019  2:12 PM   Pulse 75 10/14/2019  2:10 PM   Resp 24 10/14/2019  2:10 PM   SpO2 97 % 10/14/2019  2:10 PM   Vitals shown include unvalidated device data.  Post vital signs: stable  Level of consciousness: awake and responds to simple questions  Post-anesthesia pain: pain controlled  Post-anesthesia nausea and vomiting: no  Pulmonary: unassisted  Cardiovascular: stable  Hydration: adequate  Anesthetic events: no    QCDR Measures:  ASA# 11 - Lilia-op Cardiac Arrest: ASA11B - Patient did NOT experience unanticipated cardiac arrest  ASA# 12 - Lilia-op Mortality Rate: ASA12B - Patient did NOT die  ASA# 13 - PACU Re-Intubation Rate: NA - No ETT / LMA used for case  ASA# 10 - Composite Anes Safety: ASA10A - No serious adverse event    Additional Notes:

## 2021-06-02 NOTE — TELEPHONE ENCOUNTER
----- Message from Lissette Gordon NP sent at 10/11/2019 11:34 AM CDT -----  Alberta,  He is having cardioversion on 10/14/19. Is there anyway we could add BMP and BNP on 10/14/19?  Thanks  CY

## 2021-06-02 NOTE — TELEPHONE ENCOUNTER
Patient contacted with lab test results today and recommendations to increase his Lasix to 80 mg two times a day, starting today, as per Lissette Gordon CNP, orders placed.   Reviewed with patient sodium restriction of 1500mg per day and 50 ounces of fluid daily. He verbalized understanding of this plan of care.  Reinforced if increase in diuretic was not helpful to seek evaluation /treatment in ED. He agrees to do so. F/u with Lissette Gordon CNP in one week as scheduled  sk/RN

## 2021-06-02 NOTE — PATIENT INSTRUCTIONS - HE
Myron Sunshine,    It was a pleasure to see you today at the [facility].     My recommendations after this visit include:    - Take your Metoprolol Succinate 75 mg (one and half tablet of 50 mg )daily at bed time instead and see if you feel less tired during the day.      - We will contact wit your lab result and recommendations    - Stay on low sodium diet < 1500 mg/day, monitor daily weight (bring weight log book for each visit), limit fluid intake to < 48 ounces perday, and stay physically active as tolerated    -Please call if you experience rapid weight gain 2-3 lbs two days in a row or 5 lbs in a week with worsening shortness of breath, lightheaded, dizziness, abdominal bloating, and leg swelling     - Follow up with Dr. Thakkar as scheduled on 10/31/19    - Follow up in Heart Failure Clinic with Lissette in 8 weeks    - Please call Alberta Oliveira, [phone], if you have any questions or concerns    Lissette Gordon, CNP

## 2021-06-02 NOTE — TELEPHONE ENCOUNTER
ANTICOAGULATION  MANAGEMENT    Assessment     Today's INR result of 1.91 is Subtherapeutic (goal INR of 2.0-3.0)        Warfarin taken as previously instructed    No new diet changes affecting INR    No new medication/supplements affecting INR    Continues to tolerate warfarin with no reported s/s of bleeding or thromboembolism     Previous INR was Supratherapeutic    Plan:     Spoke with Myron regarding INR result and instructed:   Patient confirmed that he is okay with cutting the warfarin 2 mg tablet in half for the new doses.     Warfarin Dosing Instructions:  Change warfarin dose to    2 mg every Sun, Wed; 3 mg all other days       (6 % change)    Instructed patient to follow up no later than: 1-2 weeks, made aware that INR will be checked with scheduled [facility] visit on 11/12/19.    Education provided: importance of therapeutic range and target INR goal and significance of current INR result    Myron verbalizes understanding and agrees to warfarin dosing plan.    Instructed to call the [facility] for any changes, questions or concerns. (#[phone])   ?   Natasha Grijalva RN    Subjective/Objective:      Myron MOHSEN Sunshine, a 88 y.o. male is on warfarin.     Myron reports:     Home warfarin dose: as updated on anticoagulation calendar per template     Missed doses: No     Medication changes:  No     S/S of bleeding or thromboembolism:  No     New Injury or illness:  No     Changes in diet or alcohol consumption:  No     Upcoming surgery, procedure or cardioversion:  No    Anticoagulation Episode Summary     Current INR goal:   2.0-3.0   TTR:   60.1 %   Next INR check:   11/14/2019   INR from last check:   1.91! (10/31/2019)   Weekly max warfarin dose:      Target end date:      INR check location:      Preferred lab:      Send INR reminders to:   [facility]    Indications    Persistent atrial fibrillation [I48.19]           Comments:   new order INR 2/5/19         Anticoagulation Care Providers      Provider Role Specialty Phone number    Donna Thakkar MD Referring Cardiology [phone]

## 2021-06-02 NOTE — TELEPHONE ENCOUNTER
Patient's wife Eulalia reached and advised that Myron should arrive at 1pm on Monday prior to cardioversion and present to the clinic for labs. BNP and BMP orders placed. sk/RN

## 2021-06-02 NOTE — TELEPHONE ENCOUNTER
Results and recommendations relayed to pt.  Pt verbalized understanding and had no questions.   -[facility]    Notes recorded by Donna Thakkar MD on 11/1/2019 at 3:23 PM CDT  B type natruretic peptide is elevated though has been chronically increased.  This may in part be related to some renal insufficiency with creatinine 1.68.  Creatinine although elevated is similar to previous if not improved from 3-8 weeks ago.  No new specific recommendations based on lab tests.

## 2021-06-02 NOTE — TELEPHONE ENCOUNTER
ANTICOAGULATION  MANAGEMENT    Assessment     Today's INR result of 2.9 is Therapeutic (goal INR of 2.0-3.0)     10/14 19 admitted for Cardioversion Procedure      Less warfarin taken than instructed which may be affecting INR    No new diet changes affecting INR    No new medication/supplements affecting INR    Continues to tolerate warfarin with no reported s/s of bleeding or thromboembolism     Previous INR was Supratherapeutic    Plan:     Spoke with Myron regarding INR result and instructed:     Warfarin Dosing Instructions:  Change warfarin dose to    4 mg every Mon, Wed, Fri; 2 mg all other days     (5 % change from what the patient took in the past week)    Instructed patient to follow up no later than:  one week - appointment made.    Education provided: importance of therapeutic range, target INR goal and significance of current INR result and importance of taking warfarin as instructed    Myron verbalizes understanding and agrees to warfarin dosing plan.    Instructed to call the [facility] for any changes, questions or concerns. (#[phone])   ?   Natasha Grijalva RN    Subjective/Objective:      Myron MOHSEN Sunshine, a 88 y.o. male is on warfarin.     Myron reports:     Home warfarin dose: as updated on anticoagulation calendar per template     Missed doses: No     Medication changes:  No     S/S of bleeding or thromboembolism:  No     New Injury or illness:  No     Changes in diet or alcohol consumption:  No     Upcoming surgery, procedure or cardioversion:  No     Anticoagulation Episode Summary     Current INR goal:   2.0-3.0   TTR:   58.0 %   Next INR check:   10/21/2019   INR from last check:   2.90 (10/16/2019)   Weekly max warfarin dose:      Target end date:      INR check location:      Preferred lab:      Send INR reminders to:   [facility]    Indications    Persistent atrial fibrillation [I48.19]           Comments:   new order INR 2/5/19         Anticoagulation Care Providers      Provider Role Specialty Phone number    Donna Thakkar MD Referring Cardiology [phone]

## 2021-06-02 NOTE — TELEPHONE ENCOUNTER
ANTICOAGULATION  MANAGEMENT: Discharge Review    Myron MOHSEN Sunshine chart reviewed for anticoagulation continuity of care    Hospital admission on  10/14/19 for Cardioversion Procedure.    Discharge disposition: Home    INR Results:       Recent labs: (last 7 days)     10/11/19  1047 10/14/19  1155   INR 4.10* 3.80*       Warfarin inpatient management: not applicable    Warfarin discharge instructions: home regimen continued     Medication Changes Affecting Anticoagulation: No    Additional Factors Affecting Anticoagulation: No    Plan     No adjustment to anticoagulation plan needed   and Recommend 10/16/19 added to scheduled follow up visit with [facility]    Spoke with Myron and he reported that he most likely took warfarin 2 mg on 10/14 due to INR was high at 3.8      Anticoagulation calendar updated    Natasha Grijalva, JEREMIAS

## 2021-06-02 NOTE — PROGRESS NOTES
Remote Heart Failure ICM    Thoracic Impedance: 59 ohms/ reference line at 61ohms    Optivol Fluid Index: 40    Arrhythmias: Since 19, no VT/VF detected. 100% AF burden since 19, v-rates are >/=120bpm<5%/     % CRT Pacin.4% (known low BiV pacing).   AP: <0.1%  Night Heart rate trends: Night HR ~70bpm and day HR ~80bpm  Heart Rate variability: does not graph during mode switch.   Patient Activity:  0.3hour/day    Intervention: None. OptiVol at 40 (Routing criteria is >/=60). Normal device function.     Technician Signature/Date: LANE Meza, EP, ICD/Pacemaker Specialist

## 2021-06-02 NOTE — ANESTHESIA PREPROCEDURE EVALUATION
Anesthesia Evaluation      Patient summary reviewed   No history of anesthetic complications     Airway   Mallampati: II  Neck ROM: full   Pulmonary    (+) shortness of breath, sleep apnea, decreased breath sounds,     ROS comment: Restrictive lung disease on home oxygen (2-3L)                         Cardiovascular - normal exam  Exercise tolerance: < 4 METS  (+) hypertension, valvular problems/murmurs, CAD, dysrhythmias, cardiomyopathy, hypercholesterolemia,     ECG reviewed        Neuro/Psych    (+) anxiety/panic attacks,     Endo/Other - negative ROS      GI/Hepatic/Renal    (+) GERD,   chronic renal disease,           Dental    (+) poor dentition                         Anesthesia Plan  Planned anesthetic: general mask    ASA 4   Induction: intravenous   Anesthetic plan and risks discussed with: patient and spouse    Post-op plan: routine recovery  DNR/DNI status   DNR/DNI status discussed with patient and spouse.  Plan is for suspension of DNR

## 2021-06-02 NOTE — PATIENT INSTRUCTIONS - HE
Myron Sunshine,    It was a pleasure to see you today at the [facility].     My recommendations after this visit include:    - No medications changes made today     - Your lab result will be called with recommendations    - Stay on low sodium diet < 1500 mg/day, monitor daily weight (bring weight log book for each visit), and  limit fluid intake <50 ounces per day    -Please call if you experience rapid weight gain 2-3 lbs two days in a row or 5 lbs in a week with worsening shortness of breath, lightheaded, dizziness, abdominal bloating, and leg swelling     - Follow up with Dr. Thakkar on 10/30/19 as scheduled    - Follow up in Heart Failure Clinic with Lissette in 1 week     Lissette Gordon CNP

## 2021-06-02 NOTE — TELEPHONE ENCOUNTER
ANTICOAGULATION  MANAGEMENT    Assessment     Today's INR result of 3.3 is Supratherapeutic (goal INR of 2.0-3.0)        Warfarin taken as previously instructed    No new diet changes affecting INR     Note: patient is still taking prednisone 10 mg     No new medication/supplements affecting INR    Continues to tolerate warfarin with no reported s/s of bleeding or thromboembolism     Previous INR was Therapeutic     Plan:     Spoke with Myron regarding INR result and instructed:     Warfarin Dosing Instructions:  Change warfarin dose to    4 mg every Mon, Thu; 2 mg all other days       (10 % change)    Instructed patient to follow up no later than: 10/31 patient will go to [facility] due to [facility] does not do fingerstick INR.    Education provided: importance of therapeutic range and target INR goal and significance of current INR result    Myron verbalizes understanding and agrees to warfarin dosing plan.    Instructed to call the [facility] for any changes, questions or concerns. (#[phone])   ?   Natasha Grijalva RN    Subjective/Objective:      Myron CONNOLLY Jong, a 88 y.o. male is on warfarin.     Myron reports:     Home warfarin dose: as updated on anticoagulation calendar per template     Missed doses: No     Medication changes:  No     S/S of bleeding or thromboembolism:  No     New Injury or illness:  No     Changes in diet or alcohol consumption:  No     Upcoming surgery, procedure or cardioversion:  No    Anticoagulation Episode Summary     Current INR goal:   2.0-3.0   TTR:   58.5 %   Next INR check:   10/31/2019   INR from last check:   3.30! (10/23/2019)   Weekly max warfarin dose:      Target end date:      INR check location:      Preferred lab:      Send INR reminders to:   [facility]    Indications    Persistent atrial fibrillation [I48.19]           Comments:   new order INR 2/5/19         Anticoagulation Care Providers     Provider Role Specialty Phone number    Bijan,  Donna TAN MD Referring Cardiology [phone]

## 2021-06-03 VITALS
HEART RATE: 72 BPM | WEIGHT: 153 LBS | DIASTOLIC BLOOD PRESSURE: 58 MMHG | RESPIRATION RATE: 16 BRPM | HEIGHT: 69 IN | BODY MASS INDEX: 22.66 KG/M2 | SYSTOLIC BLOOD PRESSURE: 100 MMHG

## 2021-06-03 VITALS
HEART RATE: 70 BPM | RESPIRATION RATE: 18 BRPM | DIASTOLIC BLOOD PRESSURE: 46 MMHG | SYSTOLIC BLOOD PRESSURE: 98 MMHG | OXYGEN SATURATION: 88 %

## 2021-06-03 VITALS
RESPIRATION RATE: 16 BRPM | WEIGHT: 154 LBS | HEIGHT: 69 IN | BODY MASS INDEX: 22.81 KG/M2 | DIASTOLIC BLOOD PRESSURE: 66 MMHG | HEART RATE: 70 BPM | SYSTOLIC BLOOD PRESSURE: 96 MMHG

## 2021-06-03 VITALS
HEART RATE: 68 BPM | WEIGHT: 155 LBS | SYSTOLIC BLOOD PRESSURE: 92 MMHG | BODY MASS INDEX: 22.96 KG/M2 | DIASTOLIC BLOOD PRESSURE: 60 MMHG | RESPIRATION RATE: 20 BRPM | HEIGHT: 69 IN

## 2021-06-03 VITALS — WEIGHT: 156 LBS | BODY MASS INDEX: 23.11 KG/M2 | HEIGHT: 69 IN

## 2021-06-03 VITALS
HEART RATE: 64 BPM | BODY MASS INDEX: 22.59 KG/M2 | OXYGEN SATURATION: 92 % | RESPIRATION RATE: 18 BRPM | WEIGHT: 153 LBS | SYSTOLIC BLOOD PRESSURE: 96 MMHG | DIASTOLIC BLOOD PRESSURE: 54 MMHG

## 2021-06-03 VITALS — BODY MASS INDEX: 22.07 KG/M2 | WEIGHT: 149 LBS | HEIGHT: 69 IN

## 2021-06-03 VITALS
HEIGHT: 69 IN | HEART RATE: 80 BPM | SYSTOLIC BLOOD PRESSURE: 102 MMHG | RESPIRATION RATE: 16 BRPM | BODY MASS INDEX: 23.11 KG/M2 | DIASTOLIC BLOOD PRESSURE: 70 MMHG | WEIGHT: 156 LBS

## 2021-06-03 VITALS
DIASTOLIC BLOOD PRESSURE: 50 MMHG | WEIGHT: 153 LBS | OXYGEN SATURATION: 96 % | SYSTOLIC BLOOD PRESSURE: 92 MMHG | BODY MASS INDEX: 22.59 KG/M2 | RESPIRATION RATE: 20 BRPM | HEART RATE: 86 BPM

## 2021-06-03 VITALS
HEIGHT: 69 IN | WEIGHT: 148 LBS | DIASTOLIC BLOOD PRESSURE: 52 MMHG | HEART RATE: 68 BPM | SYSTOLIC BLOOD PRESSURE: 98 MMHG | BODY MASS INDEX: 21.92 KG/M2 | RESPIRATION RATE: 16 BRPM

## 2021-06-03 VITALS
DIASTOLIC BLOOD PRESSURE: 56 MMHG | BODY MASS INDEX: 23.35 KG/M2 | HEART RATE: 80 BPM | SYSTOLIC BLOOD PRESSURE: 96 MMHG | RESPIRATION RATE: 24 BRPM | OXYGEN SATURATION: 98 % | WEIGHT: 158.1 LBS

## 2021-06-03 VITALS
HEIGHT: 69 IN | WEIGHT: 156 LBS | DIASTOLIC BLOOD PRESSURE: 70 MMHG | HEART RATE: 80 BPM | SYSTOLIC BLOOD PRESSURE: 110 MMHG | RESPIRATION RATE: 16 BRPM | BODY MASS INDEX: 23.11 KG/M2

## 2021-06-03 VITALS
RESPIRATION RATE: 16 BRPM | BODY MASS INDEX: 22.74 KG/M2 | DIASTOLIC BLOOD PRESSURE: 64 MMHG | OXYGEN SATURATION: 95 % | WEIGHT: 154 LBS | SYSTOLIC BLOOD PRESSURE: 106 MMHG | HEART RATE: 73 BPM

## 2021-06-03 NOTE — TELEPHONE ENCOUNTER
Attempted to contact pt, voicemail message was left with contact information and instructing pt to call back.  11/15/2019 2:33 PM  Ivory Portillo RN

## 2021-06-03 NOTE — PROGRESS NOTES
In clinic device check with Device RN and follow-up with Zo Dudley RN, CNP.  Please see link for full device report.  Patient was informed of results and next follow up during today's visit.

## 2021-06-03 NOTE — TELEPHONE ENCOUNTER
ANTICOAGULATION  MANAGEMENT    Assessment     Today's INR result of 2.0 is Therapeutic (goal INR of 2.0-3.0)        Warfarin taken as previously instructed    No new diet changes affecting INR    No new medication/supplements affecting INR    Continues to tolerate warfarin with no reported s/s of bleeding or thromboembolism     Previous INR was Subtherapeutic    Plan:     Spoke with Erica ( patient's daughter ) regarding INR result and instructed:     Warfarin Dosing Instructions:  Continue current warfarin dose    2 mg every Sun, Wed; 3 mg all other days       (0 % change)    Instructed patient to follow up no later than: 2 weeks.  Instructed Erica to have the patient call [facility] back for any questions.    Education provided: importance of therapeutic range and target INR goal and significance of current INR result    Erica verbalizes understanding and agrees to warfarin dosing plan.    Instructed to call the [facility] for any changes, questions or concerns. (#[phone])   ?   Natasha Grijalva RN    Subjective/Objective:      Myron CONNOLLY Naveedhaylee, a 88 y.o. male is on warfarin.     Myron reports:     Home warfarin dose: as updated on anticoagulation calendar per template     Missed doses: No     Medication changes:  No     S/S of bleeding or thromboembolism:  No     New Injury or illness:  No     Changes in diet or alcohol consumption:  No     Upcoming surgery, procedure or cardioversion:  No    Anticoagulation Episode Summary     Current INR goal:   2.0-3.0   TTR:   57.2 %   Next INR check:   11/26/2019   INR from last check:   2.00 (11/12/2019)   Weekly max warfarin dose:      Target end date:      INR check location:      Preferred lab:      Send INR reminders to:   [facility]    Indications    Persistent atrial fibrillation [I48.19]           Comments:   new order INR 2/5/19         Anticoagulation Care Providers     Provider Role Specialty Phone number    Donna Thakkar MD Referring  Cardiology [phone]

## 2021-06-03 NOTE — PROGRESS NOTES
Remote Heart Failure ICM    Thoracic Impedance: Daily and Reference Line at 60ohms    Optivol Fluid Index: 0    Arrhythmias: Since 10/14/19, no VT/VF in monitor/therapy zones detected. 2 of the available VSE episode EGMs appear NSVT rates ~150s-160s. 3 mode switches detected, all <1minute (no EGMs for review; duration too short).     % CRT Pacin.3% (in-clinic apt scheduled 19)   AP: 86.6%   Night Heart rate trends: Day and Night HR ~70bpm.   Heart Rate variability: Minimal, mostly paced rhythm.   Patient Activity: 0.3hr/day.     Intervention: None. OptiVol at 0 (Routing criteria is >/=60). Normal device function.     Technician Signature/Date: LANE Meza, EP, ICD/Pacemaker Specialist      Provider Signature/Date:

## 2021-06-03 NOTE — TELEPHONE ENCOUNTER
" Myron called Device clinic this morning asking to speak with Zo GREWAL CNP. States he was \"feeling much better after Cardioversion, and now with return of AF he is just not functioning as well, I get more short of breath now. \"  States his weights are stable, does not think he is retaining fluid, but would like to talk about maybe \"trying another one of those cardioversions or something, I just felt so much better in normal rhythm. \"     Will review with Zo GREWAL CNP  "

## 2021-06-03 NOTE — TELEPHONE ENCOUNTER
As we discussed in clinic, I do not have any options to keep him out of A fib.  Cardioversion is not a treatment for A fib and he had early recurrence of A fib after cardioversion.  I have no antiarrhythmic medications I can use, nor would they likely work.  Thanks,  Zo

## 2021-06-03 NOTE — PATIENT INSTRUCTIONS - HE
Myron Sunshine,    It was a pleasure to see you today at the [facility].     My recommendations after this visit include:    Continue current medications.    Pay attention to how you are feeling/if you feel any differently    Follow up with Dr. Cadet in 2 months    Zo Dudley, CNP  [facility], Electrophysiology  [phone]  EP nurses [phone]

## 2021-06-04 VITALS — WEIGHT: 152.3 LBS | HEIGHT: 69 IN | BODY MASS INDEX: 22.56 KG/M2

## 2021-06-04 VITALS
OXYGEN SATURATION: 96 % | HEART RATE: 67 BPM | RESPIRATION RATE: 17 BRPM | TEMPERATURE: 97.8 F | DIASTOLIC BLOOD PRESSURE: 64 MMHG | SYSTOLIC BLOOD PRESSURE: 108 MMHG

## 2021-06-04 VITALS
DIASTOLIC BLOOD PRESSURE: 66 MMHG | WEIGHT: 153 LBS | RESPIRATION RATE: 16 BRPM | BODY MASS INDEX: 22.66 KG/M2 | HEART RATE: 72 BPM | SYSTOLIC BLOOD PRESSURE: 97 MMHG | HEIGHT: 69 IN | OXYGEN SATURATION: 98 %

## 2021-06-04 VITALS
HEIGHT: 69 IN | DIASTOLIC BLOOD PRESSURE: 62 MMHG | HEART RATE: 80 BPM | BODY MASS INDEX: 22.62 KG/M2 | OXYGEN SATURATION: 98 % | SYSTOLIC BLOOD PRESSURE: 94 MMHG | RESPIRATION RATE: 18 BRPM

## 2021-06-04 VITALS
HEART RATE: 60 BPM | RESPIRATION RATE: 18 BRPM | WEIGHT: 162 LBS | DIASTOLIC BLOOD PRESSURE: 64 MMHG | SYSTOLIC BLOOD PRESSURE: 86 MMHG | BODY MASS INDEX: 23.99 KG/M2 | HEIGHT: 69 IN

## 2021-06-04 NOTE — TELEPHONE ENCOUNTER
Pt given results from echo- appears to be similar to previous echo done in August.  Vanda, any further recommendations? He will see his prim card in feb.  Thanks,  Marj

## 2021-06-04 NOTE — TELEPHONE ENCOUNTER
ANTICOAGULATION  MANAGEMENT    Assessment     Today's INR result of 3.2 is Supratherapeutic (goal INR of 2.0-3.0)        More warfarin taken than instructed which may be affecting INR    No new diet changes affecting INR    No new medication/supplements affecting INR    Continues to tolerate warfarin with no reported s/s of bleeding or thromboembolism     Previous INR was Therapeutic    Plan:     Spoke with Myron regarding INR result and instructed:     Warfarin Dosing Instructions:  Continue current warfarin dose    2 mg every Sun, Wed; 3 mg all other days     (0 % change)    Instructed patient to follow up no later than: 2 weeks, appointment made.    Education provided: importance of consistent vitamin K intake, importance of therapeutic range, target INR goal and significance of current INR result and importance of taking warfarin as instructed    Myron verbalizes understanding and agrees to warfarin dosing plan.    Instructed to call the [facility] for any changes, questions or concerns. (#[phone])   ?   Natasha Grijalva RN    Subjective/Objective:      Myron MOHSEN Sunshine, a 88 y.o. male is on warfarin.     Myron reports:     Home warfarin dose: patient confirmed that what he wrote in the template is what he took      Missed doses: No     Medication changes:  No     S/S of bleeding or thromboembolism:  No     New Injury or illness:  No     Changes in diet or alcohol consumption:  Yes: ate a lot during the holidays     Upcoming surgery, procedure or cardioversion:  No    Anticoagulation Episode Summary     Current INR goal:   2.0-3.0   TTR:   60.2 % (11.2 mo)   Next INR check:   1/13/2020   INR from last check:   3.20! (12/30/2019)   Weekly max warfarin dose:      Target end date:      INR check location:      Preferred lab:      Send INR reminders to:   [facility]    Indications    Persistent atrial fibrillation [I48.19]           Comments:   new order INR 2/5/19         Anticoagulation Care  Providers     Provider Role Specialty Phone number    Donna Thakkar MD Referring Cardiology [phone]

## 2021-06-04 NOTE — PROGRESS NOTES
[facility] Heart Care Note    Assessment:  Assessment  1. 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            left circumflex occlusion, thrombosed stent requiring repeat  stenting 11/4/2001.    4. ICD implant 12/2001, secondary indication (clinical VT);        generator replacement  7/19/2007,         generator replacement 6/18/2014.  [employer] DDDR/ICD  Upgrade to CRT-D December 4, 2018  5.  Moderate aortic stenosis        Mean aortic valve gradient 22 mm on October 2, 2017  TAVR 11-; follow up Echo good    abdominal aortic aneurysm with repair 12/2008.     Chronicrenal insufficiency   Left ventricular dysfunction with ejection fraction about 35% with large inferior wall motion abnormality.         Echocardiogram  is similar       Echocardiogram October 2, 2017; ejection fraction       Echocardiogram 40%, infero-posterior aneurysm  Ejection fraction 22% by echocardiogram November 28, 2018  Echocardiogram December 31, 2018 ejection fraction 44%  Ejection fraction 45% by echocardiogram August 31, 2019  Chronic anticoagulation with warfarin. Also ASA  TIA; comprehensive evaluation was unremarkable November 2015    Plan:    Recently you  have been doing a bit better  Now takes furosemide 80 mg once daily and occasionally take supplemental furosemide for fluid retention  Blood work December 2, 2019 was stable.  Creatinine of 1.65 represents a stable kidney pattern  Hemoglobin was good equals 13.7  Device interrogation shows that you have spontaneously terminated the atrial fibrillation are now back in a regular rhythm  Echocardiogram August 31, 2019 showed the heart function was about the same-ejection fraction 45%  Continue current medications no adjustments were made  I would suggest you obtain a wheelchair and use the wheelchair when you have longer trips  In 3 months return for comprehensive cardiac  arrhythmia device assessment    Subjective:    I had the opportunity to see.Myron Sunshine , who is a 88 y.o. male with a known history of Sahni cardiomyopathy and lung disease  Myron continues uses continuous home oxygen  Recently he seems to have improved a bit, seems less breathless and seems to have more energy  Device interrogation showed that he had a successful cardioversion then relapsed back in atrial fibrillation November 11.  But he has had spontaneous conversion of atrial fibrillation has been in sinus rhythm now for the last several weeks this may relate to why he is feeling a bit better  Looking at his OptiVol, there has been no increase in long fluid recently and his activity has increased coincident with restoration normal sinus rhythm    Now takes furosemide 80 mg in the morning only rarely does he take a second dose  No chest pain  No awareness of arrhythmias or palpitations no syncopal or near syncopal episodes  Tries to stay active, walks behind his walker with his continuous home oxygen    Does follow-up with his pulmonary specialist as well    He had comprehensive laboratory studies December 2, 2019  Normal electrolytes  Creatinine 1.65 is stable  Hemoglobin 13.7    Cardiogram August 31, 2019 showed ejection fraction 45%    Problem List:  Patient Active Problem List   Diagnosis     Ischemic cardiomyopathy     Stage 3 chronic kidney disease (H)     Biventricular ICD (implantable cardioverter-defibrillator) in place     Dyslipidemia, goal LDL below 70     Left ventricular aneurysm     Restrictive lung disease; moderate by PFT     DNAR (do not attempt resuscitation)     Chronic hypoxemic respiratory failure (H)     Persistent atrial fibrillation     Non-rheumatic mitral regurgitation     Left bundle branch block     Chronic systolic congestive heart failure (H)     Glaucoma     MIS (obstructive sleep apnea)     Depression     Shortness of breath     Coronary artery disease due to calcified  coronary lesion     Medical History:  Past Medical History:   Diagnosis Date     Anxiety      Bowel obstruction (H)      Bronchiectasis without acute exacerbation (H) 12/1/2017     Chronic hypoxemic respiratory failure (H)      CKD (chronic kidney disease) stage 3, GFR 30-59 ml/min (H)      Coronary artery disease due to lipid rich plaque      DNAR (do not attempt resuscitation)      Dyslipidemia, goal LDL below 70      ED (erectile dysfunction)      Essential hypertension      GERD (gastroesophageal reflux disease)      Gout flare 12/13/2018     Ischemic cardiomyopathy      Mild aortic stenosis      Noncompliance with medication regimen 9/11/2018     Paroxysmal VT (H)      Persistent atrial fibrillation 8/22/2018     Psoriasis      Restrictive lung disease; moderate by PFT 10/24/2017    FVC 1.9 456% FEV1 1.4 457% ratio 74%.  No significant response to bronchodilators.  TLC 40% predicted DLCO corrected 47% predicted Assessment: No obstruction.  Moderate restriction.  Moderate diffusion defect.     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     Seasonal allergies      Severe aortic stenosis 10/10/2018    Added automatically from request for surgery [id number]     Sleep apnea      TIA (transient ischemic attack) 8/09     Ventricular aneurysm     Long-term coumadin therapy s/p ventricular patch 2001     Vitamin D deficiency      VT (ventricular tachycardia) (H)      Surgical History:  Past Surgical History:   Procedure Laterality Date     ABDOMINAL AORTIC ANEURYSM REPAIR       APPENDECTOMY      Perforated     CARDIAC CATHETERIZATION       CARDIAC DEFIBRILLATOR PLACEMENT  7/19/2007     CARDIOVERSION  10/14/2019     CORONARY STENT PLACEMENT  2001    LCX     CV CORONARY ANGIOGRAM N/A 10/18/2018    Procedure: Coronary Angiogram;  Surgeon: Elijah Liu MD;  Location: [facility] Cath Lab;  Service:      CV TRANSFEMORAL TRANSCATHETER VALVE REPLACEMENT N/A 11/27/2018    Transfemoral Transcatheter Aortic  Valve Replacement;  Surgeon: Elijah Liu MD;  Location: [facility] Cath Lab;  Service: Structural Heart     ICD Generator Change  14     IR EXTREMITY VENOGRAM LEFT  12/3/2018     PICC  2018          PICC AND MIDLINE TEAM LINE INSERTION  2018          RESECTION SMALL BOWEL / CLOSURE ILEOSTOMY       THORACENTESIS       VENTRICULAR RESECTION / REPAIR ANEURYSM       Social History:  Social History     Socioeconomic History     Marital status:      Spouse name: Eulalia     Number of children: Not on file     Years of education: Not on file     Highest education level: Not on file   Occupational History     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/, 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   Social Needs     Financial resource strain: Not on file     Food insecurity:     Worry: Not on file     Inability: Not on file     Transportation needs:     Medical: Not on file     Non-medical: Not on file   Tobacco Use     Smoking status: Former Smoker     Packs/day: 1.00     Years: 8.00     Pack years: 8.00     Types: Cigarettes     Last attempt to quit: 1955     Years since quittin.6     Smokeless tobacco: Never Used   Substance and Sexual Activity     Alcohol use: Yes     Alcohol/week: 1.0 standard drinks     Types: 1 Glasses of wine per week     Comment: social     Drug use: No     Sexual activity: Not on file   Lifestyle     Physical activity:     Days per week: 0 days     Minutes per session: 0 min     Stress: Not on file   Relationships     Social connections:     Talks on phone: Not on file     Gets together: Not on file     Attends Anabaptism service: Not on file     Active member of club or organization: Not on file     Attends meetings of clubs or organizations: Not on file     Relationship status: Not on file     Intimate partner violence:     Fear of current or ex partner: Not on file     Emotionally abused: Not on file     Physically abused: Not on file     Forced sexual activity: Not on file    Other Topics Concern     Not on file   Social History Narrative    Lives with his wife, Eulalia, who he states has advanced arthritis. 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         Review of Systems:      General: WNL  Eyes: WNL  Ears/Nose/Throat: WNL  Lungs: WNL  Heart: WNL  Stomach: WNL  Bladder: WNL  Muscle/Joints: WNL  Skin: WNL  Nervous System: WNL  Mental Health: WNL     Blood: WNL        Family History:  Family History   Problem Relation Age of Onset     Stroke Mother      Thyroid disease Mother      Cancer Brother      No Medical Problems Son      No Medical Problems Son      No Medical Problems Son      Lung disease Neg Hx          Allergies:  Allergies   Allergen Reactions     Bumex [Bumetanide] Anxiety     Amiodarone Other (See Comments)     Shakiness and headache     Blood-Group Specific Substance      Anti-Josep/Barnes.  Expect delays in obtaining blood for transfusion.  Collect 2 lavender top tubes and 1 red top tube for all blood bank orders.        Medications:  Current Outpatient Medications   Medication Sig Dispense Refill     acetaminophen (TYLENOL EXTRA STRENGTH) 500 MG tablet Take 1 tablet (500 mg total) by mouth every 6 (six) hours as needed for pain.  0     atorvastatin (LIPITOR) 80 MG tablet Take 1 tablet (80 mg total) by mouth daily. (Patient taking differently: Take 80 mg by mouth every morning.       ) 30 tablet 3     calcium, as carbonate, (TUMS) 200 mg calcium (500 mg) chewable tablet Chew 2-4 tablets as needed for heartburn. Do not take more than 15 tablets in 24 hours.             cetirizine (ZYRTEC) 10 MG tablet Take 10 mg by mouth daily as needed.              clopidogrel (PLAVIX) 75 mg tablet Take 1 tablet (75 mg total) by mouth daily. take 75 mg (1 pill) daily (Patient taking differently: Take 75 mg by mouth every morning. take 75 mg (1 pill) daily      )       fluticasone (FLONASE) 50 mcg/actuation nasal spray Apply 1 spray into each nostril daily as needed for rhinitis.        furosemide (LASIX) 40 MG tablet Take 4 tablets (160 mg total) by mouth daily. Take 80 mg in AM and 80mg in PM starting 10/7/19 (Patient taking differently: Take 80 mg by mouth daily. Take 80 mg in AM) 90 tablet 0     hydrocortisone 1 % cream Apply 1 application topically daily as needed.              Lactobacillus acidophilus (PROBIOTIC ORAL) Take 1 capsule by mouth as needed.              latanoprost (XALATAN) 0.005 % ophthalmic solution Administer 1 drop to the right eye at bedtime. 2.5 mL 12     metoprolol succinate (TOPROL-XL) 50 MG 24 hr tablet Take 75 mg by mouth see administration instructions. Take 75 mg (one and half tablet of 50 mg) daily after supper             multivitamin with minerals (THERA-M) 9 mg iron-400 mcg Tab tablet Take 1 tablet by mouth daily.       nebulizer and compressor Radha For chronic cough 1 each 0     nitroglycerin (NITROSTAT) 0.4 MG SL tablet Place 1 tablet (0.4 mg total) under the tongue every 5 (five) minutes as needed for chest pain.  0     OXYGEN-AIR DELIVERY SYSTEMS MISC into each nostril continuous. 1.5 liters with rest and 3 liters with activity  May use up to 1.5 liters at [facility].  [facility]             pantoprazole (PROTONIX) 20 MG tablet Take 1 tablet (20 mg total) by mouth daily. 30 tablet 0     predniSONE (DELTASONE) 5 MG tablet Take 1 tablet by mouth daily. May increase to 2 tabs daily if needed.. (Patient taking differently: Take 10 mg by mouth daily Take 1 tablet by mouth daily. May increase to 2 tabs daily if needed..) 60 tablet 1     sertraline (ZOLOFT) 25 MG tablet Take 25 mg by mouth at bedtime.              warfarin (COUMADIN/JANTOVEN) 2 MG tablet Take 2-4 mg by mouth See Admin Instructions. Take 2 mg one day weekly (on Tuesdays and Friday) and 4 mg five days weekly (on all other days of the week). Adjust dose based on INR results as directed.             albuterol (PROVENTIL) 2.5 mg /3 mL (0.083 %) nebulizer solution Take 3 mL (2.5 mg total) by nebulization every  4 (four) hours as needed for wheezing. 360 mL 6     budesonide (PULMICORT) 0.5 mg/2 mL nebulizer solution Take 2 mL (0.5 mg total) by nebulization 2 (two) times a day. 120 mL 6     formoterol fumarate (PERFOROMIST) 20 mcg/2 mL nebulizer solution Take 2 mL (20 mcg total) by nebulization every 12 (twelve) hours. 160 mL 11     No current facility-administered medications for this visit.          Surgical site  It is well-healed to left subclavicular region  Juan Luis in sinus rhythm  49% atrial pacing  83% ventricular pacing-less ventricular pacing during episodes of atrial fibrillation  No ventricular tachycardia  Lead function satisfactory  Battery good for another 7 years  *    Objective:   Vital signs:  /64 (Patient Site: Left Arm, Patient Position: Sitting, Cuff Size: Adult Regular)   Pulse 73   Resp 16   Wt 154 lb (69.9 kg)   SpO2 95% Comment: 2 liters of 02  BMI 22.74 kg/m    Using continuous nasal oxygen  Comes to clinic with a walker  Alert and appropriate   Seems weak    Physical Exam:      GENERAL APPEARANCE: Alert, cooperative and in no acute distress.  HEENT: No scleral icterus. No Xanthelasma. Oral mucous membranes pink and moist.  NECK: JVP normal cm. No Hepatojugular reflux. Thyroid not  Palpable  CHEST: clear to auscultation and percussion  CARDIOVASCULAR: S1, S2 without murmur ; heart tones are normal no systolic or diastolic murmur   Brachial, radial  pulses are intact and symmetric.   No carotid bruits noted.  ABDOMEN: Non tender. BS+. No bruits.  EXTREMITIES: No cyanosis, clubbing or edema.    Lab Results:  LIPIDS:  Lab Results   Component Value Date    CHOL 114 11/08/2018    CHOL 136 08/23/2017    CHOL 133 09/19/2016     Lab Results   Component Value Date    HDL 39 (L) 11/08/2018    HDL 40 08/23/2017    HDL 39 (L) 09/19/2016     Lab Results   Component Value Date    LDLCALC 53 11/08/2018    LDLCALC 74 08/23/2017    LDLCALC 69 09/19/2016     Lab Results   Component Value Date    TRIG 109  11/08/2018    TRIG 110 08/23/2017    TRIG 125 09/19/2016     No components found for: CHOLHDL    BMP:  Lab Results   Component Value Date    CREATININE 1.65 (H) 12/02/2019    BUN 49 (H) 12/02/2019     12/02/2019    K 3.9 12/02/2019     12/02/2019    CO2 37 (H) 12/02/2019         This note has been dictated using voice recognition software. Any grammatical or context distortions are unintentional and inherent to the software.  Donal Cadet MD  [facility]  [phone]

## 2021-06-04 NOTE — PATIENT INSTRUCTIONS - HE
Myron Sunshine,    It was a pleasure to see you today at the [facility].     My recommendations after this visit include:    - No medications changes made today     - Stay on low sodium diet < 2000 mg/day,  monitor daily weight (bring weight log book for each visit) and stay physically active as tolerated    -Please call if you experience rapid weight gain 2-3 lbs two days in a row or 5 lbs in a week with worsening shortness of breath, lightheaded, dizziness, abdominal bloating, and leg swelling     - Follow up with Dr. Thakkar in 8 weeks    Follow up in Heart Failure Clinic with Lissette in 4 months or sooner if needed    - Please call Alberta Oliveira, -[phone], if you have any questions or concerns    Lissette Gordon, CNP

## 2021-06-04 NOTE — TELEPHONE ENCOUNTER
----- Message from Ivory Abel RN sent at 12/31/2019  2:59 PM CST -----    ----- Message -----  From: Sherice James  Sent: 12/31/2019   2:37 PM CST  To: [facility] Erin Rn Support Pool        Caller: Patient    Primary cardiologist: Lissette Castro    Detailed reason for call: Patient stated that he is looking for the results of his tests    Best phone number: [phone]    Best time to contact: Today    Ok to leave a detailed message? Yes    Device? yes    Additional Info:

## 2021-06-04 NOTE — PROGRESS NOTES
Remote Heart Failure ICM    Thoracic Impedance: Daily and reference line at 62ohms    Optivol Fluid Index: 5    Arrhythmias: Since 19, no AT/AF detected. 1 NSVT.     % CRT Pacin.4%   AP: 79.1%  Night Heart rate trends: Day and night HR ~70bpm.   Heart Rate variability: Does not graph, mostly paced rhythm.   Patient Activity: 1.1hr/day    Intervention: None. OptiVol at 5 (Routing criteria is >/=60). Normal device function.       Technician Signature/Date: LANE Meza, EP, ICD/Pacemaker Specialist

## 2021-06-04 NOTE — TELEPHONE ENCOUNTER
ANTICOAGULATION  MANAGEMENT    Assessment     Today's INR result of 2.0 is Therapeutic (goal INR of 2.0-3.0)        Warfarin taken as previously instructed    No new diet changes affecting INR    No new medication/supplements affecting INR    Continues to tolerate warfarin with no reported s/s of bleeding or thromboembolism     Previous INR was Therapeutic    Plan:     Spoke with Myron regarding INR result and instructed:     Warfarin Dosing Instructions:  Continue current warfarin dose 2 mg daily on Sun, Wed; and 3 mg daily rest of week  (0 % change)    Instructed patient to follow up no later than: 4 weeks    Education provided: target INR goal and significance of current INR result, importance of notifying clinic for changes in medications and importance of notifying clinic for diarrhea, nausea/vomiting, reduced intake and/or illness    Myron verbalizes understanding and agrees to warfarin dosing plan.    Instructed to call the [facility] for any changes, questions or concerns. (#[phone])   ?   Yoko Gamino RN    Subjective/Objective:      Myron Sunshine, a 88 y.o. male is on warfarin.     Myron reports:     Home warfarin dose: verbally confirmed home dose with Myron and updated on anticoagulation calendar     Missed doses: No     Medication changes:  No     S/S of bleeding or thromboembolism:  No     New Injury or illness:  No     Changes in diet or alcohol consumption:  No     Upcoming surgery, procedure or cardioversion:  No    Anticoagulation Episode Summary     Current INR goal:   2.0-3.0   TTR:   60.0 % (11.3 mo)   Next INR check:   1/3/2020   INR from last check:   2.00 (12/6/2019)   Weekly max warfarin dose:      Target end date:      INR check location:      Preferred lab:      Send INR reminders to:   [facility]    Indications    Persistent atrial fibrillation [I48.19]           Comments:   new order INR 2/5/19         Anticoagulation Care Providers     Provider Role Specialty  Phone number    Donna Thakkar MD Referring Cardiology [phone]

## 2021-06-04 NOTE — PATIENT INSTRUCTIONS - HE
Myron Sunshine    Thank you for coming to the [facility] today for a cardiac evaluation  It was my pleasure to see you today  A good contact for any questions would be Ivory Portillo  RN @ [phone]    Recently you  have been doing a bit better  Now takes furosemide 80 mg once daily and occasionally take supplemental furosemide for fluid retention  Blood work December 2, 2019 was stable.  Creatinine of 1.65 represents a stable kidney pattern  Hemoglobin was good equals 13.7  Device interrogation shows that you have spontaneously terminated the atrial fibrillation are now back in a regular rhythm  Echocardiogram August 31, 2019 showed the heart function was about the same-ejection fraction 45%  Continue current medications no adjustments were made  I would suggest you obtain a wheelchair and use the wheelchair when you have longer trips  In 3 months return for comprehensive cardiac arrhythmia device assessment

## 2021-06-04 NOTE — PROGRESS NOTES
In clinic device check with Device RN and Dr. Cadet.  Please see link for full device report.  Patient was informed of results and next follow up during today's visit.

## 2021-06-05 NOTE — TELEPHONE ENCOUNTER
Pt is now hospitalized.  Due to the extent of his pulmonary disease and kidney disease, he is not a candidate for any antiarrhythmic medications.  INR was subtherapeutic, so he would need JOSE prior to cardioversion or attempt at pace termination of rhythm.  Regardless, he is unlikely to maintain sinus rhythm for any length of time.  I have talked to him at length about excepting atrial fibrillation and recommend a rate control strategy.    Thanks,  Zo

## 2021-06-05 NOTE — TELEPHONE ENCOUNTER
Spoke with Erin directly over the phone. Due to symptoms of shortness of breath, especially at rest and that lasix is not working effectively, it was recommended that the patient go to the emergency room. Pt verbalized understanding. He was disappointed to hear this recommendation but it was explained that even when speaking to Myron, he sounded a little breathless with conversation and should be evaluated for IV diuretics. He will have one of his children drive him to the ER. -[facility]

## 2021-06-05 NOTE — TELEPHONE ENCOUNTER
ANTICOAGULATION  MANAGEMENT    Assessment     Today's INR result of 1.9 is Subtherapeutic (goal INR of 2.0-3.0)         Less warfarin taken than instructed which may be affecting INR    No new diet changes affecting INR    No new medication/supplements affecting INR    Continues to tolerate warfarin with no reported s/s of bleeding or thromboembolism     Previous INR was Supratherapeutic    Plan:     Spoke with Myron regarding INR result and instructed:     Warfarin Dosing Instructions:  Continue current warfarin dose    2 mg every Sun, Wed; 3 mg all other days      (0 % change)    Instructed patient to follow up no later than: 2 weeks- appointment made.    Education provided: importance of therapeutic range, target INR goal and significance of current INR result and importance of taking warfarin as instructed    Myron verbalizes understanding and agrees to warfarin dosing plan.    Instructed to call the [facility] for any changes, questions or concerns. (#[phone])   ?   Natasha Grijalva RN    Subjective/Objective:      Myron Sunshine, a 88 y.o. male is on warfarin.     Myron reports:     Home warfarin dose: as updated on anticoagulation calendar per template     Missed doses: No     Medication changes:  No     S/S of bleeding or thromboembolism:  No     New Injury or illness:  No     Changes in diet or alcohol consumption:  No     Upcoming surgery, procedure or cardioversion:  No    Anticoagulation Episode Summary     Current INR goal:   2.0-3.0   TTR:   60.9 % (11.7 mo)   Next INR check:   1/28/2020   INR from last check:   1.90! (1/14/2020)   Weekly max warfarin dose:      Target end date:      INR check location:      Preferred lab:      Send INR reminders to:   [facility] HEART CARE    Indications    Persistent atrial fibrillation [I48.19]           Comments:   new order INR 2/5/19         Anticoagulation Care Providers     Provider Role Specialty Phone number    Donna Thakkar MD  Referring Cardiology [phone]

## 2021-06-05 NOTE — TELEPHONE ENCOUNTER
ANTICOAGULATION  MANAGEMENT- Home Care/Care Facility Result    Assessment     Today's INR result of 3.8 is Supratherapeutic (goal INR of 2.0-3.0)        Warfarin taken as previously instructed    No new diet changes affecting INR     Patient normally takes Prednisone 10 mg daily but per report  dose was increased  to 20 mg since 1/24/2020    Still has shortness of breath -  patient updated his Pulmonologist    Per Home Care nurse, patient sometimes forgets to take his water pills     Interaction between increased Prednisone and warfarin may be affecting INR    Continues to tolerate warfarin with no reported s/s of bleeding or thromboembolism     Previous INR was Therapeutic    Plan:     Spoke with Home Care Nurse Jolene and patient Myron discussed INR result and instructed:     Warfarin Dosing Instructions: hold warfarin dose today then continue current warfarin dose    2 mg every Sun, Wed; 3 mg all other days     (0 % change)    Next INR to be drawn: 1/31     Education provided: importance of therapeutic range, target INR goal and significance of current INR result and potential interaction between warfarin and prednisone    Jolene and Myron verbalizes understanding and agrees to warfarin dosing plan.   ?   Natasha Grijalva RN    Subjective/Objective:      Myron Sunshine, a 88 y.o. male is established on warfarin.     Home care/care facility RN's report of Myron INR, recent warfarin dosing, diet changes, medication changes, and symptoms is documented below.    Additional findings: see above    Anticoagulation Episode Summary     Current INR goal:   2.0-3.0   TTR:   58.8 % (11.5 mo)   Next INR check:   1/31/2020   INR from last check:   3.80! (1/27/2020)   Weekly max warfarin dose:      Target end date:      INR check location:      Preferred lab:      Send INR reminders to:   [facility]    Indications    Persistent atrial fibrillation [I48.19]           Comments:   new order INR 2/5/19          Anticoagulation Care Providers     Provider Role Specialty Phone number    Donna Thakkar MD Referring Cardiology [phone]

## 2021-06-05 NOTE — TELEPHONE ENCOUNTER
ANTICOAGULATION  MANAGEMENT- Home Care/Care Facility Result    Assessment     Today's INR result of 3.1 is Supratherapeutic (goal INR of 2.0-3.0)        Warfarin recently held as instructed which may be affecting INR    No new diet changes affecting INR    Interaction between decrease Prednisone back from 20 mg to 10 mg ( patient's regular dose ) and warfarin may be affecting INR    Continues to tolerate warfarin with no reported s/s of bleeding or thromboembolism     Previous INR was Supratherapeutic    Plan:     Spoke with Home Care Nurse Maira discussed INR result and instructed:     Warfarin Dosing Instructions: one time lower dose of 2 mg today then continue current warfarin dose    2 mg every Sun, Wed; 3 mg all other days     (0 % change)    Next INR to be drawn: 2/3/2020    Education provided: importance of therapeutic range and target INR goal and significance of current INR result    Maira verbalizes understanding and agrees to warfarin dosing plan.   ?   Natasha Grijalva RN    Subjective/Objective:      Myron Sunshine, a 88 y.o. male is established on warfarin.     Home care/care facility RN's report of Myron INR, recent warfarin dosing, diet changes, medication changes, and symptoms is documented below.    Additional findings: medication changes: prednisone decreased back from 20 mg to 10 mg ( patient's regular dose )    Anticoagulation Episode Summary     Current INR goal:   2.0-3.0   TTR:   57.6 % (11.5 mo)   Next INR check:   2/3/2020   INR from last check:   3.10! (1/31/2020)   Weekly max warfarin dose:      Target end date:      INR check location:      Preferred lab:      Send INR reminders to:   [facility]    Indications    Persistent atrial fibrillation [I48.19]           Comments:   new order INR 2/5/19         Anticoagulation Care Providers     Provider Role Specialty Phone number    Donna Thakkar MD Referring Cardiology [phone]

## 2021-06-05 NOTE — PROGRESS NOTES
LM with [facility] medical records ([phone]) to see if they can fax us over Myron's sleep study (because Myron is wanting a new CPAP machine).  Will wait for return call or fax.

## 2021-06-05 NOTE — TELEPHONE ENCOUNTER
INR result is 3.2    INR   Date Value Ref Range Status   01/31/2020 3.10 (!) 0.9 - 1.1 Final       Will the patient be seen, or did they already see, MD or CNP today? No    Most Recent Warfarin dose day/week  Sunday Monday Tuesday Wednesday Thursday Friday Saturday    held 3 2 3 2 3     Sunday Monday Tuesday Wednesday Thursday Friday Saturday   2             Has the patient missed any doses of Coumadin, Warfarin, Jantoven in the past 7 days? No    Has the patients medications changed since the last visit? No    Has the patient experienced any bleeding recently? No    Has the patient experienced any injuries or illness recently? No    Has the patient experienced any 'new' shortness of breath, severe headaches, or changes in vision recently? No    Has the patient had any changes in their diet, or alcohol consumption? No    Is the patient here today to prepare for any type of upcoming surgery, procedure, or for a cardioversion procedure? No    What phone number can we reach the patient at today? home phone listed in demographics.

## 2021-06-05 NOTE — TELEPHONE ENCOUNTER
Myron called earlier today with complaint of increased shortness of breath and feeling miserable. Said he is not sure if it is his heart or his lungs. Said he took an extra half of lasix this morning and has had no improvement. Called back this afternoon and said he had received a call and he is back in atrial fib. Said he was waiting to see what to do. Did again say he is ok if he doesn't move much but it  becomes very hard to breath with any activity. Instructed to go to the Ed to be evaluated and I will update Dr. Christensen. He agreed with the plan.

## 2021-06-05 NOTE — TELEPHONE ENCOUNTER
ANTICOAGULATION  MANAGEMENT: Discharge Review    Myron Sunshine chart reviewed for anticoagulation continuity of care    Hospital admission on  1/15/1/20 for shortness of breath  1/17 Coronary Angiogram.    Discharge disposition: Home with Home Care    INR Results:       Recent labs: (last 7 days)     01/14/20  0846 01/15/20  1706 01/16/20  0504 01/17/20  0618 01/18/20  0525 01/19/20  0625 01/20/20  0557   INR 1.90* 1.82* 2.12* 1.80* 1.34* 1.39* 1.55*       Warfarin inpatient management: reversed with 2.5 mg vit k on 1/16/2020    Warfarin discharge instructions: home regimen continued     Medication Changes Affecting Anticoagulation: Yes: vit k,prednisone    Additional Factors Affecting Anticoagulation: No    Plan     Agree with dosing adjustment on discharge    Spoke with Myron  and he confirmed that he took warfarin dose as instructed at discharge - he stated that Home Care is coming tomorrow - instructed to have Home Care Nurse check INR with visit and have RN call  with result.    Anticoagulation calendar updated    Natasha Grijalva, JEREMIAS

## 2021-06-05 NOTE — TELEPHONE ENCOUNTER
Called patient and spoke with his wife- left a message for her to have him call me. He was currently meeting with a home health aide and being assisted with ADL's. She will have him call me when their meeting is complete. -mony

## 2021-06-05 NOTE — PROGRESS NOTES
Remote device check.  Please see link for full device report.  HF processed due to low BiV pacing.

## 2021-06-05 NOTE — TELEPHONE ENCOUNTER
INR result is 3.1  POC INR   Date Value Ref Range Status   01/27/2020 3.8 (!) 0.90 - 1.10 Final       Will the patient be seen, or did they already see, MD or CNP today? No    Most Recent Warfarin dose day/week  Sunday Monday Tuesday Wednesday Thursday Friday Saturday        3 3     Sunday Monday Tuesday Wednesday Thursday Friday Saturday   2 held 3 2 3         Has the patient missed any doses of Coumadin, Warfarin, Jantoven in the past 7 days? No    Has the patients medications changed since the last visit? No    Has the patient experienced any bleeding recently? No    Has the patient experienced any injuries or illness recently? No    Has the patient experienced any 'new' shortness of breath, severe headaches, or changes in vision recently? No    Has the patient had any changes in their diet, or alcohol consumption? No    Is the patient here today to prepare for any type of upcoming surgery, procedure, or for a cardioversion procedure? No    What phone number can we reach the patient at today? home phone listed in demographics.

## 2021-06-05 NOTE — PATIENT INSTRUCTIONS - HE
Continue the Toprol XL (metoprolol) 50mg. Take 1.5 pills daily.    Continue oxygen    Continue prednisone 5mg    Call with any worsening symptoms

## 2021-06-05 NOTE — TELEPHONE ENCOUNTER
Myron called to say he is having shortness of breath all the time now and he is taking prednisone 10 mg a day and is wondering if he could take more. Will take 20 mg over the weekend and call with an update on Monday.

## 2021-06-05 NOTE — TELEPHONE ENCOUNTER
ANTICOAGULATION  MANAGEMENT- Home Care/Care Facility Result    Assessment     Today's INR result of 3.2 is Supratherapeutic (goal INR of 2.0-3.0)        Warfarin taken as previously instructed    No new diet changes affecting INR    No new medication/supplements affecting INR    Continues to tolerate warfarin with no reported s/s of bleeding or thromboembolism     Previous INR was Supratherapeutic    Plan:     Spoke with Home Care Nurse Maira discussed INR result and instructed:     Warfarin Dosing Instructions: Change warfarin dose to    3 mg every Tue, Thu, Sat; 2 mg all other days     (10.5 % change)    Next INR to be drawn: 2/7    Education provided: importance of therapeutic range and target INR goal and significance of current INR result    Maira verbalizes understanding and agrees to warfarin dosing plan.    ?   Natasha Grijalva RN    Subjective/Objective:      Myron Sunshine, a 88 y.o. male is established on warfarin.     Home care/care facility RN's report of Myron INR, recent warfarin dosing, diet changes, medication changes, and symptoms is documented below.    Additional findings: none    Anticoagulation Episode Summary     Current INR goal:   2.0-3.0   TTR:   56.8 % (11.5 mo)   Next INR check:   2/14/2020   INR from last check:   3.20! (2/3/2020)   Weekly max warfarin dose:      Target end date:      INR check location:      Preferred lab:      Send INR reminders to:   [facility]    Indications    Persistent atrial fibrillation [I48.19]           Comments:   new order INR 2/5/19         Anticoagulation Care Providers     Provider Role Specialty Phone number    Donna Thakkar MD Referring Cardiology [phone]

## 2021-06-05 NOTE — PROGRESS NOTES
Called to see how Myron is feeling today. Said he is a little better and today is his last day of the 20 mg of prednisone. Informed it is ok to go down to the 10 mg of prednisone if it helps his breathing. Order sent to his pharmacy for 10 mg tablets per his request. Instructed to call with any concerns.

## 2021-06-05 NOTE — TELEPHONE ENCOUNTER
INR result is   INR   Date Value Ref Range Status   02/03/2020 3.20 (!) 0.9 - 1.1 Final       2/07/20                 2.5    Will the patient be seen, or did they already see, MD or CNP today? No  But Cardiologist this morning    Most Recent Warfarin dose day/week  Sunday Monday Tuesday Wednesday Thursday Friday Saturday        2 mg 3 mg     Sunday Monday Tuesday Wednesday Thursday Friday Saturday   2 mg 2 mg 3 mg 2 mg 3 mg         Has the patient missed any doses of Coumadin, Warfarin, Jantoven in the past 7 days? No    Has the patients medications changed since the last visit? No    Has the patient experienced any bleeding recently? No    Has the patient experienced any injuries or illness recently? No    Has the patient experienced any 'new' shortness of breath, severe headaches, or changes in vision recently? No    Has the patient had any changes in their diet, or alcohol consumption? No    Is the patient here today to prepare for any type of upcoming surgery, procedure, or for a cardioversion procedure? No    What phone number can we reach the patient at today? Jolene [facility]   [phone].

## 2021-06-05 NOTE — TELEPHONE ENCOUNTER
INR result is 3.8  POC INR   Date Value Ref Range Status   01/22/2020 2.6 (!) 0.90 - 1.10 Final       Will the patient be seen, or did they already see, MD or CNP today? No    Most Recent Warfarin dose day/week  Sunday Monday Tuesday Wednesday Thursday Friday Saturday    3 3 2 3 3 3     Sunday Monday Tuesday Wednesday Thursday Friday Saturday   2             Has the patient missed any doses of Coumadin, Warfarin, Jantoven in the past 7 days? No    Has the patients medications changed since the last visit? Yes increase to prednisone, on 01/24 by pulmonary provider     Has the patient experienced any bleeding recently? No    Has the patient experienced any injuries or illness recently? No    Has the patient experienced any 'new' shortness of breath, severe headaches, or changes in vision recently? Yes still having shortness of breath (has spoken to pulmonary)    Has the patient had any changes in their diet, or alcohol consumption? Yes has not drank his ensure today    Is the patient here today to prepare for any type of upcoming surgery, procedure, or for a cardioversion procedure? No    What phone number can we reach the patient at today? Please contact JEREMIAS Fernández with [facility] at [phone].

## 2021-06-05 NOTE — TELEPHONE ENCOUNTER
Myron called with multiple questions. Would like a new Nebulizer machine,said his is not working correctly. Has not heard from [facility] about a new CPAP and supplies. Order sent to [facility] for a new neb.machine and call to see why he had not received the CPAP supplies. Said he was notified and never came to get them. Has one more day at the 20 mg of prednisone, instructed to make sure to take his lasix as well, He forgot yesterday.   Will update Dr. Christensen.

## 2021-06-05 NOTE — PROGRESS NOTES
Pulmonary Clinic Follow Up    Cc: follow up dyspnea.    HPI: 86yoM with history of asthma as well as cardiomyopathy, EF 44% s/p TAVR for severe AS, restrictive lung disease presents for follow up.    He underwent TAVR 11/27 and upgrade of his BiV AICD. Was hospitalized 12/30-1/2 for CHF exacerbation and then again 8/30-9/2.       He is a former smoker, but quit in 1956. 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    He continues to use his oxygen with ambulation. He wears CPAP at night with 1L Oxygen bled in. 3L activity, 2L rest. He feels he has been doing well. Had cardioversion in November and has stayed in sinus, therefore breathing is at baseline.     Current regimen: Budesonide nebs, perforomist which he takes once daily and albuterol nebs.      Current Outpatient Medications   Medication Sig Note     acetaminophen (TYLENOL EXTRA STRENGTH) 500 MG tablet Take 1 tablet (500 mg total) by mouth every 6 (six) hours as needed for pain.      albuterol (PROVENTIL) 2.5 mg /3 mL (0.083 %) nebulizer solution Take 3 mL (2.5 mg total) by nebulization every 4 (four) hours as needed for wheezing.      atorvastatin (LIPITOR) 80 MG tablet Take 1 tablet (80 mg total) by mouth daily. (Patient taking differently: Take 80 mg by mouth every morning.       )      budesonide (PULMICORT) 0.5 mg/2 mL nebulizer solution Take 2 mL (0.5 mg total) by nebulization 2 (two) times a day.      calcium, as carbonate, (TUMS) 200 mg calcium (500 mg) chewable tablet Chew 2-4 tablets as needed for heartburn. Do not take more than 15 tablets in 24 hours.            cetirizine (ZYRTEC) 10 MG tablet Take 10 mg by mouth daily as needed.             clopidogrel (PLAVIX) 75 mg tablet Take 1 tablet (75 mg total) by mouth daily. take 75 mg (1 pill) daily (Patient taking differently: Take 75 mg by mouth every morning. take 75 mg (1 pill)  daily      )      fluticasone (FLONASE) 50 mcg/actuation nasal spray Apply 1 spray into each nostril daily as needed for rhinitis.      formoterol fumarate (PERFOROMIST) 20 mcg/2 mL nebulizer solution Take 2 mL (20 mcg total) by nebulization every 12 (twelve) hours.      furosemide (LASIX) 40 MG tablet Take 2 tablets (80 mg total) by mouth daily. Take 80mg daily dose alternate with 80mg AM and 40mg in PM .      hydrocortisone 1 % cream Apply 1 application topically daily as needed.             Lactobacillus acidophilus (PROBIOTIC ORAL) Take 1 capsule by mouth as needed.             latanoprost (XALATAN) 0.005 % ophthalmic solution Administer 1 drop to the right eye at bedtime.      metoprolol succinate (TOPROL-XL) 50 MG 24 hr tablet Take 75 mg by mouth see administration instructions. Take 75 mg (one and half tablet of 50 mg) daily after supper            multivitamin with minerals (THERA-M) 9 mg iron-400 mcg Tab tablet Take 1 tablet by mouth daily.      nebulizer and compressor Radha For chronic cough      nitroglycerin (NITROSTAT) 0.4 MG SL tablet Place 1 tablet (0.4 mg total) under the tongue every 5 (five) minutes as needed for chest pain. 8/30/2019: 8/30/2019: Please address if the patient should receive a new prescription for nitroglycerin.     OXYGEN-AIR DELIVERY SYSTEMS MISC into each nostril continuous. 1.5 liters with rest and 3 liters with activity  May use up to 1.5 liters at noc.  [facility]            pantoprazole (PROTONIX) 20 MG tablet Take 1 tablet (20 mg total) by mouth daily.      predniSONE (DELTASONE) 5 MG tablet Take 1 tablet by mouth daily. May increase to 2 tabs daily if needed.. (Patient taking differently: Take 10 mg by mouth daily Take 1 tablet by mouth daily. May increase to 2 tabs daily if needed..)      sertraline (ZOLOFT) 25 MG tablet Take 25 mg by mouth at bedtime.             warfarin (COUMADIN/JANTOVEN) 2 MG tablet Take 2-4 mg by mouth See Admin Instructions. Take 2 mg one day weekly  (on Tuesdays and Friday) and 4 mg five days weekly (on all other days of the week). Adjust dose based on INR results as directed.            Vitals:    01/07/20 0836   BP: 96/56   Pulse: 80   Resp: 24   SpO2: 98%   2L  Gen: NAD, elderly, frail  HEENT: no thrush or oral lesions   Neck: No lymphadenopathy  C/V: RRR,  S1 and S2. No longer any murmur  Resp: Rales at both bases  Ext: no peripheral edema.   Neuro: Tremor at rest.   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   -Overnight oximetry to assess if 1L is enough    2) Bronchiectasis-seen at bases on CT scan.    -No current exacerbation.   -Already had flu shot    3) Asthma,  -Continue perforomist and budesonide nebulized. Tried an inhaler, didn't like it. He would benefit from use of a nebulizer.  -PRN albuterol    4) Afib, paroxysmal, in sinus since November which has improved his breathing.   -Amiodarone remains off     5) iCMP, EF 45%   -Currently euvolemic. Continue close attention to daily weights. He takes Lasix 80mg Daily and has extra if weight up    6) CKD III  -Stable with current regimen    RTC 3 months      Albina Christensen MD  >50% of this 30 minute visit spent in direct patient counseling and coordination of care.

## 2021-06-05 NOTE — TELEPHONE ENCOUNTER
Remote Report:  Type: Monthly Heart Failure CRT-D Remote Transmission. Processed due to low BiV pacing.   Presenting: Atrial fibrillation (AF) with BiV pacing and ventricular sensing, rate ~70s.   Lead/Battery Status: Stable.   Atrial Arrhythmias: Since 1/14/20, 100% AF burden (noted to be in progress since 1/13/20), intermittent functional undersigning noted, v-rates during AF are >/=120bpm<5%.   Vent Arrhythmias: Since 1/14/20, none detected.   Anticoagulant: Per EMR, patient is taking Warfarin.   Comments: Normal device function. BiV pacing at 68.5%. Routed to Device RN for review.   Alerts: None. [facility]         ADD: BIVP continues to dip, recently reviewed by Zo Dudley CNP on 1/16/2020. This is likely due to persistent AF and she is recommending rate control, rates are controlled but competing with BIVP. Patient has upcoming appt with Dr. Thakkar on 2/7, will route note to him. HF diagnostics remain stable. TAE

## 2021-06-05 NOTE — TELEPHONE ENCOUNTER
Patient left a voicemail with concerns to diuretic therapy and weight gain.  Forwarded to Lissette PARSON's team for further follow up.  NASRA

## 2021-06-05 NOTE — TELEPHONE ENCOUNTER
INR result is 2.6  INR   Date Value Ref Range Status   01/20/2020 1.55 (H) 0.90 - 1.10 Final       Will the patient be seen, or did they already see, MD or CNP today? No    Most Recent Warfarin dose day/week Patient was hospitalized  Sunday Monday Tuesday Wednesday Thursday Friday Saturday      mg mg mg mg     Sunday Monday Tuesday Wednesday Thursday Friday Saturday   4 mg 3 mg 3 mg           Has the patient missed any doses of Coumadin, Warfarin, Jantoven in the past 7 days? No    Has the patients medications changed since the last visit? Yes Started Isosorbide Mononitrate 30 mg, Atorvastatin 80 mg, Plavix 75 mg, Lasix 80 mg in am 40 mg at night, Pantoprazole 20 mg, Prednisone 5 mg daily increased if needed to 10 mg    Has the patient experienced any bleeding recently? No    Has the patient experienced any injuries or illness recently? No    Has the patient experienced any 'new' shortness of breath, severe headaches, or changes in vision recently? Yes Still having some shortness of breath but O2 is in range per nurse    Has the patient had any changes in their diet, or alcohol consumption? Yes slightly less appetite    Is the patient here today to prepare for any type of upcoming surgery, procedure, or for a cardioversion procedure? No    What phone number can we reach the patient at today? [phone] Jolene.

## 2021-06-05 NOTE — TELEPHONE ENCOUNTER
"Received a call back from patient. He is a patient of [facility] for heart failure. He reports gradual weight gain since discharge from the hospital 1/20/2020. He states that his weight is up about 3-4 lbs and he is \"not peeing\" as much as he used to. He takes 80 mg once a day followed by 80 mg in the AM and 40 in the afternoon the next day alternating. He says his weight is 151.8 lbs, he has shortness of breath with activity and mildly at rest. He states that he has fatigue and ankle swelling. He was instructed to go to the ER if the symptoms persist to the lateness of the day but will forward to HF NP with high importance.      Erin or Marielos,  See above symptoms. Took 80 mg of Lasix today. Recs?  Mal   "

## 2021-06-05 NOTE — TELEPHONE ENCOUNTER
ANTICOAGULATION  MANAGEMENT- Home Care/Care Facility Result    Assessment     Today's INR result of 2.6 is Therapeutic (goal INR of 2.0-3.0)        Patient was just discharged from the hospitalon 1/20 and noted that he was reversed with 2.5 mg vit.k on 1/16 for Coronary Angiogram procedure done on 1/17/2020     Updated anticoagulation calendar with doses held/given while inpatient.    Slightly less appetite may be affecting diet and INR     Per patient  he has been taking 10 mg of prednisone for a while now.    Interaction between pantoprazole and warfarin may be affecting INR    Concurrent use of plavix and warfarin may increase risk of bleeding, but not expected to affect INR    No interaction expected between ,atorvastatin and warfarin    Continues to tolerate warfarin with no reported s/s of bleeding or thromboembolism     Previous INR was Subtherapeutic    Plan:     Spoke with Home Care Nurse Jolene and patient Myron discussed INR result and instructed:     Warfarin Dosing Instructions: Continue current warfarin dose    2 mg every Sun, Wed; 3 mg all other days      (0 % change)    Next INR to be drawn: 1/27 or 1/28 next home care visit    Education provided: importance of therapeutic range, target INR goal and significance of current INR result and importance of taking warfarin as instructed    Jolene verbalizes understanding and agrees to warfarin dosing plan.   ?   Natasha Grijalva RN    Subjective/Objective:      Myron Sunshine, a 88 y.o. male is established on warfarin.     Home care/care facility RN's report of Myron INR, recent warfarin dosing, diet changes, medication changes, and symptoms is documented below.    Additional findings: medication changes: see above    Anticoagulation Episode Summary     Current INR goal:   2.0-3.0   TTR:   59.7 % (11.5 mo)   Next INR check:   1/28/2020   INR from last check:   2.60 (1/22/2020)   Weekly max warfarin dose:      Target end date:      INR check location:       Preferred lab:      Send INR reminders to:   [facility] HEART [facility]    Indications    Persistent atrial fibrillation [I48.19]           Comments:   new order INR 2/5/19         Anticoagulation Care Providers     Provider Role Specialty Phone number    Donna Thakkar MD Referring Cardiology [phone]

## 2021-06-06 NOTE — TELEPHONE ENCOUNTER
INR result is 1.6  INR   Date Value Ref Range Status   02/28/2020 1.60 (!) 0.9 - 1.1 Final       Will the patient be seen, or did they already see, MD or CNP today? No    Most Recent Warfarin dose day/week  Sunday Monday Tuesday Wednesday Thursday Friday Saturday        1.5 mg 1.5 mg     Sunday Monday Tuesday Wednesday Thursday Friday Saturday   1.5 mg             Has the patient missed any doses of Coumadin, Warfarin, Jantoven in the past 7 days? No    Has the patients medications changed since the last visit? No    Has the patient experienced any bleeding recently? No    Has the patient experienced any injuries or illness recently? No    Has the patient experienced any 'new' shortness of breath, severe headaches, or changes in vision recently? No    Has the patient had any changes in their diet, or alcohol consumption? No    Is the patient here today to prepare for any type of upcoming surgery, procedure, or for a cardioversion procedure? No    What phone number can we reach the patient at today? [phone] Salma

## 2021-06-06 NOTE — TELEPHONE ENCOUNTER
INR result is   INR   Date Value Ref Range Status   02/18/2020 2.20 (!) 0.9 - 1.1 Final       2/21/20     3.2    Will the patient be seen, or did they already see, MD or CNP today? No    Most Recent Warfarin dose day/week  Sunday Monday Tuesday Wednesday Thursday Friday Saturday        1.5 mg 1.5 mg     Sunday Monday Tuesday Wednesday Thursday Friday Saturday   1.5 mg 1.5 mg 1.5 mg 1.5 mg 1.5 mg         Has the patient missed any doses of Coumadin, Warfarin, Jantoven in the past 7 days? No    Has the patients medications changed since the last visit? No    Has the patient experienced any bleeding recently? No    Has the patient experienced any injuries or illness recently? No    Has the patient experienced any 'new' shortness of breath, severe headaches, or changes in vision recently? No    Has the patient had any changes in their diet, or alcohol consumption? No    Is the patient here today to prepare for any type of upcoming surgery, procedure, or for a cardioversion procedure? No    What phone number can we reach the patient at today? [facility]  [phone].

## 2021-06-06 NOTE — TELEPHONE ENCOUNTER
INR result is 2.2  INR   Date Value Ref Range Status   02/15/2020 3.00 (H) 0.90 - 1.10 Final       Will the patient be seen, or did they already see, MD or CNP today? No    Most Recent Warfarin dose day/week  Sunday Monday Tuesday Wednesday Thursday Friday Saturday     1.5 mg 1.5 mg 1.5 mg 1.5 mg 1.5 mg     Sunday Monday Tuesday Wednesday Thursday Friday Saturday   1.5 mg 1.5 mg            Has the patient missed any doses of Coumadin, Warfarin, Jantoven in the past 7 days? No    Has the patients medications changed since the last visit? No    Has the patient experienced any bleeding recently? No    Has the patient experienced any injuries or illness recently? Yes [facility]  2/10-2/15 shortness of breath    Has the patient experienced any 'new' shortness of breath, severe headaches, or changes in vision recently? Yes continues to have shortness of breath    Has the patient had any changes in their diet, or alcohol consumption? No    Is the patient here today to prepare for any type of upcoming surgery, procedure, or for a cardioversion procedure? No    What phone number can we reach the patient at today? [phone]-Tiffany.

## 2021-06-06 NOTE — TELEPHONE ENCOUNTER
ANTICOAGULATION  MANAGEMENT- Home Care/Care Facility Result    Assessment     Today's INR result of 1.6 is Subtherapeutic (goal INR of 2.0-3.0)        More warfarin taken than instructed which may be affecting INR    No new diet changes affecting INR    No interaction expected between furosemide and warfarin     Continues to tolerate warfarin with no reported s/s of bleeding or thromboembolism     Previous INR was Subtherapeutic     Per Home Care Nurse the patient does not want to be poked ( INR checked )2 times a week.    Plan:     Spoke with Home Care Nurse Sharan discussed INR result and instructed:     Warfarin Dosing Instructions: 3 mg booster dose today then change warfarin dose to    1.5 mg every Sun, Wed; 2 mg all other days       (13 % change)    Next INR to be drawn: one week    Education provided: importance of therapeutic range, target INR goal and significance of current INR result and importance of following up for INR monitoring at instructed interval    Sharan verbalizes understanding and agrees to warfarin dosing plan.   ?   Natasha Grijalva RN    Subjective/Objective:      Myron Sunshine, a 88 y.o. male is established on warfarin.     Home care/care facility RN's report of Myron INR, recent warfarin dosing, diet changes, medication changes, and symptoms is documented below.    Additional findings: Per Home Care Nurse the patient took 2 mg on 3/5 and 3/6 then 1.5  Sat and Sun    Anticoagulation Episode Summary     Current INR goal:   2.0-3.0   TTR:   55.2 % (11.3 mo)   Next INR check:   3/16/2020   INR from last check:   1.60! (3/9/2020)   Weekly max warfarin dose:      Target end date:      INR check location:      Preferred lab:      Send INR reminders to:   [facility] HEART CARE    Indications    Persistent atrial fibrillation [I48.19]           Comments:   new order INR 2/5/19         Anticoagulation Care Providers     Provider Role Specialty Phone number    Donna Thakkar MD  Referring Cardiology [phone]

## 2021-06-06 NOTE — TELEPHONE ENCOUNTER
INR result is 1.9  INR   Date Value Ref Range Status   03/02/2020 1.80 (!) 0.9 - 1.1 Final       Will the patient be seen, or did they already see, MD or CNP today? No    Most Recent Warfarin dose day/week  Sunday Monday Tuesday Wednesday Thursday Friday Saturday Sunday Monday Tuesday Wednesday Thursday Friday Saturday    2 mg 1.5 mg 1.5 mg          Has the patient missed any doses of Coumadin, Warfarin, Jantoven in the past 7 days? Yes unsure     Has the patients medications changed since the last visit? Yes Magnesium 30 mg PRN taken around 3/3    Has the patient experienced any bleeding recently? No    Has the patient experienced any injuries or illness recently? No    Has the patient experienced any 'new' shortness of breath, severe headaches, or changes in vision recently? No    Has the patient had any changes in their diet, or alcohol consumption? No    Is the patient here today to prepare for any type of upcoming surgery, procedure, or for a cardioversion procedure? No    What phone number can we reach the patient at today? [phone] Alexia

## 2021-06-06 NOTE — TELEPHONE ENCOUNTER
INR result is   1.6    Will the patient be seen, or did they already see, MD or CNP today? No    Most Recent Warfarin dose day/week  Sunday Monday Tuesday Wednesday Thursday Friday Saturday Sunday Monday Tuesday Wednesday Thursday Friday Saturday     1.5 1.5 1.5         Has the patient missed any doses of Coumadin, Warfarin, Jantoven in the past 7 days? No    Has the patients medications changed since the last visit? No    Has the patient experienced any bleeding recently? No    Has the patient experienced any injuries or illness recently? No    Has the patient experienced any 'new' shortness of breath, severe headaches, or changes in vision recently? No    Has the patient had any changes in their diet, or alcohol consumption? No    Is the patient here today to prepare for any type of upcoming surgery, procedure, or for a cardioversion procedure? No    What phone number can we reach the patient at today?[phone] .

## 2021-06-06 NOTE — TELEPHONE ENCOUNTER
ANTICOAGULATION  MANAGEMENT- Home Care/Care Facility Result    Assessment     Today's INR result of 2.0 is Therapeutic (goal INR of 2.0-3.0)        Less warfarin taken than instructed which may be affecting INR    Increased greens/vitamin K intake may be affecting INR    No new medication/supplements affecting INR    Continues to tolerate warfarin with no reported s/s of bleeding or thromboembolism     Previous INR was Supratherapeutic    Plan:     Spoke with Home Care Nurse Sharan discussed INR result and instructed:     Warfarin Dosing Instructions: Continue current warfarin dose    1 mg every Sun, Fri; 1.5 mg all other days        (0 % change)    Next INR to be drawn: 2/28    Education provided: importance of therapeutic range and target INR goal and significance of current INR result    Sharan verbalizes understanding and agrees to warfarin dosing plan.   ?   Natasha Grijalva RN    Subjective/Objective:      Myron Sunshine, a 88 y.o. male is established on warfarin.     Home care/care facility RN's report of Myron INR, recent warfarin dosing, diet changes, medication changes, and symptoms is documented below.    Additional findings: none    Anticoagulation Episode Summary     Current INR goal:   2.0-3.0   TTR:   55.2 % (11.3 mo)   Next INR check:   2/28/2020   INR from last check:   2.00 (2/25/2020)   Weekly max warfarin dose:      Target end date:      INR check location:      Preferred lab:      Send INR reminders to:   [facility] HEART [facility]    Indications    Persistent atrial fibrillation [I48.19]           Comments:   new order INR 2/5/19         Anticoagulation Care Providers     Provider Role Specialty Phone number    Donna Thakkar MD Referring Cardiology [phone]

## 2021-06-06 NOTE — TELEPHONE ENCOUNTER
Talked with hospice. Myron does not want an inr done today and would appreciate the longest duration between pokes. Per protocol we could wait another week, 3/23 which is agreed upon

## 2021-06-06 NOTE — TELEPHONE ENCOUNTER
ANTICOAGULATION  MANAGEMENT- Home Care/Care Facility Result    Assessment     Today's INR result of 1.6 is Subtherapeutic (goal INR of 2.0-3.0)        Warfarin taken as previously instructed    No new diet changes affecting INR    No new medication/supplements affecting INR    Continues to tolerate warfarin with no reported s/s of bleeding or thromboembolism     Previous INR was Therapeutic    Plan:     Spoke with Home Care Nurse Sharan discussed INR result and instructed:     Warfarin Dosing Instructions: take 2 mg booster dose today then change warfarin dose to    1.5 mg every day        (10.5 % change)    Next INR to be drawn: 3/2    Education provided: importance of therapeutic range, target INR goal and significance of current INR result and importance of following up for INR monitoring at instructed interval    Sharan verbalizes understanding and agrees to warfarin dosing plan.   ?   Natasha Grijalva RN    Subjective/Objective:      Myron Sunshine, a 88 y.o. male is established on warfarin.     Home care/care facility RN's report of Myron INR, recent warfarin dosing, diet changes, medication changes, and symptoms is documented below.    Additional findings: none    Anticoagulation Episode Summary     Current INR goal:   2.0-3.0   TTR:   55.2 % (11.3 mo)   Next INR check:   3/2/2020   INR from last check:   1.60! (2/28/2020)   Weekly max warfarin dose:      Target end date:      INR check location:      Preferred lab:      Send INR reminders to:   [facility] HEART [facility]    Indications    Persistent atrial fibrillation [I48.19]           Comments:   new order INR 2/5/19         Anticoagulation Care Providers     Provider Role Specialty Phone number    Donna Thakkar MD Referring Cardiology [phone]

## 2021-06-06 NOTE — TELEPHONE ENCOUNTER
INR result is 1.6  INR   Date Value Ref Range Status   03/05/2020 1.90 (!) 0.9 - 1.1 Final       Will the patient be seen, or did they already see, MD or CNP today? No    Most Recent Warfarin dose day/week  Sunday Monday Tuesday Wednesday Thursday Friday Saturday    1.5 1.5 1.5 1.5 1.5 1.5     Sunday Monday Tuesday Wednesday Thursday Friday Saturday   1.5             Has the patient missed any doses of Coumadin, Warfarin, Jantoven in the past 7 days? No    Has the patients medications changed since the last visit? Yes, reduced furosimde was cut in half to 80 mg daily  Has the patient experienced any bleeding recently? No    Has the patient experienced any injuries or illness recently? No    Has the patient experienced any 'new' shortness of breath, severe headaches, or changes in vision recently? No    Has the patient had any changes in their diet, or alcohol consumption? No    Is the patient here today to prepare for any type of upcoming surgery, procedure, or for a cardioversion procedure? No    What phone number can we reach the patient at today? [facility] [phone]

## 2021-06-06 NOTE — TELEPHONE ENCOUNTER
ANTICOAGULATION  MANAGEMENT: Discharge Review    Myron Sunshine chart reviewed for anticoagulation continuity of care    Hospital admission on  2/10/20 to 2/15/20 for Acute on chronic systolic congestive heart failure (H).    Discharge disposition: Home with Hospice    INR Results:       Recent labs: (last 7 days)     02/10/20  1214 02/11/20  0453 02/12/20  0549 02/13/20  0539 02/14/20  0546 02/15/20  0558   INR 2.02* 2.35* 2.43* 2.86* 2.94* 3.00*       Warfarin inpatient management: less warfarin administered than maintenance regimen    Warfarin discharge instructions: decrease dose to: 1.5mg daily       Medication Changes Affecting Anticoagulation: No    Additional Factors Affecting Anticoagulation: Yes: CHF    Plan     Agree with discharge plan for follow up on 3-5 days    Spoke with [facility]. INR will be drawn tomorrow    Anticoagulation calendar updated    Dary Chavez RN

## 2021-06-06 NOTE — TELEPHONE ENCOUNTER
ANTICOAGULATION  MANAGEMENT- Home Care/Care Facility Result    Assessment     Today's INR result of 2.2 is Therapeutic (goal INR of 2.0-3.0)        Warfarin taken as previously instructed- 1.5 mg daily.    No new diet changes affecting INR    No new medication/supplements affecting INR    Continues to tolerate warfarin with no reported s/s of bleeding or thromboembolism     Previous INR was Therapeutic     Hospitalized 2/10-2/15 for acute on chronic systolic chf.     Plan:     Spoke with hospice nurse tiki discussed INR result and instructed:     Warfarin Dosing Instructions: Continue current warfarin dose 1.5 mg daily.    Next INR to be drawn: Fri 2/21.     Education provided: importance of taking warfarin as instructed    Tiki verbalizes understanding and agrees to warfarin dosing plan.   ?   Lester Erazo RN    Subjective/Objective:      Myron Sunshine, a 88 y.o. male is established on warfarin.     Home care/care facility RN's report of Myron INR, recent warfarin dosing, diet changes, medication changes, and symptoms is documented below.    Additional findings: template incorrect; verbally confirmed home dose with Tiki and updated on anticoagulation calendar    Anticoagulation Episode Summary     Current INR goal:   2.0-3.0   TTR:   55.5 % (11.3 mo)   Next INR check:   2/21/2020   INR from last check:   2.20 (2/18/2020)   Weekly max warfarin dose:      Target end date:      INR check location:      Preferred lab:      Send INR reminders to:   [facility] HEART CARE    Indications    Persistent atrial fibrillation [I48.19]           Comments:   new order INR 2/5/19         Anticoagulation Care Providers     Provider Role Specialty Phone number    Donna Thakakr MD Referring Cardiology [phone]

## 2021-06-06 NOTE — TELEPHONE ENCOUNTER
ANTICOAGULATION  MANAGEMENT- Home Care/Care Facility Result    Assessment     Today's INR result of 3.2 is Supratherapeutic (goal INR of 2.0-3.0)        Warfarin taken as previously instructed    No new diet changes affecting INR    No new medication/supplements affecting INR    Continues to tolerate warfarin with no reported s/s of bleeding or thromboembolism     Previous INR was Therapeutic    Plan:     Spoke with hospice nurse  Sharan discussed INR result and instructed:     Warfarin Dosing Instructions: have 1 mg today and tomorrow  to help lower inr, then change dose to 1 mg daily on sun/fri; 1.5 mg daily all other days (9.5 % change)    Next INR to be drawn: tues 2/25 with hospice visit    Sharan verbalizes understanding and agrees to warfarin dosing plan.   ?   Carolina Clayton RN    Subjective/Objective:      Myron Sunshine, a 88 y.o. male is established on warfarin.     Home care/care facility RN's report of Myron INR, recent warfarin dosing, diet changes, medication changes, and symptoms is documented below.    Additional findings: none    Anticoagulation Episode Summary     Current INR goal:   2.0-3.0   TTR:   55.3 % (11.3 mo)   Next INR check:   2/25/2020   INR from last check:   3.20! (2/21/2020)   Weekly max warfarin dose:      Target end date:      INR check location:      Preferred lab:      [facility]    Indications    Persistent atrial fibrillation [I48.19]           Comments:   new order INR 2/5/19         Anticoagulation Care Providers     Provider Role Specialty Phone number    Donna Thakkar MD Referring Cardiology [phone]

## 2021-06-06 NOTE — TELEPHONE ENCOUNTER
INR result is   INR   Date Value Ref Range Status   02/21/2020 3.20 (!) 0.9 - 1.1 Final       2/25/20        2.0    Will the patient be seen, or did they already see, MD or CNP today? No    Most Recent Warfarin dose day/week  Sunday Monday Tuesday Wednesday Thursday Friday Saturday        1 mg 1 mg     Sunday Monday Tuesday Wednesday Thursday Friday Saturday   1 mg 1 mg            Has the patient missed any doses of Coumadin, Warfarin, Jantoven in the past 7 days? yes  Took 1 mg on Monday instead of 1.5 mg    Has the patients medications changed since the last visit? No    Has the patient experienced any bleeding recently? No    Has the patient experienced any injuries or illness recently? No    Has the patient experienced any 'new' shortness of breath, severe headaches, or changes in vision recently? No    Has the patient had any changes in their diet, or alcohol consumption? Yes had 2 portions of broccoli    Is the patient here today to prepare for any type of upcoming surgery, procedure, or for a cardioversion procedure? No    What phone number can we reach the patient at today? Sharan [facility]  [phone].

## 2021-06-06 NOTE — TELEPHONE ENCOUNTER
ANTICOAGULATION  MANAGEMENT- Home Care/Care Facility Result    Assessment     Today's INR result of 1.8 is Subtherapeutic (goal INR of 2.0-3.0)        Warfarin taken as previously instructed    No new diet changes affecting INR    No new medication/supplements affecting INR    Continues to tolerate warfarin with no reported s/s of bleeding or thromboembolism     Previous INR was Subtherapeutic    Plan:     Spoke with Home Care Nurse Sharan discussed INR result and instructed:     Warfarin Dosing Instructions: Change warfarin dose to    2 mg every Mon; 1.5 mg all other days      (5 % change)     Next INR to be drawn: 3/5    Education provided: importance of therapeutic range, target INR goal and significance of current INR result and importance of taking warfarin as instructed    Sharan verbalizes understanding and agrees to warfarin dosing plan.   ?   Natasha Grijalva RN    Subjective/Objective:      Myron Sunshine, a 88 y.o. male is established on warfarin.     Home care/care facility RN's report of Myron INR, recent warfarin dosing, diet changes, medication changes, and symptoms is documented below.    Additional findings: INR is 1.8 today versus reported 1.6   Sharan confirmed that the patient took 2 mg dose on 2/28 then 1.5 mg on Sat and Sun.    Anticoagulation Episode Summary     Current INR goal:   2.0-3.0   TTR:   55.2 % (11.3 mo)   Next INR check:   3/5/2020   INR from last check:   1.80! (3/2/2020)   Weekly max warfarin dose:      Target end date:      INR check location:      Preferred lab:      Send INR reminders to:   [facility] HEART CARE    Indications    Persistent atrial fibrillation [I48.19]           Comments:   new order INR 2/5/19         Anticoagulation Care Providers     Provider Role Specialty Phone number    Donna Thakkar MD Referring Cardiology [phone]

## 2021-06-06 NOTE — TELEPHONE ENCOUNTER
ANTICOAGULATION  MANAGEMENT- Home Care/Care Facility Result    Assessment     Today's INR result of 1.9 is Subtherapeutic (goal INR of 2.0-3.0)        Warfarin taken as previously instructed    No new diet changes affecting INR    No new medication/supplements affecting INR    Continues to tolerate warfarin with no reported s/s of bleeding or thromboembolism     Previous INR was Subtherapeutic    Plan:     Spoke with home care nurse Shaniqua discussed INR result and instructed:     Warfarin Dosing Instructions: Change warfarin dose to 2 mg daily on Mon, Thur; and 1.5 mg daily rest of week  (4.5 % change)    Next INR to be drawn: Mon 3/9.    Education provided: importance of taking warfarin as instructed    Shaniqua verbalizes understanding and agrees to warfarin dosing plan.   ?   Lester Erazo RN    Subjective/Objective:      Myron Sunshine, a 88 y.o. male is established on warfarin.     Home care/care facility RN's report of Myron INR, recent warfarin dosing, diet changes, medication changes, and symptoms is documented below.    Additional findings: verbally confirmed home dose with Shaniqua and updated on anticoagulation calendar    Anticoagulation Episode Summary     Current INR goal:   2.0-3.0   TTR:   55.2 % (11.3 mo)   Next INR check:   3/9/2020   INR from last check:   1.90! (3/5/2020)   Weekly max warfarin dose:      Target end date:      INR check location:      Preferred lab:      Send INR reminders to:   [facility]    Indications    Persistent atrial fibrillation [I48.19]           Comments:   new order INR 2/5/19         Anticoagulation Care Providers     Provider Role Specialty Phone number    Donna Thakkar MD Referring Cardiology [phone]

## 2021-06-07 NOTE — PROGRESS NOTES
I spoke with Eulalia today and she wanted me to say thank you to the hospice team.   She said she was so glad that we were there.

## 2021-06-08 NOTE — PROGRESS NOTES
INR is 5.0 will hold Warfarin Fr,Sat,Sun and retest Monday. Pt has been ill and on antibiotics and steroids. Off these medication less then 48 hours. He will eat more greens and retest Monday 2/20. After talking with pt and discussing history of greens/salads and medication change. Pt will  continue  with current diet and dosing of Warfarin.  Continue with moderation of Vit K and green leafy vegetables. Cautioned to call with increase bruising or bleeding. Reminded to call with medication change especially antibiotic. Call with any questions or concerns or any up coming procedures. Cautioned about using Herbal medication.

## 2021-06-09 ENCOUNTER — RECORDS - HEALTHEAST (OUTPATIENT)
Dept: ADMINISTRATIVE | Facility: CLINIC | Age: 86
End: 2021-06-09

## 2021-06-11 NOTE — PROGRESS NOTES
INR 2.2 using prednisone. Will be done next couple of days. After talking with pt and discussing history of greens/salads and medication change. Pt will  continue  with current diet and dosing of Warfarin.  Continue with moderation of Vit K and green leafy vegetables. Cautioned to call with increase bruising or bleeding. Reminded to call with medication change especially antibiotic. Call with any questions or concerns or any up coming procedures. Cautioned about using Herbal medication.

## 2021-06-12 NOTE — PROGRESS NOTES
INR 2.6 pt is on steroids and will adjust Warfarin and steroids as needed. Will retest in 4 weeks. INR stable. Discussed continuing management of dose of Warfarin and returning in one month . No changes to diet needed at this time. Continue moderate intake of Vitamin K and call if increase bruising or unexplained bleeding. Call with medication changes or upcoming procedures.

## 2021-06-12 NOTE — PROGRESS NOTES
NR 2.2 INR stable. Discussed continuing management of dose of Warfarin and returning in one month . No changes to diet needed at this time. Continue moderate intake of Vitamin K and call if increase bruising or unexplained bleeding. Call with medication changes or upcoming procedures.

## 2021-06-13 NOTE — PROGRESS NOTES
PFT NOTE    Pt gave good effort & was cooperative, was able to meet ATS standards for 3 acceptable maneuvers, albuterol was given for the bronchodilator. Breanna Painting, LRT      RESPIRATORY CARE NOTE     Patient Name: Myron Sunshine  Today's Date: 10/24/2017     Problem List  Patient Active Problem List   Diagnosis     Paroxysmal VT     Ischemic cardiomyopathy     Stage 3 chronic kidney disease     ICD (implantable cardioverter-defibrillator), dual, in situ     Chronic systolic heart failure     Pleural effusion, left     Essential hypertension     Dyslipidemia, goal LDL below 70     Coronary artery disease due to lipid rich plaque     Nonrheumatic aortic valve stenosis     Left ventricular aneurysm     Cough, persistent     Mild intermittent asthma without complication                           Breanna Painting

## 2021-06-13 NOTE — PROGRESS NOTES
Patient instructed in use of Symbicort inhaler.  Return demo and first dose given in clinic. Patient able to use the inhaler device without any difficulty.  Instructed to also rinse mouth after each use.  Phone numbers given to call if any questions.

## 2021-06-13 NOTE — PROGRESS NOTES
Pulmonary Clinic Visit    Cc: dyspnea    HPI: 86 y.o. male with history of asthma presents for evaluation of worsening shortness of breath. He reports that sitting down feels fine. When does something feels short of breath. Notices it in the morning when brushing his teeth. Not SOB with showering. Has lasted 6 months at least.     Prednisone helps breathing but comes off of it and feels worse. (I think he may also be relaying the energy burst this provides him, not just the improvement in the airways).    Just stopped prednisone yesterday. Feels he can breathe fully when he has prednisone. Just completed 5 days. Wheezes every day. This has also been going on for a while. Albuterol does help, using with spacer. Using albuterol at least 6 times per day.     Triggers include: humidity.    Of note, he saw Dr. Gasca of [facility] 2 times prior to his   He tells me that Dr. Gasca stopped his lasix and then fluid accumulated and he wound up in the hospital. Was also started on Incruse by Dr. Gasca and doesn't feel it has helped significantly.     Was hospitalized end of September into October and found to have pleural effusion  He had thoracentesis 10/4 with 850cc removed. It appeared to be exudative--LDH was 735 with 1267 WBC and 24% eosinophils. CXR done after thoracentesis showed improvement in effusion but possible infiltrate at right base.    Past Medical History:   Diagnosis Date     Anxiety      Aortic stenosis      Chronic kidney disease     stage 3     Coronary artery disease due to lipid rich plaque      Dyslipidemia, goal LDL below 70      ED (erectile dysfunction)      Essential hypertension      GERD (gastroesophageal reflux disease)      Ischemic cardiomyopathy      Psoriasis      Seasonal allergies      Sleep apnea      TIA (transient ischemic attack) 8/09     Ventricular aneurysm     Long-term coumadin therapy s/p ventricular patch 2001     Vitamin D deficiency          Social History   Substance Use  Topics     Smoking status: Former Smoker     Packs/day: 1.00     Years: 8.00     Quit date: 5/17/1955     Smokeless tobacco: Never Used     Alcohol use 0.6 oz/week     1 Glasses of wine per week         Family History   Problem Relation Age of Onset     Stroke Mother      Thyroid disease Mother      Cancer Brother      Lung disease Neg Hx          Current Outpatient Prescriptions   Medication Sig     albuterol (PROVENTIL HFA;VENTOLIN HFA) 90 mcg/actuation inhaler Inhale 2 puffs every 6 (six) hours as needed for wheezing.     aspirin 81 MG tablet Take 81 mg by mouth daily.     fluticasone (FLONASE) 50 mcg/actuation nasal spray 2 sprays into each nostril daily as needed for allergies.      furosemide (LASIX) 20 MG tablet Take 20 mg by mouth 2 (two) times a day at 9am and 6pm. Take in the morning and early afternoon.     guaiFENesin (MUCINEX) 600 mg 12 hr tablet Take 600 mg by mouth 2 (two) times a day as needed for congestion.      latanoprost (XALATAN) 0.005 % ophthalmic solution Administer 1 drop to the right eye at bedtime.     losartan (COZAAR) 50 MG tablet Take 50 mg by mouth daily.     metoprolol (TOPROL-XL) 100 MG 24 hr tablet Take 100 mg by mouth daily.     multivitamin therapeutic (THERAGRAN) tablet Take 1 tablet by mouth daily.     nitroglycerin (NITROSTAT) 0.4 MG SL tablet Place 0.4 mg under the tongue every 5 (five) minutes as needed for chest pain.     nystatin-triamcinolone (MYCOLOG) ointment Apply topically 2 (two) times a day as needed (apply to groin).      predniSONE (DELTASONE) 20 MG tablet Take 2 tablets (40 mg total) by mouth daily with lunch.     sertraline (ZOLOFT) 50 MG tablet Take 25 mg by mouth daily. Take 1/2 tablet (25mg) daily.     sildenafil (VIAGRA) 100 MG tablet Take 100 mg by mouth as needed for erectile dysfunction.      simvastatin (ZOCOR) 80 MG tablet Take 80 mg by mouth bedtime.     umeclidinium (INCRUSE ELLIPTA) 62.5 mcg/actuation DsDv inhaler Inhale 1 puff daily.     warfarin  (COUMADIN) 2 MG tablet Take 2-4 mg by mouth See Admin Instructions. Take 1 tablet (2mg) on Tuesday & Thursday; Take 2 tablets (4mg) all other days of the week. Adjust dose based on INR results as directed.     dextromethorphan-guaiFENesin (ROBITUSSIN-DM)  mg/5 mL liquid Take 5 mL by mouth every 4 (four) hours as needed.       No Known Allergies      Review of Systems   Constitutional: Positive for fatigue.   HENT: Positive for congestion.    Eyes: Negative.    Respiratory: Positive for cough, shortness of breath and wheezing.    Cardiovascular: Positive for leg swelling.   Gastrointestinal: Negative.    Endocrine: Negative.    Genitourinary: Negative.    Musculoskeletal: Negative.    Skin: Negative.    Allergic/Immunologic: Negative.    Neurological: Positive for weakness and light-headedness.   Hematological: Negative.    Psychiatric/Behavioral: Negative.        Vitals:    10/12/17 1053   BP: 90/50   Pulse: 62   Resp: 18   SpO2: 99%   RA  Physical Exam   Constitutional: He is oriented to person, place, and time. He appears well-developed and well-nourished.   HENT:   Head: Normocephalic.   Mallampati I   Eyes: Conjunctivae and EOM are normal.   Neck: Normal range of motion. No tracheal deviation present.   Cardiovascular: Normal rate and regular rhythm.    Systolic murmur   Pulmonary/Chest: Effort normal. He has no wheezes. He has no rales.   Abdominal: Soft. There is no tenderness.   Musculoskeletal: Normal range of motion.   1+ pitting edema bilaterally   Neurological: He is alert and oriented to person, place, and time.   Skin: Skin is warm and dry.   Psychiatric: He has a normal mood and affect. His behavior is normal. Thought content normal.     CXR: Personally reviewed from 10/4.  Improved left lower lobe effusion s/p thoracentesis. Right side is clear.    Spirometry done 2010 with allergy  At [facility]. Found FEV1 1.95L, 71% predicted, FVC 2.89L, 82% predicted  FEV1/FVC 67    PFTs scheduled for future  date.  Spirometry done today:  FEV1 1.28L, 53% predicted  FVC 1.83L, 50% predicted  FEV1/FVC 70  Flow volume loop shows mild scooping.    Assessment/Plan  86yoM with dyspnea at least in part related to what sounds like asthma.  Asthma action plan at follow up  Flu shot already received  Peak flow meter, will see if improves on addition of ICS/LABA. Sample provided today along with teaching for Symbicort.  Will obtain full PFTs prior to return to clinic.     This is also in part related to systolic heart failure (EF 40%). Patient not interested in daily weights etc, but we discussed the importance of staying on top of his fluid balance to stay out of the hospital. Asked him to weigh daily, record weights and call if up 5 pounds in 1 week, 2 pounds in 1 day.    I am unclear if he truly had pneumonia causing a parapneumonic effusion--but no bacteria grew so this was not empyema. I would like to get a Chest CT prior to his return to assess lung parenchyma and status of fluid. Could this also be HP due to his response to steroids?    Albina Christensen MD  Electronically signed on 10/12/2017 11:21 AM  >50% of this 60 minute visit spent in direct patient counseling.    Dr. Weston cc: missy.

## 2021-06-14 NOTE — PROGRESS NOTES
Pulmonary Clinic Follow Up    Cc: follow up dyspnea.    HPI: 86yoM with history of asthma as well as cardiomyopathy, EF 40%, restrictive lung disease presents for follow up.    First visit 10/2017 after hospitalization where pleural effusion was found to be exudative concerning for parapneumonic effusion. No organism ever grew. Initiated Symbicort and he has felt his breathing has improved significantly. Out of it for the past week and noticed a decline in his breathing while off of it.    Received peak flow meter but can't really remember his numbers. Sometimes up to 300 he thinks, and while off symbicort best he can do is 250 and that's hard. Also feels his hoarseness has improved since starting the symbicort.    Current Outpatient Prescriptions   Medication Sig     albuterol (PROVENTIL HFA;VENTOLIN HFA) 90 mcg/actuation inhaler Inhale 2 puffs every 6 (six) hours as needed for wheezing.     aspirin 81 MG tablet Take 81 mg by mouth daily.     dextromethorphan-guaiFENesin (ROBITUSSIN-DM)  mg/5 mL liquid Take 5 mL by mouth every 4 (four) hours as needed.     fluticasone (FLONASE) 50 mcg/actuation nasal spray 2 sprays into each nostril daily as needed for allergies.      furosemide (LASIX) 20 MG tablet Take 20 mg by mouth 2 (two) times a day at 9am and 6pm. Take in the morning and early afternoon.     guaiFENesin (MUCINEX) 600 mg 12 hr tablet Take 600 mg by mouth 2 (two) times a day as needed for congestion.      latanoprost (XALATAN) 0.005 % ophthalmic solution Administer 1 drop to the right eye at bedtime.     losartan (COZAAR) 50 MG tablet Take 50 mg by mouth daily.     metoprolol (TOPROL-XL) 100 MG 24 hr tablet Take 100 mg by mouth daily.     multivitamin therapeutic (THERAGRAN) tablet Take 1 tablet by mouth daily.     nitroglycerin (NITROSTAT) 0.4 MG SL tablet Place 0.4 mg under the tongue every 5 (five) minutes as needed for chest pain.     nystatin-triamcinolone (MYCOLOG) ointment Apply topically 2 (two)  times a day as needed (apply to groin).      sertraline (ZOLOFT) 50 MG tablet Take 25 mg by mouth daily. Take 1/2 tablet (25mg) daily.     sildenafil (VIAGRA) 100 MG tablet Take 100 mg by mouth as needed for erectile dysfunction.      simvastatin (ZOCOR) 80 MG tablet Take 80 mg by mouth bedtime.     umeclidinium (INCRUSE ELLIPTA) 62.5 mcg/actuation DsDv inhaler Inhale 1 puff daily.     budesonide-formoterol (SYMBICORT) 160-4.5 mcg/actuation inhaler Inhale 2 puffs 2 (two) times a day.     warfarin (COUMADIN) 2 MG tablet Take 2 mg T and thur and 4 mg all other days.     Vitals:    12/01/17 0935   BP: 92/60   Pulse: 84   Resp: 24   SpO2: 94%   RA  Gen: NAD  C/V: RRR< S1 and S2  Resp: Clear bilaterally with good air entry  Ext: no edema    PFT: personally reviewed  FVC 1.9 456% FEV1 1.4 457% ratio 74%.  No significant response to bronchodilators.  TLC 40% predicted DLCO corrected 47% predicted  Assessment: No obstruction.  Moderate restriction.  Moderate diffusion defect.    CT chest: thickened pleural bilaterally with mild pleural effusion. Stable rounded atelectasis. Interstitial reticular subpleural markings more advanced than previously.    ASSESSMENT/PLAN:  1) Restrictive lung disease-TLC appears out of proportion to findings on CT scan, however makes sense that he would feel better on prednisone    2) Bronchiectasis-seen at bases on CT scan. Discussed increased risk for pneumonia and this explains chronic cough. Unclear etiology however given his age, it is unikely to have been genetic or autoimmune    3) Asthma, improved with symbicort. Prescribed this for him. OK to take Incruse as well.    RTC in 6 months.  Albina Christensen MD  Electronically signed on 12/3/2017 9:57 PM

## 2021-06-14 NOTE — PROGRESS NOTES
[facility] Heart Care Note  Assessment:  1. 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    2. History of left circumflex occlusion, thrombosed stent requiring repeat  stenting 11/4/2001.    4. ICD implant 12/2001, secondary indication (clinical VT);        generator replacement  7/19/2007,         generator replacement 6/18/2014.  [employer] DDDR/ICD  5.  Moderate aortic stenosis        Mean aortic valve gradient 22 mm on October 2, 2017   6. History of abdominal aortic aneurysm with repair 12/2008.    7. Chronic moderate renal insufficiency with creatinine 1.84    8. Left ventricular dysfunction with ejection fraction about 35% with large inferior wall motion abnormality.         Echocardiogram  is similar       Echocardiogram October 2, 2017; ejection fraction 40%, infero-posterior aneurysm  9. Chronic anticoagulation with warfarin. Also ASA  10. Sinus bradycardia.  TIA; comprehensive evaluation was unremarkable November 2015  Left pleural effusion with thoracentesis-850 cc exudate 2017    Plan:    Pacemaker /defibrillator evaluation is very good today  Battery should last another 6.5 years  Almost no irregular heart rhythms identified  Recent hospitalization showed ejection fraction of 40%; this  is pretty much unchanged from previous studies  There is moderate narrowing of the aortic valve with a mean aortic valve gradient of 22 mm.  If this progresses, severe aortic stenosis would be a mean gradient of 40 mm or more  Continue furosemide on your current schedule  INR today, continue warfarin  Follow-up with Dr. Thakkar in 6 months,  return for comprehensive cardiac arrhythmia device assessment in 1 year  Echocardiogram in 1 year  to assess severity of aortic valve stenosis          Subjective:    I had the opportunity to see.Myron Sunshine , who is a 86 y.o. male with a known history of coronary artery disease  and ventricular arrhythmia and ICD  Hospitalized October 2017 for shortness of breath.  He had a large left pleural effusion and a thoracentesis yielded 850 cc of red fluid that was felt to be a transudate.  Studies did not show infection or cancer  He is now under the direction of the pulmonary specialist  He is on broncho-inhalers that seem to be helpful  Echocardiogram October 2, 2017 showed ejection fraction 40%.  There is moderate aortic stenosis with a mean aortic valve gradient 22.5 mm  NP was elevated equals 609  Did better on prednisone therapy  Was started on Symbicort  Now takes Lasix 20 mg twice daily; before his hospital decompensation I believe he was off diuretics  Since his hospital discharge, he has made good improvement in breathing and exercise capacity  He has no angina  Denies orthopnea PND or pedal edema-controlled with his diuretic  He has no awareness of palpitations, arrhythmias and no syncopal or near syncopal episodes    Problem List:  Patient Active Problem List   Diagnosis     Paroxysmal VT     Ischemic cardiomyopathy     Stage 3 chronic kidney disease     ICD (implantable cardioverter-defibrillator), dual, in situ     Chronic systolic heart failure     Pleural effusion, left     Essential hypertension     Dyslipidemia, goal LDL below 70     Coronary artery disease due to lipid rich plaque     Nonrheumatic aortic valve stenosis     Left ventricular aneurysm     Cough, persistent     Mild intermittent asthma without complication     Medical History:  Past Medical History:   Diagnosis Date     Anxiety      Aortic stenosis      Chronic kidney disease     stage 3     Coronary artery disease due to lipid rich plaque      Dyslipidemia, goal LDL below 70      ED (erectile dysfunction)      Essential hypertension      GERD (gastroesophageal reflux disease)      Ischemic cardiomyopathy      Psoriasis      Seasonal allergies      Sleep apnea      TIA (transient ischemic attack) 8/09      Ventricular aneurysm     Long-term coumadin therapy s/p ventricular patch 2001     Vitamin D deficiency      Surgical History:  Past Surgical History:   Procedure Laterality Date     ABDOMINAL AORTIC ANEURYSM REPAIR       APPENDECTOMY      Perforated- Unknown     CARDIAC CATHETERIZATION       CARDIAC DEFIBRILLATOR PLACEMENT  7/19/2007     CORONARY STENT PLACEMENT      LCX     ICD Generator Change  6/18/14     RESECTION SMALL BOWEL / CLOSURE ILEOSTOMY       VENTRICULAR RESECTION / REPAIR ANEURYSM       Social History:  Social History     Social History     Marital status:      Spouse name: Eulalia     Number of children: N/A     Years of education: N/A     Occupational History      Retired     [employer]     Social History Main Topics     Smoking status: Former Smoker     Packs/day: 1.00     Years: 8.00     Quit date: 5/17/1955     Smokeless tobacco: Never Used     Alcohol use 0.6 oz/week     1 Glasses of wine per week     Drug use: No     Sexual activity: Yes     Partners: Female     Other Topics Concern     Not on file     Social History Narrative       Review of Systems:      General: WNL  Eyes: WNL  Ears/Nose/Throat: WNL  Lungs: WNL  Heart: WNL  Stomach: WNL  Bladder: WNL  Muscle/Joints: WNL  Skin: WNL  Nervous System: WNL  Mental Health: WNL     Blood: WNL        Family History:  Family History   Problem Relation Age of Onset     Stroke Mother      Thyroid disease Mother      Cancer Brother      Lung disease Neg Hx          Allergies:  No Known Allergies    Medications:  Current Outpatient Prescriptions   Medication Sig Dispense Refill     albuterol (PROVENTIL HFA;VENTOLIN HFA) 90 mcg/actuation inhaler Inhale 2 puffs every 6 (six) hours as needed for wheezing.       aspirin 81 MG tablet Take 81 mg by mouth daily.       dextromethorphan-guaiFENesin (ROBITUSSIN-DM)  mg/5 mL liquid Take 5 mL by mouth every 4 (four) hours as needed.  0     fluticasone (FLONASE) 50 mcg/actuation nasal spray 2 sprays into  each nostril daily as needed for allergies.        furosemide (LASIX) 20 MG tablet Take 20 mg by mouth 2 (two) times a day at 9am and 6pm. Take in the morning and early afternoon.       guaiFENesin (MUCINEX) 600 mg 12 hr tablet Take 600 mg by mouth 2 (two) times a day as needed for congestion.        latanoprost (XALATAN) 0.005 % ophthalmic solution Administer 1 drop to the right eye at bedtime.       losartan (COZAAR) 50 MG tablet Take 50 mg by mouth daily.       metoprolol (TOPROL-XL) 100 MG 24 hr tablet Take 100 mg by mouth daily.       multivitamin therapeutic (THERAGRAN) tablet Take 1 tablet by mouth daily.       nitroglycerin (NITROSTAT) 0.4 MG SL tablet Place 0.4 mg under the tongue every 5 (five) minutes as needed for chest pain.       nystatin-triamcinolone (MYCOLOG) ointment Apply topically 2 (two) times a day as needed (apply to groin).        sertraline (ZOLOFT) 50 MG tablet Take 25 mg by mouth daily. Take 1/2 tablet (25mg) daily.       sildenafil (VIAGRA) 100 MG tablet Take 100 mg by mouth as needed for erectile dysfunction.        simvastatin (ZOCOR) 80 MG tablet Take 80 mg by mouth bedtime.       umeclidinium (INCRUSE ELLIPTA) 62.5 mcg/actuation DsDv inhaler Inhale 1 puff daily. 30 puff 0     warfarin (COUMADIN) 2 MG tablet Take 2-4 mg by mouth See Admin Instructions. Take 1 tablet (2mg) on Tuesday & Thursday; Take 2 tablets (4mg) all other days of the week. Adjust dose based on INR results as directed.       No current facility-administered medications for this visit.          Surgical site  ICD is well-healed and left subclavicular region    Device interrogation  Intrinsic rhythm is sinus bradycardia at 55 bpm  78% atrial pacing  Almost no ventricular pacing-0.6%  Minimal atrial arrhythmias none lasting more than a minute  Minimal nonsustained ventricular tachycardia-one episode of 17 beats-none requiring therapy  Lead function satisfactory  ICD battery good for another 6.5 years    Objective:  "  Vital signs:  BP 98/60 (Patient Site: Left Arm, Patient Position: Sitting, Cuff Size: Adult Regular)  Pulse 65  Resp 16  Ht 5' 8\" (1.727 m)  Wt 179 lb 8 oz (81.4 kg)  BMI 27.29 kg/m2      Physical Exam:      GENERAL APPEARANCE: Alert, cooperative and in no acute distress.  HEENT: No scleral icterus. No Xanthelasma. Oral mucous membranes pink and moist.  NECK: JVP  Normal cm. No Hepatojugular reflux. Thyroid not  Palpable  CHEST: clear to auscultation and percussion  CARDIOVASCULAR: S1, S2 2/6 HOWIE; not impressive    Brachial, radial  pulses are intact and symmetric.   No carotid bruits noted.  ABDOMEN: Non tender. BS+. No bruits.  EXTREMITIES: No cyanosis, clubbing or edema.    Lab Results:  LIPIDS:  Lab Results   Component Value Date    CHOL 136 08/23/2017    CHOL 133 09/19/2016     Lab Results   Component Value Date    HDL 40 08/23/2017    HDL 39 (L) 09/19/2016     Lab Results   Component Value Date    LDLCALC 74 08/23/2017    LDLCALC 69 09/19/2016     Lab Results   Component Value Date    TRIG 110 08/23/2017    TRIG 125 09/19/2016     No components found for: CHOLHDL    BMP:  Lab Results   Component Value Date    CREATININE 1.84 (H) 10/05/2017    BUN 43 (H) 10/05/2017     10/05/2017    K 4.0 10/06/2017     10/05/2017    CO2 25 10/05/2017         This note has been dictated using voice recognition software. Any grammatical or context distortions are unintentional and inherent to the software.  Donal Cadet MD  [facility]  [phone]            "

## 2021-06-15 NOTE — PROGRESS NOTES
Myron called with complaint of increased SOB and cold like symptoms. Denies fever.  Will start Levaquin 750 mg for five days per Dr. Penaloza, instructed to call if gets worse.

## 2021-06-15 NOTE — PROGRESS NOTES
INR 2.9 will continue on current dose and retest in 4 week. Continue current management dosing of Warfarin. Continue  diet of moderate Vitamin K intake. Discussed with pt the need to call with questions or concerns or any change in medication especially herbal medication or OTC. Call with increased bleeding or bruising or any upcoming procedures.  Used steroids due to bronchitis.

## 2021-06-16 PROBLEM — F32.A DEPRESSION: Status: ACTIVE | Noted: 2018-12-13

## 2021-06-16 PROBLEM — I48.19 PERSISTENT ATRIAL FIBRILLATION (H): Status: ACTIVE | Noted: 2018-09-25

## 2021-06-16 PROBLEM — G47.33 OSA (OBSTRUCTIVE SLEEP APNEA): Status: ACTIVE | Noted: 2018-12-13

## 2021-06-16 PROBLEM — I50.23 ACUTE ON CHRONIC SYSTOLIC CONGESTIVE HEART FAILURE (H): Status: ACTIVE | Noted: 2020-01-01

## 2021-06-16 PROBLEM — H40.9 GLAUCOMA: Status: ACTIVE | Noted: 2018-12-13

## 2021-06-16 PROBLEM — J98.4 RESTRICTIVE LUNG DISEASE: Chronic | Status: ACTIVE | Noted: 2017-10-24

## 2021-06-16 PROBLEM — I50.21 ACUTE SYSTOLIC HEART FAILURE (H): Status: ACTIVE | Noted: 2020-01-01

## 2021-06-16 PROBLEM — I21.4 NON Q WAVE MYOCARDIAL INFARCTION (H): Status: ACTIVE | Noted: 2020-01-01

## 2021-06-16 PROBLEM — I44.7 LEFT BUNDLE BRANCH BLOCK: Chronic | Status: ACTIVE | Noted: 2018-11-20

## 2021-06-16 PROBLEM — I25.3 LEFT VENTRICULAR ANEURYSM: Chronic | Status: ACTIVE | Noted: 2017-10-04

## 2021-06-16 NOTE — TELEPHONE ENCOUNTER
Telephone Encounter by Natasha Grijalva RN at 8/29/2019  1:18 PM     Author: Natasha Grijalva RN Service: -- Author Type: Registered Nurse    Filed: 8/29/2019  2:02 PM Encounter Date: 8/29/2019 Status: Attested    : Natasha Grijalva RN (Registered Nurse)    Related Notes: Original Note by Natasha Grijalva RN (Registered Nurse) filed at 8/29/2019  2:01 PM    Cosigner: Donna Thakkar MD at 8/30/2019 10:40 AM    Attestation signed by Donna Thakkar MD at 8/30/2019 10:40 AM                     ANTICOAGULATION  MANAGEMENT    Assessment     Today's INR result of 1.82 is Subtherapeutic (goal INR of 2.0-3.0)        Warfarin taken as previously instructed    No new diet changes affecting INR     Still taking prednisone 10 mg daily since 8/12/19    Continues to tolerate warfarin with no reported s/s of bleeding or thromboembolism     Previous INR was Subtherapeutic    Plan:     Spoke with Myron regarding INR result and instructed:     Warfarin Dosing Instructions:  Change warfarin dose to    2 mg every Tue; 4 mg all other days      (8 % change)    Instructed patient to follow up no later than: 2 weeks.    Education provided: importance of therapeutic range, target INR goal and significance of current INR result and importance of taking warfarin as instructed    Myron verbalizes understanding and agrees to warfarin dosing plan.    Instructed to call the [facility] for any changes, questions or concerns. (#[phone])   ?   Natasha Grijalva RN    Subjective/Objective:      Myron Sunshine, a 88 y.o. male is on warfarin.     Myron reports:     Home warfarin dose: verbally confirmed home dose with Myron and updated on anticoagulation calendar     Missed doses: No     Medication changes:  No still taking prednisone 10 mg daily since 8/12/19     S/S of bleeding or thromboembolism:  No     New Injury or illness:  No     Changes in diet or alcohol consumption:  No     Upcoming surgery, procedure or  cardioversion:  No    Anticoagulation Episode Summary     Current INR goal:   2.0-3.0   TTR:   60.7 %   Next INR check:   9/12/2019   INR from last check:   1.82! (8/29/2019)   Weekly max warfarin dose:      Target end date:      INR check location:      Preferred lab:      Send INR reminders to:   [facility]    Indications    Persistent atrial fibrillation (H) [I48.1]           Comments:   new order INR 2/5/19         Anticoagulation Care Providers     Provider Role Specialty Phone number    Donna Thakkar MD Referring Cardiology [phone]

## 2021-06-16 NOTE — PROGRESS NOTES
Patient arrived via wheelchair with son for IV Daptomycin as ordered. PICC line patent with blood return confirmed and flushed easily with 0.9NaCl. Green swab cap placed on PICC port prior to discharge. Patient reports will return tomorrow as scheduled for next dose. Discharged via wheelchair with son.

## 2021-06-16 NOTE — PROGRESS NOTES
Pulmonary Clinic Follow Up    Cc: follow up dyspnea.    HPI: 86yoM with history of asthma as well as cardiomyopathy, EF 40%, restrictive lung disease presents for follow up after hospitalization end of February. CT found RML infiltrate and was found to have enterococcal bacteremia, placed on daptomycin. Also had AMARILIS and sepsis but no septic shock.     He has completed the course of daptomycin outpatient at the infusion center. Had a lot of congestion while in the hospital this is better but now has a dry cough.     Was initiated on the mucomyst in the hospital. Had stopped it but yesterday had a really bad day so did use it. Having difficulty using the mucomyst with the syringe and expiration of the dosing. Went to bed with CPAP and that helped. Has a flutter valve at home but has not been using it.     Still feels short of breath, was in bed yesterday and didn't do much as a result.     Current Outpatient Prescriptions   Medication Sig     acetylcysteine (MUCOMYST) 100 mg/mL (10 %) nebulizer solution Take 4 mL by nebulization 2 (two) times a day.     albuterol (PROVENTIL HFA;VENTOLIN HFA) 90 mcg/actuation inhaler Inhale 2 puffs every 6 (six) hours as needed for wheezing.     albuterol (PROVENTIL) 2.5 mg /3 mL (0.083 %) nebulizer solution Take 3 mL (2.5 mg total) by nebulization 2 (two) times a day.     aspirin 81 MG tablet Take 81 mg by mouth daily.     budesonide-formoterol (SYMBICORT) 160-4.5 mcg/actuation inhaler Inhale 2 puffs 2 (two) times a day.     fluticasone (FLONASE) 50 mcg/actuation nasal spray 2 sprays into each nostril daily as needed for allergies.      furosemide (LASIX) 20 MG tablet Take 20 mg by mouth daily.      guaiFENesin (MUCINEX) 600 mg 12 hr tablet Take 600 mg by mouth 2 (two) times a day as needed for congestion.      latanoprost (XALATAN) 0.005 % ophthalmic solution Administer 1 drop to the right eye at bedtime.     losartan (COZAAR) 25 MG tablet Take 1 tablet (25 mg total) by mouth daily.      metoprolol succinate (TOPROL-XL) 25 MG Take 1 tablet (25 mg total) by mouth daily.     multivitamin therapeutic (THERAGRAN) tablet Take 1 tablet by mouth daily.     nebulizer and compressor Radha For chronic cough     nitroglycerin (NITROSTAT) 0.4 MG SL tablet Place 0.4 mg under the tongue every 5 (five) minutes as needed for chest pain.     sertraline (ZOLOFT) 50 MG tablet Take 25 mg by mouth at bedtime. Take 1/2 tablet (25mg) daily.     sildenafil (VIAGRA) 100 MG tablet Take 100 mg by mouth as needed for erectile dysfunction.      simvastatin (ZOCOR) 80 MG tablet Take 80 mg by mouth bedtime.     umeclidinium (INCRUSE ELLIPTA) 62.5 mcg/actuation DsDv inhaler Inhale 1 puff daily.     warfarin (COUMADIN) 2 MG tablet Take 2 mg t and th and 4 mg all other days.     Vitals:    03/06/18 0846   BP: 112/60   Pulse: 92   Resp: 20   SpO2: 93%   RA  Gen: NAD, mildly disheveled  HEENT: no thrush   Neck: No lymphadenopathy  C/V: RRR< S1 and S2. III/VI systolic murmur.  Resp: Rales on right about half way down  Ext: no edema  Neuro: Tremor  Skin: ecchymosis on arms from blood draws, no rash    CT chest 2/2018: personally reviewed. New RML infiltrate. Chronic thickened effusion R>L with rind. Bronchiectasis at bases.    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     2) Bronchiectasis-seen at bases on CT scan. We discussed again how this can increase his risk of pneumonia.   -Mucomyst, flutter    3) Asthma,  Continue Symbicort and Incruse    4) CAP  Check CBC, Procal, BMP today  Repeat CT chest in 4 weeks    5) Bacteremia with enterococcus. Unclear source. ?need for ID follow up, patient has none. If CBC or procalcitonin abnormaly, consider referral as never completed JOSE, etc.    RTC 4 weeks after CT chest.  Albina Christensen MD  Electronically signed on 3/6/2018 9:57 PM  >50% of this 40 minute visit spent in direct patient counseling and coordination of care.

## 2021-06-16 NOTE — TELEPHONE ENCOUNTER
Telephone Encounter by Yoko Gamino RN at 9/26/2019 12:16 PM     Author: Yoko Gamino RN Service: -- Author Type: Registered Nurse    Filed: 9/26/2019 12:26 PM Encounter Date: 9/26/2019 Status: Attested    : Yoko Gamino RN (Registered Nurse) Cosigner: Donna Thakkar MD at 9/26/2019  1:28 PM    Attestation signed by Donna Thakkar MD at 9/26/2019  1:28 PM                     ANTICOAGULATION  MANAGEMENT    Assessment     Today's INR result of 3.4 is Supratherapeutic (goal INR of 2.0-3.0) (prepping for cardioversion)        Warfarin taken as previously instructed    No new diet changes affecting INR    Interaction between prednisone and warfarin may be affecting INR    Continues to tolerate warfarin with no reported s/s of bleeding or thromboembolism     Previous INR was Therapeutic    Plan:     Spoke with Myron regarding INR result and instructed:     Warfarin Dosing Instructions:  Take 2 mg today only then continue current warfarin dose 2 mg daily on Tue; and 4 mg daily rest of week  (0 % change)    Instructed patient to follow up no later than: Mon 9/30 at [facility] appt    Education provided: target INR goal and significance of current INR result, importance of following up for INR monitoring at instructed interval, importance of taking warfarin as instructed, potential interaction between warfarin and prednisone and importance of notifying clinic for changes in medications    Myron verbalizes understanding and agrees to warfarin dosing plan.    Instructed to call the [facility] for any changes, questions or concerns. (#[phone])   ?   Yoko Gamino RN    Subjective/Objective:      Myron Sunshine, a 88 y.o. male is on warfarin.     Myron reports:     Home warfarin dose: verbally confirmed home dose with Myron and updated on anticoagulation calendar     Missed doses: No     Medication changes:  Yes: still taking prednisone     S/S of bleeding or thromboembolism:  No     New  Injury or illness:  No     Changes in diet or alcohol consumption:  No     Upcoming surgery, procedure or cardioversion:  Yes: cardioversion, not yet scheduled    Anticoagulation Episode Summary     Current INR goal:   2.0-3.0   TTR:   60.4 %   Next INR check:   9/30/2019   INR from last check:   3.40! (9/26/2019)   Weekly max warfarin dose:      Target end date:      INR check location:      Preferred lab:      Send INR reminders to:   [facility]    Indications    Persistent atrial fibrillation (H) [I48.1]           Comments:   new order INR 2/5/19         Anticoagulation Care Providers     Provider Role Specialty Phone number    Donna Thakkar MD Referring Cardiology [phone]

## 2021-06-16 NOTE — PROGRESS NOTES
INR After talking with pt and discussing history of greens/salads and medication change. Pt will  continue  with current diet and dosing of Warfarin.  Continue with moderation of Vit K and green leafy vegetables. Cautioned to call with increase bruising or bleeding. Reminded to call with medication change especially antibiotic. Call with any questions or concerns or any up coming procedures. Cautioned about using Herbal medication.

## 2021-06-16 NOTE — TELEPHONE ENCOUNTER
Telephone Encounter by Brenda Do RN at 9/19/2019 11:56 AM     Author: Brenda Do RN Service: -- Author Type: Registered Nurse    Filed: 9/19/2019 12:15 PM Encounter Date: 9/19/2019 Status: Attested    : Brenda Do RN (Registered Nurse) Cosigner: Donna Thakkar MD at 9/23/2019 10:49 AM    Attestation signed by Donna Thakkar MD at 9/23/2019 10:49 AM                     ANTICOAGULATION  MANAGEMENT     Assessment     Today's INR result of 1.9 is Subtherapeutic (goal INR of 2.0-3.0)        Less warfarin taken than instructed which may be affecting INR.  Stated he was taking 2mg on Tues and Thurs and 4mg all other days of the week (24mg/week) instead of instructed 26mg/week.  Patient states he may have taken 2mg yesterday 9/18 as well that most likely is not reflected in INR today.  States he self adjusts at times based on his diet/medications.  Reminded importance of taking as instructed and not adjusting dose until INR is done.      No new diet changes affecting INR    Potential interaction between Prednisone and warfarin which may affect subsequent INRs.  Increased INR and risk of bleeding with concurrent use of warfarin and prednisone.  One time dose in last week, no longer taking.      To have cardioversion once 3 weekly INR's in range.  Reminded of following dosing as instructed to ensure cardioversion can be done.     Continues to tolerate warfarin with no reported s/s of bleeding or thromboembolism     Previous INR was Supratherapeutic    Plan:     Spoke with Myron regarding INR result and instructed:     Warfarin Dosing Instructions:  Change warfarin dose to 2 mg daily on Tues; and 4 mg daily rest of week  (15 % change)    Instructed patient to follow up no later than: 1 week     Education provided: importance of consistent vitamin K intake, importance of therapeutic range, target INR goal and significance of current INR result, importance of taking warfarin as instructed and  importance of notifying clinic for changes in medications    Myron verbalizes understanding and agrees to warfarin dosing plan.    Instructed to call the [facility] for any changes, questions or concerns. (#[phone])   ?   Brenda Do RN    Subjective/Objective:      Myron MOHSEN Sunshine, a 88 y.o. male is on warfarin.     Myron reports:     Home warfarin dose: verbally confirmed home dose with Myron and updated on anticoagulation calendar     Missed doses: Yes: less warfarin than instructed      Medication changes:  Yes: prednisone      S/S of bleeding or thromboembolism:  No     New Injury or illness:  No     Changes in diet or alcohol consumption:  No     Upcoming surgery, procedure or cardioversion:  Yes: cardioversion     Anticoagulation Episode Summary     Current INR goal:   2.0-3.0   TTR:   60.6 %   Next INR check:   9/26/2019   INR from last check:   1.90! (9/19/2019)   Weekly max warfarin dose:      Target end date:      INR check location:      Preferred lab:      Send INR reminders to:   [facility]    Indications    Persistent atrial fibrillation (H) [I48.1]           Comments:   new order INR 2/5/19         Anticoagulation Care Providers     Provider Role Specialty Phone number    Donna Thakkar MD Referring Cardiology [phone]

## 2021-06-16 NOTE — PROGRESS NOTES
INR 2.9 After talking with pt and discussing history of greens/salads and medication change. Pt will  continue  with current diet and dosing of Warfarin.  Continue with moderation of Vit K and green leafy vegetables. Cautioned to call with increase bruising or bleeding. Reminded to call with medication change especially antibiotic. Call with any questions or concerns or any up coming procedures. Cautioned about using Herbal medication.  Pt c/o of dizziness. Suggested switching one of BP meds to evenings. Pt will try Losartan in the evening.

## 2021-06-16 NOTE — TELEPHONE ENCOUNTER
"Telephone Encounter by Ratna Lyn RN at 1/15/2020  1:18 PM     Author: Ratna Lyn RN Service: -- Author Type: Registered Nurse    Filed: 1/15/2020  2:38 PM Encounter Date: 1/15/2020 Status: Addendum    : Ratna Lyn RN (Registered Nurse)    Related Notes: Original Note by Ratna Lyn RN (Registered Nurse) filed at 1/15/2020  2:30 PM       Type: CRT-D alert remote transmission for long AT/AF.   Presenting rhythm: atrial fibrillation with biVP 85 bpm.  Battery/lead status: stable.  Arrhythmias: since 12/6/19; two AF episodes, one in progress since 1/13/20, previous episode lasting 27hrs, v-rates >/=120 bpm 5%, AF burden 5.6%. One NSVT detected.  Comments: appears to be normal ICD function. Device biVP 90.8% in NSR, down to 70% with AF  Device/lead alerts: none. NIECY       Transmission reviewed, agree with above. Episode of AF/AFL noted on 1/12/20, was out of AF briefly and then back in AF since 1/13/20. V-rate controlled and on Warfarin. However, when in AF BiV pacing drops into the 70s. Call placed to patient to review findings. Patient states is has been very short of breath last 2-3 days, states he has a call out to his pulmonologist because he is so short of breath with any activity. States at rest he is \"OK\" but any activity makes his breathing worse. Denies edema or palpitations.  I had him sent a remote for current rhythm check which shows still in AF/AFL today.  Reviewed with patient that I would notify Dr. Cadet and/Terezar Zo GREWAL CNP ASAP as this seems to correlate with onset of AF/AFL but that if his shortness of breath worsens, has lightheadedness, chest pain etc then he should seek ER. He verbalized understanding.     Last INRs:       Ref Range & Units 1/14/20 0846 12/30/19 1203 12/6/19 1250 11/12/19 1436 10/23/19 1017 10/16/19 1306 10/14/19 1155    POC INR 0.90 - 1.10 1.90Abnormal   3.20Abnormal   2.00Abnormal   2.00Abnormal   3.30Abnormal   2.90Abnormal   3.80Abnormal     Resulting " Agency  [facility] Lyn RN       Presenting EGM            Current Histograms:        Last 90 day Cardiac Compass View:

## 2021-06-16 NOTE — PROGRESS NOTES
Patient ambulated into [facility]fusion [facility] accompanied by his wife. Patient is alert and oriented and VSS. PICC line flushed with good blood return. Patient received Daptomycin IV push. PICC line flushed with Normal Saline and removed PICC line.  Tip of.PICC line clean and intact. Dry gauze and Coban dressing applied. All questions answered and patient was discharged home.

## 2021-06-16 NOTE — PROGRESS NOTES
Myron came in for a nurse assessment, was feeling short of breath and said he was just able to take shallow breaths.  While here his O2 sat on room air = 95 %, we walked 150 feet and O2 sat =93 %.  B/P 100/60, states his B/P runs low. RR. 24 , Lungs are clear and decreased at the base. Stated that using his albuterol neb  Was helpful this morning and he does feel somewhat better. Will change his Budesonide to neb form as well and add Ipratropium qid.  Per Dr. Wall. Instructed to call with any questions or concerns.

## 2021-06-16 NOTE — PROGRESS NOTES
"Patient arrived this am via wheelchair with son. VS taken and noted patient hypotensive upon sitting and standing/ patient reporting feeling \"very wobbly\" and fatigue/ does report slept well last night. Patient drank cocoa x2 during visit and received antibiotic IVP as ordered. VS rechecked and patient did ambulate in unit with standby assist of 1 with B/P noted to be improved from upon admission. Patient reported did still feel weakness and felt \"legs still wobbly\". Patient encouraged to seek evaluation with ED if not feeling well this am and agreed. Report called to Rossy MCDOWELL in ED. Patient to ED via W/C/ son also present.  "

## 2021-06-16 NOTE — TELEPHONE ENCOUNTER
Telephone Encounter by Yoko Gamino RN at 9/30/2019  5:28 PM     Author: Yoko Gamino RN Service: -- Author Type: Registered Nurse    Filed: 9/30/2019  6:00 PM Encounter Date: 9/30/2019 Status: Attested    : Yoko Gamino RN (Registered Nurse) Cosigner: Donna Thakkar MD at 10/1/2019  9:13 AM    Attestation signed by Donna Thakkar MD at 10/1/2019  9:13 AM                     ANTICOAGULATION  MANAGEMENT    Assessment     Today's INR result of 3.6 is Supratherapeutic (goal INR of 2.0-3.0)        Warfarin taken as previously instructed    No new diet changes affecting INR    Interaction between prednisone and warfarin may be affecting INR    Continues to tolerate warfarin with no reported s/s of bleeding or thromboembolism     Previous INR was Supratherapeutic    Plan:     Spoke with Myron regarding INR result and instructed:     Warfarin Dosing Instructions:  Hold warfarin today only then change warfarin dose to 2 mg daily on Tue, Fri; and 4 mg daily rest of week  (7.7 % change)  ACN wanted pt to take a reduced dose of 2 mg today only, but pt wanted to hold dose.  Discussed the possibility of INR dropping too low which would affect the process to get his cardioversion in 3 weeks, pt still wants to hold warfarin today to get INR back in 2 to 3 range    Instructed patient to follow up no later than: 1 week at [facility] appt    Education provided: target INR goal and significance of current INR result, importance of following up for INR monitoring at instructed interval, importance of taking warfarin as instructed and importance of notifying clinic for changes in medications    Myron verbalizes understanding and agrees to warfarin dosing plan.    Instructed to call the [facility] for any changes, questions or concerns. (#[phone])   ?   Yoko Gamino RN    Subjective/Objective:      Myron MOHSEN Sunshine, a 88 y.o. male is on warfarin.     Myron reports:     Home warfarin dose:  verbally confirmed home dose with Myron and updated on anticoagulation calendar     Missed doses: No     Medication changes:  Yes: still taking prednisone     S/S of bleeding or thromboembolism:  No     New Injury or illness:  No     Changes in diet or alcohol consumption:  No     Upcoming surgery, procedure or cardioversion:  Yes: cardioversion in 3 weeks    Anticoagulation Episode Summary     Current INR goal:   2.0-3.0   TTR:   59.6 %   Next INR check:   10/7/2019   INR from last check:   3.60! (9/30/2019)   Weekly max warfarin dose:      Target end date:      INR check location:      Preferred lab:      Send INR reminders to:   [facility]    Indications    Persistent atrial fibrillation (H) [I48.1]           Comments:   new order INR 2/5/19         Anticoagulation Care Providers     Provider Role Specialty Phone number    Donna Thakkar MD Referring Cardiology [phone]

## 2021-06-16 NOTE — TELEPHONE ENCOUNTER
Telephone Encounter by Ivory Portillo RN at 9/10/2019 12:55 PM     Author: Ivory Portillo RN Service: -- Author Type: Registered Nurse    Filed: 9/10/2019 12:56 PM Encounter Date: 9/10/2019 Status: Signed    : Ivory Portillo RN (Registered Nurse)       Pt was called, spoke to wife, corresponding information/recommendations reviewed, verbalized understanding, has no further questions at this time, contact information was given for further concerns/questions and medication list updated   Yokasta Jara also notified, as he is being prepared for CV   9/10/2019 12:55 PM  Ivory Portillo RN    Message   Received: Today   Message Contents       Albina Christensen MD Granrud, Donal FRIEND MD               Saw Mr. Sunshine today. He has not been taking the amio due to side effects. Wanted to make sure you were aware due to planned cardioversion next month. IN addition, he has considerable pulmonary fibrosis/ILD in the background of all of this so if amio is restarted, we should monitor this closely due to his minimal pulmonary reserve.   Thanks, Albina Christensen [phone]        Patient is very symptomatic in atrial fibrillation-lots of  CHF  Want to get him out of atrial fibrillation  Poor candidate for sotalol with renal insufficiency likewise for Tikosyn  Thought we would put him on amiodarone but he took 1 dose of amiodarone and felt side effects and stopped taking medication  Has quite limited pulmonary reserve so not a good candidate for long-term amiodarone  Continue preparing patient for cardioversion  Would do cardioversion once he has INRs have been therapeutic for 3 weeks  No further amiodarone  Earlier, I suggested resuming the amiodarone but now with the pulmonary concerns, no amiodarone

## 2021-06-17 NOTE — PROGRESS NOTES
I called and talked to Tyra from [facility], to make sure that pt gets started on oxygen today or tomorrow

## 2021-06-17 NOTE — PROGRESS NOTES
INR 4.1 pt is taking steroids for upper resp issues. Will titrate down on steroids and retest in 2 weeks. Will hold todays dose and then 2 mg until Friday. After talking with pt and discussing history of greens/salads and medication change. Pt will  continue  with current diet and dosing of Warfarin.  Continue with moderation of Vit K and green leafy vegetables. Cautioned to call with increase bruising or bleeding. Reminded to call with medication change especially antibiotic. Call with any questions or concerns or any up coming procedures. Cautioned about using Herbal medication.

## 2021-06-17 NOTE — PROGRESS NOTES
Pulmonary Clinic Follow Up    Cc: follow up dyspnea.    HPI: 86yoM with history of asthma as well as cardiomyopathy, EF 40%, restrictive lung disease presents for visit because he is feeling worse. Was hospitaliszed in February for RML infiltrate and enterococcal bacteremia s/p treatment.    He called clinic this week stating that he was feeling worse and breathing was quite bad, asked if oxygen was low. No change in cough. Denies change in weight. No fevers or chills.  Has been doing the nebulizers but spacing them out throughout the day--does seem to feel better when he uses the albuterol.      Current Outpatient Prescriptions   Medication Sig     albuterol (PROVENTIL HFA;VENTOLIN HFA) 90 mcg/actuation inhaler Inhale 2 puffs every 6 (six) hours as needed for wheezing.     albuterol (PROVENTIL) 2.5 mg /3 mL (0.083 %) nebulizer solution Take 3 mL (2.5 mg total) by nebulization every 4 (four) hours as needed for wheezing.     aspirin 81 MG tablet Take 81 mg by mouth daily.     budesonide (PULMICORT) 0.5 mg/2 mL nebulizer solution Take 2 mL (0.5 mg total) by nebulization 2 (two) times a day.     fluticasone (FLONASE) 50 mcg/actuation nasal spray 2 sprays into each nostril daily as needed for allergies.      formoterol fumarate (PERFOROMIST) 20 mcg/2 mL nebulizer solution Take 2 mL (20 mcg total) by nebulization every 12 (twelve) hours.     furosemide (LASIX) 20 MG tablet Take 20 mg by mouth daily as needed.      guaiFENesin (MUCINEX) 600 mg 12 hr tablet Take 600 mg by mouth 2 (two) times a day as needed for congestion.      latanoprost (XALATAN) 0.005 % ophthalmic solution Administer 1 drop to the right eye at bedtime.     losartan (COZAAR) 25 MG tablet Take 1 tablet (25 mg total) by mouth daily.     metoprolol succinate (TOPROL-XL) 25 MG Take 1 tablet (25 mg total) by mouth daily.     multivitamin therapeutic (THERAGRAN) tablet Take 1 tablet by mouth daily.     nebulizer and compressor Radha For chronic cough      nitroglycerin (NITROSTAT) 0.4 MG SL tablet Place 0.4 mg under the tongue every 5 (five) minutes as needed for chest pain.     sertraline (ZOLOFT) 50 MG tablet Take 25 mg by mouth at bedtime. Take 1/2 tablet (25mg) daily.     simvastatin (ZOCOR) 80 MG tablet Take 80 mg by mouth bedtime.     sodium chloride 3 % nebulizer solution Take 3cc hypertonic saline (3% saline) and mix with albuterol solution and nebulize twice daily.     warfarin (COUMADIN) 2 MG tablet TAKE ONE TABLET (2MG) BY MOUTH ON TUESDAY AND THURSDAY AND TWO TABS (4MG) ALL OTHER DAYS     Vitals:    05/04/18 1133   BP: 110/60   Pulse: 75   Resp: 18   SpO2: 94%   RA  Gen: NAD, elderly, frail  HEENT: no thrush   Neck: No lymphadenopathy  C/V: RRR,  S1 and S2. III/VI systolic murmur.  Resp: Rales at both bases, unchanged from previous  Ext: no edema  Neuro: Tremor  Skin: ecchymosis resolved, no rashes.     CT chest 4/2/2018: resolution of the right middle lobe infiltrate. Persistent right lower lobe rounded atelectasis.     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   -I remain unclear why he is now hypoxic and was not 1 month ago. ?fluid. I don't think he has new infection given lack of symptoms and there is no wheezing to suggest worsening obstruction--if anything this hsould be better now with nebulizing.   -Does qualify for oxygen and will order this today for him.    2) Bronchiectasis-seen at bases on CT scan.   -Monitoring closely, low threshold for antibiotic if sputum changes    3) Asthma,  -ICS/LABA BID nebulized,   -Hold on hypertonic saline for now but use if gets into another exacerbation. .     4) Deconditioning/Dyspnea/Frequent falls  -Will attempt to get him PT at home for strengthening and balance.     5) iCMP, EF 40% but no evidence of CHF exacerbation, no edema, weight is down.     RTC 4 weeks to reassess, ?need for repeat imaging, BNP, etc.      Albina Christensen MD  Electronically signed on  5/4/2018 9:57 PM  >50% of this 30 minute visit spent in direct patient counseling and coordination of care.    Due to desaturation of SaO2 less than or equal to 88% on room air at rest from restrictive lung disease, bronchiectasis, home oxygen therapy will benefit my patient's condition.  The patient has tried (or considered) other medications with limited success and oxygen is still required. The patient is mobile in the home and requires portability.  Albina Christensen MD  Electronically signed on 5/4/2018 11:32 AM

## 2021-06-17 NOTE — PROGRESS NOTES
DME Provider: [facility]  Date Faxed: 4/9/18  Ordering Provider: Dr. Christensen  Equipment ordered: nebulizer medication (albuterol and budesonide)

## 2021-06-17 NOTE — PATIENT INSTRUCTIONS - HE
Patient Instructions by Vanda Arora PA-C at 12/30/2019  2:50 PM     Author: Vanda Arora PA-C Service: -- Author Type: Physician Assistant    Filed: 12/30/2019  3:04 PM Encounter Date: 12/30/2019 Status: Signed    : Vanda Arora PA-C (Physician Assistant)       Myron Sunshine,    It was a pleasure to see you today at the [facility].     My recommendations after this visit include:    - no changes in medications    - I will call you with the results of the echo by the end of this week    You should followup with Dr. Thakkar on Feb 7      If you have questions or concerns, please call using the numbers below:    Valve Clinic Pool  [phone]    After Hours/Scheduling  [phone]    Otherwise you can dial the nurse directly at:    Ady Almanzar RN  Valve clinic coordinator  [phone]    Marj Mcneil RN  Valve clinic coordinator  [phone]

## 2021-06-17 NOTE — PROGRESS NOTES
DME Provider: [facility]  Date Faxed: 5/4/18  Ordering Provider: Dr. Christensen  Equipment ordered: oxygen with ambulation (pt would like the smaller tanks, not the pull behind tanks) and overnight oximetry

## 2021-06-17 NOTE — PROGRESS NOTES
Myron here for a nurse visit, has been having increased SOB and thought he may need some oxygen.   Also wanted his BP checked feels he fatigues to quickly and legs feel week. O2 sat on room air = 94 %  Walked 100 feet and O2 sat = 83 % rested and O2 placed at 2 liters per N/C . Walked 200 feet on two liters and   sats 88 to 91 %. Increased to 3 liters and sat 94 %. Walked with pulsating dose and sats  decreased to 86 %.   Does not tolerate conserving device. Will need continuous flow.  Will send orders to [facility] for portable O2 and supplies. Will check overnight Oximetry with his CPAP.

## 2021-06-17 NOTE — PROGRESS NOTES
Pulmonary Clinic Follow Up    Cc: follow up dyspnea.    HPI: 86yoM with history of asthma as well as cardiomyopathy, EF 40%, restrictive lung disease presents for follow up after hospitalization end of February. CT found RML infiltrate and was found to have enterococcal bacteremia, placed on daptomycin. Also had AMARILIS and sepsis but no septic shock. I saw him post-hospitalization and we switched medications to nebulized form due to cost and ability to inhale them.     He is here today with his son. Still feels short of breath with activity--Warned him this may be the new baseline.    Fell the other day but luckily landed in the chair. Feels he is very deconditioned and also worries he will fall again.     Current Outpatient Prescriptions   Medication Sig     acetylcysteine (MUCOMYST) 100 mg/mL (10 %) nebulizer solution Take 4 mL by nebulization 2 (two) times a day.     albuterol (PROVENTIL HFA;VENTOLIN HFA) 90 mcg/actuation inhaler Inhale 2 puffs every 6 (six) hours as needed for wheezing.     albuterol (PROVENTIL) 2.5 mg /3 mL (0.083 %) nebulizer solution Take 3 mL (2.5 mg total) by nebulization every 4 (four) hours as needed for wheezing.     albuterol (PROVENTIL) 2.5 mg /3 mL (0.083 %) nebulizer solution Take 3 mL (2.5 mg total) by nebulization every 4 (four) hours as needed for wheezing.     albuterol (PROVENTIL) 2.5 mg /3 mL (0.083 %) nebulizer solution Take 3 mL (2.5 mg total) by nebulization every 6 (six) hours as needed for wheezing.     aspirin 81 MG tablet Take 81 mg by mouth daily.     budesonide (PULMICORT) 0.5 mg/2 mL nebulizer solution Take 2 mL (0.5 mg total) by nebulization 2 (two) times a day.     fluticasone (FLONASE) 50 mcg/actuation nasal spray 2 sprays into each nostril daily as needed for allergies.      formoterol fumarate (PERFOROMIST) 20 mcg/2 mL nebulizer solution Take 2 mL (20 mcg total) by nebulization every 12 (twelve) hours.     furosemide (LASIX) 20 MG tablet Take 20 mg by mouth daily as  needed.      guaiFENesin (MUCINEX) 600 mg 12 hr tablet Take 600 mg by mouth 2 (two) times a day as needed for congestion.      ipratropium-albuterol (DUO-NEB) 0.5-2.5 mg/3 mL nebulizer Take 3 mL by nebulization 4 (four) times a day.     latanoprost (XALATAN) 0.005 % ophthalmic solution Administer 1 drop to the right eye at bedtime.     losartan (COZAAR) 25 MG tablet Take 1 tablet (25 mg total) by mouth daily.     metoprolol succinate (TOPROL-XL) 25 MG Take 1 tablet (25 mg total) by mouth daily.     multivitamin therapeutic (THERAGRAN) tablet Take 1 tablet by mouth daily.     nebulizer and compressor Radha For chronic cough     nitroglycerin (NITROSTAT) 0.4 MG SL tablet Place 0.4 mg under the tongue every 5 (five) minutes as needed for chest pain.     sertraline (ZOLOFT) 50 MG tablet Take 25 mg by mouth at bedtime. Take 1/2 tablet (25mg) daily.     simvastatin (ZOCOR) 80 MG tablet Take 80 mg by mouth bedtime.     sodium chloride 3 % nebulizer solution Take 3cc hypertonic saline (3% saline) and mix with albuterol solution and nebulize twice daily.     warfarin (COUMADIN) 2 MG tablet Take 2 mg t and th and 4 mg all other days.     Vitals:    04/04/18 1300   BP: 106/60   Pulse: 88   Resp: 24   SpO2: 93%   RA  Gen: NAD, mildly disheveled  HEENT: no thrush   Neck: No lymphadenopathy  C/V: RRR,  S1 and S2. III/VI systolic murmur.  Resp: Rales at both bases  Ext: no edema  Neuro: Tremor  Skin: ecchymosis resolved, no rashes.     CT chest 4/2/2018: resolution of the right middle lobe infiltrate. Persistent right lower lobe rounded atelectasis.     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     2) Bronchiectasis-seen at bases on CT scan.   -Reiterated his increased risk for infection and that if sputum increases, changes color, fever he should call right away and we will prescribe antibiotic. I am concerned about providing him with Abx at home given possibility of  confusion.    3) Asthma,  He has difficulty with inhalers and expense, therefore we switched him to nebulized medications. He doesn't feel the brovana has helped at all.   -We will try just pulmicort BID along with albuterol.   -Hold on hypertonic saline for now but use if gets into another exacerbation.   -The patient has a diagnosis of asthma and would benefit from nebulized medications.       4) CAP--resolved  -Repeat CT scan shows resolution of RML process. No need for further imaging currently.     5) Deconditioning/Dyspnea/Frequent falls  -Will attempt to get him PT at home for strengthening and balance.       Albina Christensen MD  Electronically signed on 4/4/2018 9:57 PM  >50% of this 30 minute visit spent in direct patient counseling and coordination of care.

## 2021-06-17 NOTE — PROGRESS NOTES
Myron called to say he had not received his new medications or even heard from [facility].  I called [facility] to see if he could get his nebs. today. Niurka said that they would need an agreement signed and sent back in the mail,  Before they would be able to get them out to him. Asked them to cancel order and scripts sent to   [facility] so he would be able to get them today. Called Myron back and informed of change. Instructed to call with any questions or concerns.

## 2021-06-17 NOTE — PROGRESS NOTES
Myron called and said he only picked up two medications, budesonide and albuterol,  Orders sent for the formoterol.

## 2021-06-18 NOTE — PROGRESS NOTES
Oxygen saturation walk test    Oxygen pulse dose testing   While ambulating 150ft on 2LPM at pulse dose, oxygen saturation is 86%.  While ambulating 150ft on 3LPM at pulse dose, oxygen saturation is 89%.  (pt has [employer] that he bought out of pocket and is trying to decide if it works for him or not)    DME Provider: Florian    Patient is ambulatory within his/her home.

## 2021-06-18 NOTE — PROGRESS NOTES
Pulmonary Clinic Follow Up    Cc: follow up dyspnea.    HPI: 86yoM with history of asthma as well as cardiomyopathy, EF 40%, restrictive lung disease presents for follow up after initiating oxygen.    He was placed on Prednisone 40mg x5 days and just completed yesterday. Felt it was too much for too long and prefers the taper. Feels his breathing is better now after the prednisone. Also received doxycycline.     He purchased an inogen one out of pocket and would like to see how his O2 does on it.  ACT: 5+1+5+3+3=17    ROS: 12-point ROS performed and pertinent positives noted in HPI.  Current Outpatient Prescriptions   Medication Sig     albuterol (PROVENTIL HFA;VENTOLIN HFA) 90 mcg/actuation inhaler Inhale 2 puffs every 6 (six) hours as needed for wheezing.     aspirin 81 MG tablet Take 81 mg by mouth daily.     budesonide (PULMICORT) 0.5 mg/2 mL nebulizer solution Take 2 mL (0.5 mg total) by nebulization 2 (two) times a day.     fluticasone (FLONASE) 50 mcg/actuation nasal spray 2 sprays into each nostril daily as needed for allergies.      furosemide (LASIX) 20 MG tablet Take 20 mg by mouth daily as needed.      guaiFENesin (MUCINEX) 600 mg 12 hr tablet Take 600 mg by mouth 2 (two) times a day as needed for congestion.      latanoprost (XALATAN) 0.005 % ophthalmic solution Administer 1 drop to the right eye at bedtime.     losartan (COZAAR) 25 MG tablet Take 1 tablet (25 mg total) by mouth daily.     metoprolol succinate (TOPROL-XL) 25 MG Take 1 tablet (25 mg total) by mouth daily.     multivitamin therapeutic (THERAGRAN) tablet Take 1 tablet by mouth daily.     nebulizer and compressor Radha For chronic cough     nitroglycerin (NITROSTAT) 0.4 MG SL tablet Place 0.4 mg under the tongue every 5 (five) minutes as needed for chest pain.     OXYGEN-AIR DELIVERY SYSTEMS MISC 3 L/min into each nostril continuous. Indications: decreased oxygen saturation on room air     sertraline (ZOLOFT) 50 MG tablet Take 25 mg by mouth  at bedtime. Take 1/2 tablet (25mg) daily.     simvastatin (ZOCOR) 80 MG tablet Take 80 mg by mouth bedtime.     sodium chloride 3 % nebulizer solution Take 3cc hypertonic saline (3% saline) and mix with albuterol solution and nebulize twice daily.     warfarin (COUMADIN) 2 MG tablet TAKE ONE TABLET (2MG) BY MOUTH ON TUESDAY AND THURSDAY AND TWO TABS (4MG) ALL OTHER DAYS     albuterol (PROVENTIL) 2.5 mg /3 mL (0.083 %) nebulizer solution Take 3 mL (2.5 mg total) by nebulization every 4 (four) hours as needed for wheezing.     formoterol fumarate (PERFOROMIST) 20 mcg/2 mL nebulizer solution Take 2 mL (20 mcg total) by nebulization every 12 (twelve) hours.     Vitals:    05/30/18 1337   BP: 118/64   Pulse: 71   Resp: 18   SpO2: 95%   RA  Gen: NAD, elderly, frail  HEENT: no thrush   Neck: No lymphadenopathy  C/V: RRR,  S1 and S2. III/VI systolic murmur.  Resp: Rales at both bases, unchanged from previous  Ext: no edema  Neuro: Tremor  Skin: ecchymosis resolved, no rashes.     CT chest 4/2/2018: resolution of the right middle lobe infiltrate. Persistent right lower lobe rounded atelectasis.     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     2) Bronchiectasis-seen at bases on CT scan.   -Monitoring closely, low threshold for antibiotic if sputum changes    3) Asthma,  -ICS/LABA BID nebulized,   -Just completed Prednisone for exacerbation, feels better  -Asthma plan completed, he does better with taper 40, 30, 20, 10  -Hold on hypertonic saline for now but use if gets into another exacerbation.     4) Deconditioning/Dyspnea/Frequent falls  -Feels home PT has been very beneficial and has been doing the exercises.    5) iCMP, EF 40%   -Currently euvolemic    RTC 3 weeks      Albina Christensen MD  Electronically signed on 5/30/2018 9:57 PM  >50% of this 30 minute visit spent in direct patient counseling and coordination of care.

## 2021-06-18 NOTE — PROGRESS NOTES
NR 2.9 pt decreased dose due to steroids. Will increase warfarin dose as weans off steroids.  Pt has home health at this time and may use them in future for INRs. Gave card with phone number on. Gave infor with home inr machine and monitoring. After talking with pt and discussing history of greens/salads and medication change. Pt will  continue  with current diet and dosing of Warfarin.  Continue with moderation of Vit K and green leafy vegetables. Cautioned to call with increase bruising or bleeding. Reminded to call with medication change especially antibiotic. Call with any questions or concerns or any up coming procedures. Cautioned about using Herbal medication.

## 2021-06-18 NOTE — LETTER
Letter by Rola Murray at      Author: Rola Murray Service: -- Author Type: --    Filed:  Encounter Date: 1/14/2019 Status: (Other)       Myron Sunshine  [address] [place] [address]      January 15, 2019      Dear  Naveedhaylee,    RE: Remote Results    We are writing to you regarding your recent Remote ICD check from home. Your transmission was received successfully. Battery status is satisfactory at this time.     Your results are being reviewed.    Your next device appointment will be a clinic visit on January 31, 2019 at 10:20am at our  [place] [facility] location, [address] [place] [address].    To schedule or reschedule, please call [phone] and press 1.    NOTE: If you would like to do an extra transmission, please call [phone] and press 3 to speak to a nurse BEFORE transmitting. This ensures that the Device Clinic staff is aware of the reason you are sending a transmission, and can follow-up with you after it has been reviewed.    We will be checking your implanted device from home (remotely) every three months unless otherwise instructed. We will need to see you in the clinic at least once a year. You may need to be seen in the clinic sooner depending on the results of your check.    Please be aware:    The follow-up schedule is like a Physician prescription.    Your remote monitor is paired to your specific implanted device.      Sincerely,    [facility]

## 2021-06-18 NOTE — PROGRESS NOTES
INR 2.1 Continue current management dosing of Warfarin. Continue  diet of moderate Vitamin K intake. Discussed with pt the need to call with questions or concerns or any change in medication especially herbal medication or OTC. Call with increased bleeding or bruising or any upcoming procedures.  Will come off steroids on Wed and retest INR in one week.

## 2021-06-18 NOTE — PROGRESS NOTES
INR 2.1 Continue current management dosing of Warfarin. Continue  diet of moderate Vitamin K intake. Discussed with pt the need to call with questions or concerns or any change in medication especially herbal medication or OTC. Call with increased bleeding or bruising or any upcoming procedures.  Retest in one week.

## 2021-06-18 NOTE — PROGRESS NOTES
Myron called with complaint if still coughing and now bringing up yellow colored secretions.Said he took 5 mg of prednisone the last two days and that really helped.  Is wondering about an antibiotic. Feels like maybe he is getting a cold.States he is feeling a little better then he did yesterday.  Will order prednisone 40 mg x 5 days and Doxycycline 100 mg for 5 days per Dr. Haro.And will see Dr. Christensen next wed.

## 2021-06-19 NOTE — PROGRESS NOTES
INR 2.9 with home health. Continue current dose and retest in 2 weeks. After talking with pt and discussing history of greens/salads and medication change. Pt will  continue  with current diet and dosing of Warfarin.  Continue with moderation of Vit K and green leafy vegetables. Cautioned to call with increase bruising or bleeding. Reminded to call with medication change especially antibiotic. Call with any questions or concerns or any up coming procedures. Cautioned about using Herbal medication.

## 2021-06-19 NOTE — LETTER
Letter by Angela Frost RDCS at      Author: Angela Frost RDCS Service: -- Author Type: --    Filed:  Encounter Date: 8/1/2019 Status: (Other)         Myron Sunshine  [address] [place] [address]      August 1, 2019      Dear Mr. Sunshine,    RE: Remote Results    We are writing to you regarding your recent Remote ICD check from home. Your transmission was received successfully. Battery status is satisfactory at this time.     Your results are showing no significant changes.    Your next device appointment will be a clinic visit on September 5, 2019 at 12:20PM at our  [place] [facility] location, [address] [place] [address].    To schedule or reschedule, please call [phone] and press 1.    NOTE: If you would like to do an extra transmission, please call [phone] and press 3 to speak to a nurse BEFORE transmitting. This ensures that the Device Clinic staff is aware of the reason you are sending a transmission, and can follow-up with you after it has been reviewed.    We will be checking your implanted device from home (remotely) every three months unless otherwise instructed. We will need to see you in the clinic at least once a year. You may need to be seen in the clinic sooner depending on the results of your check.    Please be aware:    The follow-up schedule is like a Physician prescription.    Your remote monitor is paired to your specific implanted device.      Sincerely,    [facility]

## 2021-06-19 NOTE — PROGRESS NOTES
INR 2.7 INR stable. Discussed continuing management of dose of Warfarin and returning in one month . No changes to diet needed at this time. Continue moderate intake of Vitamin K and call if increase bruising or unexplained bleeding. Call with medication changes or upcoming procedures.  INR done with home health.

## 2021-06-19 NOTE — PROGRESS NOTES
Pulmonary Clinic Follow Up    Cc: follow up dyspnea.    HPI: 86yoM with history of asthma as well as cardiomyopathy, EF 40%, restrictive lung disease presents for follow up.    He was hospitalized early in 2018 with severe sepsis and discharged very weak and debilitated. Since that hospitalization, he has been able to gain weight but qualified for oxygen at the end of May 2018. He now has an inogen portable oxygen concentrator but remains very short of breath with any activity.      ACT: previously 17, 3+1+5+2+3=14    ROS: 12-point ROS performed and pertinent positives noted in HPI.  Current Outpatient Prescriptions   Medication Sig Note     acetaminophen (TYLENOL EXTRA STRENGTH) 500 MG tablet Take 500 mg by mouth every 6 (six) hours as needed for pain. Indications: Back Pain      aspirin 81 MG tablet Take 81 mg by mouth daily.      budesonide (PULMICORT) 0.5 mg/2 mL nebulizer solution Take 2 mL (0.5 mg total) by nebulization 2 (two) times a day.      formoterol fumarate (PERFOROMIST) 20 mcg/2 mL nebulizer solution Take 20 mcg by nebulization daily.       guaiFENesin (MUCINEX) 600 mg 12 hr tablet Take 600 mg by mouth 2 (two) times a day as needed for congestion.       latanoprost (XALATAN) 0.005 % ophthalmic solution Administer 1 drop to the right eye at bedtime.      lidocaine 4 % patch Place 1 patch on the skin daily. Applies to lower left sacrum  Remove and discard patch with 12 hours or as directed by MD. 7/2/2018: Patient states he has been applying one patch 2-3 times per day. States one patch lasts 8 hours.     losartan (COZAAR) 25 MG tablet Take 1 tablet (25 mg total) by mouth daily.      metoprolol succinate (TOPROL-XL) 25 MG Take 1 tablet (25 mg total) by mouth daily.      multivitamin therapeutic (THERAGRAN) tablet Take 1 tablet by mouth daily.      nebulizer and compressor Radha For chronic cough      nitroglycerin (NITROSTAT) 0.4 MG SL tablet Place 0.4 mg under the tongue every 5 (five) minutes as needed  for chest pain.      OXYGEN-AIR DELIVERY SYSTEMS MISC into each nostril continuous. 1.5 liters with rest and 3 liters with activity  May use up to 1.5 liters at noc.      sertraline (ZOLOFT) 50 MG tablet Take 25 mg by mouth at bedtime.       simvastatin (ZOCOR) 80 MG tablet Take 80 mg by mouth bedtime.      sodium chloride 3 % nebulizer solution Take 3cc hypertonic saline (3% saline) and mix with albuterol solution and nebulize twice daily.      warfarin (COUMADIN) 2 MG tablet TAKE 1 TABLET (2MG) BY MOUTH ON TUESDAY AND THURSDAY AND 2 TABS (4MG) ALL OTHER DAYS      albuterol (PROVENTIL) 2.5 mg /3 mL (0.083 %) nebulizer solution Take 3 mL (2.5 mg total) by nebulization every 4 (four) hours as needed for wheezing.      Vitals:    08/17/18 1616   BP: 104/58   Pulse: 78   SpO2: 94%   RA  Gen: NAD, elderly, frail  HEENT: no thrush   Neck: No lymphadenopathy  C/V: RRR,  S1 and S2. III/VI systolic murmur.  Resp: Rales at both bases  Ext: no edema  Neuro: Tremor  Skin: ecchymosis resolved, no rashes.     CT chest 4/2/2018: resolution of the right middle lobe infiltrate. Persistent right lower lobe rounded atelectasis.     ASSESSMENT/PLAN:  1) Restrictive lung disease-Combination of trapped lung and mild fibrosis that may be slowly progressing. Patient asked about prognosis.. I attempted to explain that he does not have classic  IPF  as he was likely reading about onling, but that the only way to officially diagnose him would be with invasive biopsy and could make him a lot worse. I am unclear the underlying pathology but it seems to have progressed and also in the setting of the pneumonia he recently had in the winter.  -Continue O2  -Prednisone PRN    2) Bronchiectasis-seen at bases on CT scan.   -Monitoring closely, low threshold for antibiotic if sputum changes    3) Asthma,  -ICS/LABA BID nebulized,   -Just completed Prednisone 40, 30, 20, 10for exacerbation    4) Deconditioning/Dyspnea/Frequent falls  -Completed home   PT     5) iCMP, EF 40%   -Currently euvolemic    RTC 6 months, sooner if needed      Albina Christensen MD  Electronically signed on 8/20/2018 9:57 PM  >50% of this 30 minute visit spent in direct patient counseling and coordination of care.

## 2021-06-19 NOTE — LETTER
Letter by Donna Thakkar MD at      Author: Donna Thakkar MD Service: -- Author Type: --    Filed:  Encounter Date: 7/24/2019 Status: (Other)         Myron Sunshine  [address] [place] [address]      July 24, 2019      Dear Myron,    This letter is to remind you that you will be due for your follow up appointment with Dr. Diaz Thakkar . To help ensure you are in the best health possible, a regular follow-up with your cardiologist is essential.     Please call our Patient Scheduling Line at [phone] to schedule your appointment at your earliest convenience.  If you have recently scheduled an appointment, please disregard this letter.    We look forward to seeing you again. As always, we are available at the number  above for any questions or concerns you may have.      Sincerely,     The Physicians and Staff of [facility]

## 2021-06-20 NOTE — ANESTHESIA CARE TRANSFER NOTE
Last vitals:   Vitals:    09/17/18 1313   BP: 100/70   Pulse: 98   Resp: 16   Temp:    SpO2: 100%     Patient's level of consciousness is drowsy  Spontaneous respirations: yes  Maintains airway independently: yes  Dentition unchanged: yes  Oropharynx: oropharynx clear of all foreign objects    QCDR Measures:  ASA# 20 - Surgical Safety Checklist: WHO surgical safety checklist completed prior to induction  PQRS# 430 - Adult PONV Prevention: 4558F - Pt received => 2 anti-emetic agents (different classes) preop & intraop  ASA# 8 - Peds PONV Prevention: NA - Not pediatric patient, not GA or 2 or more risk factors NOT present  PQRS# 424 - Lilia-op Temp Management: NA - MAC anesthesia or case < 60 minutes  PQRS# 426 - PACU Transfer Protocol: - Transfer of care checklist used  ASA# 14 - Acute Post-op Pain: ASA14B - Patient did NOT experience pain >= 7 out of 10

## 2021-06-20 NOTE — PROGRESS NOTES
Myron is wondering if maybe taking 5 mg of prednisone daily would help with his breathing. Message sent to Dr. Christensen.

## 2021-06-20 NOTE — ANESTHESIA POSTPROCEDURE EVALUATION
Patient: Myron Sunshine  * No procedures listed *  Anesthesia type: No value filed.    Patient location: Telemetry/Step Down Unit  Last vitals:   Vitals:    09/17/18 1322   BP:    Pulse:    Resp:    Temp: 36.3  C (97.4  F)   SpO2:      Post vital signs: stable  Level of consciousness: awake, alert and responds to simple questions  Post-anesthesia pain: pain controlled  Post-anesthesia nausea and vomiting: no  Pulmonary: unassisted, nasal cannula  Cardiovascular: stable and blood pressure at baseline  Hydration: adequate  Anesthetic events: no    QCDR Measures:  ASA# 11 - Lilia-op Cardiac Arrest: ASA11B - Patient did NOT experience unanticipated cardiac arrest  ASA# 12 - Lilia-op Mortality Rate: ASA12B - Patient did NOT die  ASA# 13 - PACU Re-Intubation Rate: NA - No ETT / LMA used for case  ASA# 10 - Composite Anes Safety: ASA10A - No serious adverse event    Additional Notes:

## 2021-06-20 NOTE — PROGRESS NOTES
Clinic Care Coordination Referral received from [facility].  Patient does not have a [facility] PCP.  Syedra  notified of [facility] referral made.

## 2021-06-20 NOTE — PROGRESS NOTES
6/12/1931  Home:[phone] (home) Cell:There is no such number on file (mobile).  Emergency Contact: Eulalia Sunshine [phone]    +++Important patient information for [facility]/Cath Lab staff : None+++    INRs  6/26- 2.1  7/2- 3.04  7/4- 2.95  7/5-2.78  8/2-2.9  8/16- 2.7  8/25-2.85  8/26-3.42  8/27-3.17  9/4- 2.5    [facility] EP Cath Lab Procedure Order   Cardioversion:  Cardioversion    Diagnosis:  AF  Anticipated Case Duration:  Standard  Scheduling Needs/Timeframe:  Next Available    Current Device: Dual ICD  Device Company/Device Rep Needed for Procedure: [employer]    Anesthesia:  General-CV Only  Research Protocol:  No    [facility] EP Cath Lab Prep   Ordering Provider: Dr Cadet  Ordering Date: 9/12/2018  Orders Status: Intial order placed and Order set placed  EP NC Contact: Shana Portillo RN    H&P:  Compled by Marielos on 9/1118 if scheduled within 30 days, pt to schedule with PMD if procedure outside of this timeframe  PCP: Patricia Gerardo PA-C, [phone]    Pre-op Labs: Ordered AM of procedure    Medical Records Pertinent for Procedure:  Echo 6/10/18 and EKG 8/25/18    Patient Education:    Teach with Patient: Will be completed via phone prior to procedure, and letter was also sent to pt via mail/[employer] with written pre-procedural instructions.    Risks Reviewed:     Cardioversion    >90% acute success rate, <10% failure to convert or   reverts shortly after cardioversion.    <1% embolic event of (CVA, pulmonary embolism, or   other site).    75% risk for superficial burn.  Risks associated with general anesthesia will be addressed by the Anesthesiology Department    Consent: Will be obtained in [facility] day of procedure    Pre-Procedure Instructions that were Reviewed with Patient:  NPO after midnight, Remove all jewelry prior to coming in for procedure, Shower prior to arrival, Notified patient of time and date of procedure by CV , Transportation arrangements needed s/p procedure, Post-procedure follow up  process, Sedation plan/orders and Pre-procedure letter was sent to pt by CV     Pre-Procedure Medication Instructions:  Instructions given to pt regarding anticoagulants: Coumadin- instructed to continue anticoagulation uninterrupted through their procedure  Instructions given to pt regarding antiarrhythmic medication: Beta Blocker; Pt instructed to continue medication prior to procedure  Instructions for medication, other than anticoagulants/antiarrhythmics listed above, given to pt: to take all morning medications with small sips of water, with the exception of OTC supplements and MVI    No Known Allergies    Current Outpatient Prescriptions:      acetaminophen (TYLENOL EXTRA STRENGTH) 500 MG tablet, Take 500 mg by mouth every 6 (six) hours as needed for pain. Indications: Back Pain, Disp: , Rfl:      albuterol (PROVENTIL) 2.5 mg /3 mL (0.083 %) nebulizer solution, Take 2.5 mg by nebulization every 4 (four) hours as needed for wheezing., Disp: , Rfl:      aspirin 81 MG tablet, Take 81 mg by mouth daily., Disp: , Rfl:      budesonide (PULMICORT) 0.5 mg/2 mL nebulizer solution, Take 2 mL (0.5 mg total) by nebulization 2 (two) times a day., Disp: 120 mL, Rfl: 6     fluticasone (FLONASE) 50 mcg/actuation nasal spray, Apply 1 spray into each nostril daily., Disp: , Rfl:      formoterol fumarate (PERFOROMIST) 20 mcg/2 mL nebulizer solution, Take 20 mcg by nebulization daily. , Disp: , Rfl:      furosemide (LASIX) 40 MG tablet, Take 1 tablet (40 mg total) by mouth daily., Disp: 30 tablet, Rfl: 0     guaiFENesin (MUCINEX) 600 mg 12 hr tablet, Take 600 mg by mouth 2 (two) times a day as needed for congestion. , Disp: , Rfl:      latanoprost (XALATAN) 0.005 % ophthalmic solution, Administer 1 drop to the right eye at bedtime., Disp: , Rfl:      lidocaine 4 % patch, Place 1 patch on the skin daily. Applies to lower left sacrum Remove and discard patch with 12 hours or as directed by MD., Disp: , Rfl:      metoprolol  succinate (TOPROL-XL) 25 MG, Take 1 tablet (25 mg total) by mouth daily., Disp: 90 tablet, Rfl: 2     multivitamin therapeutic (THERAGRAN) tablet, Take 1 tablet by mouth daily., Disp: , Rfl:      nebulizer and compressor Radha, For chronic cough, Disp: 1 each, Rfl: 0     nitroglycerin (NITROSTAT) 0.4 MG SL tablet, Place 0.4 mg under the tongue every 5 (five) minutes as needed for chest pain., Disp: , Rfl:      OXYGEN-AIR DELIVERY SYSTEMS MISC, into each nostril continuous. 1.5 liters with rest and 3 liters with activity  May use up to 1.5 liters at noc., Disp: , Rfl:      pantoprazole (PROTONIX) 20 MG tablet, Take 20 mg by mouth daily., Disp: , Rfl:      predniSONE (DELTASONE) 5 MG tablet, Take 1 tablet (5 mg total) by mouth daily., Disp: 30 tablet, Rfl: 6     sertraline (ZOLOFT) 50 MG tablet, Take 25 mg by mouth at bedtime. , Disp: , Rfl:      simvastatin (ZOCOR) 80 MG tablet, Take 80 mg by mouth bedtime., Disp: , Rfl:      sodium chloride 3 % nebulizer solution, Take 3 mL by nebulization as needed. Take 3cc hypertonic saline (3% saline) and mix with albuterol solution and Pulmicort twice daily., Disp: , Rfl:      warfarin (COUMADIN) 2 MG tablet, Take 2 mg by mouth See Admin Instructions. Take 1 tablet (2 mg) by mouth on Tuesdays and Thursdays.  Take 2 tablets (4 mg) by mouth on all other days of the week.  Adjust dose based on INR results as directed., Disp: , Rfl:     Documentation Date:9/12/2018 9:00 AM  Ivory Portillo RN

## 2021-06-20 NOTE — PROGRESS NOTES
Pt was called, corresponding information/recommendations reviewed, verbalized understanding, has no further questions at this time, contact information was given for further concerns/questions, scheduling notified to contact pt and order(s) were placed   9/12/2018 9:16 AM  Ivory Portillo RN

## 2021-06-20 NOTE — PROGRESS NOTES
Patient seen in clinic with his son and spouse for HF education s/p recent hospital discharge 8/27.  Reviewed HF Binder that includes the  HF Sx Awareness & Action plan  handout and  A [employer] Pump  booklet and Weight log booklet highlighting :  __X_patient s type of heart failure _X__Na management in diet  __X_importance of daily wts  _X__Fluid Guidelines, if applicable  __X_medication review and importance of compliance     Instructed patient in signs and sx of heart failure, reiterated when to call clinic - reviewed HF hotline # [phone] and after hours call # [phone].  Majority of time was spent reviewing: s/s CHF exacerbation, when to call [facility], low sodium diet guide and shopping options.   Patient verbalized understanding of HF discussion.  Plan for f/u with continued HF education reviewed. Labs are completed during this visit.   Will continue to reinforce HF management education.

## 2021-06-20 NOTE — PROGRESS NOTES
Patient seen in clinic for continued HF education with his son Ibrahima.  Patient viewed 'What is heart Failure' video which covers risk factors, symptoms, medications, Na and fluid guidelines, balancing activity and rest, and daily monitoring of symptoms.    Addressed patients questions/concerns- will continue to reinforce HF education with future patient interactions.

## 2021-06-20 NOTE — ANESTHESIA PREPROCEDURE EVALUATION
Anesthesia Evaluation      Patient summary reviewed   No history of anesthetic complications     Airway   Mallampati: II  Neck ROM: full   Pulmonary     breath sounds clear to auscultation  (+) shortness of breath, sleep apnea,   (-) not a smoker (Former)    ROS comment: Restrictive lung disease                         Cardiovascular   (+) pacemaker (ICD), hypertension, valvular problems/murmurs (Mild AS), CAD, dysrhythmias (Paroxysmal VT), CHF, , hypercholesterolemia,     ECG reviewed  Rhythm: irregular  Rate: abnormal,         Neuro/Psych    (+) anxiety/panic attacks,     Endo/Other - negative ROS      GI/Hepatic/Renal    (+) GERD,   chronic renal disease ARF and CRI,      Other findings: 6/10/18 Echo  Summary          Left Ventricle: The estimated left ventricular ejection fraction is 50%. This represents a mildly decreased ejection fraction. Inferolateral wall pseudoaneurysm present. Sizable probable pseudoaneurysm in the inferior posterior wall. This is been as described on the echo from February 19, 2018 appears similar on the current study.E/e' > 15, suggesting elevated LV filling pressures.    The following segments are aneurysmal: basal inferior, basal inferolateral and mid inferior. All other segments are normal.    Right Ventricle: Normal right ventricular size. The systolic function is mildly reduced. TAPSE is abnormal, which is consistent with abnormal right ventricular systolic function. Pacer lead is present in the ventricle.      Compared to the echocardiogram from February 19, 2018, the findings are similar.             Dental - normal exam                        Anesthesia Plan  Planned anesthetic: general mask and total IV anesthesia    ASA 4   Induction: intravenous   Anesthetic plan and risks discussed with: patient    Post-op plan: routine recovery

## 2021-06-20 NOTE — PROGRESS NOTES
INR 1.9 per home health. Pt missed 2 doses and will continue on current dose of 2 mg Tue and thur and 4 mg all other days with retest in 2 weeks. After talking with pt and discussing history of greens/salads and medication change. Pt will  continue  with current diet and dosing of Warfarin.  Continue with moderation of Vit K and green leafy vegetables. Cautioned to call with increase bruising or bleeding. Reminded to call with medication change especially antibiotic. Call with any questions or concerns or any up coming procedures. Cautioned about using Herbal medication.

## 2021-06-20 NOTE — LETTER
Letter by Lissette Gordon NP at      Author: Lissette Gordon NP Service: -- Author Type: --    Filed:  Encounter Date: 3/3/2020 Status: (Other)         Myron Sunshine  [address] [place] [address]      March 3, 2020      Dear Myron,    This letter is to remind you that you will be due for your follow up appointment with Lissette Gordon NP in April, 2020 . To help ensure you are in the best health possible, a regular follow-up with your cardiologist is essential.     Please call our Patient Scheduling Line at [phone] to schedule your appointment at your earliest convenience.  If you have recently scheduled an appointment, please disregard this letter.    We look forward to seeing you again. As always, we are available at the number  above for any questions or concerns you may have.      Sincerely,     The Physicians and Staff of [facility]

## 2021-06-20 NOTE — PROGRESS NOTES
INR 2.5 Continue current management dosing of Warfarin. Continue  diet of moderate Vitamin K intake. Discussed with pt the need to call with questions or concerns or any change in medication especially herbal medication or OTC. Call with increased bleeding or bruising or any upcoming procedures.  Pt seen by home health.

## 2021-06-20 NOTE — PROGRESS NOTES
INR 2.6 in hospital. Will continue on current dose with home care retesting on 10/9. Spoke with home care. After talking with pt and discussing history of greens/salads and medication change. Pt will  continue  with current diet and dosing of Warfarin.  Continue with moderation of Vit K and green leafy vegetables. Cautioned to call with increase bruising or bleeding. Reminded to call with medication change especially antibiotic. Call with any questions or concerns or any up coming procedures. Cautioned about using Herbal medication.

## 2021-06-20 NOTE — PROGRESS NOTES
New onset AF; rate pretty good  Cr=1.74 not good for Sotalol  Plan  Proceed to CV  Once at AC guideline  Would not stat anti-arrhythmia prior to CV

## 2021-06-20 NOTE — PROGRESS NOTES
Patient in clinic today to be evaluated for severe AS and severe MR. He was accompanied by his wife and grandson. He has a lengthy history of HFREF, AS, MR, CAD s/p PCI to Lcx '01, LV pseudoaneurysm s/p patch repai, HTN, HL interstitial lung disease, bronchiectasis, asthma, obstructive sleep apnea, and chronic kidney disease. He has an STS score of 4.5%. A surgeon was unavailable during the time of his visit. It was decided for him to begin the TAVR work up. CTA, angiogram and two surgeon consults scheduled. Also, he is aware of dental clearance and will schedule a cleaning. He is very anxious to get his TAVR done. He states that he is feeling pretty symptomatic. He knows to call me with any further questions.

## 2021-06-20 NOTE — PROGRESS NOTES
[facility] RN Pre-Procedure Education Note    Procedure: Coronary angiogram With Dr. Liu  Date of Procedure: 10/18/2018  Arrival time: 6 am  Location: [facility]                Diagnosis: severe aortic stenosis  Cardiologist Ordering Procedure: Dr. Liu  Primary Cardiologist: Dr. Thakkar   PCP: Patricia Gerardo PA-C    H&P completed by: Dr. Liu  Previous Cath Report: in chart  Bypass grafts: No  Labs within 7 days: no   Renal Issues: Yes- CKD III  Diabetic: No    Does patient have contrast/IV dye allergy: no      Patient Education  Explained indications/risks for diagnostic evaluation, including one or more of the following:  left heart catheterization, right heart catheterization and coronary angiogram  Explained indications/risks for therapeutic interventions, including one or more of the following: PTCA, artherectomy and stent.  These risks are in addition to baseline risks associated with a Diagnostic Evaluation.  Patient state understanding of procedure and risks and agrees to proceed    Additional education comment: Pt was given and instructed on procedure letter and written education material. This information was reviewed with the patient. No further questions at this time.    Pre-procedure instructions  Patient instructed to be NPO after midnight.  Patient instructed to arrange for transportation home following procedure  No driving for 24 hours post procedure  Depending on the results of the test, provider may decide to keep patient overnight in the hospital for further evaluation.  Reviewed lifting restrictions    Pre-procedure medication instructions  medication instructions: instructed to hold warfarin x3 days prior  Hold lasix AM of procedure       Current Outpatient Prescriptions   Medication Sig Dispense Refill     acetaminophen (TYLENOL EXTRA STRENGTH) 500 MG tablet Take 500 mg by mouth every 6 (six) hours as needed for pain. Indications: Back Pain        albuterol (PROVENTIL) 2.5 mg /3 mL (0.083 %) nebulizer solution Take 2.5 mg by nebulization every 4 (four) hours as needed for wheezing.       aspirin 81 MG tablet Take 81 mg by mouth daily.       budesonide (PULMICORT) 0.5 mg/2 mL nebulizer solution Take 2 mL (0.5 mg total) by nebulization 2 (two) times a day. 120 mL 6     fluticasone (FLONASE) 50 mcg/actuation nasal spray Apply 1 spray into each nostril daily.       formoterol fumarate (PERFOROMIST) 20 mcg/2 mL nebulizer solution Take 20 mcg by nebulization daily.        furosemide (LASIX) 40 MG tablet Take 1 tablet (40 mg total) by mouth 2 (two) times a day at 9am and 6pm. 60 tablet 0     guaiFENesin (MUCINEX) 600 mg 12 hr tablet Take 600 mg by mouth 2 (two) times a day as needed for congestion.        latanoprost (XALATAN) 0.005 % ophthalmic solution Administer 1 drop to the right eye at bedtime.       metoprolol succinate (TOPROL-XL) 25 MG Take 1 tablet (25 mg total) by mouth daily. 90 tablet 2     multivitamin therapeutic (THERAGRAN) tablet Take 1 tablet by mouth daily.       nebulizer and compressor Radha For chronic cough 1 each 0     nitroglycerin (NITROSTAT) 0.4 MG SL tablet Place 0.4 mg under the tongue every 5 (five) minutes as needed for chest pain.       OXYGEN-AIR DELIVERY SYSTEMS MISC into each nostril continuous. 1.5 liters with rest and 3 liters with activity  May use up to 1.5 liters at noc.       pantoprazole (PROTONIX) 20 MG tablet Take 1 tablet (20 mg total) by mouth daily. 30 tablet 0     predniSONE (DELTASONE) 5 MG tablet Take 1 tablet (5 mg total) by mouth daily. 30 tablet 6     sertraline (ZOLOFT) 50 MG tablet Take 25 mg by mouth at bedtime.        simvastatin (ZOCOR) 80 MG tablet Take 80 mg by mouth bedtime.       sodium chloride 3 % nebulizer solution Take 3 mL by nebulization as needed. Take 3cc hypertonic saline (3% saline) and mix with albuterol solution and Pulmicort twice daily.       warfarin (COUMADIN) 2 MG tablet Take 2-4 mg by  mouth See Admin Instructions. Take 1 tablet (2 mg) by mouth on Tuesdays and Thursdays.   Take 2 tablets (4 mg) by mouth on all other days of the week.   Adjust dose based on INR results as directed.       No current facility-administered medications for this visit.        No Known Allergies

## 2021-06-20 NOTE — PROGRESS NOTES
In clinic device check with Device RN and Marielos Landaverde CNP.  Please see link for full device report.  Patient was informed of results and next follow up during today's visit.

## 2021-06-21 NOTE — PROGRESS NOTES
Patient in to see RN for Pre-TAVR visit on 11/26/2018     All pre-procedure labs drawn: 11/26/2018    EKG obtained: 11/26/2018    Patient sent to radiology for chest X ray: 11/26/2018   MRSA nose swab collected: 11/26/2018   Bactroban administered in nares: 11/26/2018   5 meter walk: 11/26/2018 10.60, 9.98, 10.03  IS instructions given to patient by RN. Patient able to demonstrate proper use of IS.     STS score: 5.5%  NYHA score: 4  CCS score: 0    Labs reviewed: yes, no further action needed.    Patient instructed on medications:   Continue to hold your warfarin. Do not take lasix and metoprolol the AM of TAVR. Continue to take Brilinta     Loading dose of Plavix: no patient already on Brilinta   Loading dose of ASA: yes, 325 mg    Sent home with Hibiclens solution. Instructions given in detail to patient along with written directions.    Patient has advanced directive: No, not on file.     Education was given to patient regarding what to expect pre-procedure.     Instructed to come to the main entrance of the [facility] at 7:30    TAVR and JOSE consent signed at the time of the appt: yes, sent to [facility]    All questions were answered to family and patient by RN.    Patient present at the time of appointment.

## 2021-06-21 NOTE — ANESTHESIA POSTPROCEDURE EVALUATION
Patient: Myron Sunshine  Transfemoral Transcatheter Aortic Valve Replacement - General Anesthesia whole case, H&P done, cesar-Marj, pt has [employer] device-booked 11/20, perfussion booked, non invasive Dr. JACKI Alanis, TRANSFEMORAL TRANSCATHETER AORTIC VALVE REPLACEMENT (STANDBY)  Anesthesia type: MAC    Patient location: ICU  Last vitals:   Vitals:    11/27/18 1930   BP: (!) 95/32   Pulse: (!) 121   Resp: 20   Temp: 36.9  C (98.5  F)   SpO2:      Post vital signs: stable  Level of consciousness: awake and responds to simple questions  Post-anesthesia pain: pain controlled  Post-anesthesia nausea and vomiting: no  Pulmonary: unassisted, spontaneous ventilation, nasal cannula  Cardiovascular: stable and hypotensive  Hydration: adequate  Anesthetic events: no    QCDR Measures:  ASA# 11 - Lilia-op Cardiac Arrest: ASA11B - Patient did NOT experience unanticipated cardiac arrest  ASA# 12 - Lilia-op Mortality Rate: ASA12B - Patient did NOT die  ASA# 13 - PACU Re-Intubation Rate: ASA13B - Patient did NOT require a new airway mgmt  ASA# 10 - Composite Anes Safety: ASA10A - No serious adverse event    Additional Notes: patient feels ok, but is requiring pressors. picc ordered.

## 2021-06-21 NOTE — PROGRESS NOTES
Patient tolerated fluids for 2 hours well. No complaints. I did offer him a TAVR spot for November 20th but he is pretty sure his wife has eye surgery that day. I told him to double check and call me tomorrow. I brought patient down to CT where we were met by Rachele. His wife will be waiting in the lobby upon his return.

## 2021-06-21 NOTE — PROGRESS NOTES
[facility]      Facility:    [facility] [[id number]]    Code Status: FULL CODE   [facility] 11/5 through 11/10/18      Chief Complaint/Reason for Visit:  Chief Complaint   Patient presents with     H & P       HPI:   Myron is a 87 y.o. male with past history of coronary artery disease, CHF, bronchiectasis, severe aortic stenosis, severe mitral regurgitation, chronic kidney disease stage III, hypertension, COPD, paroxysmal atrial fibrillation and hyperlipidemia.  He is on 24-hour a day oxygen therapy.    Up to increasing shortness of breath at home.  He was admitted to [facility], was found to be in acute on chronic hypoxemic/hypercapnic respiratory failure.  He also is in acute on chronic CHF.  He did have a fever, was on 7 days of antibiotics.  He was found to have severe aortic stenosis and mitral regurgitation, will met with cardiology, and was recommended for  TAVR which is scheduled for 27 November 2018.      Past Medical History:  Past Medical History:   Diagnosis Date     Anxiety      Bowel obstruction (H)      Chronic hypoxemic respiratory failure (H)      Chronic kidney disease     stage 3     Coronary artery disease due to lipid rich plaque      DNAR (do not attempt resuscitation)      Dyslipidemia, goal LDL below 70      ED (erectile dysfunction)      Essential hypertension      GERD (gastroesophageal reflux disease)      Ischemic cardiomyopathy     but EF 50-55%     Mild aortic stenosis      Noncompliance with medication regimen 9/11/2018     Paroxysmal VT (H)      Persistent atrial fibrillation (H) 8/22/2018     Psoriasis      Restrictive lung disease      Seasonal allergies      Sleep apnea      TIA (transient ischemic attack) 8/09     Ventricular aneurysm     Long-term coumadin therapy s/p ventricular patch 2001     Vitamin D deficiency            Surgical History:  Past Surgical History:   Procedure Laterality Date     ABDOMINAL AORTIC  ANEURYSM REPAIR       APPENDECTOMY      Perforated- Unknown     CARDIAC CATHETERIZATION       CARDIAC DEFIBRILLATOR PLACEMENT  2007     CORONARY STENT PLACEMENT  2001    LCX     CV CORONARY ANGIOGRAM N/A 10/18/2018    Procedure: Coronary Angiogram;  Surgeon: Elijah Liu MD;  Location: [facility] Cath Lab;  Service:      ICD Generator Change  14     PICC AND MIDLINE TEAM LINE INSERTION  2018          RESECTION SMALL BOWEL / CLOSURE ILEOSTOMY       THORACENTESIS       VENTRICULAR RESECTION / REPAIR ANEURYSM         Family History:   Family History   Problem Relation Age of Onset     Stroke Mother      Thyroid disease Mother      Cancer Brother      No Medical Problems Son      No Medical Problems Son      No Medical Problems Son      Lung disease Neg Hx        Social History:    Social History     Socioeconomic History     Marital status:      Spouse name: Eulalia     Number of children: Not on file     Years of education: Not on file     Highest education level: Not on file   Social Needs     Financial resource strain: Not on file     Food insecurity - worry: Not on file     Food insecurity - inability: Not on file     Transportation needs - medical: Not on file     Transportation needs - non-medical: Not on file   Occupational History     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     Comment: [place]   Tobacco Use     Smoking status: Former Smoker     Packs/day: 1.00     Years: 8.00     Pack years: 8.00     Types: Cigarettes     Last attempt to quit: 1955     Years since quittin.5     Smokeless tobacco: Never Used   Substance and Sexual Activity     Alcohol use: Yes     Alcohol/week: 0.6 oz     Types: 1 Glasses of wine per week     Drug use: No     Sexual activity: Not on file   Other Topics Concern     Not on file   Social History Narrative    Lives with his wife, Joann, who he states has advanced arthritis. 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            Review of Systems   Occasional  indigestion, but his appetite is always fairly poor., no problems with bowel or bladder  He gets dyspnea with exertion, at rest it is not too bad.  Has lost some weight, but needs a head of the bed up at all times for resting, including at home.  He has been walking with a walker to the PT unit and back.  He also uses a walker at home  The remainder of the comprehensive review of systems is negative    Blood pressure 104/65, pulse 87, temperature (!) 93.6  F (34.2  C), resp. rate 18, SpO2 98 %.        Physical Exam   Constitutional: He is oriented to person, place, and time. No distress.   Thin elderly , looks a bit younger than his stated age.   HENT:   Nose: Nose normal.   Mouth/Throat: Oropharynx is clear and moist.   Eyes: Conjunctivae and EOM are normal. No scleral icterus.   Cardiovascular: Exam reveals no friction rub.   Murmur heard.  3/6 systolic murmur at the right upper sternal border, also at the apex.  More holosystolic  murmurat the apex  Rhythm is irregularly irregular   Pulmonary/Chest: He has rales.   Bilateral inspiratory crackles lower half.  No JVD elevation   Abdominal: Bowel sounds are normal. There is no tenderness. There is no rebound.   Musculoskeletal: Normal range of motion. He exhibits no edema.   Hand muscle atrophy   Lymphadenopathy:     He has no cervical adenopathy.   Neurological: He is alert and oriented to person, place, and time. Coordination normal.   Skin: Skin is warm and dry. No rash noted.   Psychiatric: He has a normal mood and affect. His behavior is normal. Thought content normal.       Medication List:  Current Outpatient Medications   Medication Sig     acetaminophen (TYLENOL EXTRA STRENGTH) 500 MG tablet Take 1 tablet (500 mg total) by mouth every 6 (six) hours as needed for pain.     albuterol (PROVENTIL) 2.5 mg /3 mL (0.083 %) nebulizer solution Take 3 mL (2.5 mg total) by nebulization every 4 (four) hours as needed for wheezing.     aspirin 81 MG EC tablet  Take 1 tablet (81 mg total) by mouth daily. Stop after 6 weeks     atorvastatin (LIPITOR) 80 MG tablet Take 1 tablet (80 mg total) by mouth daily.     budesonide (PULMICORT) 0.5 mg/2 mL nebulizer solution Take 2 mL (0.5 mg total) by nebulization 2 (two) times a day.     cetirizine (ZYRTEC) 10 MG tablet Take 10 mg by mouth daily.     formoterol fumarate (PERFOROMIST) 20 mcg/2 mL nebulizer solution Take 2 mL (20 mcg total) by nebulization daily.     furosemide (LASIX) 40 MG tablet Take 1 tablet (40 mg total) by mouth 2 (two) times a day at 9am and 6pm. (Patient taking differently: Take 40 mg by mouth daily .      )     latanoprost (XALATAN) 0.005 % ophthalmic solution Administer 1 drop to the right eye at bedtime.     magnesium hydroxide (MILK OF MAG) 400 mg/5 mL Susp suspension Take 30 mL by mouth daily as needed (constipation).     metoprolol succinate (TOPROL-XL) 25 MG Take 1 tablet (25 mg total) by mouth daily.     multivitamin therapeutic (THERAGRAN) tablet Take 1 tablet by mouth daily.     nebulizer and compressor Radha For chronic cough     nitroglycerin (NITROSTAT) 0.4 MG SL tablet Place 1 tablet (0.4 mg total) under the tongue every 5 (five) minutes as needed for chest pain.     OXYGEN-AIR DELIVERY SYSTEMS MISC into each nostril continuous. 1.5 liters with rest and 3 liters with activity  May use up to 1.5 liters at noc.     pantoprazole (PROTONIX) 20 MG tablet Take 1 tablet (20 mg total) by mouth daily.     polyethylene glycol (MIRALAX) 17 gram packet Take 17 g by mouth daily as needed.     potassium chloride (K-DUR,KLOR-CON) 20 MEQ tablet Take 1 tablet (20 mEq total) by mouth 2 (two) times a day.     sertraline (ZOLOFT) 50 MG tablet Take 0.5 tablets (25 mg total) by mouth at bedtime.     ticagrelor (BRILINTA) 90 mg Tab Take 1 tablet (90 mg total) by mouth 2 (two) times a day.     warfarin (COUMADIN) 3 MG tablet Take 1 tablet (3 mg total) by mouth daily. (Patient taking differently: Take 3 mg by mouth daily  11/15/18 INR 2.98  Cont 3mg daily.  Next INR next week with PMD.    11/12/18 INR 2.57 Cont 3mg daily. Next INR 11/15.  11/10/18 INR 2.26 3mg daily.  11/9/18 INR 2.21 3mg  11/8/18 had 4mg.      )       Labs:   Ref Range & Units 11/10/18 0621 11/10/18 0200   Sodium 136 - 145 mmol/L 140     Potassium 3.5 - 5.0 mmol/L 3.3 Abnormally low   3.3 Abnormally low     Chloride 98 - 107 mmol/L 100     CO2 22 - 31 mmol/L 30     Anion Gap, Calculation 5 - 18 mmol/L 10     Glucose 70 - 125 mg/dL 97     Calcium 8.5 - 10.5 mg/dL 9.4     BUN 8 - 28 mg/dL 38 Abnormally high      Creatinine 0.70 - 1.30 mg/dL 1.57 Abnormally high      GFR MDRD Non Af Amer >60 mL/min/1.73m2 42 Abnormally low         Ref Range & Units 11/10/18 0200 11/9/18 0655 11/7/18 0609 11/6/18 0652   Potassium 3.5 - 5.0 mmol/L 3.3 Abnormally low   3.7  4.0  4.3       Ref Range & Units 11/6/18 0652 11/5/18 2006   WBC 4.0 - 11.0 thou/uL 19.8 Abnormally high   18.8 Abnormally high     Hemoglobin 14.0 - 18.0 g/dL 11.4 Abnormally low   13.3 Abnormally low     RDW 11.0 - 14.5 % 17.7 Abnormally high   18.2 Abnormally high     Platelets 140 - 440 thou/uL 158          Ref Range & Units 11/8/18    Triglycerides <=149 mg/dL 109    Cholesterol <=199 mg/dL 114    LDL Calculated <=129 mg/dL 53    HDL Cholesterol >=40 mg/dL 39 Abnormally low            Assessment / Plan:    ICD-10-CM    1. 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  On Lasix   2. Acute on chronic respiratory failure with hypoxia (H) J96.21 On 2-3 L O2 per NC   3. Severe aortic stenosis I35.0 TAVR planned in Novemeber   4. Persistent atrial fibrillation (H) I48.1  Brilinta, Coumadin   5. Non-rheumatic mitral regurgitation I34.0    6. Essential hypertension I10    7. Bronchiectasis without acute exacerbation (H) J47.9  budesonide nebs, formoterol fumarate nebs   8. Restrictive lung disease J98.4    9. ICD (implantable cardioverter-defibrillator) in  place Z95.810            Electronically signed by: Janet Noe MD

## 2021-06-21 NOTE — PROGRESS NOTES
Patient in clinic for IV hydration pre-CTA. He will receive 75 mg/hr x2 hours pre Dr. Liu. 18 g IV placed into R AC, patient tolerated well.

## 2021-06-21 NOTE — CONSULTS
DATE OF SERVICE: 10/16/2018    Asked to evaluate this patient for aortic stenosis and mitral regurgitation by Dr. Elijah Liu.    HISTORY OF PRESENT ILLNESS:  Mr. Sunshine is an 87-year-old gentleman with a history  of HFREF.  He has aortic stenosis and mitral regurgitation.  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  Additionally, he has hypertension, hyperlipidemia,  interstitial lung disease, bronchiectasis, asthma, obstructive sleep apnea and  chronic kidney disease.  He has had progressive dyspnea and now is on home O2.  He  has been admitted 5 times over the course of the past year for ADHF.  He uses a  walker to get around and struggles.  His most recent transthoracic echo shows an  LVEF in the 40s and he has aortic stenosis with a peak gradient of 40 and a mean  gradient of 20.  His aortic valve area is 0.6 cm2 with a dimensionless index of 0.2.   Additionally, he has severe mitral regurgitation.    PAST SURGICAL AND MEDICAL HISTORY:  SURGICAL PROCEDURES:   1.  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  2.  Abdominal aortic aneurysm repair.  3.  Appendectomy.    4.  Resection of small bowel and closure of ileostomy.    MEDICAL PROBLEMS:  1.  Stage 3 chronic kidney disease.  2.  ICD.  3.  Essential hypertension.  4.  Dyslipidemia.  5.  Coronary artery disease.  6.  Bronchiectasis.  7.  Restrictive lung disease.  8.  Persistent atrial fibrillation.  9.  Chronic hypoxemic respiratory failure.  10.  Mitral regurgitation.  11.  Acute combined systolic and diastolic congestive heart failure.  12.  Atrial fibrillation.  13.  Aortic stenosis.      Additional health problems include vitamin D deficiency, history of a TIA,  psoriasis, paroxysmal ventricular tachycardia, GERD and erectile dysfunction.      FAMILY HISTORY:  Positive for a stroke in his mother, thyroid disease in his mother.    SOCIAL  HISTORY:  He is retired.  He is .  His wife accompanies him.  He no  longer smokes and does not ingest alcohol.      REVIEW OF SYSTEMS:  A complete 10-point review of systems was negative except for  the above.    ALLERGIES:  None known.    PHYSICAL EXAMINATION:  APPEARANCE:  Chronically ill, elderly gentleman in no acute distress.  Height is 5  feet 9 inches, weight is 156 pounds.  VITAL SIGNS:  Temperature afebrile, respiratory rate 16, heart rate 88, blood  pressure 110/60.  SKIN:  Scattered senile changes.  LYMPH NODES:  No palpable lymphadenopathy.  HEENT:  Normocephalic.  PERRL.  EOMs intact.  ENT unremarkable.  NECK:  Supple, without carotid bruits.  CHEST:  Without deformity, well-healed median sternotomy scar.  LUNGS:  Clear to auscultation.  HEART:  Regular rate and rhythm with a grade 2/6 systolic ejection murmur heard best  over the right upper sternal border.  Pulses 2+ and symmetrical.  ABDOMEN:  Soft, nontender, without palpable organomegaly.  Well-healed surgical  scars.  GENITOURINARY AND RECTAL:  Deferred.  NEUROLOGIC:  Grossly intact.  BACK:  Without deformity.  EXTREMITIES:  Full range of motion without clubbing, cyanosis, edema.    ASSESSMENT:  This gentleman has severe aortic stenosis and his mitral regurgitation  is probably worsened by the presence of his aortic stenosis.  I believe he would  benefit from transcatheter aortic valve replacement.  He is not an appropriate open  surgical candidate because of his multiple comorbidities and he and his wife are not  interested in him undergoing a redo sternotomy.  They understand that the risks for  a transcatheter aortic valve replacement include bleeding, infection, stroke, heart  block requiring a pacemaker, cardiac perforation, aortic root rupture, aortic  dissection and an operative mortality of about 2%.  He is concerned about his  kidneys and may want to be very cautious in proceeding with a TAVR.    Thank you for this  referral.      MD manuel HART 10/16/2018 16:08:43  T 10/16/2018 16:37:41  R 10/16/2018 16:37:41  [id number]        cc: DAWOOD RIGGINS MD

## 2021-06-21 NOTE — PROGRESS NOTES
Cardiothoracic Surgery Consult    Date of Service: 10/25/18    REFERRING CARDIOLOGIST: Dr. Roy    REASON FOR CONSULTATION: Severe aortic stenosis     HISTORY OF PRESENT ILLNESS: Mr. Myron Sunshine is a 87 y.o. male presenting with worsening difficulty breathing.  He has known severe aortic stenosis.  He has reduction in his EF to 40%.  He has been admitted previously for HF exaccerbations.  He has had a prior patch repair of ventricular aneurysm.      He is seen today in clinic.         ASSESSMENT and PLAN:   1) Agree with TAVR - poor candidate for surgical AVR given age, frailty, and comorbidities.  2) Please call with questions      PAST MEDICAL HISTORY:   Past Medical History:   Diagnosis Date     Anxiety      Bowel obstruction (H)      Chronic hypoxemic respiratory failure (H)      Chronic kidney disease     stage 3     Coronary artery disease due to lipid rich plaque      DNAR (do not attempt resuscitation)      Dyslipidemia, goal LDL below 70      ED (erectile dysfunction)      Essential hypertension      GERD (gastroesophageal reflux disease)      Ischemic cardiomyopathy     but EF 50-55%     Mild aortic stenosis      Noncompliance with medication regimen 9/11/2018     Paroxysmal VT (H)      Persistent atrial fibrillation (H) 8/22/2018     Psoriasis      Restrictive lung disease      Seasonal allergies      Sleep apnea      TIA (transient ischemic attack) 8/09     Ventricular aneurysm     Long-term coumadin therapy s/p ventricular patch 2001     Vitamin D deficiency        PAST SURGICAL HISTORY:   Past Surgical History:   Procedure Laterality Date     ABDOMINAL AORTIC ANEURYSM REPAIR       APPENDECTOMY      Perforated- Unknown     CARDIAC CATHETERIZATION       CARDIAC DEFIBRILLATOR PLACEMENT  7/19/2007     CORONARY STENT PLACEMENT  2001    LCX     CV CORONARY ANGIOGRAM N/A 10/18/2018    Procedure: Coronary Angiogram;  Surgeon: Elijah Liu MD;  Location: [facility];  Service:       ICD Generator Change  6/18/14     PICC AND MIDLINE TEAM LINE INSERTION  2/23/2018          RESECTION SMALL BOWEL / CLOSURE ILEOSTOMY       THORACENTESIS       VENTRICULAR RESECTION / REPAIR ANEURYSM         ALLERGIES:   No Known Allergies    CURRENT MEDICATIONS:  Current Outpatient Prescriptions on File Prior to Visit   Medication Sig Dispense Refill     acetaminophen (TYLENOL EXTRA STRENGTH) 500 MG tablet Take 500 mg by mouth every 6 (six) hours as needed for pain. Indications: Back Pain       albuterol (PROVENTIL) 2.5 mg /3 mL (0.083 %) nebulizer solution Take 2.5 mg by nebulization every 4 (four) hours as needed for wheezing.       aspirin 81 MG EC tablet Take 1 tablet (81 mg total) by mouth daily. Stop after 6 weeks  0     atorvastatin (LIPITOR) 80 MG tablet Take 1 tablet (80 mg total) by mouth daily. 30 tablet 3     budesonide (PULMICORT) 0.5 mg/2 mL nebulizer solution Take 2 mL (0.5 mg total) by nebulization 2 (two) times a day. 120 mL 6     fluticasone (FLONASE) 50 mcg/actuation nasal spray Apply 1 spray into each nostril daily.       formoterol fumarate (PERFOROMIST) 20 mcg/2 mL nebulizer solution Take 20 mcg by nebulization daily.        furosemide (LASIX) 40 MG tablet Take 1 tablet (40 mg total) by mouth 2 (two) times a day at 9am and 6pm. 60 tablet 0     guaiFENesin (MUCINEX) 600 mg 12 hr tablet Take 600 mg by mouth 2 (two) times a day as needed for congestion.        metoprolol succinate (TOPROL-XL) 25 MG Take 1 tablet (25 mg total) by mouth daily. 90 tablet 2     multivitamin therapeutic (THERAGRAN) tablet Take 1 tablet by mouth daily.       nebulizer and compressor Radha For chronic cough 1 each 0     nitroglycerin (NITROSTAT) 0.4 MG SL tablet Place 0.4 mg under the tongue every 5 (five) minutes as needed for chest pain.       OXYGEN-AIR DELIVERY SYSTEMS MISC into each nostril continuous. 1.5 liters with rest and 3 liters with activity  May use up to 1.5 liters at noc.       pantoprazole (PROTONIX) 20  MG tablet Take 1 tablet (20 mg total) by mouth daily. 30 tablet 0     sertraline (ZOLOFT) 50 MG tablet Take 25 mg by mouth at bedtime.        sodium chloride 3 % nebulizer solution Take 3 mL by nebulization as needed. Take 3cc hypertonic saline (3% saline) and mix with albuterol solution and Pulmicort twice daily.       ticagrelor (BRILINTA) 90 mg Tab Take 1 tablet (90 mg total) by mouth 2 (two) times a day. 180 tablet 11     warfarin (COUMADIN) 2 MG tablet Take 2-4 mg by mouth See Admin Instructions. Take 1 tablet (2 mg) by mouth on Tuesdays and Thursdays.   Take 2 tablets (4 mg) by mouth on all other days of the week.   Adjust dose based on INR results as directed.       latanoprost (XALATAN) 0.005 % ophthalmic solution Administer 1 drop to the right eye at bedtime.       No current facility-administered medications on file prior to visit.        FAMILY HISTORY:   Family History   Problem Relation Age of Onset     Stroke Mother      Thyroid disease Mother      Cancer Brother      No Medical Problems Son      No Medical Problems Son      No Medical Problems Son      Lung disease Neg Hx      The family history was reviewed and is not pertinent to the patient's disease/illness    SOCIAL HISTORY:   Social History     Social History     Marital status:      Spouse name: Eulalia     Number of children: N/A     Years of education: N/A     Occupational History      Retired     [employer]     Social History Main Topics     Smoking status: Former Smoker     Packs/day: 1.00     Years: 8.00     Types: Cigarettes     Quit date: 5/17/1955     Smokeless tobacco: Never Used     Alcohol use 0.6 oz/week     1 Glasses of wine per week     Drug use: No     Sexual activity: Not on file     Other Topics Concern     Not on file     Social History Narrative    Lives with his wife, Joann, who he states has advanced arthritis. 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         REVIEW OF SYSTEMS:  Review of Systems - A complete 10  "point review of systems was obtained and is negative other than the above stated complaints    PHYSICAL EXAMINATION:   Vitals: /70 (Patient Site: Left Arm, Patient Position: Sitting, Cuff Size: Adult Regular)  Pulse 80  Resp 18  Ht 5' 9\" (1.753 m)  Wt 154 lb (69.9 kg)  BMI 22.74 kg/m2  GENERAL:  Well developed and well nourished  HEENT: Atraumatic, normocephalic, pupils equal and reactive, CN 2-12 intact  NECK: Supple, trachea midline, no thyromegaly, no lymphadenopathy  CARDIOVASCULAR: RRR  RESPIRATIONS: CTAB  ABDOMEN: Soft, not tender, not distended  EXTREMITIES: Motor, sensation, pulses intact  NEUROLOGIC: intact and symmetric with no focal deficits  PSYCHIATRIC: alert and oriented x3, pleasant    LABORATORY STUDIES:   Lab Results   Component Value Date    WBC 9.6 10/18/2018    HGB 11.3 (L) 10/19/2018    HCT 42.6 10/18/2018    MCV 83 10/18/2018     10/18/2018      Lab Results   Component Value Date    CREATININE 1.59 (H) 10/29/2018    BUN 44 (H) 10/29/2018     10/29/2018    K 3.8 10/29/2018     10/29/2018    CO2 31 10/29/2018     Lab Results   Component Value Date    HGBA1C 5.7 09/14/2013       EKG (10/19/18):   Sinus rhythm with 1st degree A-V block with Possible Premature atrial complexes with Aberrant conduction   Left axis deviation   Non-specific intra-ventricular conduction block   Abnormal ECG   When compared with ECG of 18-OCT-2018 09:21,   Aberrant conduction is now Present   Confirmed by MAIA CASTELLANOS MD LOC:[facility] ([id number]) on 10/19/2018 11:46:08 AM     CARDIAC CATHETERIZATION (10/18/18):   This study was personally reviewed by me  Findings:  LM:no obstruction  LAD:mild diffuse disease. D1 has a known ostial disease, ~80%, somewhat progressed from prior angio but w/ MARIBELL 3 flow  Lcx:severe ISR w/ TIMI2 flow in PLV, unchanged from prior  RCA:new 80% proximal narrowing     Access:  R Radial artery     PCI:  RCA was engaged w/ a 6F JR4 Guide catheter and the lesion in pRCA was " wired w/ a Forte wire, Faustino advanced over a 2.0x12mm Emerge balloon, which was used to pre-dilate the lesion at 12 sanket, prior to stenting w/ a 2.5x20mm Synergy EES, post-dilating w/ the stent balloon at 13 sanket, and a 3.0x12mm NC Emerge at 12 sanket in proximal stent. Final angiography showed no dissection or perforation and a MARIBELL 3 flow.    TRANSTHORACIC ECHOCARDIOGRAM (9/18/18):   This study was personally reviewed by me  Summary        Left Ventricle: The calculated left ventricular ejection fraction is 42%. This represents a moderately decreased ejection fraction. 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 This is been previously described. There is an echodense area suggestive of prior patch. There is flow into the pseudoaneurysm is as documented by Definity contrast which immediately opacifies this area. E/e' > 15, suggesting elevated LV filling pressures.    Right Ventricle: Normal right ventricular size. The systolic function is moderately reduced. TAPSE is abnormal, which is consistent with abnormal right ventricular systolic function. Pacer lead is present in the ventricle.    Aortic Valve: The valve is tricuspid. There is global calcification without reduced excursion of the aortic valve present. There is significant calcification and reduction in systolic excursion of the aortic valve leaflets. The mean gradient across the aortic valve is 20 mmHg. Given the pseudoaneurysm and resulting low cardiac output, the aortic stenosis moderate least severe or possibly severe. Moderate aortic regurgitation.    Mitral Valve: The following structural abnormalities were observed: non-specific thickening. There is moderate mitral annular calcification. No mitral stenosis present. Severe mitral regurgitation.    Tricuspid Valve: Mild to moderate tricuspid valve regurgitation. The estimated systolic pulmonary artery pressure is 42+ right atrial pressure mmhg.      Compared to  echocardiogram performed on 6/10/2018, the pseudoaneurysm has not similar appearance.  The degree of mitral insufficiency is significantly more prominent on the current study.  The aortic stenosis has a similar mean gradient) slightly elevated).  The aortic valve appears to open less prominently on the current study and this may be related to reduced cardiac output as well.         Thank you very much for this referral.     Ozzy Salas 11/1/2018 7:58 AM  Cardiothoracic Surgery  Pager: [phone]

## 2021-06-21 NOTE — ANESTHESIA PREPROCEDURE EVALUATION
Anesthesia Evaluation      Patient summary reviewed   No history of anesthetic complications     Airway   Mallampati: II  Neck ROM: full   Pulmonary - normal exam   (+) COPD (on home oxygen) severe, shortness of breath, sleep apnea,                          Cardiovascular   Exercise tolerance: < 4 METS  (+) hypertension, valvular problems/murmurs AS, CAD, dysrhythmias, CHF, cardiomyopathy,     Rhythm: regular  Rate: normal,    murmur Location:upper left sternal border      Neuro/Psych    (+) CVA (TIA) , anxiety/panic attacks,     Endo/Other       GI/Hepatic/Renal    (+) GERD well controlled and intermittent, esophageal disease,  chronic renal disease CRI,           Dental - normal exam                        Anesthesia Plan  Planned anesthetic: MAC    ASA 4     Anesthetic plan and risks discussed with: patient    Post-op plan: routine recovery

## 2021-06-21 NOTE — ANESTHESIA CARE TRANSFER NOTE
Tx to 5124 on monitor with     Last vitals:   Vitals:    11/27/18 1239   BP: 116/78   Pulse: 86   Resp: 16   Temp: (!) 2.3  C (36.1  F)   SpO2: 100%     Patient's level of consciousness is awake  Spontaneous respirations: yes  Maintains airway independently: yes  Dentition unchanged: yes  Oropharynx: oropharynx clear of all foreign objects    QCDR Measures:  ASA# 20 - Surgical Safety Checklist: WHO surgical safety checklist completed prior to induction    PQRS# 430 - Adult PONV Prevention: NA - Not adult patient, not GA or 3 or more risk factors NOT present  ASA# 8 - Peds PONV Prevention: NA - Not pediatric patient, not GA or 2 or more risk factors NOT present  PQRS# 424 - Lilia-op Temp Management: 4559F - At least one body temp DOCUMENTED => 35.5C or 95.9F within required timeframe  PQRS# 426 - PACU Transfer Protocol: - Transfer of care checklist used  ASA# 14 - Acute Post-op Pain: ASA14B - Patient did NOT experience pain >= 7 out of 10

## 2021-06-21 NOTE — PROGRESS NOTES
[facility]  Letter to referring provider  Today's Date 11/26/2018                Dear Dr. Thakkar,  Thank you for your referral. I wanted to update you on your severe aortic stenosis patient, Myron Sunshine who was recently evaluated by our Heart Team.      As you know, current ACC/AHA Guidelines for the Management of Patients with Valvular Heart Disease indicates aortic valve replacement for virtually all patients with severe, symptomatic aortic stenosis. Without replacement of the aortic valve, this disease is life-threatening and previous studies have shown that 50 percent of patients will not survive more than an average of two years after the onset of symptoms.      Our Heart Team, which includes a cardiothoracic surgeon, interventional cardiologist, TAVR Coordinator, echocardiographer, anesthesiologist, and pre- and postoperative care providers, takes a rigorous, multi-disciplinary approach to patient care to provide appropriate patient selection.      After assessing your patient s diagnostic test results and conducting a thorough clinical, cardiovascular and surgical assessment, our Heart Team has recommended that your patient undergo TAVR. The procedure is scheduled to take place on 11/27/2018 at [facility] with Dr. Santos and Dr. Liu. Patients typically return for follow up at 30 days and one year post procedure.  Outside of these follow up points, you can expect to continue providing care to your patient.    Collaboration between practices can be a critical component to determining the best course of therapy for severe, symptomatic aortic stenosis patients.  Our Heart Team looks forward to partnering with you to provide quality care for these challenging patients.    CC: Dr. Gerardo    Sincerely,     Dr. Arian Santos, Interventional Cardiology  Dr. Elijah Liu, Interventional Cardiology  Dr. Pascual Reaves, Cardiothoracic Surgery  Marj Ott, RN, BSN, PHN,  CCRN

## 2021-06-21 NOTE — PROGRESS NOTES
INR 1.5 will increase to 4 mg daily and retest on 11/2 with appointment with NP. After talking with pt and discussing history of greens/salads and medication change. Pt will  continue  with current diet and dosing of Warfarin.  Continue with moderation of Vit K and green leafy vegetables. Cautioned to call with increase bruising or bleeding. Reminded to call with medication change especially antibiotic. Call with any questions or concerns or any up coming procedures. Cautioned about using Herbal medication.

## 2021-06-21 NOTE — PROGRESS NOTES
[facility]    DATE: 2018    NAME:  Myron Sunshine             :  1931  MRN: [id number]  CODE STATUS:  FULL CODE    VISIT TYPE: Discharge Summary     FACILITY:  [facility] [[id number]]       CHIEF COMPLAIN/REASON FOR VISIT:    Chief Complaint   Patient presents with     Discharge Summary               HISTORY OF PRESENT ILLNESS: Myron Sunshine is a 87 y.o. male who was admitted -11/10 for shortness of breath. He was found to be in acute on chronic combined hypoxemic and hypercapnic respiratory failure. He was also treated for acute on chronic CHF. He was diuresed with IV lasix and transitioned to po. He is not on ACE/ARB due to CKD and hypotension. He wears chronic O2 and had increased requirements during hospital stay but by discharge was back on 2LNC. He did have fever during stay and was treated with 7 days of antibiotics. He was found to have severe aortic stenosis and mitral regurg. He was referred to follow up with cardiology as outpatient for possible TAVR. He was sent to TCU for further rehab. He has PMH of CAD, bronchiectasis, severe aortic stenosis, severe mitral regurgitation, PAF, CKD stage 3, HTN, HLD, COPD. Prior to this he lived at home in a condo with his wife. He used walker at home and used O2 continuously.     TCU course:   Mr. Sunshine has had a short TCU stay. He made good progress with therapy though and is ambulating >125 feet with walker and supervision. He is independent for all ADLs. Therapy had recommended he stay until next week but as he is having his TAVR procedure on  he wished to go home so he could get some affairs in order prior to his surgery. He plans to return to TCU after his surgery. His labs were stable but did have some hypotension during stay. He was asymptomatic and after discussion with him he does have chronic hypotension. His lasix was reduced to daily due to hypotension as he had no  edema and shortness of breath was at baseline. He was weaned on the oxygen back to his baseline use of 2LNC at rest and 3LNC with exertion. He was started on cetirizine daily for allergic rhinitis symptoms. His weights were stable and will have INR on 11/15 prior to discharge. He will be returning home this weekend and will plan to return to TCU after TAVR procedure. He will have no services due to upcoming surgery.     REVIEW OF SYSTEMS:  PROBLEMS AND REVIEW OF SYSTEMS:   Review of Systems  Today on ROS:   Currently, no fever, chills, or rigors. Decreased vision and hearing. Denies any chest pain, headaches, lightheadedness. Appetite is good. Denies any GERD symptoms. Denies any difficulty with swallowing, nausea, or vomiting.  Denies any abdominal pain, diarrhea or constipation. Denies any urinary symptoms. No insomnia. No active bleeding. No rash. Positive for chronic o2 use, shortness of breath, no dizziness, no pain, low blood pressures, runny nose and cough      Allergies   Allergen Reactions     Blood-Group Specific Substance      Anti-Josep/Barnes.  Expect delays in obtaining blood for transfusion.  Collect 2 lavender top tubes and 1 red top tube for all blood bank orders.      Current Outpatient Medications   Medication Sig     cetirizine (ZYRTEC) 10 MG tablet Take 10 mg by mouth daily.     acetaminophen (TYLENOL EXTRA STRENGTH) 500 MG tablet Take 1 tablet (500 mg total) by mouth every 6 (six) hours as needed for pain.     albuterol (PROVENTIL) 2.5 mg /3 mL (0.083 %) nebulizer solution Take 3 mL (2.5 mg total) by nebulization every 4 (four) hours as needed for wheezing.     aspirin 81 MG EC tablet Take 1 tablet (81 mg total) by mouth daily. Stop after 6 weeks     atorvastatin (LIPITOR) 80 MG tablet Take 1 tablet (80 mg total) by mouth daily.     budesonide (PULMICORT) 0.5 mg/2 mL nebulizer solution Take 2 mL (0.5 mg total) by nebulization 2 (two) times a day.     formoterol fumarate (PERFOROMIST) 20 mcg/2  mL nebulizer solution Take 2 mL (20 mcg total) by nebulization daily.     furosemide (LASIX) 40 MG tablet Take 1 tablet (40 mg total) by mouth 2 (two) times a day at 9am and 6pm. (Patient taking differently: Take 40 mg by mouth daily .      )     latanoprost (XALATAN) 0.005 % ophthalmic solution Administer 1 drop to the right eye at bedtime.     magnesium hydroxide (MILK OF MAG) 400 mg/5 mL Susp suspension Take 30 mL by mouth daily as needed (constipation).     metoprolol succinate (TOPROL-XL) 25 MG Take 1 tablet (25 mg total) by mouth daily.     multivitamin therapeutic (THERAGRAN) tablet Take 1 tablet by mouth daily.     nebulizer and compressor Radha For chronic cough     nitroglycerin (NITROSTAT) 0.4 MG SL tablet Place 1 tablet (0.4 mg total) under the tongue every 5 (five) minutes as needed for chest pain.     OXYGEN-AIR DELIVERY SYSTEMS MISC into each nostril continuous. 1.5 liters with rest and 3 liters with activity  May use up to 1.5 liters at noc.     pantoprazole (PROTONIX) 20 MG tablet Take 1 tablet (20 mg total) by mouth daily.     polyethylene glycol (MIRALAX) 17 gram packet Take 17 g by mouth daily as needed.     potassium chloride (K-DUR,KLOR-CON) 20 MEQ tablet Take 1 tablet (20 mEq total) by mouth 2 (two) times a day.     sertraline (ZOLOFT) 50 MG tablet Take 0.5 tablets (25 mg total) by mouth at bedtime.     ticagrelor (BRILINTA) 90 mg Tab Take 1 tablet (90 mg total) by mouth 2 (two) times a day.     warfarin (COUMADIN) 3 MG tablet Take 1 tablet (3 mg total) by mouth daily. (Patient taking differently: Take 3 mg by mouth daily 11/15/18 INR 2.98  Cont 3mg daily.  Next INR next week with PMD.    11/12/18 INR 2.57 Cont 3mg daily. Next INR 11/15.  11/10/18 INR 2.26 3mg daily.  11/9/18 INR 2.21 3mg  11/8/18 had 4mg.      )     Past Medical History:    Past Medical History:   Diagnosis Date     Anxiety      Bowel obstruction (H)      Chronic hypoxemic respiratory failure (H)      Chronic kidney disease      stage 3     Coronary artery disease due to lipid rich plaque      DNAR (do not attempt resuscitation)      Dyslipidemia, goal LDL below 70      ED (erectile dysfunction)      Essential hypertension      GERD (gastroesophageal reflux disease)      Ischemic cardiomyopathy     but EF 50-55%     Mild aortic stenosis      Noncompliance with medication regimen 9/11/2018     Paroxysmal VT (H)      Persistent atrial fibrillation (H) 8/22/2018     Psoriasis      Restrictive lung disease      Seasonal allergies      Sleep apnea      TIA (transient ischemic attack) 8/09     Ventricular aneurysm     Long-term coumadin therapy s/p ventricular patch 2001     Vitamin D deficiency            PHYSICAL EXAMINATION  Vitals:    11/13/18 2231   BP: 93/55   Pulse: 93   Resp: 16   Temp: 96.6  F (35.9  C)   SpO2: 96%   Weight: 156 lb (70.8 kg)       Today on physical exam:     GENERAL: Awake, Alert, oriented x3, not in any form of acute distress, answers questions appropriately, follows simple commands, conversant  HEENT: Head is normocephalic with normal hair distribution. No evidence of trauma. Ears: No acute purulent discharge. Eyes: Conjunctivae pink with no scleral jaundice. Nose: Normal mucosa and septum. NECK: Supple with no cervical or supraclavicular lymphadenopathy. Trachea is midline.   CHEST: No tenderness or deformity, no crepitus  LUNG: dim and tight to auscultation with limited chest expansion. There are no crackles or wheezes, normal AP diameter. Barrel chest changes, 2LNC, no cough  BACK: No kyphosis of the thoracic spine. Symmetric, no curvature, ROM normal, no CVA tenderness, no spinal tenderness   CVS: irregularly irregular rhythm, systolic murmur,  2+ pulses symmetric in all extremities.  ABDOMEN: Rounded and soft, nontender to palpation, non distended, no masses, no organomegaly, good bowel sounds, no rebound or guarding, no peritoneal signs.   EXTREMITIES: No pedal edema,   SKIN: Warm and dry, no erythema noted.   Skin color, texture, no rashes or lesions.  NEUROLOGICAL: The patient is oriented to person, place and time. No numb/tingling            LABS:   Recent Results (from the past 168 hour(s))   Potassium   Result Value Ref Range    Potassium 3.3 (L) 3.5 - 5.0 mmol/L   INR   Result Value Ref Range    INR 2.26 (H) 0.90 - 1.10   Basic Metabolic Panel   Result Value Ref Range    Sodium 140 136 - 145 mmol/L    Potassium 3.3 (L) 3.5 - 5.0 mmol/L    Chloride 100 98 - 107 mmol/L    CO2 30 22 - 31 mmol/L    Anion Gap, Calculation 10 5 - 18 mmol/L    Glucose 97 70 - 125 mg/dL    Calcium 9.4 8.5 - 10.5 mg/dL    BUN 38 (H) 8 - 28 mg/dL    Creatinine 1.57 (H) 0.70 - 1.30 mg/dL    GFR MDRD Af Amer 51 (L) >60 mL/min/1.73m2    GFR MDRD Non Af Amer 42 (L) >60 mL/min/1.73m2   Basic Metabolic Panel   Result Value Ref Range    Sodium 140 136 - 145 mmol/L    Potassium 4.1 3.5 - 5.0 mmol/L    Chloride 99 98 - 107 mmol/L    CO2 31 22 - 31 mmol/L    Anion Gap, Calculation 10 5 - 18 mmol/L    Glucose 98 70 - 125 mg/dL    Calcium 9.4 8.5 - 10.5 mg/dL    BUN 36 (H) 8 - 28 mg/dL    Creatinine 1.62 (H) 0.70 - 1.30 mg/dL    GFR MDRD Af Amer 49 (L) >60 mL/min/1.73m2    GFR MDRD Non Af Amer 41 (L) >60 mL/min/1.73m2   INR   Result Value Ref Range    INR 2.57 (H) 0.90 - 1.10   HM1 (CBC with Diff)   Result Value Ref Range    WBC 11.1 (H) 4.0 - 11.0 thou/uL    RBC 4.33 (L) 4.40 - 6.20 mill/uL    Hemoglobin 10.9 (L) 14.0 - 18.0 g/dL    Hematocrit 35.8 (L) 40.0 - 54.0 %    MCV 83 80 - 100 fL    MCH 25.2 (L) 27.0 - 34.0 pg    MCHC 30.4 (L) 32.0 - 36.0 g/dL    RDW 17.3 (H) 11.0 - 14.5 %    Platelets 186 140 - 440 thou/uL    MPV 10.7 8.5 - 12.5 fL    Neutrophils % 77 (H) 50 - 70 %    Lymphocytes % 9 (L) 20 - 40 %    Monocytes % 11 (H) 2 - 10 %    Eosinophils % 3 0 - 6 %    Basophils % 1 0 - 2 %    Neutrophils Absolute 8.4 (H) 2.0 - 7.7 thou/uL    Lymphocytes Absolute 1.0 0.8 - 4.4 thou/uL    Monocytes Absolute 1.2 (H) 0.0 - 0.9 thou/uL    Eosinophils Absolute  0.3 0.0 - 0.4 thou/uL    Basophils Absolute 0.1 0.0 - 0.2 thou/uL   INR   Result Value Ref Range    INR 2.98 (H) 0.90 - 1.10     Results for orders placed or performed in visit on 11/12/18   Basic Metabolic Panel   Result Value Ref Range    Sodium 140 136 - 145 mmol/L    Potassium 4.1 3.5 - 5.0 mmol/L    Chloride 99 98 - 107 mmol/L    CO2 31 22 - 31 mmol/L    Anion Gap, Calculation 10 5 - 18 mmol/L    Glucose 98 70 - 125 mg/dL    Calcium 9.4 8.5 - 10.5 mg/dL    BUN 36 (H) 8 - 28 mg/dL    Creatinine 1.62 (H) 0.70 - 1.30 mg/dL    GFR MDRD Af Amer 49 (L) >60 mL/min/1.73m2    GFR MDRD Non Af Amer 41 (L) >60 mL/min/1.73m2         Lab Results   Component Value Date    WBC 11.1 (H) 11/13/2018    HGB 10.9 (L) 11/13/2018    HCT 35.8 (L) 11/13/2018    MCV 83 11/13/2018     11/13/2018       Lab Results   Component Value Date    [id number] 520 09/14/2013     Lab Results   Component Value Date    HGBA1C 5.7 09/14/2013     Lab Results   Component Value Date    INR 2.98 (H) 11/15/2018    INR 2.57 (H) 11/12/2018    INR 2.26 (H) 11/10/2018     No results found for: [id number]  Lab Results   Component Value Date    TSH 3.1 09/14/2013           ASSESSMENT/PLAN:    1. Acute on chronic CHF: Daily [id number]. shortness of breath improving, no edema ble today. On lasix daily, reduced for hypotension with no signs of fluid overload.    2. Acute on chronic respiratory failure, COPD, Bronchiectasis: shortness of breath improving, not quite to baseline. No cough. 2LNC at rest, 3L with exertion. At baseline o2 requirements. Lungs dim no cough. IS q4h while awake. Budesonide two times a day, perforomist daily, albuterol q4h prn.   3. Hypotension: chronic issue. SBP 90-100s. Asymptomatic. No ACE/ARB for CHF due to hypotension. Notify if SBP <85. Reduced lasix to daily.   4. A fib: Rate controlled 90s. Per report palpitations at times with exertion. On metoprolol with hold parameters. On warfarin.   5. Severe aortic stenosis:  No chest pain, systolic murmur. [facility]R scheduled for 11/27 at [facility] per patient report.   6. AMARILIS on CKD: Cr 1.57 on 11/10, Cr 1.6 today 11/12. Stable.   7. S/p ICD: F/u with device clinic.   8. Constipation: Miralax daily prn.   9. HLD, CAD: On ticagrelor, nitrostat, atorvastatin, aspirin.   10. Depression: On sertraline.   11. Hypokalemia: On kcl.   12. GERD: On pantoprazole.   13. Glaucoma: On latanoprost.       Electronically signed by: Africa Coello NP    Total 35 minutes of which 55% was spent in counseling and coordination of care of the above plan    Time spent in interview and examination of patient, review of available records, and discussion with nursing staff. Continue care plan, efforts at therapy, and monitor nutritional status.

## 2021-06-21 NOTE — PROGRESS NOTES
[facility]    DATE: 2018    NAME:  Myron Sunshine             :  1931  MRN: [id number]  CODE STATUS:  FULL CODE    VISIT TYPE: Problem Visit (hospital f/u)     FACILITY:  [facility] [[id number]]       CHIEF COMPLAIN/REASON FOR VISIT:    Chief Complaint   Patient presents with     Problem Visit     hospital f/u               HISTORY OF PRESENT ILLNESS: Myron Sunshine is a 87 y.o. male who was admitted -11/10 for shortness of breath. He was found to be in acute on chronic combined hypoxemic and hypercapnic respiratory failure. He was also treated for acute on chronic CHF. He was diuresed with IV lasix and transitioned to po. He is not on ACE/ARB due to CKD and hypotension. He wears chronic O2 and had increased requirements during hospital stay but by discharge was back on 2LNC. He did have fever during stay and was treated with 7 days of antibiotics. He was found to have severe aortic stenosis and mitral regurg. He was referred to follow up with cardiology as outpatient for possible TAVR. He was sent to TCU for further rehab. He has PMH of CAD, bronchiectasis, severe aortic stenosis, severe mitral regurgitation, PAF, CKD stage 3, HTN, HLD, COPD. Prior to this he lived at home in a condo with his wife. He used walker at home and used O2 continuously.     Today Mr. Sunshine states he has been using oxygen therapy for about 4 months now at home. He says he slept well but is not quite awake yet. He feels his shortness of breath is still a little worse than baseline but is getting better. He denies cough today though. He wears 2L all the time at home and 3 L with exertion or when going out of condo. He says his bowels are fine and appetite is fair. He denies nausea or stomach upset problems just doesn't feel hungry. He says his appetite has declined the last few months. He denies any chest pain but sometimes his heart does feel like its racing,  especially with exertion. He denies any dizziness or lightheadedness though. He denies any trouble urinating and is trying to drink more fluids. He says he is not having any pain nor is he needing to take any pain meds. He says the staff was concerned about his low blood pressures yesterday but he says he always runs low. He says a good day for him is to be in the low 100s. He denies any symptoms when his blood pressure is low. He denies any numb/tingling and no swelling in his legs. He does later say he has heartburn at times and this was improved with ginger ale last night. He reports he has already scheduled his TAVR procedure for 11/27 at [facility]. He was hoping they could do the mitral at the same time and he is wondering why they can't. He says even though he is not constipated he would like a stool softener ordered as needed. Per nursing staff had multiple blood pressures in 90s yesterday. Staff thought had appt with cardiology on 11/26 but this was cancelled and he is scheduled for surgery on 11/27.     REVIEW OF SYSTEMS:  PROBLEMS AND REVIEW OF SYSTEMS:   Review of Systems  Today on ROS:   Currently, no fever, chills, or rigors. Decreased vision and hearing. Denies any chest pain, headaches, lightheadedness. Appetite is good. Denies any GERD symptoms. Denies any difficulty with swallowing, nausea, or vomiting.  Denies any abdominal pain, diarrhea or constipation. Denies any urinary symptoms. No insomnia. No active bleeding. No rash. Positive for chronic o2 use, shortness of breath, heart racing at times, no dizziness, no pain, low blood pressures, heartburn intermittent      Allergies   Allergen Reactions     Blood-Group Specific Substance      Anti-Josep/Barnes.  Expect delays in obtaining blood for transfusion.  Collect 2 lavender top tubes and 1 red top tube for all blood bank orders.      Current Outpatient Medications   Medication Sig     albuterol (PROVENTIL) 2.5 mg /3 mL (0.083 %) nebulizer solution  Take 3 mL (2.5 mg total) by nebulization every 4 (four) hours as needed for wheezing.     aspirin 81 MG EC tablet Take 1 tablet (81 mg total) by mouth daily. Stop after 6 weeks     atorvastatin (LIPITOR) 80 MG tablet Take 1 tablet (80 mg total) by mouth daily.     budesonide (PULMICORT) 0.5 mg/2 mL nebulizer solution Take 2 mL (0.5 mg total) by nebulization 2 (two) times a day.     formoterol fumarate (PERFOROMIST) 20 mcg/2 mL nebulizer solution Take 2 mL (20 mcg total) by nebulization daily.     furosemide (LASIX) 40 MG tablet Take 1 tablet (40 mg total) by mouth 2 (two) times a day at 9am and 6pm.     latanoprost (XALATAN) 0.005 % ophthalmic solution Administer 1 drop to the right eye at bedtime.     magnesium hydroxide (MILK OF MAG) 400 mg/5 mL Susp suspension Take 30 mL by mouth daily as needed (constipation).     metoprolol succinate (TOPROL-XL) 25 MG Take 1 tablet (25 mg total) by mouth daily.     nitroglycerin (NITROSTAT) 0.4 MG SL tablet Place 1 tablet (0.4 mg total) under the tongue every 5 (five) minutes as needed for chest pain.     polyethylene glycol (MIRALAX) 17 gram packet Take 17 g by mouth daily as needed.     acetaminophen (TYLENOL EXTRA STRENGTH) 500 MG tablet Take 1 tablet (500 mg total) by mouth every 6 (six) hours as needed for pain.     multivitamin therapeutic (THERAGRAN) tablet Take 1 tablet by mouth daily.     nebulizer and compressor Radha For chronic cough     OXYGEN-AIR DELIVERY SYSTEMS MISC into each nostril continuous. 1.5 liters with rest and 3 liters with activity  May use up to 1.5 liters at noc.     pantoprazole (PROTONIX) 20 MG tablet Take 1 tablet (20 mg total) by mouth daily.     potassium chloride (K-DUR,KLOR-CON) 20 MEQ tablet Take 1 tablet (20 mEq total) by mouth 2 (two) times a day.     sertraline (ZOLOFT) 50 MG tablet Take 0.5 tablets (25 mg total) by mouth at bedtime.     ticagrelor (BRILINTA) 90 mg Tab Take 1 tablet (90 mg total) by mouth 2 (two) times a day.     warfarin  (COUMADIN) 3 MG tablet Take 1 tablet (3 mg total) by mouth daily. (Patient taking differently: Take 3 mg by mouth daily 11/12/18 INR 2.57 Cont 3mg daily. Next INR 11/15.  11/10/18 INR 2.26 3mg daily.  11/9/18 INR 2.21 3mg  11/8/18 had 4mg.      )     Past Medical History:    Past Medical History:   Diagnosis Date     Anxiety      Bowel obstruction (H)      Chronic hypoxemic respiratory failure (H)      Chronic kidney disease     stage 3     Coronary artery disease due to lipid rich plaque      DNAR (do not attempt resuscitation)      Dyslipidemia, goal LDL below 70      ED (erectile dysfunction)      Essential hypertension      GERD (gastroesophageal reflux disease)      Ischemic cardiomyopathy     but EF 50-55%     Mild aortic stenosis      Noncompliance with medication regimen 9/11/2018     Paroxysmal VT (H)      Persistent atrial fibrillation (H) 8/22/2018     Psoriasis      Restrictive lung disease      Seasonal allergies      Sleep apnea      TIA (transient ischemic attack) 8/09     Ventricular aneurysm     Long-term coumadin therapy s/p ventricular patch 2001     Vitamin D deficiency            PHYSICAL EXAMINATION  Vitals:    11/11/18 1907   BP: 94/60   Pulse: 85   Resp: 18   Temp: (!) 96.4  F (35.8  C)   SpO2: 97%   Weight: 153 lb (69.4 kg)       Today on physical exam:     GENERAL: Awake, Alert, oriented x3, not in any form of acute distress, answers questions appropriately, follows simple commands, conversant  HEENT: Head is normocephalic with normal hair distribution. No evidence of trauma. Ears: No acute purulent discharge. Eyes: Conjunctivae pink with no scleral jaundice. Nose: Normal mucosa and septum. NECK: Supple with no cervical or supraclavicular lymphadenopathy. Trachea is midline.   CHEST: No tenderness or deformity, no crepitus  LUNG: dim and tight to auscultation with limited chest expansion. There are no crackles or wheezes, normal AP diameter. Barrel chest changes, 2LNC, no cough  BACK: No  kyphosis of the thoracic spine. Symmetric, no curvature, ROM normal, no CVA tenderness, no spinal tenderness   CVS: irregularly irregular rhythm, systolic murmur,  2+ pulses symmetric in all extremities.  ABDOMEN: Rounded and soft, nontender to palpation, non distended, no masses, no organomegaly, good bowel sounds, no rebound or guarding, no peritoneal signs.   EXTREMITIES: No pedal edema,   SKIN: Warm and dry, no erythema noted.  Skin color, texture, no rashes or lesions.  NEUROLOGICAL: The patient is oriented to person, place and time. No numb/tingling            LABS:   Recent Results (from the past 168 hour(s))   INR   Result Value Ref Range    INR 2.49 (H) 0.90 - 1.10   Basic Metabolic Panel   Result Value Ref Range    Sodium 140 136 - 145 mmol/L    Potassium 4.0 3.5 - 5.0 mmol/L    Chloride 104 98 - 107 mmol/L    CO2 27 22 - 31 mmol/L    Anion Gap, Calculation 9 5 - 18 mmol/L    Glucose 97 70 - 125 mg/dL    Calcium 9.1 8.5 - 10.5 mg/dL    BUN 35 (H) 8 - 28 mg/dL    Creatinine 1.53 (H) 0.70 - 1.30 mg/dL    GFR MDRD Af Amer 52 (L) >60 mL/min/1.73m2    GFR MDRD Non Af Amer 43 (L) >60 mL/min/1.73m2   Troponin I   Result Value Ref Range    Troponin I 0.60 (HH) 0.00 - 0.29 ng/mL   INR   Result Value Ref Range    INR 2.01 (H) 0.90 - 1.10   Lipid Profile   Result Value Ref Range    Triglycerides 109 <=149 mg/dL    Cholesterol 114 <=199 mg/dL    LDL Calculated 53 <=129 mg/dL    HDL Cholesterol 39 (L) >=40 mg/dL   Crossmatch   Result Value Ref Range    Crossmatch INVALID     Blood Expiration Date [id number]     Unit Type O Pos     Unit Number [id number]     Status Released     Component Red Blood Cells     PRODUCT CODE [id number]     Blood Type 5100     CODING SYSTEM [id number]    Crossmatch   Result Value Ref Range    Crossmatch LEAST INCOMPATIBLE     Blood Expiration Date [id number]     Unit Type O Pos     Unit Number [id number]     Status Released     Component Red Blood Cells     PRODUCT CODE [id number]      Blood Type 5100     CODING SYSTEM [id number]    Crossmatch   Result Value Ref Range    Crossmatch INVALID     Blood Expiration Date [id number]     Unit Type O Pos     Unit Number [id number]     Status Released     Component Red Blood Cells     PRODUCT CODE F7659E14     Blood Type 5100     CODING SYSTEM [id number]    Crossmatch   Result Value Ref Range    Crossmatch LEAST INCOMPATIBLE     Blood Expiration Date [id number]     Unit Type O Pos     Unit Number [id number]     Status Released     Component Red Blood Cells     PRODUCT CODE [id number]     Blood Type 5100     CODING SYSTEM [id number]    Crossmatch   Result Value Ref Range    Crossmatch INVALID     Blood Expiration Date [id number]     Unit Type O Pos     Unit Number [id number]     Status Released     Component Red Blood Cells     PRODUCT CODE [id number]     Blood Type 5100     CODING SYSTEM [id number]    Crossmatch   Result Value Ref Range    Crossmatch INVALID     Blood Expiration Date [id number]     Unit Type O Pos     Unit Number [id number]     Status Released     Component Red Blood Cells     PRODUCT CODE [id number]     Blood Type 5100     CODING SYSTEM [id number]    INR   Result Value Ref Range    INR 2.21 (H) 0.90 - 1.10   Basic Metabolic Panel   Result Value Ref Range    Sodium 140 136 - 145 mmol/L    Potassium 3.7 3.5 - 5.0 mmol/L    Chloride 100 98 - 107 mmol/L    CO2 33 (H) 22 - 31 mmol/L    Anion Gap, Calculation 7 5 - 18 mmol/L    Glucose 100 70 - 125 mg/dL    Calcium 9.4 8.5 - 10.5 mg/dL    BUN 39 (H) 8 - 28 mg/dL    Creatinine 1.59 (H) 0.70 - 1.30 mg/dL    GFR MDRD Af Amer 50 (L) >60 mL/min/1.73m2    GFR MDRD Non Af Amer 41 (L) >60 mL/min/1.73m2   Potassium   Result Value Ref Range    Potassium 3.3 (L) 3.5 - 5.0 mmol/L   INR   Result Value Ref Range    INR 2.26 (H) 0.90 - 1.10   Basic Metabolic Panel   Result Value Ref Range    Sodium 140 136 - 145 mmol/L    Potassium 3.3 (L) 3.5 - 5.0 mmol/L    Chloride 100 98 - 107 mmol/L    CO2 30 22 -  31 mmol/L    Anion Gap, Calculation 10 5 - 18 mmol/L    Glucose 97 70 - 125 mg/dL    Calcium 9.4 8.5 - 10.5 mg/dL    BUN 38 (H) 8 - 28 mg/dL    Creatinine 1.57 (H) 0.70 - 1.30 mg/dL    GFR MDRD Af Amer 51 (L) >60 mL/min/1.73m2    GFR MDRD Non Af Amer 42 (L) >60 mL/min/1.73m2   Basic Metabolic Panel   Result Value Ref Range    Sodium 140 136 - 145 mmol/L    Potassium 4.1 3.5 - 5.0 mmol/L    Chloride 99 98 - 107 mmol/L    CO2 31 22 - 31 mmol/L    Anion Gap, Calculation 10 5 - 18 mmol/L    Glucose 98 70 - 125 mg/dL    Calcium 9.4 8.5 - 10.5 mg/dL    BUN 36 (H) 8 - 28 mg/dL    Creatinine 1.62 (H) 0.70 - 1.30 mg/dL    GFR MDRD Af Amer 49 (L) >60 mL/min/1.73m2    GFR MDRD Non Af Amer 41 (L) >60 mL/min/1.73m2   INR   Result Value Ref Range    INR 2.57 (H) 0.90 - 1.10   HM1 (CBC with Diff)   Result Value Ref Range    WBC 11.1 (H) 4.0 - 11.0 thou/uL    RBC 4.33 (L) 4.40 - 6.20 mill/uL    Hemoglobin 10.9 (L) 14.0 - 18.0 g/dL    Hematocrit 35.8 (L) 40.0 - 54.0 %    MCV 83 80 - 100 fL    MCH 25.2 (L) 27.0 - 34.0 pg    MCHC 30.4 (L) 32.0 - 36.0 g/dL    RDW 17.3 (H) 11.0 - 14.5 %    Platelets 186 140 - 440 thou/uL    MPV 10.7 8.5 - 12.5 fL    Neutrophils % 77 (H) 50 - 70 %    Lymphocytes % 9 (L) 20 - 40 %    Monocytes % 11 (H) 2 - 10 %    Eosinophils % 3 0 - 6 %    Basophils % 1 0 - 2 %    Neutrophils Absolute 8.4 (H) 2.0 - 7.7 thou/uL    Lymphocytes Absolute 1.0 0.8 - 4.4 thou/uL    Monocytes Absolute 1.2 (H) 0.0 - 0.9 thou/uL    Eosinophils Absolute 0.3 0.0 - 0.4 thou/uL    Basophils Absolute 0.1 0.0 - 0.2 thou/uL     Results for orders placed or performed in visit on 11/12/18   Basic Metabolic Panel   Result Value Ref Range    Sodium 140 136 - 145 mmol/L    Potassium 4.1 3.5 - 5.0 mmol/L    Chloride 99 98 - 107 mmol/L    CO2 31 22 - 31 mmol/L    Anion Gap, Calculation 10 5 - 18 mmol/L    Glucose 98 70 - 125 mg/dL    Calcium 9.4 8.5 - 10.5 mg/dL    BUN 36 (H) 8 - 28 mg/dL    Creatinine 1.62 (H) 0.70 - 1.30 mg/dL    GFR MDRD Af  Amer 49 (L) >60 mL/min/1.73m2    GFR MDRD Non Af Amer 41 (L) >60 mL/min/1.73m2         Lab Results   Component Value Date    WBC 11.1 (H) 11/13/2018    HGB 10.9 (L) 11/13/2018    HCT 35.8 (L) 11/13/2018    MCV 83 11/13/2018     11/13/2018       Lab Results   Component Value Date    OXLVOAMF70 520 09/14/2013     Lab Results   Component Value Date    HGBA1C 5.7 09/14/2013     Lab Results   Component Value Date    INR 2.57 (H) 11/12/2018    INR 2.26 (H) 11/10/2018    INR 2.21 (H) 11/09/2018     No results found for: [id number]  Lab Results   Component Value Date    TSH 3.1 09/14/2013           ASSESSMENT/PLAN:    1. Acute on chronic CHF: Daily weights-153lbs. shortness of breath improving, no edema ble today. On lasix two times a day.   2. Acute on chronic respiratory failure, COPD, Bronchiectasis: shortness of breath improving, not quite to baseline. No cough. 2LNC at rest, 3L with exertion. At baseline o2 requirements. Lungs dim no cough. IS q4h while awake. Budesonide two times a day, perforomist daily, albuterol q4h prn.   3. Hypotension: chronic issue. SBP 90-100s. Asymptomatic. No ACE/ARB for CHF due to hypotension. Notify if SBP <85.   4. A fib: Rate controlled 80s. Per report palpitations at times with exertion. On metoprolol with hold parameters. On warfarin.   5. Severe aortic stenosis: No chest pain, systolic murmur. TAVR scheduled for 11/27.   6. AMARILIS on CKD: Cr 1.57 on 11/10, Cr 1.6 today 11/12. Stable.   7. S/p ICD: F/u with device clinic.   8. Constipation: Miralax daily prn.   9. HLD, CAD: On ticagrelor, nitrostat, atorvastatin, aspirin.   10. Depression: On sertraline.   11. Hypokalemia: On kcl.   12. GERD: On pantoprazole.   13. Glaucoma: On latanoprost.     Bmp, inr today ordered to add hm1 11/12    Electronically signed by: Africa Coello NP    Total 45 minutes of which 55% was spent in counseling and coordination of care of the above plan    Time spent in interview and examination of  patient, review of available records, and discussion with nursing staff. Continue care plan, efforts at therapy, and monitor nutritional status.

## 2021-06-22 NOTE — PROGRESS NOTES
[facility]      Facility:    [facility] [[id number]]   Code Status: FULL CODE   [facility] 11/27 through 12/5/18      Chief Complaint/Reason for Visit:  Chief Complaint   Patient presents with     H & P     s/p TAVR and synchronous pace       HPI:   Myron is a 87 y.o. male who known severe aortic stenosis, moderate to severe mitral regurgitation, chronic kidney disease atrial fibrillation, chronic heart failure with reduced ejection fraction, interstitial lung disease on chronic oxygen, obstructive sleep apnea, essential hypertension, coronary artery disease status post stents.  .  He came in for a planned TAVR.  His endurance and breathing did not change significantly in the hospital.  He went into fast atrial fibrillation, and had a fair amount of left ventricular dysfunction.  It was decided that he needed a synchronous pacer, which was  placed.  At the same time they also placed a new ICD generator.  The atrial fibrillation was not prolonged, there is been discussion of ablation, this was not done. .  He has New York Heart Association class IV failure with rest crackles and dyspnea.  BNP preoperatively was 500.    Iliac echo was done: Ejection fraction estimated 22% with a large pseudoaneurysm at the base of the inferior/inferior lateral wall.  There is normal function of the bioprosthetic aortic valve with a mean gradient of 8 mmHg, trace paravalvular insufficiency.  Moderate to severe mitral regurgitation.    He transferred to a [facility]    Past Medical History:  Past Medical History:   Diagnosis Date     Anxiety      Bowel obstruction (H)      Chronic hypoxemic respiratory failure (H)      CKD (chronic kidney disease) stage 3, GFR 30-59 ml/min (H)      Coronary artery disease due to lipid rich plaque      DNAR (do not attempt resuscitation)      Dyslipidemia, goal LDL below 70      ED (erectile dysfunction)      Essential  hypertension      GERD (gastroesophageal reflux disease)      Ischemic cardiomyopathy      Mild aortic stenosis      Noncompliance with medication regimen 9/11/2018     Paroxysmal VT (H)      Persistent atrial fibrillation (H) 8/22/2018     Psoriasis      Restrictive lung disease; moderate by PFT 10/24/2017    FVC 1.9 456% FEV1 1.4 457% ratio 74%.  No significant response to bronchodilators.  TLC 40% predicted DLCO corrected 47% predicted Assessment: No obstruction.  Moderate restriction.  Moderate diffusion defect.     Seasonal allergies      Sleep apnea      TIA (transient ischemic attack) 8/09     Ventricular aneurysm     Long-term coumadin therapy s/p ventricular patch 2001     Vitamin D deficiency            Surgical History:  Past Surgical History:   Procedure Laterality Date     ABDOMINAL AORTIC ANEURYSM REPAIR       APPENDECTOMY      Perforated     CARDIAC CATHETERIZATION       CARDIAC DEFIBRILLATOR PLACEMENT  7/19/2007     CORONARY STENT PLACEMENT  2001    LCX     CV CORONARY ANGIOGRAM N/A 10/18/2018    Procedure: Coronary Angiogram;  Surgeon: Elijah Liu MD;  Location: [facility] Cath Lab;  Service:      CV TRANSFEMORAL TRANSCATHETER VALVE REPLACEMENT N/A 11/27/2018    Procedure: Transfemoral Transcatheter Aortic Valve Replacement;  Surgeon: Elijah Liu MD;  Location: [facility] Cath Lab;  Service: Structural Heart     ICD Generator Change  6/18/14     IR EXTREMITY VENOGRAM LEFT  12/3/2018     PICC  11/27/2018          PICC AND MIDLINE TEAM LINE INSERTION  2/23/2018          RESECTION SMALL BOWEL / CLOSURE ILEOSTOMY       THORACENTESIS       VENTRICULAR RESECTION / REPAIR ANEURYSM         Family History:   Family History   Problem Relation Age of Onset     Stroke Mother      Thyroid disease Mother      Cancer Brother      No Medical Problems Son      No Medical Problems Son      No Medical Problems Son      Lung disease Neg Hx        Social History:    Social History     Socioeconomic History  "    Marital status:      Spouse name: Eulalia     Number of children: Not on file     Years of education: Not on file     Highest education level: Not on file   Social Needs     Financial resource strain: Not on file     Food insecurity - worry: Not on file     Food insecurity - inability: Not on file     Transportation needs - medical: Not on file     Transportation needs - non-medical: Not on file   Occupational History     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            Review of Systems   States valve replacement was \"tough\"  Dyspnea is about the same as previously  Chest is a little bit sore from the new pacer placement  He has a little bit of soreness in the bruising left groin and medial thigh greater than right  He does not like taking Lasix 80 mg all at once in the morning: His pressure will dip sometimes afterwards he much prefers 40 mg twice a day.  Its fervent hope that he can walk around his house to do ADLs.  The remainder of the comprehensive ROS is negative    Vitals:    18 1000   BP: 110/65   Pulse: 88   Resp: 16   Temp: 97.5  F (36.4  C)   SpO2: 98%       Physical Exam   Constitutional: He is oriented to person, place, and time. No distress.   Fatigued, thin elderly  male   HENT:   Nose: Nose normal.   Mouth/Throat: Oropharynx is clear and moist.   Eyes: Conjunctivae and EOM are " normal. No scleral icterus.   Cardiovascular: Regular rhythm and normal heart sounds. Exam reveals no friction rub.   Holosystolic murmur at apex, still some Aortic murmur   Pulmonary/Chest: Effort normal. He has rales.   Decreased at both bases posteriorly   Abdominal: Soft. Bowel sounds are normal. There is no tenderness.   Musculoskeletal: Normal range of motion. He exhibits no edema.   Lymphadenopathy:     He has no cervical adenopathy.   Neurological: He is alert and oriented to person, place, and time. He exhibits normal muscle tone. Coordination normal.   Skin: Skin is warm and dry.   Lateral medial thigh bruising  Left Upper chest battery area mild swelling and bruising   Psychiatric: He has a normal mood and affect. Thought content normal.          Allergies   Allergen Reactions     Blood-Group Specific Substance      Anti-Josep/Barnes.  Expect delays in obtaining blood for transfusion.  Collect 2 lavender top tubes and 1 red top tube for all blood bank orders.      Allergies   Allergen Reactions     Blood-Group Specific Substance      Anti-Josep/Barnes.  Expect delays in obtaining blood for transfusion.  Collect 2 lavender top tubes and 1 red top tube for all blood bank orders.          Medication List:  Current Outpatient Medications   Medication Sig     acetaminophen (TYLENOL EXTRA STRENGTH) 500 MG tablet Take 1 tablet (500 mg total) by mouth every 6 (six) hours as needed for pain.     albuterol (PROVENTIL) 2.5 mg /3 mL (0.083 %) nebulizer solution Take 3 mL (2.5 mg total) by nebulization every 4 (four) hours as needed for wheezing.     aspirin 81 MG EC tablet Take 1 tablet (81 mg total) by mouth daily. Stop after 6 weeks     atorvastatin (LIPITOR) 80 MG tablet Take 1 tablet (80 mg total) by mouth daily.     budesonide (PULMICORT) 0.5 mg/2 mL nebulizer solution Take 2 mL (0.5 mg total) by nebulization 2 (two) times a day.     cetirizine (ZYRTEC) 10 MG tablet Take 10 mg by mouth daily.     clopidogrel  (PLAVIX) 75 mg tablet Take 1 tablet (75 mg total) by mouth daily.     fluticasone (FLONASE) 50 mcg/actuation nasal spray Apply 1 spray into each nostril daily as needed for rhinitis.     formoterol fumarate (PERFOROMIST) 20 mcg/2 mL nebulizer solution Take 2 mL (20 mcg total) by nebulization daily.     furosemide (LASIX) 80 MG tablet Take 1 tablet (80 mg total) by mouth daily.     latanoprost (XALATAN) 0.005 % ophthalmic solution Administer 1 drop to the right eye at bedtime.     metoprolol succinate (TOPROL-XL) 50 MG 24 hr tablet Take 1 tablet (50 mg total) by mouth daily.     multivitamin therapeutic (THERAGRAN) tablet Take 1 tablet by mouth daily.     nebulizer and compressor Radha For chronic cough     nitroglycerin (NITROSTAT) 0.4 MG SL tablet Place 1 tablet (0.4 mg total) under the tongue every 5 (five) minutes as needed for chest pain.     OXYGEN-AIR DELIVERY SYSTEMS MISC into each nostril continuous. 1.5 liters with rest and 3 liters with activity  May use up to 1.5 liters at noc.     pantoprazole (PROTONIX) 20 MG tablet Take 1 tablet (20 mg total) by mouth daily.     potassium chloride (K-DUR,KLOR-CON) 20 MEQ tablet Take 1 tablet (20 mEq total) by mouth 2 (two) times a day.     sertraline (ZOLOFT) 50 MG tablet Take 0.5 tablets (25 mg total) by mouth at bedtime.     warfarin (COUMADIN) 3 MG tablet Take 1 tablet (3 mg total) by mouth daily. (Patient taking differently: Take 3 mg by mouth daily 11/15/18 INR 2.98  Cont 3mg daily.  Next INR next week with PMD.    11/12/18 INR 2.57 Cont 3mg daily. Next INR 11/15.  11/10/18 INR 2.26 3mg daily.  11/9/18 INR 2.21 3mg  11/8/18 had 4mg.      )       Labs:   Ref Range & Units 12/5/18 0525   WBC 4.0 - 11.0 thou/uL 9.6    Hemoglobin 14.0 - 18.0 g/dL 9.1 Abnormally low     Hematocrit 40.0 - 54.0 % 30.2 Abnormally low     RDW 11.0 - 14.5 % 19.1 Abnormally high     Platelets 140 - 440 thou/uL 106 Abnormally low       Ref Range & Units 12/5/18 0525   Sodium 136 - 145 mmol/L  138    Potassium 3.5 - 5.0 mmol/L 4.2    Chloride 98 - 107 mmol/L 102    CO2 22 - 31 mmol/L 29    Anion Gap, Calculation 5 - 18 mmol/L 7    Glucose 70 - 125 mg/dL 96    Calcium 8.5 - 10.5 mg/dL 8.9    BUN 8 - 28 mg/dL 41 Abnormally high     Creatinine 0.70 - 1.30 mg/dL 1.37 Abnormally high     GFR MDRD Non Af Amer >60 mL/min/1.73m2 49 Abnormally low       Ref Range & Units 12/5/18 0525   Magnesium 1.8 - 2.6 mg/dL 2.1           Ref Range & Units 12/5/18 0525   INR 0.90 - 1.10 1.29 Abnormally high            Assessment /Plan:    ICD-10-CM    1. Severe aortic stenosis I35.0 Non rheumatic   2. S/P TAVR (transcatheter aortic valve replacement) Z95.2 He hopes there is some recovery in the heart function.  On Coumadin   3. ICD (implantable cardioverter-defibrillator) in place Z95.810 New lead placed this hospitalization along with a synchronous pacer   4. Chronic HFrEF (heart failure with reduced ejection fraction) (H) I50.22 OK to split Lasix dose.  Toprol, if the dose needs to be reduced for low pressure for this over Lasix   5. Chronic hypoxemic respiratory failure (H) J96.11 Chronic, on O2   6. Essential hypertension I10 BP low normal on the cardiac/ CHF medications   7. Restrictive lung disease; moderate by PFT J98.4  Vijay   8. Stage 3 chronic kidney disease (H) N18.3      Apparently converted to normal sinus rhythm in the hospital, remains on Coumadin      Electronically signed by: Janet Noe MD

## 2021-06-22 NOTE — PROGRESS NOTES
[facility] Note    Assessment:    Myron Sunshine is a 87 y.o. old male with HFREF, AS s/p TAVR w/ a #26 S3 11/18 , MR, CAD s/p PCI to Lcx '01, LV pseudoaneurysm s/p patch repair, HTN, HL interstitial lung disease, bronchiectasis, asthma, obstructive sleep apnea, and chronic kidney disease here for f/u post-TAVR.     Plan:  # AS - s/p TAVR w/ a #26S3, no AI mean gradient 11  - continue furosemide/metoprolol/clopidogrel/warfarin    # MR - appears moderate and improved since TAVR/diuresis  - would favor medical management at this point; MitraClip if repeatedly fails medical therapy    # Cardiomyopathy   - HFREF LVEF:difficult to assess LV function due to LV aneurysm but appears in the range of 35-45%.  - etiology: ischemic  - conduction: LBBB now s/p CRT  - warm and dry  - BB: metoprolol 25mg  - AL reduction:not tolerating ACEI/ARB due to CKD and hypotesnion, can consider hydral in the future - BP borderline today  - diuretic: furosemide 40mg daily  - aldosterone antagonist: none due to CKD   - ICD: CRT-D in place, Bi-V paced 90%    # AF - continue metop/warfarin    F/u w/  in 3 mos   F/u in HF clinic regularly  F/u w/  in 1 year    GRANT LORENZANA  [facility]  [phone]  ______________________________________________________________________    Subjective:  CC: F/u AS    I had the opportunity to see Myron Sunshine at the [facility]. Myron Sunshine is a 87 y.o. male with a known history of HFREF, AS s/p TAVR w/ a #26 S3 11/18 , MR, CAD s/p PCI to Lcx '01, LV pseudoaneurysm s/p patch repair, HTN, HL interstitial lung disease, bronchiectasis, asthma, obstructive sleep apnea, and chronic kidney disease here for f/u post-TAVR.     Since DC he continued to have dyspnea/O2 requirements c/w his pulmonary disease, albeit w/ improved energy levels and his diuretic dose was decreased at the time of f/u visit. Then, on 12/31 he was admitted w/ rapidly progressive  hypoxic/hypercarbic respiratory failure, transiently necessitating BIPAP and diuretics. As part of the w/u he appeared euvolemic (in fact cre bumped in response to diuretics), w/ stable chronic pulmonary disease, but elevated WBC and procalcitonin,hinting at an occult respiraotry infection as the source of his decomepnsation. He improved and was discharged. Since then, he has don fairly well, feels much addis stamina since TAVR and even accidentally detaching the O2 a couple of times, he didn't experience any shortness of breath - this is drastically different to priro to TAVR and CRT upgrade per the patient. His main complaint no is feeling jittery after the shoulder cortisone shot. No orthopnea/PND/Edema/syncope + mild BARGER, N/V/D/F. Has one more day of abx. Left.  ______________________________________________________________________      Review of Systems:   As noted in HPI, all others reviewed and are negative      Problem List:  Patient Active Problem List   Diagnosis     Stage 3 chronic kidney disease (H)     Biventricular ICD (implantable cardioverter-defibrillator) in place     Essential hypertension     Dyslipidemia, goal LDL below 70     Left ventricular aneurysm     Bronchiectasis without acute exacerbation (H)     Restrictive lung disease; moderate by PFT     Paroxysmal atrial fibrillation (H)     DNAR (do not attempt resuscitation)     Chronic hypoxemic respiratory failure (H)     Severe aortic stenosis     Non-rheumatic mitral regurgitation     Hypotension     Left bundle branch block     Chronic HFrEF (heart failure with reduced ejection fraction) (H)     VT (ventricular tachycardia) (H)     S/P TAVR (transcatheter aortic valve replacement)     Glaucoma     MIS (obstructive sleep apnea)     Depression     Gout flare     Acute respiratory failure (H)     Medical History:  Past Medical History:   Diagnosis Date     Anxiety      Bowel obstruction (H)      Chronic hypoxemic respiratory failure (H)      CKD  (chronic kidney disease) stage 3, GFR 30-59 ml/min (H)      Coronary artery disease due to lipid rich plaque      DNAR (do not attempt resuscitation)      Dyslipidemia, goal LDL below 70      ED (erectile dysfunction)      Essential hypertension      GERD (gastroesophageal reflux disease)      Ischemic cardiomyopathy      Mild aortic stenosis      Noncompliance with medication regimen 9/11/2018     Paroxysmal VT (H)      Persistent atrial fibrillation (H) 8/22/2018     Psoriasis      Restrictive lung disease; moderate by PFT 10/24/2017    FVC 1.9 456% FEV1 1.4 457% ratio 74%.  No significant response to bronchodilators.  TLC 40% predicted DLCO corrected 47% predicted Assessment: No obstruction.  Moderate restriction.  Moderate diffusion defect.     Seasonal allergies      Sleep apnea      TIA (transient ischemic attack) 8/09     Ventricular aneurysm     Long-term coumadin therapy s/p ventricular patch 2001     Vitamin D deficiency      Surgical History:  Past Surgical History:   Procedure Laterality Date     ABDOMINAL AORTIC ANEURYSM REPAIR       APPENDECTOMY      Perforated     CARDIAC CATHETERIZATION       CARDIAC DEFIBRILLATOR PLACEMENT  7/19/2007     CORONARY STENT PLACEMENT  2001    LCX     CV CORONARY ANGIOGRAM N/A 10/18/2018    Procedure: Coronary Angiogram;  Surgeon: Elijah Liu MD;  Location: [facility] Cath Lab;  Service:      CV TRANSFEMORAL TRANSCATHETER VALVE REPLACEMENT N/A 11/27/2018    Procedure: Transfemoral Transcatheter Aortic Valve Replacement;  Surgeon: Elijah Liu MD;  Location: [facility] Cath Lab;  Service: Structural Heart     ICD Generator Change  6/18/14     IR EXTREMITY VENOGRAM LEFT  12/3/2018     PICC  11/27/2018          PICC AND MIDLINE TEAM LINE INSERTION  2/23/2018          RESECTION SMALL BOWEL / CLOSURE ILEOSTOMY       THORACENTESIS       VENTRICULAR RESECTION / REPAIR ANEURYSM       Social History:  Social History     Socioeconomic History     Marital status:       Spouse name: Eulalia     Number of children: Not on file     Years of education: Not on file     Highest education level: Not on file   Social Needs     Financial resource strain: Not on file     Food insecurity - worry: Not on file     Food insecurity - inability: Not on file     Transportation needs - medical: Not on file     Transportation needs - non-medical: Not on file   Occupational History     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             Family History:  Family History   Problem Relation Age of Onset     Stroke Mother      Thyroid disease Mother      Cancer Brother      No Medical Problems Son      No Medical Problems Son      No Medical Problems Son      Lung disease Neg Hx          Allergies:  Allergies   Allergen Reactions     Blood-Group Specific Substance      Anti-Josep/Barnes.  Expect delays in obtaining blood for transfusion.  Collect 2 lavender top tubes and 1 red top tube for all blood bank orders.        Medications:  Current Outpatient Medications   Medication Sig Dispense Refill     acetaminophen (TYLENOL EXTRA STRENGTH) 500 MG tablet Take 1 tablet (500 mg total) by mouth every 6 (six) hours as needed for pain.  0     albuterol (PROAIR HFA;PROVENTIL HFA;VENTOLIN HFA) 90 mcg/actuation inhaler Inhale 2 puffs every 6 (six) hours as needed for shortness of breath. 1 Inhaler 0     albuterol  (PROVENTIL) 2.5 mg /3 mL (0.083 %) nebulizer solution Take 3 mL (2.5 mg total) by nebulization every 4 (four) hours as needed for wheezing.  0     allopurinol (ZYLOPRIM) 100 MG tablet Take 100 mg by mouth daily.       amoxicillin-clavulanate (AUGMENTIN) 500-125 mg per tablet Take 1 tablet (500 mg total) by mouth 2 (two) times a day for 7 days. 14 tablet 0     atorvastatin (LIPITOR) 80 MG tablet Take 1 tablet (80 mg total) by mouth daily. 30 tablet 3     cetirizine (ZYRTEC) 10 MG tablet Take 10 mg by mouth daily.       clopidogrel (PLAVIX) 75 mg tablet Take 300 mg (4 pills) Friday morning.  Starting Saturday, take 75 mg (1 pill) daily 30 tablet 12     fluticasone (FLONASE) 50 mcg/actuation nasal spray Apply 1 spray into each nostril daily as needed for rhinitis.       fluticasone-vilanterol (BREO ELLIPTA) 200-25 mcg/dose DsDv inhaler Inhale 1 puff daily. 1 each 11     formoterol fumarate (PERFOROMIST) 20 mcg/2 mL nebulizer solution Take 2 mL (20 mcg total) by nebulization daily.  0     furosemide (LASIX) 80 MG tablet Take 0.5 tablets (40 mg total) by mouth daily. 30 tablet 0     guaiFENesin ER (MUCINEX) 600 mg 12 hr tablet Take 1 tablet (600 mg total) by mouth 2 (two) times a day.  0     hydrocortisone 1 % cream Apply 1 application topically every 6 (six) hours as needed.       latanoprost (XALATAN) 0.005 % ophthalmic solution Administer 1 drop to the right eye at bedtime. 2.5 mL 12     metoprolol tartrate (LOPRESSOR) 25 MG tablet Take 1 tablet (25 mg total) by mouth 2 (two) times a day. 60 tablet 0     multivitamin therapeutic (THERAGRAN) tablet Take 1 tablet by mouth daily.  0     nebulizer and compressor Radha For chronic cough 1 each 0     nitroglycerin (NITROSTAT) 0.4 MG SL tablet Place 1 tablet (0.4 mg total) under the tongue every 5 (five) minutes as needed for chest pain.  0     OXYGEN-AIR DELIVERY SYSTEMS MISC into each nostril continuous. 1.5 liters with rest and 3 liters with activity  May use up to 1.5  "liters at [facility].       pantoprazole (PROTONIX) 20 MG tablet Take 1 tablet (20 mg total) by mouth daily. (Patient taking differently: Take 20 mg by mouth daily as needed.       ) 30 tablet 0     sertraline (ZOLOFT) 50 MG tablet Take 0.5 tablets (25 mg total) by mouth at bedtime.  0     warfarin (COUMADIN/JANTOVEN) 2 MG tablet Take 1 tablet (2 mg total) by mouth See Admin Instructions. Take 4 mg daily 180 tablet 0     No current facility-administered medications for this visit.        Objective:   Vital signs:  /70 (Patient Site: Left Arm, Patient Position: Sitting, Cuff Size: Adult Large)   Pulse 80   Resp 16   Ht 5' 9\" (1.753 m)   Wt 154 lb (69.9 kg)   BMI 22.74 kg/m        Physical Exam:    GENERAL APPEARANCE: Alert, cooperative and in no acute distress.   HEENT: No scleral icterus. Oral mucuos membranes pink and moist.   NECK: JVP 8. No Hepatojugular reflux. Thyroid not visualized. No lymphadenopathy   CHEST: clear to auscultation   CARDIOVASCULAR: S1, S2 without murmur ,clicks or rubs. Brachial, radial and posterior tibial pulses are intact and symetric. No carotid bruits noted. No edema  ABDOMEN: Nontender. BS+. No bruits.   SKIN: No Xanthelasma   Musculoskeletal: No cyanosis, clubbing or swelling.      Lab Results:  LIPIDS:  Lab Results   Component Value Date    CHOL 114 11/08/2018    CHOL 136 08/23/2017    CHOL 133 09/19/2016     Lab Results   Component Value Date    HDL 39 (L) 11/08/2018    HDL 40 08/23/2017    HDL 39 (L) 09/19/2016     Lab Results   Component Value Date    LDLCALC 53 11/08/2018    LDLCALC 74 08/23/2017    LDLCALC 69 09/19/2016     Lab Results   Component Value Date    TRIG 109 11/08/2018    TRIG 110 08/23/2017    TRIG 125 09/19/2016     No components found for: CHOLHDL    BMP:  Lab Results   Component Value Date    CREATININE 1.65 (H) 01/04/2019    BUN 39 (H) 01/04/2019     01/04/2019    K 4.0 01/04/2019     01/04/2019    CO2 24 01/04/2019         GRANT " [facility]

## 2021-06-22 NOTE — PROGRESS NOTES
[facility]    DATE: 2018    NAME:  Myron Sunshine             :  1931  MRN: [id number]  CODE STATUS:  FULL CODE    VISIT TYPE: Discharge Summary     FACILITY:  [facility] [[id number]]       CHIEF COMPLAIN/REASON FOR VISIT:    Chief Complaint   Patient presents with     Discharge Summary               HISTORY OF PRESENT ILLNESS: Myron Sunshine is a 87 y.o. male who was admitted - for severe aortic stenosis and underwent TAVR procedure. Postop he had significant hypoxia and required increased oxygen supplementation. His cardiomyopathy did not improve after procedure so cardiology upgraded him to BiV AICD on . He was going to undergo AV node ablation at same time as BiV aAICD upgrade however he had converted to NSR. His metoprolol dose was increased. He was sent to TCU for further rehab. He has PMH of CAD, bronchiectasis, severe aortic stenosis, severe mitral regurgitation, PAF, CKD stage 3, HTN, HLD, COPD, Restrictive lung disease, chronic hypoxemic respiratory failure, CHF. Prior to this he lived at home in a condo with his wife. He used walker at home and used O2 continuously.     TCU course:   Mr. Sunshine has made progress with therapy and is ambulating 300 feet with walker and supervision. He is independent for ADLs. Therapy had recommended he stay until  but he will be discharging on 12/15 per his request. During his stay he complained of pain in left thigh and ecchymotic areas on bilateral thighs (thought s/t TAVR procedure) but venous doppler was checked and was negative for DVT. This pain was controlled with tylenol and hot packs. His incisions in bilateral groins and left chest are healing well. His weights have been trending down 156-153lbs. His lasix was reduced to daily and he continues to be euvolemic. His blood pressures have been running 90-100s but he has been asympomatic. He continues to be in regular rate  and rhythm. INR was 1.82 on 12/10 and he is on coumadin 4mg daily. His next INR will be checked 12/17, since returning home will be done per . He continues on his home o2 level of 2LNC. His renal function is stable at 1.38 and he will be seeing cardiology today 12/13. His hg was 9.9 on 12/11 and he was to have a recheck on 12/17, but will recommend he have follow up labs when sees his pcp. He was treated for gout flare during his stay and was started on mucinex for concerns of sinus congestion. He will be returning home with  Pt, OT, HHA, RN for warfarin management. He will f/u with PCP in 5-7 days.     REVIEW OF SYSTEMS:  PROBLEMS AND REVIEW OF SYSTEMS:   Review of Systems  Today on ROS:   Currently, no fever, chills, or rigors. Decreased vision and hearing. Denies any chest pain, headaches, lightheadedness. Appetite is fair. Denies any GERD symptoms. Denies any difficulty with swallowing, nausea, or vomiting.  Denies any abdominal pain, diarrhea or constipation. No insomnia. No active bleeding. No rash. Positive for chronic o2 use, shortness of breath, no dizziness, no pain, low blood pressures, urinary frequency, urgency, CPAP use, Incentive spirometer use, bilateral groin incisions, left chest incision, pain in left thigh and bruising on both thighs, tremors, pain in right foot great toe with swelling and redness-improved today, dry skin and itching right leg, sinus congestion      Allergies   Allergen Reactions     Blood-Group Specific Substance      Anti-Josep/Barnes.  Expect delays in obtaining blood for transfusion.  Collect 2 lavender top tubes and 1 red top tube for all blood bank orders.      Current Outpatient Medications   Medication Sig     allopurinol (ZYLOPRIM) 100 MG tablet Take 100 mg by mouth daily.     guaiFENesin ER (MUCINEX) 600 mg 12 hr tablet Take 600 mg by mouth 2 (two) times a day.     hydrocortisone 1 % cream Apply 1 application topically every 6 (six) hours as needed.      acetaminophen (TYLENOL EXTRA STRENGTH) 500 MG tablet Take 1 tablet (500 mg total) by mouth every 6 (six) hours as needed for pain.     albuterol (PROVENTIL) 2.5 mg /3 mL (0.083 %) nebulizer solution Take 3 mL (2.5 mg total) by nebulization every 4 (four) hours as needed for wheezing.     aspirin 81 MG EC tablet Take 1 tablet (81 mg total) by mouth daily. Stop after 6 weeks     atorvastatin (LIPITOR) 80 MG tablet Take 1 tablet (80 mg total) by mouth daily.     budesonide (PULMICORT) 0.5 mg/2 mL nebulizer solution Take 2 mL (0.5 mg total) by nebulization 2 (two) times a day.     cetirizine (ZYRTEC) 10 MG tablet Take 10 mg by mouth daily.     clopidogrel (PLAVIX) 75 mg tablet Take 1 tablet (75 mg total) by mouth daily.     fluticasone (FLONASE) 50 mcg/actuation nasal spray Apply 1 spray into each nostril daily as needed for rhinitis.     formoterol fumarate (PERFOROMIST) 20 mcg/2 mL nebulizer solution Take 2 mL (20 mcg total) by nebulization daily.     furosemide (LASIX) 80 MG tablet Take 1 tablet (80 mg total) by mouth daily.     latanoprost (XALATAN) 0.005 % ophthalmic solution Administer 1 drop to the right eye at bedtime.     metoprolol succinate (TOPROL-XL) 50 MG 24 hr tablet Take 1 tablet (50 mg total) by mouth daily. (Patient taking differently: Take 50 mg by mouth daily IF HR<65 or SBP<95 or D<62 then give 1/2 tab (25mg).      )     multivitamin therapeutic (THERAGRAN) tablet Take 1 tablet by mouth daily.     nebulizer and compressor Radha For chronic cough     nitroglycerin (NITROSTAT) 0.4 MG SL tablet Place 1 tablet (0.4 mg total) under the tongue every 5 (five) minutes as needed for chest pain.     OXYGEN-AIR DELIVERY SYSTEMS MISC into each nostril continuous. 1.5 liters with rest and 3 liters with activity  May use up to 1.5 liters at noc.     pantoprazole (PROTONIX) 20 MG tablet Take 1 tablet (20 mg total) by mouth daily.     potassium chloride (K-DUR,KLOR-CON) 20 MEQ tablet Take 1 tablet (20 mEq total) by  mouth 2 (two) times a day.     sertraline (ZOLOFT) 50 MG tablet Take 0.5 tablets (25 mg total) by mouth at bedtime.     warfarin (COUMADIN) 3 MG tablet Take 1 tablet (3 mg total) by mouth daily. (Patient taking differently: Take 4 mg by mouth daily 12/10/18 INR 1.82 Cont 4mg daily. Next INR 12/17.  12/6/18 INR 1.35 4mg daily. NExt INR 12/10.  11/15/18 INR 2.98  Cont 3mg daily.  Next INR next week with PMD..      )     Past Medical History:    Past Medical History:   Diagnosis Date     Anxiety      Bowel obstruction (H)      Chronic hypoxemic respiratory failure (H)      CKD (chronic kidney disease) stage 3, GFR 30-59 ml/min (H)      Coronary artery disease due to lipid rich plaque      DNAR (do not attempt resuscitation)      Dyslipidemia, goal LDL below 70      ED (erectile dysfunction)      Essential hypertension      GERD (gastroesophageal reflux disease)      Ischemic cardiomyopathy      Mild aortic stenosis      Noncompliance with medication regimen 9/11/2018     Paroxysmal VT (H)      Persistent atrial fibrillation (H) 8/22/2018     Psoriasis      Restrictive lung disease; moderate by PFT 10/24/2017    FVC 1.9 456% FEV1 1.4 457% ratio 74%.  No significant response to bronchodilators.  TLC 40% predicted DLCO corrected 47% predicted Assessment: No obstruction.  Moderate restriction.  Moderate diffusion defect.     Seasonal allergies      Sleep apnea      TIA (transient ischemic attack) 8/09     Ventricular aneurysm     Long-term coumadin therapy s/p ventricular patch 2001     Vitamin D deficiency            PHYSICAL EXAMINATION  Vitals:    12/12/18 2105   BP: 107/56   Pulse: 79   Resp: 16   Temp: 96.8  F (36  C)   SpO2: 98%   Weight: 153 lb (69.4 kg)       Today on physical exam:     GENERAL: Awake, Alert, oriented x3, not in any form of acute distress, answers questions appropriately, follows simple commands, conversant  HEENT: Head is normocephalic with normal hair distribution. No evidence of trauma. Ears: No  acute purulent discharge. Eyes: Conjunctivae pink with no scleral jaundice. Nose: Normal mucosa and septum. NECK: Supple with no cervical or supraclavicular lymphadenopathy. Trachea is midline.   CHEST: No tenderness or deformity, no crepitus  LUNG: dim and tight to auscultation with limited chest expansion. There are no crackles or wheezes, normal AP diameter. Barrel chest changes, 2LNC, no cough; using Incentive spirometer 750-1000  BACK: No kyphosis of the thoracic spine. Symmetric, no curvature, ROM normal, no CVA tenderness, no spinal tenderness   CVS: regular rate and rhythm,  2+ pulses symmetric in all extremities.  ABDOMEN: Rounded and soft, nontender to palpation, non distended, no masses, no organomegaly, good bowel sounds, no rebound or guarding, no peritoneal signs.   EXTREMITIES: No pedal edema,   SKIN: Warm and dry, bilateral groin incisions c/d/i, bilateral thigh ecchymosis, small hardened round area on left thigh, left chest incision c/d/i with steri strips, right foot inferior great toe swelling, erythema, tenderness  NEUROLOGICAL: The patient is oriented to person, place and time. No numb/tingling            LABS:   Recent Results (from the past 168 hour(s))   INR   Result Value Ref Range    INR 1.82 (H) 0.90 - 1.10   Basic Metabolic Panel   Result Value Ref Range    Sodium 140 136 - 145 mmol/L    Potassium 4.1 3.5 - 5.0 mmol/L    Chloride 104 98 - 107 mmol/L    CO2 29 22 - 31 mmol/L    Anion Gap, Calculation 7 5 - 18 mmol/L    Glucose 93 70 - 125 mg/dL    Calcium 9.1 8.5 - 10.5 mg/dL    BUN 42 (H) 8 - 28 mg/dL    Creatinine 1.38 (H) 0.70 - 1.30 mg/dL    GFR MDRD Af Amer 59 (L) >60 mL/min/1.73m2    GFR MDRD Non Af Amer 49 (L) >60 mL/min/1.73m2   HM1 (CBC with Diff)   Result Value Ref Range    WBC 10.9 4.0 - 11.0 thou/uL    RBC 3.73 (L) 4.40 - 6.20 mill/uL    Hemoglobin 9.9 (L) 14.0 - 18.0 g/dL    Hematocrit 32.9 (L) 40.0 - 54.0 %    MCV 88 80 - 100 fL    MCH 26.5 (L) 27.0 - 34.0 pg    MCHC 30.1 (L)  32.0 - 36.0 g/dL    RDW 19.7 (H) 11.0 - 14.5 %    Platelets 172 140 - 440 thou/uL    MPV 10.8 8.5 - 12.5 fL    Neutrophils % 72 (H) 50 - 70 %    Lymphocytes % 13 (L) 20 - 40 %    Monocytes % 11 (H) 2 - 10 %    Eosinophils % 4 0 - 6 %    Basophils % 1 0 - 2 %    Neutrophils Absolute 7.7 2.0 - 7.7 thou/uL    Lymphocytes Absolute 1.4 0.8 - 4.4 thou/uL    Monocytes Absolute 1.2 (H) 0.0 - 0.9 thou/uL    Eosinophils Absolute 0.4 0.0 - 0.4 thou/uL    Basophils Absolute 0.1 0.0 - 0.2 thou/uL     Results for orders placed or performed in visit on 12/11/18   Basic Metabolic Panel   Result Value Ref Range    Sodium 140 136 - 145 mmol/L    Potassium 4.1 3.5 - 5.0 mmol/L    Chloride 104 98 - 107 mmol/L    CO2 29 22 - 31 mmol/L    Anion Gap, Calculation 7 5 - 18 mmol/L    Glucose 93 70 - 125 mg/dL    Calcium 9.1 8.5 - 10.5 mg/dL    BUN 42 (H) 8 - 28 mg/dL    Creatinine 1.38 (H) 0.70 - 1.30 mg/dL    GFR MDRD Af Amer 59 (L) >60 mL/min/1.73m2    GFR MDRD Non Af Amer 49 (L) >60 mL/min/1.73m2         Lab Results   Component Value Date    WBC 10.9 12/11/2018    HGB 9.9 (L) 12/11/2018    HCT 32.9 (L) 12/11/2018    MCV 88 12/11/2018     12/11/2018       Lab Results   Component Value Date    HCGHQWSA84 520 09/14/2013     Lab Results   Component Value Date    HGBA1C 5.7 09/14/2013     Lab Results   Component Value Date    INR 1.82 (H) 12/10/2018    INR 1.35 (H) 12/06/2018    INR 1.29 (H) 12/05/2018     No results found for: [id number]  Lab Results   Component Value Date    TSH 3.1 09/14/2013           ASSESSMENT/PLAN:    1. S/p TAVR: Bilateral groin incisions c/d/i, bilateral thigh ecchymosis small hardened round area on left thigh with pain. No chest pain, regular rate and rhythm today, shortness of breath at baseline. F/u with cardiology 12/13. Denies pain today. IS q4h while awake. Daily [phone]. Tylenol prn pain. On aspirin, lasix, metoprolol, ticagrelor, warfarin. Previously reduced lasix, continues to appear  euvolemic.   2.  small hematoma, v thrombophlebitis: Bilateral thigh ecchymosis with small round hardened and tender area Left thigh. Venous doppler negative for Dvt.  On warfarin. Ordered warm packs prn for pain.   3. Hypotension: chronic issue. SBP 100s. Asymptomatic. No ACE/ARB for CHF due to hypotension. Notify if SBP <85. Previously reduced lasix and stable. Monitor closely.   4. A fib converted NSR: Converted during hospital stay. On warfarin and metoprolol. Normal rate and rhythm today. Rate 90s. INR was 1.82 continue coumadin 4mg and recheck on 12/17.   5. chronic respiratory failure, COPD, Bronchiectasis: shortness of breath improving, not quite to baseline. No cough. 2LNC at rest, 3L with exertion. At baseline o2 requirements. Lungs dim no cough. IS q4h while awake. Budesonide two times a day, perforomist daily, albuterol q4h prn.  6. AMARILIS on CKD: Cr 1.37 on 12/5. Cr 1.38 on 12/11.   7. S/p BiV AICD: F/u with device clinic 12/13. Upgraded on 12/4. Left chest incision c/d/i, steri strips in place.   8. Constipation: no concerns.    9. HLD, CAD: On ticagrelor, nitrostat, atorvastatin, aspirin. No chest pain.   10. Depression: On sertraline.   11. Hypokalemia: On kcl.   12. GERD: On pantoprazole.   13. Glaucoma: On latanoprost.   14. chronic CHF: Daily [phone]. shortness of breath at baseline, no edema ble today. On lasix daily. Monitor closely. F/u with cardiology 12/13.   15. MIS: On cpap. Orders given.   16. Acute blood loss anemia: 9.9 hg on 12/11. Stable. Repeat hg on 12/17.   17. Gout flare: allopurinol 100 daily to prevent further attacks. Cr stable 1.38. Right foot pain, swelling under great toe. Completed colcrys.     Per therapy soft LCD of 12/20 min for toileting, sba for dressing, 25ft sba  For ambulation, and stand by for other adls.     Electronically signed by: Africa Coello NP    Total 35 minutes of which 55% was spent in counseling and coordination of care of the above  "plan    Time spent in interview and examination of patient, review of available records, and discussion with nursing staff. Continue care plan, efforts at therapy, and monitor nutritional status.      Please evaluate Myron Sunshine for admission to Home Health.    Face to Face Attestation and Initial Plan of Care    The face-to-face encounter occurred on date: 12/13/18  Face to Face encounter was with: Africa Coello    Please provide brief clinical summary of reason for visit and need for home care. Deconditioning after hospital stay for tavr and aicd placement    Please identify which of the following home health disciplines the patient will need AND describe the skilled services that you would like the home health agency to perform: SKILLED NURSING (RN): perform/teach anticoagulation, PHYSICAL THERAPY: strength training and gait training, OCCUPATIONAL THERAPY: ADLs and home safety and HOME HEALTH AIDE    Homebound Status (describe the functional limitations that support this patient is confined to his/her home. Medicaid recipients are not required to be homebound.):assistive device needed:  2WW    Name of physician who will be responsible for the ongoing home health plan of care (CMS requires the referring physician to provide the specific name of the community physician instead of a title, such as \"PCP\"): Patricia Gerardo PA-C    Requested Start of Care Date: Within 48 hours    Other information to assist the home health agency in developing the initial Plan of Care:    I certify that services are/were furnished while this patient was under the care of a physician and that a physician or an allowed non-physician practitioner (NPP), had a face-to-face encounter that meets the physician face-to-face encounter requirements. The encounter was in whole, or in part, related to the primary reason for home health. The patient is confined to his/her home and needs intermittent skilled nursing, physical therapy, " speech-language pathology, or the continued need for occupational therapy. A plan of care has been established by a physician and is periodically reviewed by a physician.

## 2021-06-22 NOTE — PROGRESS NOTES
DEVICE CLINIC RN POST-OP NOTE    Reason for visit: Post-op biventricular ICD check and appt with Marielos Valles CNP.      Device: [employer] Claria, CRT-D  Procedure date: 12/4/2018  Implant Diagnosis: LBBB s/p TAVR, AF, bradycardia, cardiomyopathy  Implanting Physician: Dr. Donal Cadet    Assessment  Subjective: No significant changes or concerns today. A full assessment will be done by Marielos Valles CNP.  Vitals: There were no vitals filed for this visit.  Incision/device pocket: The steri-strips were removed from the incision and it was cleansed with adhesive remover and alcohol wipes. The incision is clean, dry and intact. There are no signs of infection present. Minimal swelling, soft to the touch over the device. He said it is has not gotten any larger in size. I recommended he monitor it at home and call with changes. He agreed to this plan.    Device Findings  Interrogation of device reveals normal sensing and capture thresholds  See the Paceart Report for a full summary of the device information.        Patient Education  Myron Sunshine was accompanied today by his wife, Joann.      [facility]'s Partnership Agreement for Device Patients and Post-operative Checklist were presented and reviewed with patient at today's appointment.    Signs and symptoms of infection, poor wound healing, and device function were reviewed. Range of motion and weight restrictions for the left side are four weeks. He was issued a ONE Change remote monitor and instructed on its set-up and use for remote monitoring by the [employer] representative prior to hospital discharge. These instructions were reviewed with the patient at today s visit.     Remote monitor arrived yesterday, he plans to set-up over the weekend or on Monday, once her returns home from the TCU.       Plan  Remote on 1/10/18  Return in March    Aida Richards RN BSN  [facility]

## 2021-06-22 NOTE — PROGRESS NOTES
[facility]    DATE: 12/10/2018    NAME:  Myron Sunshine             :  1931  MRN: [id number]  CODE STATUS:  FULL CODE    VISIT TYPE: Problem Visit     FACILITY:  [facility] [[id number]]       CHIEF COMPLAIN/REASON FOR VISIT:    Chief Complaint   Patient presents with     Problem Visit               HISTORY OF PRESENT ILLNESS: Myron Sunshine is a 87 y.o. male who was admitted - for severe aortic stenosis and underwent TAVR procedure. Postop he had significant hypoxia and required increased oxygen supplementation. His cardiomyopathy did not improve after procedure so cardiology upgraded him to BiV AICD on . He was going to undergo AV node ablation at same time as BiV aAICD upgrade however he had converted to NSR. His metoprolol dose was increased. He was sent to TCU for further rehab. He has PMH of CAD, bronchiectasis, severe aortic stenosis, severe mitral regurgitation, PAF, CKD stage 3, HTN, HLD, COPD, Restrictive lung disease, chronic hypoxemic respiratory failure, CHF. Prior to this he lived at home in a condo with his wife. He used walker at home and used O2 continuously.     Today Mr. Sunshine is seen for follow up on hospital stay. He reports he is having a lot of pain in his thighs, especially the left. He thinks he can feel a lump there as well. He says he has a lot of bruising but is concerned what this lump might be. He asked for warm packs but staff said could not give to him without an order. He says he has no numbness or tingling in his legs and no swelling, just the bruising and lump. He denies any recent headaches. He is using his incentive spirometer but admits he forgets about it a lot. He says he has no recent fevers or chills and is wearing his CPAP. He feels his shortness of breath has returned to his baseline and he is wearing his home dose of oxygen 2LNC. He does have to urinate frequently from the water pills but  denies any other urinary symptoms. He says he can feel pressure when he has to go but always feels relief after he urinates. He is wondering if his water pills can be reduced. He doesn't like urinating so much and so frequently. He says his incisions have all looked good to him and no concerns of drainage or redness. He is up walking around with his walker but admits he still needs someone with him at times. He denies any issues with bowels and no dizziness or lightheadedness. He has baseline tremors in hands but no worse than normal for him. He denies any cough, sore throat, nasal congestion and says his blood pressures are low but no symptoms and if he is over 100 that is really good for him. He says his appetite is reduced from normal but no nausea and vomiting. He denies stomach upset, just reports he takes one bite and feels full. He hopes this will come back with time. He is not having any chest pain or palpitations. Per staff vitals have been pretty stable but blood pressures do run in 90-100s. He has an appt with cardiology on 12/13. He has been requesting order for hot packs.     REVIEW OF SYSTEMS:  PROBLEMS AND REVIEW OF SYSTEMS:   Review of Systems  Today on ROS:   Currently, no fever, chills, or rigors. Decreased vision and hearing. Denies any chest pain, headaches, lightheadedness. Appetite is fair. Denies any GERD symptoms. Denies any difficulty with swallowing, nausea, or vomiting.  Denies any abdominal pain, diarrhea or constipation. No insomnia. No active bleeding. No rash. Positive for chronic o2 use, shortness of breath, no dizziness, no pain, low blood pressures, urinary frequency, urgency, CPAP use, Incentive spirometer use, bilateral groin incisions, left chest incision, pain in left thigh and bruising on both thighs, tremors      Allergies   Allergen Reactions     Blood-Group Specific Substance      Anti-Josep/Barnes.  Expect delays in obtaining blood for transfusion.  Collect 2 lavender top  tubes and 1 red top tube for all blood bank orders.      Current Outpatient Medications   Medication Sig     acetaminophen (TYLENOL EXTRA STRENGTH) 500 MG tablet Take 1 tablet (500 mg total) by mouth every 6 (six) hours as needed for pain.     albuterol (PROVENTIL) 2.5 mg /3 mL (0.083 %) nebulizer solution Take 3 mL (2.5 mg total) by nebulization every 4 (four) hours as needed for wheezing.     aspirin 81 MG EC tablet Take 1 tablet (81 mg total) by mouth daily. Stop after 6 weeks     atorvastatin (LIPITOR) 80 MG tablet Take 1 tablet (80 mg total) by mouth daily.     budesonide (PULMICORT) 0.5 mg/2 mL nebulizer solution Take 2 mL (0.5 mg total) by nebulization 2 (two) times a day.     cetirizine (ZYRTEC) 10 MG tablet Take 10 mg by mouth daily.     clopidogrel (PLAVIX) 75 mg tablet Take 1 tablet (75 mg total) by mouth daily.     fluticasone (FLONASE) 50 mcg/actuation nasal spray Apply 1 spray into each nostril daily as needed for rhinitis.     formoterol fumarate (PERFOROMIST) 20 mcg/2 mL nebulizer solution Take 2 mL (20 mcg total) by nebulization daily.     furosemide (LASIX) 80 MG tablet Take 1 tablet (80 mg total) by mouth daily.     latanoprost (XALATAN) 0.005 % ophthalmic solution Administer 1 drop to the right eye at bedtime.     metoprolol succinate (TOPROL-XL) 50 MG 24 hr tablet Take 1 tablet (50 mg total) by mouth daily. (Patient taking differently: Take 50 mg by mouth daily IF HR<65 or SBP<95 or D<62 then give 1/2 tab (25mg).      )     multivitamin therapeutic (THERAGRAN) tablet Take 1 tablet by mouth daily.     nebulizer and compressor Radha For chronic cough     nitroglycerin (NITROSTAT) 0.4 MG SL tablet Place 1 tablet (0.4 mg total) under the tongue every 5 (five) minutes as needed for chest pain.     OXYGEN-AIR DELIVERY SYSTEMS MISC into each nostril continuous. 1.5 liters with rest and 3 liters with activity  May use up to 1.5 liters at noc.     pantoprazole (PROTONIX) 20 MG tablet Take 1 tablet (20 mg  total) by mouth daily.     potassium chloride (K-DUR,KLOR-CON) 20 MEQ tablet Take 1 tablet (20 mEq total) by mouth 2 (two) times a day.     sertraline (ZOLOFT) 50 MG tablet Take 0.5 tablets (25 mg total) by mouth at bedtime.     warfarin (COUMADIN) 3 MG tablet Take 1 tablet (3 mg total) by mouth daily. (Patient taking differently: Take 3 mg by mouth daily 11/15/18 INR 2.98  Cont 3mg daily.  Next INR next week with PMD.    11/12/18 INR 2.57 Cont 3mg daily. Next INR 11/15.  11/10/18 INR 2.26 3mg daily.  11/9/18 INR 2.21 3mg  11/8/18 had 4mg.      )     Past Medical History:    Past Medical History:   Diagnosis Date     Anxiety      Bowel obstruction (H)      Chronic hypoxemic respiratory failure (H)      CKD (chronic kidney disease) stage 3, GFR 30-59 ml/min (H)      Coronary artery disease due to lipid rich plaque      DNAR (do not attempt resuscitation)      Dyslipidemia, goal LDL below 70      ED (erectile dysfunction)      Essential hypertension      GERD (gastroesophageal reflux disease)      Ischemic cardiomyopathy      Mild aortic stenosis      Noncompliance with medication regimen 9/11/2018     Paroxysmal VT (H)      Persistent atrial fibrillation (H) 8/22/2018     Psoriasis      Restrictive lung disease; moderate by PFT 10/24/2017    FVC 1.9 456% FEV1 1.4 457% ratio 74%.  No significant response to bronchodilators.  TLC 40% predicted DLCO corrected 47% predicted Assessment: No obstruction.  Moderate restriction.  Moderate diffusion defect.     Seasonal allergies      Sleep apnea      TIA (transient ischemic attack) 8/09     Ventricular aneurysm     Long-term coumadin therapy s/p ventricular patch 2001     Vitamin D deficiency            PHYSICAL EXAMINATION  Vitals:    12/09/18 2046   BP: 106/54   Pulse: 84   Resp: 20   Temp: 98.2  F (36.8  C)   SpO2: 97%   Weight: 156 lb (70.8 kg)       Today on physical exam:     GENERAL: Awake, Alert, oriented x3, not in any form of acute distress, answers questions  appropriately, follows simple commands, conversant  HEENT: Head is normocephalic with normal hair distribution. No evidence of trauma. Ears: No acute purulent discharge. Eyes: Conjunctivae pink with no scleral jaundice. Nose: Normal mucosa and septum. NECK: Supple with no cervical or supraclavicular lymphadenopathy. Trachea is midline.   CHEST: No tenderness or deformity, no crepitus  LUNG: dim and tight to auscultation with limited chest expansion. There are no crackles or wheezes, normal AP diameter. Barrel chest changes, 2LNC, no cough; using Incentive spirometer 750-1000  BACK: No kyphosis of the thoracic spine. Symmetric, no curvature, ROM normal, no CVA tenderness, no spinal tenderness   CVS: regular rate and rhythm,  2+ pulses symmetric in all extremities.  ABDOMEN: Rounded and soft, nontender to palpation, non distended, no masses, no organomegaly, good bowel sounds, no rebound or guarding, no peritoneal signs.   EXTREMITIES: No pedal edema,   SKIN: Warm and dry, bilateral groin incisions c/d/i, bilateral thigh ecchymosis, small hardened round area on left thigh, left chest incision c/d/i with steri strips  NEUROLOGICAL: The patient is oriented to person, place and time. No numb/tingling            LABS:   Recent Results (from the past 168 hour(s))   Crossmatch   Result Value Ref Range    Crossmatch COMPATIBLE     Blood Expiration Date [id number]     Unit Type O Pos     Unit Number [id number]     Status Transfused     Component Red Blood Cells     PRODUCT CODE G6613D07     Issue Date and Time [id number]     Blood Type 5100     CODING SYSTEM [id number]    2(CBC w/o Differential)   Result Value Ref Range    WBC 10.8 4.0 - 11.0 thou/uL    RBC 3.80 (L) 4.40 - 6.20 mill/uL    Hemoglobin 10.1 (L) 14.0 - 18.0 g/dL    Hematocrit 32.2 (L) 40.0 - 54.0 %    MCV 85 80 - 100 fL    MCH 26.6 (L) 27.0 - 34.0 pg    MCHC 31.4 (L) 32.0 - 36.0 g/dL    RDW 18.9 (H) 11.0 - 14.5 %    Platelets 102 (L) 140 - 440 thou/uL     MPV 11.0 8.5 - 12.5 fL   Basic Metabolic Panel   Result Value Ref Range    Sodium 139 136 - 145 mmol/L    Potassium 4.2 3.5 - 5.0 mmol/L    Chloride 102 98 - 107 mmol/L    CO2 30 22 - 31 mmol/L    Anion Gap, Calculation 7 5 - 18 mmol/L    Glucose 100 70 - 125 mg/dL    Calcium 9.1 8.5 - 10.5 mg/dL    BUN 42 (H) 8 - 28 mg/dL    Creatinine 1.39 (H) 0.70 - 1.30 mg/dL    GFR MDRD Af Amer 59 (L) >60 mL/min/1.73m2    GFR MDRD Non Af Amer 48 (L) >60 mL/min/1.73m2   INR   Result Value Ref Range    INR 1.23 (H) 0.90 - 1.10   Basic Metabolic Panel   Result Value Ref Range    Sodium 139 136 - 145 mmol/L    Potassium 4.2 3.5 - 5.0 mmol/L    Chloride 101 98 - 107 mmol/L    CO2 28 22 - 31 mmol/L    Anion Gap, Calculation 10 5 - 18 mmol/L    Glucose 100 70 - 125 mg/dL    Calcium 9.1 8.5 - 10.5 mg/dL    BUN 42 (H) 8 - 28 mg/dL    Creatinine 1.36 (H) 0.70 - 1.30 mg/dL    GFR MDRD Af Amer 60 (L) >60 mL/min/1.73m2    GFR MDRD Non Af Amer 50 (L) >60 mL/min/1.73m2   HM2(CBC w/o Differential)   Result Value Ref Range    WBC 11.5 (H) 4.0 - 11.0 thou/uL    RBC 3.64 (L) 4.40 - 6.20 mill/uL    Hemoglobin 9.7 (L) 14.0 - 18.0 g/dL    Hematocrit 31.5 (L) 40.0 - 54.0 %    MCV 87 80 - 100 fL    MCH 26.6 (L) 27.0 - 34.0 pg    MCHC 30.8 (L) 32.0 - 36.0 g/dL    RDW 18.7 (H) 11.0 - 14.5 %    Platelets 102 (L) 140 - 440 thou/uL    MPV 11.0 8.5 - 12.5 fL   Magnesium   Result Value Ref Range    Magnesium 2.1 1.8 - 2.6 mg/dL   INR   Result Value Ref Range    INR 1.19 (H) 0.90 - 1.10   ECG 12 lead   Result Value Ref Range    SYSTOLIC BLOOD PRESSURE  mmHg    DIASTOLIC BLOOD PRESSURE  mmHg    VENTRICULAR RATE 86 BPM    ATRIAL RATE 86 BPM    P-R INTERVAL 196 ms    QRS DURATION 122 ms    Q-T INTERVAL 432 ms    QTC CALCULATION (BEZET) 516 ms    P Axis 74 degrees    R AXIS 197 degrees    T AXIS 18 degrees    MUSE DIAGNOSIS       Normal sinus rhythm  Electronic ventricular pacemaker  Confirmed by LINCOLN EARLY MD LOC: ([id number]) on 12/4/2018 2:29:25 PM      Crossmatch   Result Value Ref Range    Crossmatch COMPATIBLE     Blood Expiration Date [id number]     Unit Type O Pos     Unit Number [id number]     Status Released     Component Red Blood Cells     PRODUCT CODE L2668G11     Blood Type 5100     CODING SYSTEM [id number]    Basic Metabolic Panel   Result Value Ref Range    Sodium 138 136 - 145 mmol/L    Potassium 4.2 3.5 - 5.0 mmol/L    Chloride 102 98 - 107 mmol/L    CO2 29 22 - 31 mmol/L    Anion Gap, Calculation 7 5 - 18 mmol/L    Glucose 96 70 - 125 mg/dL    Calcium 8.9 8.5 - 10.5 mg/dL    BUN 41 (H) 8 - 28 mg/dL    Creatinine 1.37 (H) 0.70 - 1.30 mg/dL    GFR MDRD Af Amer 60 (L) >60 mL/min/1.73m2    GFR MDRD Non Af Amer 49 (L) >60 mL/min/1.73m2   HM2(CBC w/o Differential)   Result Value Ref Range    WBC 9.6 4.0 - 11.0 thou/uL    RBC 3.44 (L) 4.40 - 6.20 mill/uL    Hemoglobin 9.1 (L) 14.0 - 18.0 g/dL    Hematocrit 30.2 (L) 40.0 - 54.0 %    MCV 88 80 - 100 fL    MCH 26.5 (L) 27.0 - 34.0 pg    MCHC 30.1 (L) 32.0 - 36.0 g/dL    RDW 19.1 (H) 11.0 - 14.5 %    Platelets 106 (L) 140 - 440 thou/uL    MPV 10.6 8.5 - 12.5 fL   Magnesium   Result Value Ref Range    Magnesium 2.1 1.8 - 2.6 mg/dL   INR   Result Value Ref Range    INR 1.29 (H) 0.90 - 1.10   INR   Result Value Ref Range    INR 1.35 (H) 0.90 - 1.10     Results for orders placed or performed during the hospital encounter of 11/27/18   Basic Metabolic Panel   Result Value Ref Range    Sodium 138 136 - 145 mmol/L    Potassium 4.2 3.5 - 5.0 mmol/L    Chloride 102 98 - 107 mmol/L    CO2 29 22 - 31 mmol/L    Anion Gap, Calculation 7 5 - 18 mmol/L    Glucose 96 70 - 125 mg/dL    Calcium 8.9 8.5 - 10.5 mg/dL    BUN 41 (H) 8 - 28 mg/dL    Creatinine 1.37 (H) 0.70 - 1.30 mg/dL    GFR MDRD Af Amer 60 (L) >60 mL/min/1.73m2    GFR MDRD Non Af Amer 49 (L) >60 mL/min/1.73m2         Lab Results   Component Value Date    WBC 9.6 12/05/2018    HGB 9.1 (L) 12/05/2018    HCT 30.2 (L) 12/05/2018    MCV 88 12/05/2018      (L) 12/05/2018       Lab Results   Component Value Date    DHDOVETG74 520 09/14/2013     Lab Results   Component Value Date    HGBA1C 5.7 09/14/2013     Lab Results   Component Value Date    INR 1.35 (H) 12/06/2018    INR 1.29 (H) 12/05/2018    INR 1.19 (H) 12/04/2018     No results found for: [id number]  Lab Results   Component Value Date    TSH 3.1 09/14/2013           ASSESSMENT/PLAN:    1. S/p TAVR: Bilateral groin incisions c/d/i, bilateral thigh ecchymosis small hardened round area on left thigh with pain. No chest pain, regular rate and rhythm today, shortness of breath at baseline. F/u with cardiology 12/13. Denies pain today. IS q4h while awake. Daily weights. Tylenol prn pain. On aspirin, lasix, metoprolol, ticagrelor, warfarin.   2.  ?DVT v small hematoma, v thrombophlebitis: Bilateral thigh ecchymosis with small round hardened and tender area Left thigh. Will order venous doppler ble today. If negative may use warm packs prn. On warfarin.   3. Hypotension: chronic issue. SBP 90-100s. Asymptomatic. No ACE/ARB for CHF due to hypotension. Notify if SBP <85. Reduced lasix to daily today as euvolemic. Monitor closely.   4. A fib converted NSR: Converted during hospital stay. On warfarin and metoprolol. Normal rate and rhythm today.   5. chronic respiratory failure, COPD, Bronchiectasis: shortness of breath improving, not quite to baseline. No cough. 2LNC at rest, 3L with exertion. At baseline o2 requirements. Lungs dim no cough. IS q4h while awake. Budesonide two times a day, perforomist daily, albuterol q4h prn.  6. AMARILIS on CKD: Cr 1.37 on 12/5.   7. S/p BiV AICD: F/u with device clinic 12/13. Upgraded on 12/4. Left chest incision c/d/i, steri strips in place.   8. Constipation: no concerns.    9. HLD, CAD: On ticagrelor, nitrostat, atorvastatin, aspirin. No chest pain.   10. Depression: On sertraline.   11. Hypokalemia: On kcl.   12. GERD: On pantoprazole.   13. Glaucoma: On latanoprost.   14. chronic CHF:  Daily [id number]. shortness of breath at baseline, no edema ble today. On lasix two times a day will reduce to daily as no signs of fluid overload. Monitor closely. F/u with cardiology 12/13.   15. MIS: On cpap. Orders given.     Meggan, hm1 on 12/11    Per therapy recent eval    Electronically signed by: Africa Coello NP    Total 35 minutes of which 55% was spent in counseling and coordination of care of the above plan    Time spent in interview and examination of patient, review of available records, and discussion with nursing staff. Continue care plan, efforts at therapy, and monitor nutritional status.

## 2021-06-22 NOTE — PROGRESS NOTES
Pulmonary Clinic Follow Up    Cc: follow up dyspnea.    HPI: 86yoM with history of asthma as well as cardiomyopathy, EF 22%, restrictive lung disease presents for follow up.    Myron had a very difficult fall. He was hospitalized 8/25-8/27, 9/13-9/23, 10/18-10/19, 11/5-11/10. All with recurrent hypoxia and fluid overload due to severe AS. He underwent TAVR 11/27 and upgrade of his BiV AICD. He reported this was a difficult recovery but was happy he did it. He was dicharged to SNF but is Back at home now.     He thinks his breathing is maybe a bit better post-procedure.    Current regimen: Budesonide nebs, perforomist which he takes once daily and albuterol nebs.      Current Outpatient Medications   Medication Sig Note     acetaminophen (TYLENOL EXTRA STRENGTH) 500 MG tablet Take 1 tablet (500 mg total) by mouth every 6 (six) hours as needed for pain.      albuterol (PROVENTIL) 2.5 mg /3 mL (0.083 %) nebulizer solution Take 3 mL (2.5 mg total) by nebulization every 4 (four) hours as needed for wheezing.      allopurinol (ZYLOPRIM) 100 MG tablet Take 100 mg by mouth daily.      atorvastatin (LIPITOR) 80 MG tablet Take 1 tablet (80 mg total) by mouth daily.      budesonide (PULMICORT) 0.5 mg/2 mL nebulizer solution Take 2 mL (0.5 mg total) by nebulization 2 (two) times a day.      cetirizine (ZYRTEC) 10 MG tablet Take 10 mg by mouth daily.      fluticasone (FLONASE) 50 mcg/actuation nasal spray Apply 1 spray into each nostril daily as needed for rhinitis.      formoterol fumarate (PERFOROMIST) 20 mcg/2 mL nebulizer solution Take 2 mL (20 mcg total) by nebulization daily.      furosemide (LASIX) 80 MG tablet Take 1 tablet (80 mg total) by mouth daily.      guaiFENesin ER (MUCINEX) 600 mg 12 hr tablet Take 600 mg by mouth 2 (two) times a day.      hydrocortisone 1 % cream Apply 1 application topically every 6 (six) hours as needed.      latanoprost (XALATAN) 0.005 % ophthalmic solution Administer 1 drop to the right eye  at bedtime. 11/27/2018: Will have someone bring in     metoprolol succinate (TOPROL-XL) 50 MG 24 hr tablet Take 1 tablet (50 mg total) by mouth daily. (Patient taking differently: Take 50 mg by mouth daily IF HR<65 or SBP<95 or D<62 then give 1/2 tab (25mg).      )      multivitamin therapeutic (THERAGRAN) tablet Take 1 tablet by mouth daily.      nebulizer and compressor Radha For chronic cough      nitroglycerin (NITROSTAT) 0.4 MG SL tablet Place 1 tablet (0.4 mg total) under the tongue every 5 (five) minutes as needed for chest pain. 11/27/2018: Needs new Rx     OXYGEN-AIR DELIVERY SYSTEMS MISC into each nostril continuous. 1.5 liters with rest and 3 liters with activity  May use up to 1.5 liters at noc.      pantoprazole (PROTONIX) 20 MG tablet Take 1 tablet (20 mg total) by mouth daily. 11/27/2018: Takes prn      potassium chloride (K-DUR,KLOR-CON) 20 MEQ tablet Take 1 tablet (20 mEq total) by mouth 2 (two) times a day.      sertraline (ZOLOFT) 50 MG tablet Take 0.5 tablets (25 mg total) by mouth at bedtime.      ticagrelor (BRILINTA) 90 mg Tab Take 90 mg by mouth 2 (two) times a day.      warfarin (COUMADIN) 3 MG tablet Take 1 tablet (3 mg total) by mouth daily. (Patient taking differently: Take 4 mg by mouth daily 12/10/18 INR 1.82 Cont 4mg daily. Next INR 12/17.  12/6/18 INR 1.35 4mg daily. NExt INR 12/10.  11/15/18 INR 2.98  Cont 3mg daily.  Next INR next week with PMD..      )      Vitals:    12/20/18 0859   BP: 100/60   Pulse: 87   Resp: 16   SpO2: 95%   RA  Gen: NAD, elderly, frail  HEENT: no thrush   Neck: No lymphadenopathy  C/V: RRR,  S1 and S2. No longer any murmur  Resp: Rales at both bases  Ext: no edema  Neuro: Tremor  Skin: no rahses        ASSESSMENT/PLAN:  1) Restrictive lung disease-Combination of trapped lung and mild fibrosis that may be slowly progressing.  -Continue O2  -Prednisone PRN    2) Bronchiectasis-seen at bases on CT scan.   -Monitoring closely, low threshold for antibiotic if  sputum changes    3) Asthma,  -Breo once daily to replace nebulizers  -PRN albuterol  -replaced nebulizer    5) iCMP, EF 22%   -Currently euvolemic  -Unfortunately TAVR did not lead to improvement in EF at this time.     RTC 3 months      Albina Christensen MD

## 2021-06-22 NOTE — PROGRESS NOTES
[facility]    DATE: 2018    NAME:  Myron Sunshine             :  1931  MRN: [id number]  CODE STATUS:  FULL CODE    VISIT TYPE: Problem Visit (doppler f/u, pain in legs)     FACILITY:  [facility] [[id number]]       CHIEF COMPLAIN/REASON FOR VISIT:    Chief Complaint   Patient presents with     Problem Visit     doppler f/u, pain in legs               HISTORY OF PRESENT ILLNESS: Myron Sunshine is a 87 y.o. male who was admitted - for severe aortic stenosis and underwent TAVR procedure. Postop he had significant hypoxia and required increased oxygen supplementation. His cardiomyopathy did not improve after procedure so cardiology upgraded him to BiV AICD on . He was going to undergo AV node ablation at same time as BiV aAICD upgrade however he had converted to NSR. His metoprolol dose was increased. He was sent to TCU for further rehab. He has PMH of CAD, bronchiectasis, severe aortic stenosis, severe mitral regurgitation, PAF, CKD stage 3, HTN, HLD, COPD, Restrictive lung disease, chronic hypoxemic respiratory failure, CHF. Prior to this he lived at home in a condo with his wife. He used walker at home and used O2 continuously.     Today Mr. Sunshine is seen for concerns of pain in his right foot under his large right toe. He says it feels liek a gout attack coming on as he has had this before. It is a little swollen and painful and hard for him to wear his shoes now. He says it is a little red but not really warm yet. He noticed the last few days that it was increasing in size. He says he has no swelling in his legs still and shortness of breath is still at baseline. He is still wearing his home dose of o2 at 2LNC. He reports that he was relieved to hear that his doppler was negative for DVT. He says the pain in his thigh and the lump is still there and he is wondering if he can get the order for hot packs now. He says otherwise  his bowels are moving and no concerns with his incisions. He denies any other concerns today. Per staff doppler results were negative for DVT. His labs were drawn this morning.     REVIEW OF SYSTEMS:  PROBLEMS AND REVIEW OF SYSTEMS:   Review of Systems  Today on ROS:   Currently, no fever, chills, or rigors. Decreased vision and hearing. Denies any chest pain, headaches, lightheadedness. Appetite is fair. Denies any GERD symptoms. Denies any difficulty with swallowing, nausea, or vomiting.  Denies any abdominal pain, diarrhea or constipation. No insomnia. No active bleeding. No rash. Positive for chronic o2 use, shortness of breath, no dizziness, no pain, low blood pressures, urinary frequency, urgency, CPAP use, Incentive spirometer use, bilateral groin incisions, left chest incision, pain in left thigh and bruising on both thighs, tremors, pain in right foot great toe with swelling and redness      Allergies   Allergen Reactions     Blood-Group Specific Substance      Anti-Josep/Barnes.  Expect delays in obtaining blood for transfusion.  Collect 2 lavender top tubes and 1 red top tube for all blood bank orders.      Current Outpatient Medications   Medication Sig     acetaminophen (TYLENOL EXTRA STRENGTH) 500 MG tablet Take 1 tablet (500 mg total) by mouth every 6 (six) hours as needed for pain.     albuterol (PROVENTIL) 2.5 mg /3 mL (0.083 %) nebulizer solution Take 3 mL (2.5 mg total) by nebulization every 4 (four) hours as needed for wheezing.     aspirin 81 MG EC tablet Take 1 tablet (81 mg total) by mouth daily. Stop after 6 weeks     atorvastatin (LIPITOR) 80 MG tablet Take 1 tablet (80 mg total) by mouth daily.     budesonide (PULMICORT) 0.5 mg/2 mL nebulizer solution Take 2 mL (0.5 mg total) by nebulization 2 (two) times a day.     cetirizine (ZYRTEC) 10 MG tablet Take 10 mg by mouth daily.     clopidogrel (PLAVIX) 75 mg tablet Take 1 tablet (75 mg total) by mouth daily.     fluticasone (FLONASE) 50  mcg/actuation nasal spray Apply 1 spray into each nostril daily as needed for rhinitis.     formoterol fumarate (PERFOROMIST) 20 mcg/2 mL nebulizer solution Take 2 mL (20 mcg total) by nebulization daily.     furosemide (LASIX) 80 MG tablet Take 1 tablet (80 mg total) by mouth daily.     guaiFENesin (MUCINEX) 1,200 mg Ta12 Take 1 tablet by mouth as needed.     latanoprost (XALATAN) 0.005 % ophthalmic solution Administer 1 drop to the right eye at bedtime.     metoprolol succinate (TOPROL-XL) 50 MG 24 hr tablet Take 1 tablet (50 mg total) by mouth daily. (Patient taking differently: Take 50 mg by mouth daily IF HR<65 or SBP<95 or D<62 then give 1/2 tab (25mg).      )     multivitamin therapeutic (THERAGRAN) tablet Take 1 tablet by mouth daily.     nebulizer and compressor Radha For chronic cough     nitroglycerin (NITROSTAT) 0.4 MG SL tablet Place 1 tablet (0.4 mg total) under the tongue every 5 (five) minutes as needed for chest pain.     OXYGEN-AIR DELIVERY SYSTEMS MISC into each nostril continuous. 1.5 liters with rest and 3 liters with activity  May use up to 1.5 liters at noc.     pantoprazole (PROTONIX) 20 MG tablet Take 1 tablet (20 mg total) by mouth daily.     potassium chloride (K-DUR,KLOR-CON) 20 MEQ tablet Take 1 tablet (20 mEq total) by mouth 2 (two) times a day.     sertraline (ZOLOFT) 50 MG tablet Take 0.5 tablets (25 mg total) by mouth at bedtime.     warfarin (COUMADIN) 3 MG tablet Take 1 tablet (3 mg total) by mouth daily. (Patient taking differently: Take 3 mg by mouth daily 12/10/18 INR 1.82 Cont 4mg daily. Next INR 12/17.  12/6/18 INR 1.35 4mg daily. NExt INR 12/10.  11/15/18 INR 2.98  Cont 3mg daily.  Next INR next week with PMD..      )     Past Medical History:    Past Medical History:   Diagnosis Date     Anxiety      Bowel obstruction (H)      Chronic hypoxemic respiratory failure (H)      CKD (chronic kidney disease) stage 3, GFR 30-59 ml/min (H)      Coronary artery disease due to lipid  rich plaque      DNAR (do not attempt resuscitation)      Dyslipidemia, goal LDL below 70      ED (erectile dysfunction)      Essential hypertension      GERD (gastroesophageal reflux disease)      Ischemic cardiomyopathy      Mild aortic stenosis      Noncompliance with medication regimen 9/11/2018     Paroxysmal VT (H)      Persistent atrial fibrillation (H) 8/22/2018     Psoriasis      Restrictive lung disease; moderate by PFT 10/24/2017    FVC 1.9 456% FEV1 1.4 457% ratio 74%.  No significant response to bronchodilators.  TLC 40% predicted DLCO corrected 47% predicted Assessment: No obstruction.  Moderate restriction.  Moderate diffusion defect.     Seasonal allergies      Sleep apnea      TIA (transient ischemic attack) 8/09     Ventricular aneurysm     Long-term coumadin therapy s/p ventricular patch 2001     Vitamin D deficiency            PHYSICAL EXAMINATION  Vitals:    12/10/18 2211   BP: 106/54   Pulse: 90   Resp: 20   Temp: 98.2  F (36.8  C)   SpO2: 98%   Weight: 152 lb (68.9 kg)       Today on physical exam:     GENERAL: Awake, Alert, oriented x3, not in any form of acute distress, answers questions appropriately, follows simple commands, conversant  HEENT: Head is normocephalic with normal hair distribution. No evidence of trauma. Ears: No acute purulent discharge. Eyes: Conjunctivae pink with no scleral jaundice. Nose: Normal mucosa and septum. NECK: Supple with no cervical or supraclavicular lymphadenopathy. Trachea is midline.   CHEST: No tenderness or deformity, no crepitus  LUNG: dim and tight to auscultation with limited chest expansion. There are no crackles or wheezes, normal AP diameter. Barrel chest changes, 2LNC, no cough; using Incentive spirometer 750-1000  BACK: No kyphosis of the thoracic spine. Symmetric, no curvature, ROM normal, no CVA tenderness, no spinal tenderness   CVS: regular rate and rhythm,  2+ pulses symmetric in all extremities.  ABDOMEN: Rounded and soft, nontender to  palpation, non distended, no masses, no organomegaly, good bowel sounds, no rebound or guarding, no peritoneal signs.   EXTREMITIES: No pedal edema,   SKIN: Warm and dry, bilateral groin incisions c/d/i, bilateral thigh ecchymosis, small hardened round area on left thigh, left chest incision c/d/i with steri strips, right foot inferior great toe swelling, erythema, tenderness  NEUROLOGICAL: The patient is oriented to person, place and time. No numb/tingling            LABS:   Recent Results (from the past 168 hour(s))   INR   Result Value Ref Range    INR 1.82 (H) 0.90 - 1.10   Basic Metabolic Panel   Result Value Ref Range    Sodium 140 136 - 145 mmol/L    Potassium 4.1 3.5 - 5.0 mmol/L    Chloride 104 98 - 107 mmol/L    CO2 29 22 - 31 mmol/L    Anion Gap, Calculation 7 5 - 18 mmol/L    Glucose 93 70 - 125 mg/dL    Calcium 9.1 8.5 - 10.5 mg/dL    BUN 42 (H) 8 - 28 mg/dL    Creatinine 1.38 (H) 0.70 - 1.30 mg/dL    GFR MDRD Af Amer 59 (L) >60 mL/min/1.73m2    GFR MDRD Non Af Amer 49 (L) >60 mL/min/1.73m2   HM1 (CBC with Diff)   Result Value Ref Range    WBC 10.9 4.0 - 11.0 thou/uL    RBC 3.73 (L) 4.40 - 6.20 mill/uL    Hemoglobin 9.9 (L) 14.0 - 18.0 g/dL    Hematocrit 32.9 (L) 40.0 - 54.0 %    MCV 88 80 - 100 fL    MCH 26.5 (L) 27.0 - 34.0 pg    MCHC 30.1 (L) 32.0 - 36.0 g/dL    RDW 19.7 (H) 11.0 - 14.5 %    Platelets 172 140 - 440 thou/uL    MPV 10.8 8.5 - 12.5 fL    Neutrophils % 72 (H) 50 - 70 %    Lymphocytes % 13 (L) 20 - 40 %    Monocytes % 11 (H) 2 - 10 %    Eosinophils % 4 0 - 6 %    Basophils % 1 0 - 2 %    Neutrophils Absolute 7.7 2.0 - 7.7 thou/uL    Lymphocytes Absolute 1.4 0.8 - 4.4 thou/uL    Monocytes Absolute 1.2 (H) 0.0 - 0.9 thou/uL    Eosinophils Absolute 0.4 0.0 - 0.4 thou/uL    Basophils Absolute 0.1 0.0 - 0.2 thou/uL     Results for orders placed or performed in visit on 12/11/18   Basic Metabolic Panel   Result Value Ref Range    Sodium 140 136 - 145 mmol/L    Potassium 4.1 3.5 - 5.0 mmol/L     Chloride 104 98 - 107 mmol/L    CO2 29 22 - 31 mmol/L    Anion Gap, Calculation 7 5 - 18 mmol/L    Glucose 93 70 - 125 mg/dL    Calcium 9.1 8.5 - 10.5 mg/dL    BUN 42 (H) 8 - 28 mg/dL    Creatinine 1.38 (H) 0.70 - 1.30 mg/dL    GFR MDRD Af Amer 59 (L) >60 mL/min/1.73m2    GFR MDRD Non Af Amer 49 (L) >60 mL/min/1.73m2         Lab Results   Component Value Date    WBC 10.9 12/11/2018    HGB 9.9 (L) 12/11/2018    HCT 32.9 (L) 12/11/2018    MCV 88 12/11/2018     12/11/2018       Lab Results   Component Value Date    JDUHWFSY57 520 09/14/2013     Lab Results   Component Value Date    HGBA1C 5.7 09/14/2013     Lab Results   Component Value Date    INR 1.82 (H) 12/10/2018    INR 1.35 (H) 12/06/2018    INR 1.29 (H) 12/05/2018     No results found for: [id number]  Lab Results   Component Value Date    TSH 3.1 09/14/2013           ASSESSMENT/PLAN:    1. S/p TAVR: Bilateral groin incisions c/d/i, bilateral thigh ecchymosis small hardened round area on left thigh with pain. No chest pain, regular rate and rhythm today, shortness of breath at baseline. F/u with cardiology 12/13. Denies pain today. IS q4h while awake. Daily [id number]. Tylenol prn pain. On aspirin, lasix, metoprolol, ticagrelor, warfarin. Previously reduced lasix, continues to appear euvolemic.   2.  small hematoma, v thrombophlebitis: Bilateral thigh ecchymosis with small round hardened and tender area Left thigh. Venous doppler negative for Dvt.  On warfarin. Ordered warm packs prn for pain.   3. Hypotension: chronic issue. SBP 100s. Asymptomatic. No ACE/ARB for CHF due to hypotension. Notify if SBP <85. Previously reduced lasix and stable. Monitor closely.   4. A fib converted NSR: Converted during hospital stay. On warfarin and metoprolol. Normal rate and rhythm today. Rate 90s. INR was 1.82 continue coumadin 4mg and recheck on 12/17.   5. chronic respiratory failure, COPD, Bronchiectasis: shortness of breath improving, not quite to baseline. No  cough. 2LNC at rest, 3L with exertion. At baseline o2 requirements. Lungs dim no cough. IS q4h while awake. Budesonide two times a day, perforomist daily, albuterol q4h prn.  6. AMARILIS on CKD: Cr 1.37 on 12/5. Cr 1.38 on 12/11.   7. S/p BiV AICD: F/u with device clinic 12/13. Upgraded on 12/4. Left chest incision c/d/i, steri strips in place.   8. Constipation: no concerns.    9. HLD, CAD: On ticagrelor, nitrostat, atorvastatin, aspirin. No chest pain.   10. Depression: On sertraline.   11. Hypokalemia: On kcl.   12. GERD: On pantoprazole.   13. Glaucoma: On latanoprost.   14. chronic CHF: Daily [id number]. shortness of breath at baseline, no edema ble today. On lasix daily. Monitor closely. F/u with cardiology 12/13.   15. MIS: On cpap. Orders given.   16. Acute blood loss anemia: 9.9 hg on 12/11. Stable. Repeat hg on 12/17.   17. Gout flare: Ordered colchicine x 3 days then start allopurinol 100 daily to prevent further attacks. Cr stable 1.38. Right foot pain, swelling under great toe.  creatinine on 12/17.     Per therapy soft LCD of 12/20 min for toileting, sba for dressing, 25ft sba  For ambulation, and stand by for other adls.     Electronically signed by: Africa Coello NP    Total 35 minutes of which 55% was spent in counseling and coordination of care of the above plan    Time spent in interview and examination of patient, review of available records, and discussion with nursing staff. Continue care plan, efforts at therapy, and monitor nutritional status.

## 2021-06-22 NOTE — PROGRESS NOTES
Instructed on proper use of Breo and albuterol inhaler.  Demonstrated back correctly,  and instructed on importance of rinsing mouth after use. Number given to call with any questions or concerns.

## 2021-06-22 NOTE — PROGRESS NOTES
NR 2.8 spoke with pt. Continue 4 mg daily and retest in one week. After talking with pt and discussing history of greens/salads and medication change. Pt will  continue  with current diet and dosing of Warfarin.  Continue with moderation of Vit K and green leafy vegetables. Cautioned to call with increase bruising or bleeding. Reminded to call with medication change especially antibiotic. Call with any questions or concerns or any up coming procedures. Cautioned about using Herbal medication.

## 2021-06-22 NOTE — PROGRESS NOTES
INR 2.7 Left  message with return phone number for questions and concerns. [phone]  Take 4 mg daily. After talking with pt and discussing history of greens/salads and medication change. Pt will  continue  with current diet and dosing of Warfarin.  Continue with moderation of Vit K and green leafy vegetables. Cautioned to call with increase bruising or bleeding. Reminded to call with medication change especially antibiotic. Call with any questions or concerns or any up coming procedures. Cautioned about using Herbal medication.

## 2021-06-23 NOTE — PROGRESS NOTES
INR 2.4 with Mar[facility] [phone]. Continue 4 mg daily and retest in 2 weeks. Left VM message with return phone number for questions and concerns. [phone]. After talking with pt and discussing history of greens/salads and medication change. Pt will  continue  with current diet and dosing of Warfarin.  Continue with moderation of Vit K and green leafy vegetables. Cautioned to call with increase bruising or bleeding. Reminded to call with medication change especially antibiotic. Call with any questions or concerns or any up coming procedures. Cautioned about using Herbal medication.

## 2021-06-23 NOTE — TELEPHONE ENCOUNTER
Patient reports new bilateral ankle swelling and worsening shortness of breath for 2 days. His oxygen saturations are 98-99% at rest and 85% with exercise or walking across the room. Recovery time has increased to about 5 minutes after activity.  He is on 2L O2 per nasal cannula.    His weight is 154 pounds; up from his normal weight of 150 pounds.     Furosemide was decreased to 60 mg daily from 80 mg daily at hospital discharge 1/2/19 due to kidney disease.   Patient increased furosemide to 60 mg in the morning and 20 mg in the afternoon yesterday, under his own advisement.     Patient has not been following a low sodium diet. He has had fried chicken, salami, a cheese omelette, pasta with red sauce, sausage and 2-3 Ensure drinks per day.    His blood pressure is 100/60 today with HR of 68. He denies lightheadedness, dizziness, chest pain, palpitations or syncope.    Patient has an appointment with Dr. Thakkar tomorrow @ 1:30.    Encouraged patient to limit sodium intake to 2,000 mg a day, elevate legs to help with swelling, continue daily weights and adequate hydration with 50-60 ounces of fluid per day.

## 2021-06-23 NOTE — TELEPHONE ENCOUNTER
Type: one month PO CRT-D remote transmission. New lead implanted.  Presenting rhythm: AS- 70-80 bpm.  Battery/lead status: LV threshold measures at 3.5V @ 0.4ms, previously 1.75V @ 0.4ms, otherwise stable.  Arrhythmias: since 12/17/18; total of 105sec of AT/AF, no EGMs. No VT/VF detected.  Comments: appears to be normal ICD function. Device biVP 92.1% with 5.1% VSR pacing. Routed to device RN for review.  Device/lead alerts: none. NIECY     Transmission reviewed, 1 month remote transmission shows LV lead thresholds have doubles since implant. Impedance appears stable. Has 3 month po clinic check with Dr. Cadet in March. Will keep that appointment but will bring patient in earlier in light LV lead threshold changes.   Call transferred to schedulers to arrange for LV lead troubleshooting and explained to patient as well. He agrees to come in.     Ratna Lyn RN

## 2021-06-23 NOTE — TELEPHONE ENCOUNTER
----- Message from Josephinedarvin Chandrika sent at 1/29/2019  8:16 AM CST -----  Contact: Patient   General phone call:    Caller: Patient   Primary cardiologist: Bijan  Detailed reason for call: Patient has questions regarding Furosemide medication dose and symptoms of retaining fluids in his  legs. He also has questions about the amount of Ensure he is able to intake. Patient also gained 4 lbs in 3-4 days.   New or active symptoms?  Weight gain and fluid retention in legs.  Best phone number: [phone]   Best time to contact: anytime   Ok to leave a detailed message?  Yes   Device? Yes,     Additional Info:

## 2021-06-23 NOTE — PROGRESS NOTES
Patient is S/P TAVR, order received for Phase II Cardiac Rehab on day of surgery. Multiple attempts have been made to schedule patient, however calls are not returned.  Order will be filed in the event patient reaches out to us at a later time.

## 2021-06-23 NOTE — TELEPHONE ENCOUNTER
Spoke with Myron. Recommended to switch Metoprolol Tartrate to Succinate starting tomorrow. He will take the evening dose of Metoprolol Tartrate 25 mg. Starting tomorrow he will take Metoprolol Succinate 50 mg at bed time. He was encouraged to call back if lightheaded or dizziness. Verbalized understanding and agreed with plan.

## 2021-06-23 NOTE — PATIENT INSTRUCTIONS - HE
Myron Sunshine,    It was a pleasure to see you today at the [facility].     My recommendations after this visit include:    - No medications changes made today     - Follow up with Dr. Thakkar as scheduled    - Follow up in Heart Failure Clinic with Marielos Landaverde CNP in 5-6 weeks    - Please call Shannen Basurto RN on [phone], if you have any questions or concerns    Lissette Gordon CNP

## 2021-06-24 NOTE — PATIENT INSTRUCTIONS - HE
Myron Sunshine,    It was a pleasure to see you today at the [facility].     My recommendations after this visit include:    - No medications changes made today     - I did some lab work today and will call you with results when available     - Please go to the emergency department if worsening shortness of breath, lightheadedness or dizziness or decreased O2 persistently stays below 88%    - Follow up with Dr. Thakkar as scheduled in February 2019    - Follow up in Heart Failure Clinic with Marielos on 3/1/19 as scheduled    - Please call Shannen Basurto RN on [phone], if you have any questions or concerns    Lissette Gordon, CNP

## 2021-06-24 NOTE — PATIENT INSTRUCTIONS - HE
Take lasix 60mg every morning and 20mg every afternoon for the next 5 days. If the weight is back down and the swelling has improved, great. If no better, call the cardiology clinic and discuss a plan and moving the appointment sooner than March.    We will do blood work today, will call you with results tomorrow.

## 2021-06-24 NOTE — PROGRESS NOTES
Pulmonary Clinic Follow Up    Cc: follow up dyspnea.    HPI: 86yoM with history of asthma as well as cardiomyopathy, EF 44% s/p TAVR for severe AS, restrictive lung disease presents for follow up.    He underwent TAVR 11/27 and upgrade of his BiV AICD. Has been at home since December. Was hospitalized 12/30-1/2 for CHF exacerbation, ?pneumonia with a procalcitonin of 11. Required bipap initially. Discharged home.     Now more swelling in legs. Weight is up 3 pounds from discharge and has forgotten a few doses of lasix. Taking 60mg qam, 20mg qpm.    Current regimen: Budesonide nebs, perforomist which he takes once daily and albuterol nebs.      Current Outpatient Medications   Medication Sig     acetaminophen (TYLENOL EXTRA STRENGTH) 500 MG tablet Take 1 tablet (500 mg total) by mouth every 6 (six) hours as needed for pain.     albuterol (PROAIR HFA;PROVENTIL HFA;VENTOLIN HFA) 90 mcg/actuation inhaler Inhale 2 puffs every 6 (six) hours as needed for shortness of breath.     albuterol (PROVENTIL) 2.5 mg /3 mL (0.083 %) nebulizer solution Take 3 mL (2.5 mg total) by nebulization every 4 (four) hours as needed for wheezing.     allopurinol (ZYLOPRIM) 100 MG tablet Take 100 mg by mouth daily.     atorvastatin (LIPITOR) 80 MG tablet Take 1 tablet (80 mg total) by mouth daily.     budesonide (PULMICORT) 0.5 mg/2 mL nebulizer solution Take 2 mL (0.5 mg total) by nebulization 2 (two) times a day.     budesonide-formoterol (SYMBICORT) 160-4.5 mcg/actuation inhaler Inhale 2 puffs 2 (two) times a day.     cetirizine (ZYRTEC) 10 MG tablet Take 10 mg by mouth daily.     clopidogrel (PLAVIX) 75 mg tablet Take 300 mg (4 pills) Friday morning.  Starting Saturday, take 75 mg (1 pill) daily     fluticasone (FLONASE) 50 mcg/actuation nasal spray Apply 1 spray into each nostril daily as needed for rhinitis.     formoterol fumarate (PERFOROMIST) 20 mcg/2 mL nebulizer solution Take 2 mL (20 mcg total) by nebulization daily.     furosemide  (LASIX) 80 MG tablet Take 0.5 tablets (40 mg total) by mouth daily. (Patient taking differently: Take 80 mg by mouth daily.       )     guaiFENesin ER (MUCINEX) 600 mg 12 hr tablet Take 1 tablet (600 mg total) by mouth 2 (two) times a day.     hydrocortisone 1 % cream Apply 1 application topically every 6 (six) hours as needed.     latanoprost (XALATAN) 0.005 % ophthalmic solution Administer 1 drop to the right eye at bedtime.     metoprolol succinate (TOPROL XL) 50 MG 24 hr tablet Take 1 tablet (50 mg total) by mouth at bedtime.     multivitamin therapeutic (THERAGRAN) tablet Take 1 tablet by mouth daily.     nebulizer and compressor Radha For chronic cough     nitroglycerin (NITROSTAT) 0.4 MG SL tablet Place 1 tablet (0.4 mg total) under the tongue every 5 (five) minutes as needed for chest pain.     OXYGEN-AIR DELIVERY SYSTEMS MISC into each nostril continuous. 1.5 liters with rest and 3 liters with activity  May use up to 1.5 liters at noc.     pantoprazole (PROTONIX) 20 MG tablet Take 1 tablet (20 mg total) by mouth daily. (Patient taking differently: Take 20 mg by mouth daily as needed.       )     sertraline (ZOLOFT) 50 MG tablet Take 0.5 tablets (25 mg total) by mouth at bedtime.     warfarin (COUMADIN/JANTOVEN) 2 MG tablet Take 1 tablet (2 mg total) by mouth See Admin Instructions. Take 4 mg daily     Vitals:    02/21/19 1015   BP: 96/58   Pulse: 76   Resp: 24   SpO2: 91%   RA  Gen: NAD, elderly, frail  HEENT: no thrush   Neck: No lymphadenopathy  C/V: RRR,  S1 and S2. No longer any murmur  Resp: Rales at both bases  Ext: new swelling in legs, right-sided  Neuro: Tremor at rest.   Skin: no rashes        ASSESSMENT/PLAN:  1) Restrictive lung disease-Combination of trapped lung and mild fibrosis that may be slowly progressing.  -Continue O2  -Prednisone 5mg for now. I will attempt to discuss him at ILD conference. I fear biopsy would be too risky given his frail state but empiric higher dose steroids  Palliative Medicine    Pt seen and examined  Pt with NG  Minimally communicative  Reporting some abdominal pain  Prn requested  May require ATC dosing given inability to directly request medication  Reassess in 24 hours potentially versus nothing    2) Bronchiectasis-seen at [facility] on CT scan.   -Unclear if this was the cause of his procalcitonin in January. Unfortunately no CT chest was performed. I do not see that procalcitonin has been rechecked  -Repeat Procal today hoping it is resolved.   -If not will consider repeating CT scan of chest to better understand process    3) Asthma,  -Continue perforomist and budesonide nebulized. Tried an inhaler, didn't like it.   -PRN albuterol  -replaced nebulizer    5) iCMP, EF 44% with recent hospitalization for overload in setting of infection  -Currently overloaded. Will increase his lasix to 60mg qam, 20mg qpm for 5 days. If no improvement will call cardiology heart failure clinic.     RTC 3 months      Albina Christensen MD  >50% of this 30 minute visit spent in direct patient counseling and coordination of care.

## 2021-06-24 NOTE — PROGRESS NOTES
INR 1.5 pt is been self dosing his warfarin due to steroids. Will start 2 mg M,W,F and 4 mg all other days. Retest on 3/20. Continue current management dosing of Warfarin. Continue  diet of moderate Vitamin K intake. Discussed with pt the need to call with questions or concerns or any change in medication especially herbal medication or OTC. Call with increased bleeding or bruising or any upcoming procedures.

## 2021-06-24 NOTE — TELEPHONE ENCOUNTER
Type: CRT-D alert remote transmission for long AT/AF.   Presenting rhythm: atrial fibrillation with biVP 75 bpm.  Battery/lead status: stable.  Arrhythmias: since 2/27/19; one AF episode in progress since 3/6/19, v-rates >/=120bpm 10%, AF burden 100%. No VT/VF detected.  Anticoagulant: warfarin.  Comments: appears to be normal ICD function. Device biVP 83.4% in addition to 16.4% VSR pacing. Routed to device RN for review.   Device/lead alerts: none. NIECY     Transmission reviewed. Known PAF, good V-rates and on Warfarin. Appears to be in AF since 3/6/19.  Reviewed with patient. He denies any awareness of rhythm changes at this time, no unusual shortness of breath, palpitations, or lightheadedness. Advised to call with any changes in s/s. He agrees. Confirms he is on Warfarin.     Patient also mentioned that he thought he heard beeping tones from his device. I reviewed with him that his device function and battery are fine. There were no other alerts on his remote transmission other than for this episode AF. I suggested that he double check to see that there are no magnetic sources by his device that could have set the tones off. He agrees.       Next follow up in Device clinic with Dr. Cadet is on 3/20/2019. Will update Dr. Cadet about ongoing AF episode.     Ratna Lyn RN     .

## 2021-06-24 NOTE — TELEPHONE ENCOUNTER
Called Myron to let him know his  Infection numbers (procalcitonin) now undetectable which is great news. Other  Labs show he has too much fluid and it this is making his labs associated with his kidneys, BUN and Creatinine go up a little.. He needs to follow through on the plan of 60mgqam 20mgqpm of Lasix. Informed on how important it is to remember the afternoon dose. Said his swelling in his ankles has decreased slightly. Will call on Monday with an update.

## 2021-06-24 NOTE — PATIENT INSTRUCTIONS - HE
INR 1.5 dose Warfarin at 2 mg M,W,F and 4 mg all other days. After talking with pt and discussing history of greens/salads and medication change. Pt will  continue  with current diet and dosing of Warfarin.  Continue with moderation of Vit K and green leafy vegetables. Cautioned to call with increase bruising or bleeding. Reminded to call with medication change especially antibiotic. Call with any questions or concerns or any up coming procedures. Cautioned about using Herbal medication.

## 2021-06-24 NOTE — PROGRESS NOTES
INR with home health. 2.3 After talking with pt and discussing history of greens/salads and medication change. Pt will  continue  with current diet and dosing of Warfarin.  Continue with moderation of Vit K and green leafy vegetables. Cautioned to call with increase bruising or bleeding. Reminded to call with medication change especially antibiotic. Call with any questions or concerns or any up coming procedures. Cautioned about using Herbal medication.  Retest in 2 weeks. Marj [phone]

## 2021-06-24 NOTE — TELEPHONE ENCOUNTER
Myron called wit complaint of increased Sob and was wondering if he could increase his prednisone. Also says his ankle swelling is pretty much gone and he would like to go back to the 80 mg a day instead of the 120 mg. Will give a prednisone taper and decrease his lasix to 80 mg a day per Dr. Christensen.

## 2021-06-24 NOTE — PATIENT INSTRUCTIONS - HE
Myron Sunshine,    It was a pleasure to see you today at the [facility].     My recommendations after this visit include:  - Increase metoprolol back to 50 mg daily.    - You will be called with the results of your labs.  - Continue to monitor for signs of retaining fluid (increasing weights, shortness of breath, swelling) and call with any concerns. [phone]  - Follow up with me or Lissette in early/mid April.     Marielos Landaverde, CNP

## 2021-06-24 NOTE — PROGRESS NOTES
Metoprolol and furosemide prescriptions changed.  -[facility]    Notes recorded by Lissette Gordon NP on 2/25/2019 at 11:16 AM CST  Shannen Basurto,  His renal function is stable but BNP has worsened.  Please have him cut back on her metoprolol succinate from 50 mg down to 25 mg at at bedtime. Increase furosemide from 40 mg twice a day to 80 mg in a.m. and 40 mg in p.m.  Encouraged him to go to the emergency department if worsening shortness of breath, lightheadedness or dizziness. Stay on diet high in potassium.  Follow-up with Marielos Landaverde CNP as scheduled on 3/1/19.  Thank you  Lissette

## 2021-06-25 NOTE — PROGRESS NOTES
INR 1.8 increase dose to 2 mg Monday and Friday and 4 mg all other days. Retest in 2 weeks. Continue current management dosing of Warfarin. Continue  diet of moderate Vitamin K intake. Discussed with pt the need to call with questions or concerns or any change in medication especially herbal medication or OTC. Call with increased bleeding or bruising or any upcoming procedures.  Spoke with pt and he is not taking his Warfarin as ordered. He will take his pills and check  in 2 weeks.

## 2021-06-25 NOTE — PATIENT INSTRUCTIONS - HE
INR 1.8 increase dose of Warfarin to 2 mg Monday and Friday 4 mg all other days. Retest in 2 weeks.

## 2021-06-25 NOTE — PATIENT INSTRUCTIONS - HE
Myron Sunshine    Thank you for coming to the [facility] today for a cardiac evaluation  It was my pleasure to see you today  A good contact for any questions would be Ivory Portillo  RN @ [phone]    Device evaluation shows that you have been atrial fibrillation since March 9.  With atrial fibrillation you do not receive as much resynchronization pacing and the heart rate becaomes faster and irregular  Increase metoprolol from 50 mg daily; now take 50 mg twice daily once in the morning once in the afternoon  We briefly discussed the possibility of AV node ablation to allow better ventricular pacing and heart rate control but first  We will try  medical adjustments  You should remain on warfarin, important to keep INR 2-3  Use supplements such as Ensure since she appeared to be somewhat underweight  Return in 1 month for re-evaluation

## 2021-06-25 NOTE — PROGRESS NOTES
Progress Notes by Donna Thakkar MD at 10/26/2017  9:30 AM     Author: Donna Thakkar MD Service: -- Author Type: Physician    Filed: 10/26/2017  9:48 AM Encounter Date: 10/26/2017 Status: Signed    : Donna Thakkar MD (Physician)                  [email or web address]           Thank you Dr. Gerardo for asking the [facility] team to participate in the care of your patient, Myron Sunshine.     Impression and Plan     1. Coronary artery disease. Myron has known coronary artery disease. Specifically, he had suffered a myocardial infarction in October of 2001 for which he underwent angioplasty and stenting of the circumflex. In addition, shortly thereafter he had repair of a left ventricular aneurysm/pseudoaneurysm.  Myron denies chest pain symptoms, however, he has had shortness of breath though this may be multifactorial in nature.  Nonetheless, cannot rule out possible ischemic contribution.  Plan:    Regadenoson nuclear perfusion study to evaluate for possible ischemia and will compare with prior imaging studies.     2. Cardiomyopathy (ischemic). Last echocardiogram 24 November 2014 revealed mild reduction in left ventricular systolic performance with ejection fraction of 45-50%. Patient appears volume neutral on clinical exam. He is appropriately on Cozaar as well as metoprolol therapy. As noted in previous communications he has been maintained on warfarin given aneurysmal appearance of the posterior segment despite prior surgical repair.    As noted below, patient was recently hospitalized and felt to have some degree of congestive heart failure.  As aforementioned, today patient does appear volume neutral, however.  His shortness of breath I suspect is multifactorial in nature.  He did undergo recent pulmonary function testing which revealed FVC 59% predicted and QLO976% predicted.  Patient has been seen by Dr. Albina Christensen and is scheduled for follow-up in this  regard.  At this time, we will simply continue with the current diuretic and other medical therapy.     3. Aortic stenosis.  This was felt mild to moderate in degree based on recent echocardiogram 2 October 2017.  This is commensurate with exam.  This will require continued intermittent monitoring.    4.  Hypertension. Blood pressure is  reasonable in the office today.     5. Dyslipidemia. Recent lipid profile 19 September 2016 was excised and her target with LDL 69 mg/dL and HDL 39 mg/dL.     Follow-up and further recommendations as needed pending regadenoson nuclear perfusion results.         History of Present Illness    Once again I would like to thank you again for asking me to participate in the care of your patient, Myron Sunshine.  As you know, but to reiterate for my own records, Myron Sunshine is a 86 y.o. male with known coronary disease. Specifically, he had suffered a myocardial infarction in October of 2001 for which he underwent angioplasty and stenting of the circumflex. In addition, shortly thereafter he had repair of a left ventricular aneurysm/pseudoaneurysm. He has been noted on subsequent echocardiograms to have residual aneurysmal appearance involving the patched area. For this reason, he has been maintained on warfarin since in the past he had been noted to have a suspicion of possible mural thrombus in this region.      He did undergo repeat angiography 21 November 2001 and was found to have patency of his circumflex stents, but a 95% lesion involving the 1st diagonal and this was treated medically.      More recently, patient had been hospitalized with symptoms of shortness of breath.  He was felt to have some evidence of pulmonary edema.  He also had a pleural effusion for which he underwent thoracentesis.    On interview, patient continues to report shortness of breath with certain activities.  He continues to deny any anginal type chest pain, however.  He reports no palpitations or  lightheadedness.    Further review of systems is otherwise negative/noncontributory (medical record and 13 point review of systems reviewed as well and pertinent positives noted).         Cardiac Diagnostics      Echocardiogram 2 October 2017 (limited study to assess aortic stenosis):  1. Moderately reduced left ventricular systolic performance with ejection fraction of 40%.  2. Mild to moderate aortic stenosis.    Echocardiogram 1 October 2017:  1. Moderately reduced left ventricular systolic performance with ejection fraction of 40%.  2. There is a large aneurysm of the basal inferolateral wall repaired by a patch.  3. Evidence of aortic stenosis though degree could not be determined.  4. Normal right ventricular size and systolic performance.  5. Mild left atrial enlargement.  Right atrium of normal dimension.    Echocardiogram 13 November 2015:  1. Normal left ventricular size with mild to moderate depression in left ventricular systolic performance. Ejection fraction 40%.  2. 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 Moderate basal and mid inferior hypokinesis.  3. No significant valvular heart disease.  4. Mild left atrial enlargement.    When compared to the prior echocardiogram 24 November 2014, no significant change.     Echocardiogram 24 November 2014:  1. Normal left ventricular size with mild reduction in left ventricular systolic performance. Ejection fraction estimated at 45-50%.  2. Posterior/inferior basal wall pseudoaneurysm/aneurysm noted with apparent patch closure.  3. No significant valvular heart disease.  4. No significant change from 14 September 2013 echocardiogram.    Echocardiogram performed 2 November 2012:  1. Mild dilation of the left ventricle with ejection fraction of 35%.   2. Basal posterior aneurysm was noted once again. Inferior hypokinesis to akinesis.   3. Mild aortic regurgitation.    Device interrogation 21 August 2017 ([employer]  "device implanted 18 June 2014):  1. Presenting rhythm: AP-VS, rate 60 bpm.  2. Battery/lead status: stable  3. Arrhythmias: since 5/15/17, no AT/AF noted. One nonsustained VT episode detected.  4. Comments: normal ICD function. Patient is on warfarin.    Device interrogation 31 August 2016 ([employer] device implanted 18 June 2014):  1. Atrial pacing with ventricular sensing. Underlying rhythm sinus at 60 bpm.  2. Stable battery/lead status.  3. Arrhythmias: None since last interrogation.    Device interrogation 1 September 2015 ([employer] device implanted 18 June 2014):  1. Atrial pacing with ventricular sensing. Underlying rhythm sinus at 60 bpm.  2. Stable battery/lead status.  3. Arrhythmias: None since last interrogation.            Physical Examination       BP 92/46 (Patient Site: Right Arm, Patient Position: Sitting, Cuff Size: Adult Regular)  Pulse 60  Resp 18  Ht 5' 8\" (1.727 m)  Wt 175 lb (79.4 kg)  BMI 26.61 kg/m2        Wt Readings from Last 3 Encounters:   10/26/17 175 lb (79.4 kg)   10/12/17 176 lb 1.6 oz (79.9 kg)   10/06/17 170 lb 1.6 oz (77.2 kg)       The patient is alert and oriented times three. Sclerae are anicteric. Mucosal membranes are moist. Jugular venous pressure is normal. No significant adenopathy/thyromegally appreciated. Lungs are fairly clear though diminished bilaterally. On cardiovascular exam, the patient has a regular S1 and S2.  There is a 2/6 systolic murmur heard in a sash-like distribution.  Abdomen is soft and non-tender. Extremities reveal no clubbing, cyanosis, or edema.     *    Family History/Social History/Risk Factors   Patient does not smoke.  Family history of hypertension.         Medications  Allergies   Current Outpatient Prescriptions   Medication Sig Dispense Refill   ? albuterol (PROVENTIL HFA;VENTOLIN HFA) 90 mcg/actuation inhaler Inhale 2 puffs every 6 (six) hours as needed for wheezing.     ? aspirin 81 MG tablet Take 81 mg by mouth daily.     ? " dextromethorphan-guaiFENesin (ROBITUSSIN-DM)  mg/5 mL liquid Take 5 mL by mouth every 4 (four) hours as needed.  0   ? fluticasone (FLONASE) 50 mcg/actuation nasal spray 2 sprays into each nostril daily as needed for allergies.      ? furosemide (LASIX) 20 MG tablet Take 20 mg by mouth 2 (two) times a day at 9am and 6pm. Take in the morning and early afternoon.     ? guaiFENesin (MUCINEX) 600 mg 12 hr tablet Take 600 mg by mouth 2 (two) times a day as needed for congestion.      ? latanoprost (XALATAN) 0.005 % ophthalmic solution Administer 1 drop to the right eye at bedtime.     ? losartan (COZAAR) 50 MG tablet Take 50 mg by mouth daily.     ? metoprolol (TOPROL-XL) 100 MG 24 hr tablet Take 100 mg by mouth daily.     ? multivitamin therapeutic (THERAGRAN) tablet Take 1 tablet by mouth daily.     ? nitroglycerin (NITROSTAT) 0.4 MG SL tablet Place 0.4 mg under the tongue every 5 (five) minutes as needed for chest pain.     ? nystatin-triamcinolone (MYCOLOG) ointment Apply topically 2 (two) times a day as needed (apply to groin).      ? sertraline (ZOLOFT) 50 MG tablet Take 25 mg by mouth daily. Take 1/2 tablet (25mg) daily.     ? sildenafil (VIAGRA) 100 MG tablet Take 100 mg by mouth as needed for erectile dysfunction.      ? simvastatin (ZOCOR) 80 MG tablet Take 80 mg by mouth bedtime.     ? umeclidinium (INCRUSE ELLIPTA) 62.5 mcg/actuation DsDv inhaler Inhale 1 puff daily. 30 puff 0   ? warfarin (COUMADIN) 2 MG tablet Take 2-4 mg by mouth See Admin Instructions. Take 1 tablet (2mg) on Tuesday & Thursday; Take 2 tablets (4mg) all other days of the week. Adjust dose based on INR results as directed.     ? predniSONE (DELTASONE) 20 MG tablet Take 2 tablets (40 mg total) by mouth daily with lunch. 4 tablet 0     No current facility-administered medications for this visit.       No Known Allergies       Lab Results   Lab Results   Component Value Date     10/05/2017    K 4.0 10/06/2017     10/05/2017     CO2 25 10/05/2017    BUN 43 (H) 10/05/2017    CREATININE 1.84 (H) 10/05/2017    CALCIUM 9.5 10/05/2017     Lab Results   Component Value Date    WBC 9.5 10/02/2017    WBC 8.8 06/18/2014    HGB 13.0 (L) 10/02/2017    HCT 39.9 (L) 10/02/2017    MCV 83 10/02/2017     10/02/2017     Lab Results   Component Value Date    CHOL 136 08/23/2017    TRIG 110 08/23/2017    HDL 40 08/23/2017    LDLCALC 74 08/23/2017     Lab Results   Component Value Date    INR 2.7 10/09/2017    INR 1.61 (H) 10/06/2017    INR 2.50 (!) 06/05/2015     Lab Results   Component Value Date     (H) 09/30/2017     Lab Results   Component Value Date    CKTOTAL 63 09/14/2013    CKTOTAL 61 09/14/2013    CKMB 2 09/14/2013    CKMB 2 09/14/2013    CKMB 3 04/07/2011    TROPONINI 0.13 09/30/2017    TROPONINI 0.05 11/13/2015    TROPONINI 0.06 09/14/2013     Lab Results   Component Value Date    TSH 3.1 09/14/2013

## 2021-06-25 NOTE — PROGRESS NOTES
In clinic device check with Device RN followed by office visit with Dr. Cadet.  Please see link for full device report.  Patient was informed of results and next follow up during today's visit.

## 2021-06-25 NOTE — PROGRESS NOTES
[facility] Heart Care Note    Assessment:  Assessment:  1. 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    2. History of left circumflex occlusion, thrombosed stent requiring repeat  stenting 11/4/2001.    4. ICD implant 12/2001, secondary indication (clinical VT);        generator replacement  7/19/2007,         generator replacement 6/18/2014.  [employer] DDDR/ICD  Upgrade to CRT-D December 4, 2018  5.  Moderate aortic stenosis        Mean aortic valve gradient 22 mm on October 2, 2017   6. History of abdominal aortic aneurysm with repair 12/2008.    7. Chronic moderate renal insufficiency with creatinine 1.84    8. Left ventricular dysfunction with ejection fraction about 35% with large inferior wall motion abnormality.         Echocardiogram  is similar       Echocardiogram October 2, 2017; ejection fraction 40%, infero-posterior aneurysm  9. Chronic anticoagulation with warfarin. Also ASA  10. Sinus bradycardia.  TIA; comprehensive evaluation was unremarkable November 2015  Left pleural effusion with thoracentesis-850 cc exudate 2017  Atrial fibrillation March 9, 2018-persistent    Plan      Device evaluation shows that you have been atrial fibrillation since March 9.  With atrial fibrillation you do not receive as much resynchronization pacing and the heart rate becaomes faster and irregular  Increase metoprolol from 50 mg daily; now take 50 mg twice daily once in the morning once in the afternoon  We briefly discussed the possibility of AV node ablation to allow better ventricular pacing and heart rate control but first  We will try  medical adjustments  You should remain on warfarin, important to keep INR 2-3  Use supplements such as Ensure since she appeared to be somewhat underweight  Return in 1 month for re-evaluation     Subjective:    I had the opportunity to see.Myron Sunshine , who is a 87 y.o. male  with a known history of events cardiomyopathy  But was having refractory heart failure just could not manage his heart failure.    He underwent upgrade of his device to a biventricular system on December 4, 2018 and had substantial improvement  He is gone into atrial fibrillation March 9.  During atrial fibrillation he has less ventricular pacing, about 80% and does feel some irregularities not felt as good since atrial fibrillation has occurred.    He is on warfarin but his INRs have been subtherapeutic  Uses continuous home oxygen because of respiratory insufficiency  Has lost quite a bit of weight uses some supplements  Fluid levels seem well controlled while taking furosemide 80 mg daily  He did see Dr. Thakkar March 7, 2019; atrial fibrillation was not addressed weight, and no adjustments were made    Does state atrial fibrillation the importance of anticoagulation  Also importance of rate control and maintaining resynchronization pacing  We discussed AV node ablation.  If we cannot control his rate, allow good biventricular pacing, AV node ablation would be indicated  I thought we could increase his metoprolol from 50 mg daily to 50 mg twice daily to see if we can allow higher amount of biventricular pacing; if not, then consider AV node ablation  As he is underweight, we discussed supplements  His furosemide has been adjusted he seemed to be doing well and 80 mg daily        Problem List:  Patient Active Problem List   Diagnosis     Stage 3 chronic kidney disease (H)     Biventricular ICD (implantable cardioverter-defibrillator) in place     Dyslipidemia, goal LDL below 70     Left ventricular aneurysm     Restrictive lung disease; moderate by PFT     Paroxysmal atrial fibrillation (H)     DNAR (do not attempt resuscitation)     Chronic hypoxemic respiratory failure (H)     Severe aortic stenosis     Non-rheumatic mitral regurgitation     Hypotension     Left bundle branch block     Chronic HFrEF (heart  failure with reduced ejection fraction) (H)     VT (ventricular tachycardia) (H)     S/P TAVR (transcatheter aortic valve replacement)     Glaucoma     MIS (obstructive sleep apnea)     Depression     Gout flare     Medical History:  Past Medical History:   Diagnosis Date     Anxiety      Bowel obstruction (H)      Bronchiectasis without acute exacerbation (H) 12/1/2017     Chronic hypoxemic respiratory failure (H)      CKD (chronic kidney disease) stage 3, GFR 30-59 ml/min (H)      Coronary artery disease due to lipid rich plaque      DNAR (do not attempt resuscitation)      Dyslipidemia, goal LDL below 70      ED (erectile dysfunction)      Essential hypertension      GERD (gastroesophageal reflux disease)      Ischemic cardiomyopathy      Mild aortic stenosis      Noncompliance with medication regimen 9/11/2018     Paroxysmal VT (H)      Persistent atrial fibrillation (H) 8/22/2018     Psoriasis      Restrictive lung disease; moderate by PFT 10/24/2017    FVC 1.9 456% FEV1 1.4 457% ratio 74%.  No significant response to bronchodilators.  TLC 40% predicted DLCO corrected 47% predicted Assessment: No obstruction.  Moderate restriction.  Moderate diffusion defect.     Seasonal allergies      Sleep apnea      TIA (transient ischemic attack) 8/09     Ventricular aneurysm     Long-term coumadin therapy s/p ventricular patch 2001     Vitamin D deficiency      Surgical History:  Past Surgical History:   Procedure Laterality Date     ABDOMINAL AORTIC ANEURYSM REPAIR       APPENDECTOMY      Perforated     CARDIAC CATHETERIZATION       CARDIAC DEFIBRILLATOR PLACEMENT  7/19/2007     CORONARY STENT PLACEMENT  2001    LCX     CV CORONARY ANGIOGRAM N/A 10/18/2018    Procedure: Coronary Angiogram;  Surgeon: Elijah Liu MD;  Location: [facility] Cath Lab;  Service:      CV TRANSFEMORAL TRANSCATHETER VALVE REPLACEMENT N/A 11/27/2018    Procedure: Transfemoral Transcatheter Aortic Valve Replacement;  Surgeon: Elijah  MD Noe;  Location: [facility] Cath Lab;  Service: Structural Heart     ICD Generator Change  14     IR EXTREMITY VENOGRAM LEFT  12/3/2018     PICC  2018          PICC AND MIDLINE TEAM LINE INSERTION  2018          RESECTION SMALL BOWEL / CLOSURE ILEOSTOMY       THORACENTESIS       VENTRICULAR RESECTION / REPAIR ANEURYSM       Social History:  Social History     Socioeconomic History     Marital status:      Spouse name: Eulalia     Number of children: Not on file     Years of education: Not on file     Highest education level: Not on file   Occupational History     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/, 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   Social Needs     Financial resource strain: Not on file     Food insecurity:     Worry: Not on file     Inability: Not on file     Transportation needs:     Medical: Not on file     Non-medical: Not on file   Tobacco Use     Smoking status: Former Smoker     Packs/day: 1.00     Years: 8.00     Pack years: 8.00     Types: Cigarettes     Last attempt to quit: 1955     Years since quittin.8     Smokeless tobacco: Never Used   Substance and Sexual Activity     Alcohol use: Yes     Alcohol/week: 0.6 oz     Types: 1 Glasses of wine per week     Drug use: No     Sexual activity: Not on file   Lifestyle     Physical activity:     Days per week: 0 days     Minutes per session: 0 min     Stress: Not on file   Relationships     Social connections:     Talks on phone: Not on file     Gets together: Not on file     Attends Worship service: Not on file     Active member of club or organization: Not on file     Attends meetings of clubs or organizations: Not on file     Relationship status: Not on file     Intimate partner violence:     Fear of current or ex partner: Not on file     Emotionally abused: Not on file     Physically abused: Not on file     Forced sexual activity: Not on file   Other Topics Concern     Not on file   Social History Narrative     Lives with his wife, Eulalia, who he states has advanced arthritis. 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         Review of Systems:      General: WNL  Eyes: WNL  Ears/Nose/Throat: WNL  Lungs: Shortness of Breath  Heart: Shortness of Breath with activity, Irregular Heartbeat  Stomach: WNL  Bladder: Frequent Urination at Night  Muscle/Joints: Muscle Weakness  Skin: WNL  Nervous System: Daytime Sleepiness, Loss of Balance  Mental Health: WNL     Blood: Easy Bruising        Family History:  Family History   Problem Relation Age of Onset     Stroke Mother      Thyroid disease Mother      Cancer Brother      No Medical Problems Son      No Medical Problems Son      No Medical Problems Son      Lung disease Neg Hx          Allergies:  Allergies   Allergen Reactions     Blood-Group Specific Substance      Anti-Josep/Barnes.  Expect delays in obtaining blood for transfusion.  Collect 2 lavender top tubes and 1 red top tube for all blood bank orders.        Medications:  Current Outpatient Medications   Medication Sig Dispense Refill     acetaminophen (TYLENOL EXTRA STRENGTH) 500 MG tablet Take 1 tablet (500 mg total) by mouth every 6 (six) hours as needed for pain.  0     albuterol (PROVENTIL) 2.5 mg /3 mL (0.083 %) nebulizer solution Take 3 mL (2.5 mg total) by nebulization every 4 (four) hours as needed for wheezing. 360 mL 6     allopurinol (ZYLOPRIM) 100 MG tablet Take 100 mg by mouth daily.       atorvastatin (LIPITOR) 80 MG tablet Take 1 tablet (80 mg total) by mouth daily. 30 tablet 3     budesonide (PULMICORT) 0.5 mg/2 mL nebulizer solution Take 2 mL (0.5 mg total) by nebulization 2 (two) times a day. 120 mL 6     cetirizine (ZYRTEC) 10 MG tablet Take 10 mg by mouth daily as needed.              clopidogrel (PLAVIX) 75 mg tablet Take 300 mg (4 pills) Friday morning.  Starting Saturday, take 75 mg (1 pill) daily (Patient taking differently: Take 75 mg by mouth daily. Take 300 mg (4 pills) Friday morning.   Starting Saturday, take 75 mg (1 pill) daily      ) 30 tablet 12     formoterol fumarate (PERFOROMIST) 20 mcg/2 mL nebulizer solution Take 2 mL (20 mcg total) by nebulization daily.  0     furosemide (LASIX) 80 MG tablet Take 1 tablet (80 mg) every morning, and take 1/2 tablet (40 mg) every afternoon. (Patient taking differently: Take 80 mg by mouth daily. Take 1 tablet (80 mg) every morning, and take 1/2 tablet (40 mg) every afternoon.      ) 30 tablet 0     guaiFENesin ER (MUCINEX) 600 mg 12 hr tablet Take 1 tablet (600 mg total) by mouth 2 (two) times a day. (Patient taking differently: Take 600 mg by mouth 2 (two) times a day as needed.       )  0     hydrocortisone 1 % cream Apply 1 application topically every 6 (six) hours as needed.       latanoprost (XALATAN) 0.005 % ophthalmic solution Administer 1 drop to the right eye at bedtime. 2.5 mL 12     metoprolol succinate (TOPROL XL) 50 MG 24 hr tablet Take 1 tablet (50 mg total) by mouth 2 (two) times a day. 90 tablet 3     multivitamin therapeutic (THERAGRAN) tablet Take 1 tablet by mouth daily.  0     nebulizer and compressor Radha For chronic cough 1 each 0     nitroglycerin (NITROSTAT) 0.4 MG SL tablet Place 1 tablet (0.4 mg total) under the tongue every 5 (five) minutes as needed for chest pain.  0     OXYGEN-AIR DELIVERY SYSTEMS MISC into each nostril continuous. 1.5 liters with rest and 3 liters with activity  May use up to 1.5 liters at noc.       pantoprazole (PROTONIX) 20 MG tablet Take 1 tablet (20 mg total) by mouth daily. (Patient taking differently: Take 20 mg by mouth daily as needed.       ) 30 tablet 0     sertraline (ZOLOFT) 50 MG tablet Take 0.5 tablets (25 mg total) by mouth at bedtime.  0     warfarin (COUMADIN/JANTOVEN) 2 MG tablet Take 1 tablet (2 mg total) by mouth See Admin Instructions. Take 4 mg daily 180 tablet 0     albuterol (PROAIR HFA;PROVENTIL HFA;VENTOLIN HFA) 90 mcg/actuation inhaler Inhale 2 puffs every 6 (six) hours as needed  "for shortness of breath. 1 Inhaler 11     budesonide-formoterol (SYMBICORT) 160-4.5 mcg/actuation inhaler Inhale 2 puffs 2 (two) times a day. 1 Inhaler 12     fluticasone (FLONASE) 50 mcg/actuation nasal spray Apply 1 spray into each nostril daily as needed for rhinitis.       predniSONE (DELTASONE) 10 mg tablet 40 mg x 3 days, 30 mg x 3 days, 20 mg x 3 days, 10 mg x 3 days. Then back to his 5 mg a day.. 30 tablet 0     predniSONE (DELTASONE) 5 mg/mL Conc concentrated solution Take 5 mg by mouth daily.       No current facility-administered medications for this visit.          Surgical site  ICD is well-healed    Device interrogation  Intrinsic rhythm is atrial fibrillation with ventricular rate about 86 bpm  Ventricular pacing about 81% because of atrial fibrillation.  The effective ventricular pacing is at 72%  Optive all is trending upwards  Persistent atrial fibrillation since March 6, 2019  No ventricular tachycardia  Lead function satisfactory-EKG optimization was done previously    Objective:   Vital signs:  /70 (Patient Site: Left Arm, Patient Position: Sitting, Cuff Size: Adult Regular)   Pulse 76   Resp 16   Ht 5' 9\" (1.753 m)   Wt 157 lb (71.2 kg)   BMI 23.18 kg/m      Heart rate is in the 80s, some irregularity consistent with atrial fibrillation   He appears quite comfortable  Seems euvolemic  As of lost quite a bit of weight seems underweight this time    Physical Exam:        GENERAL APPEARANCE: Alert, cooperative and in no acute distress.  HEENT: No scleral icterus. No Xanthelasma. Oral mucous membranes pink and moist.  NECK: JVP normal cm. No Hepatojugular reflux. Thyroid not  Palpable  CHEST: clear to auscultation and percussion  CARDIOVASCULAR: S1, S2 without murmur    Brachial, radial  pulses are intact and symmetric.   No carotid bruits noted.  ABDOMEN: Non tender. BS+. No bruits.  EXTREMITIES: No cyanosis, clubbing or edema.    Lab Results:  LIPIDS:  Lab Results   Component Value " Date    CHOL 114 11/08/2018    CHOL 136 08/23/2017    CHOL 133 09/19/2016     Lab Results   Component Value Date    HDL 39 (L) 11/08/2018    HDL 40 08/23/2017    HDL 39 (L) 09/19/2016     Lab Results   Component Value Date    LDLCALC 53 11/08/2018    LDLCALC 74 08/23/2017    LDLCALC 69 09/19/2016     Lab Results   Component Value Date    TRIG 109 11/08/2018    TRIG 110 08/23/2017    TRIG 125 09/19/2016     No components found for: CHOLHDL    BMP:  Lab Results   Component Value Date    CREATININE 1.33 (H) 02/28/2019    BUN 37 (H) 02/28/2019     02/28/2019    K 3.5 02/28/2019     02/28/2019    CO2 30 02/28/2019         This note has been dictated using voice recognition software. Any grammatical or context distortions are unintentional and inherent to the software.  Donal Cadet MD  [facility]  [phone]

## 2021-06-26 NOTE — PROGRESS NOTES
Progress Notes by Marielos Landaverde CNP at 11/2/2018  9:50 AM     Author: Marielos Landaverde CNP Service: -- Author Type: Nurse Practitioner    Filed: 11/2/2018 10:53 AM Encounter Date: 11/2/2018 Status: Signed    : Marielos Landaverde CNP (Nurse Practitioner)                 Click to link to [facility] NOTE      Assessment/Recommendations   1.  Coronary artery disease: PCI on October 18, 2018 with drug-eluting stent to RCA.  He is currently on triple therapy with aspirin, Brilinta, and warfarin.  We discussed the importance of antiplatelet therapy and talking with his cardiologist prior to stopping these medications for any reason.  Puncture site is soft with no hematoma.      2.  Dyslipidemia: Myron Sunshine is on high intensity statin therapy with atorvastatin 80 mg daily.      3.  Valvular disease: He has moderate to severe aortic stenosis and severe mitral regurgitation.  He is in the process of workup for TAVR.  Plan to reassess mitral valve after TAVR.     4.  Persistent atrial fibrillation: He had cardioversion on September 17, 2018.  He continues to take warfarin.  INR was checked today.         History of Present Illness    Myron Sunshine is seen at [facility] for post coronary intervention follow up.  He has a history of heart failure with reduced ejection fraction, aortic stenosis, mitral regurgitation, hypertension, coronary artery disease with PCI to left circumflex in 2001, dyslipidemia, LV pseudoaneurysm, interstitial lung disease, bronchiectasis, asthma, obstructive sleep apnea, and chronic kidney disease.  He has been seen in the valve clinic and is planning for TAVR.  Mitral valve will be reassessed after TAVR and will be treated medically or with mitraclip.  PCI on October 18, 2018 with drug-eluting stent to RCA.    He continues to have shortness of breath with activity.  He has noticed a slight improvement since PCI.  He  denies any acute heart failure symptoms.  He denies lightheadedness, chest pain and lower extremity edema.       Physical Examination Review of Systems   Vitals:    11/02/18 1002   BP: 100/60   Pulse: 64   Resp: 18     Body mass index is 23.04 kg/(m^2).  Wt Readings from Last 3 Encounters:   11/02/18 156 lb (70.8 kg)   10/25/18 154 lb (69.9 kg)   10/19/18 150 lb 4 oz (68.2 kg)       General Appearance:     Alert, cooperative and in no acute distress.   ENT/Mouth: membranes moist, no oral lesions or bleeding gums.      EYES:  no scleral icterus, normal conjunctivae   Chest/Lungs:   lungs are clear to auscultation, no rales or wheezing, respirations unlabored   Cardiovascular:   Regular. Normal first and second heart sounds, systolic murmur; no edema bilateral lower extremities    Abdomen:  Soft, nontender, nondistended, bowel sounds present   Extremities: no cyanosis or clubbing   Skin:  Neurologic: warm, dry.  mood and affect are appropriate, alert and oriented x3     Puncture Site: Right radial site is soft with small, healing bruise on forearm.  Radial pulses and Pedal pulses intact and symmetrical.  CMS intact.      General: WNL  Eyes: WNL  Ears/Nose/Throat: WNL  Lungs: WNL  Heart: WNL  Stomach: WNL  Bladder: WNL  Muscle/Joints: WNL  Skin: WNL  Nervous System: WNL  Mental Health: WNL     Blood: WNL     Medical History  Surgical History Family History Social History   Past Medical History:   Diagnosis Date   ? Anxiety    ? Bowel obstruction (H)    ? Chronic hypoxemic respiratory failure (H)    ? Chronic kidney disease     stage 3   ? Coronary artery disease due to lipid rich plaque    ? DNAR (do not attempt resuscitation)    ? Dyslipidemia, goal LDL below 70    ? ED (erectile dysfunction)    ? Essential hypertension    ? GERD (gastroesophageal reflux disease)    ? Ischemic cardiomyopathy     but EF 50-55%   ? Mild aortic stenosis    ? Noncompliance with medication regimen 9/11/2018   ? Paroxysmal VT (H)    ?  Persistent atrial fibrillation (H) 8/22/2018   ? Psoriasis    ? Restrictive lung disease    ? Seasonal allergies    ? Sleep apnea    ? TIA (transient ischemic attack) 8/09   ? Ventricular aneurysm     Long-term coumadin therapy s/p ventricular patch 2001   ? Vitamin D deficiency     Past Surgical History:   Procedure Laterality Date   ? ABDOMINAL AORTIC ANEURYSM REPAIR     ? APPENDECTOMY      Perforated- Unknown   ? CARDIAC CATHETERIZATION     ? CARDIAC DEFIBRILLATOR PLACEMENT  7/19/2007   ? CORONARY STENT PLACEMENT  2001    LCX   ? CV CORONARY ANGIOGRAM N/A 10/18/2018    Procedure: Coronary Angiogram;  Surgeon: Elijah Liu MD;  Location: [facility] Cath Lab;  Service:    ? ICD Generator Change  6/18/14   ? PICC AND MIDLINE TEAM LINE INSERTION  2/23/2018        ? RESECTION SMALL BOWEL / CLOSURE ILEOSTOMY     ? THORACENTESIS     ? VENTRICULAR RESECTION / REPAIR ANEURYSM      Family History   Problem Relation Age of Onset   ? Stroke Mother    ? Thyroid disease Mother    ? Cancer Brother    ? No Medical Problems Son    ? No Medical Problems Son    ? No Medical Problems Son    ? Lung disease Neg Hx     Social History     Social History   ? Marital status:      Spouse name: Eulalia   ? Number of children: N/A   ? Years of education: N/A     Occupational History   ?  Retired     [employer]     Social History Main Topics   ? Smoking status: Former Smoker     Packs/day: 1.00     Years: 8.00     Types: Cigarettes     Quit date: 5/17/1955   ? Smokeless tobacco: Never Used   ? Alcohol use 0.6 oz/week     1 Glasses of wine per week   ? Drug use: No   ? Sexual activity: Not on file     Other Topics Concern   ? Not on file     Social History Narrative    Lives with his wife, Joann, who he states has advanced arthritis. 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            Medications  Allergies   Current Outpatient Prescriptions   Medication Sig Dispense Refill   ? acetaminophen (TYLENOL EXTRA STRENGTH) 500 MG tablet  Take 500 mg by mouth every 6 (six) hours as needed for pain. Indications: Back Pain     ? albuterol (PROVENTIL) 2.5 mg /3 mL (0.083 %) nebulizer solution Take 2.5 mg by nebulization every 4 (four) hours as needed for wheezing.     ? aspirin 81 MG EC tablet Take 1 tablet (81 mg total) by mouth daily. Stop after 6 weeks  0   ? atorvastatin (LIPITOR) 80 MG tablet Take 1 tablet (80 mg total) by mouth daily. 30 tablet 3   ? budesonide (PULMICORT) 0.5 mg/2 mL nebulizer solution Take 2 mL (0.5 mg total) by nebulization 2 (two) times a day. 120 mL 6   ? fluticasone (FLONASE) 50 mcg/actuation nasal spray Apply 1 spray into each nostril daily.     ? formoterol fumarate (PERFOROMIST) 20 mcg/2 mL nebulizer solution Take 20 mcg by nebulization daily.      ? furosemide (LASIX) 40 MG tablet Take 1 tablet (40 mg total) by mouth 2 (two) times a day at 9am and 6pm. 60 tablet 0   ? guaiFENesin (MUCINEX) 600 mg 12 hr tablet Take 600 mg by mouth 2 (two) times a day as needed for congestion.      ? latanoprost (XALATAN) 0.005 % ophthalmic solution Administer 1 drop to the right eye at bedtime.     ? metoprolol succinate (TOPROL-XL) 25 MG Take 1 tablet (25 mg total) by mouth daily. 90 tablet 2   ? multivitamin therapeutic (THERAGRAN) tablet Take 1 tablet by mouth daily.     ? nebulizer and compressor Radha For chronic cough 1 each 0   ? nitroglycerin (NITROSTAT) 0.4 MG SL tablet Place 0.4 mg under the tongue every 5 (five) minutes as needed for chest pain.     ? OXYGEN-AIR DELIVERY SYSTEMS MISC into each nostril continuous. 1.5 liters with rest and 3 liters with activity  May use up to 1.5 liters at noc.     ? pantoprazole (PROTONIX) 20 MG tablet Take 1 tablet (20 mg total) by mouth daily. 30 tablet 0   ? sertraline (ZOLOFT) 50 MG tablet Take 25 mg by mouth at bedtime.      ? sodium chloride 3 % nebulizer solution Take 3 mL by nebulization as needed. Take 3cc hypertonic saline (3% saline) and mix with albuterol solution and Pulmicort twice  daily.     ? ticagrelor (BRILINTA) 90 mg Tab Take 1 tablet (90 mg total) by mouth 2 (two) times a day. 180 tablet 11   ? warfarin (COUMADIN) 2 MG tablet Take 2-4 mg by mouth See Admin Instructions. Take 1 tablet (2 mg) by mouth on Tuesdays and Thursdays.   Take 2 tablets (4 mg) by mouth on all other days of the week.   Adjust dose based on INR results as directed.       No current facility-administered medications for this visit.       No Known Allergies      Lab Results    Chemistry CBC BNP   Lab Results   Component Value Date    CREATININE 1.59 (H) 10/29/2018    BUN 44 (H) 10/29/2018     10/29/2018    K 3.8 10/29/2018     10/29/2018    CO2 31 10/29/2018     Creatinine (mg/dL)   Date Value   10/29/2018 1.59 (H)   10/22/2018 1.58 (H)   10/19/2018 1.25   10/18/2018 1.46 (H)    Lab Results   Component Value Date    WBC 9.6 10/18/2018    HGB 11.3 (L) 10/19/2018    HCT 42.6 10/18/2018    MCV 83 10/18/2018     10/18/2018    Lab Results   Component Value Date    BNP 1215 (H) 09/13/2018     BNP (pg/mL)   Date Value   09/13/2018 1215 (H)   09/11/2018 1379 (H)   08/25/2018 1324 (H)              Marielos Landaverde, [facility]

## 2021-06-26 NOTE — PROGRESS NOTES
Progress Notes by Marielos Landaverde CNP at 10/4/2018  2:50 PM     Author: Marielos Landaverde CNP Service: -- Author Type: Nurse Practitioner    Filed: 10/4/2018  3:47 PM Encounter Date: 10/4/2018 Status: Signed    : Marielos Landaverde CNP (Nurse Practitioner)           Click to link to [facility] NOTE      Assessment/Recommendations   Assessment:    1.  Heart failure with reduced ejection fraction, elevated LV filling pressures, right ventricular function moderately decreased: He has no symptoms of acute fluid retention today.  Although his weight is increased on clinic scale, his lungs have no crackles, no edema, no abdominal bloating, and no orthopnea.  He states that his breathing is back to baseline.    2.  Valvular disease: He has moderate to severe aortic stenosis and severe mitral regurgitation.  He will be seen in the valve clinic on October 10.    3.  Persistent atrial fibrillation: He had cardioversion on September 17, 2018.  He continues to take warfarin.  INR on September 23 was within range.  His home nurse will be checking an INR next week.    4.  Chronic kidney disease: Losartan is on hold due to worsening renal function.    Plan:  1.  Continue current medications  2.  BMP pending  3.  Low-sodium diet    Myron Sunshine will follow up with the valve clinic on October 10.     History of Present Illness    Mr. Myron Sunshine is a 87 y.o. male seen at [facility] heart failure clinic today for hospital follow-up.  He has a history of heart failure with reduced ejection fraction, aortic stenosis, mitral regurgitation, hypertension, coronary artery disease with PCI to left circumflex in 2001, dyslipidemia, LV pseudoaneurysm, interstitial lung disease, bronchiectasis, asthma, obstructive sleep apnea, and chronic kidney disease.  He was hospitalized September 13 - September 23 with acute heart failure.  He was diuresed about 18 pounds.  He  had cardioversion on September 17.  Echocardiogram on September 18, 2018 showed an ejection fraction of 42%, elevated LV filling pressures, right ventricular systolic function moderately decreased, moderate to severe aortic stenosis, and severe mitral regurgitation.    Since being home from the hospital, Myron feels that his breathing is back to baseline.  He has no symptoms of acute fluid retention.  Clinic weight is up and he does not weigh himself at home.  However, he has no lower extremity edema, orthopnea, or abdominal bloating.  He denies lightheadedness and chest pain.      He is working on following a low-sodium diet.    ECHO 9/18/18:     Left Ventricle: The calculated left ventricular ejection fraction is 42%. This represents a moderately decreased ejection fraction. 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 This is been previously described. There is an echodense area suggestive of prior patch. There is flow into the pseudoaneurysm is as documented by Definity contrast which immediately opacifies this area. E/e' > 15, suggesting elevated LV filling pressures.    Right Ventricle: Normal right ventricular size. The systolic function is moderately reduced. TAPSE is abnormal, which is consistent with abnormal right ventricular systolic function. Pacer lead is present in the ventricle.    Aortic Valve: The valve is tricuspid. There is global calcification without reduced excursion of the aortic valve present. There is significant calcification and reduction in systolic excursion of the aortic valve leaflets. The mean gradient across the aortic valve is 20 mmHg. Given the pseudoaneurysm and resulting low cardiac output, the aortic stenosis moderate least severe or possibly severe. Moderate aortic regurgitation.    Mitral Valve: The following structural abnormalities were observed: non-specific thickening. There is moderate mitral annular calcification. No mitral  stenosis present. Severe mitral regurgitation.    Tricuspid Valve: Mild to moderate tricuspid valve regurgitation. The estimated systolic pulmonary artery pressure is 42+ right atrial pressure mmhg.      Compared to echocardiogram performed on 6/10/2018, the pseudoaneurysm has not similar appearance.  The degree of mitral insufficiency is significantly more prominent on the current study.  The aortic stenosis has a similar mean gradient) slightly elevated).  The aortic valve appears to open less prominently on the current study and this may be related to reduced cardiac output as well.     Physical Examination Review of Systems   Vitals:    10/04/18 1459   BP: 98/52   Pulse: 72   Resp: 14     Body mass index is 23.18 kg/(m^2).  Wt Readings from Last 3 Encounters:   10/04/18 157 lb (71.2 kg)   09/23/18 143 lb 3.2 oz (65 kg)   09/11/18 165 lb 1.6 oz (74.9 kg)       General Appearance:     Alert, cooperative and in no acute distress.   ENT/Mouth: membranes moist, no oral lesions or bleeding gums.      EYES:  no scleral icterus, normal conjunctivae   Chest/Lungs:   lungs are decreased, no crackles. O2   Cardiovascular:   Regular. Normal first and second heart sounds, systolic murmur; no edema bilateral lower extremities    Abdomen:  Soft, nontender, nondistended, bowel sounds present   Extremities: no cyanosis or clubbing   Skin: warm, dry.    Neurologic: mood and affect are appropriate, alert and oriented x3      General: WNL  Eyes: WNL  Ears/Nose/Throat: WNL  Lungs: WNL  Heart: WNL  Stomach: WNL  Bladder: WNL  Muscle/Joints: WNL  Skin: WNL  Nervous System: WNL  Mental Health: WNL     Blood: WNL     Medical History  Surgical History Family History Social History   Past Medical History:   Diagnosis Date   ? Anxiety    ? Bowel obstruction (H)    ? Chronic hypoxemic respiratory failure (H)    ? Chronic kidney disease     stage 3   ? Coronary artery disease due to lipid rich plaque    ? DNAR (do not attempt resuscitation)     ? Dyslipidemia, goal LDL below 70    ? ED (erectile dysfunction)    ? Essential hypertension    ? GERD (gastroesophageal reflux disease)    ? Ischemic cardiomyopathy     but EF 50-55%   ? Mild aortic stenosis    ? Noncompliance with medication regimen 9/11/2018   ? Paroxysmal VT (H)    ? Persistent atrial fibrillation (H) 8/22/2018   ? Psoriasis    ? Restrictive lung disease    ? Seasonal allergies    ? Sleep apnea    ? TIA (transient ischemic attack) 8/09   ? Ventricular aneurysm     Long-term coumadin therapy s/p ventricular patch 2001   ? Vitamin D deficiency     Past Surgical History:   Procedure Laterality Date   ? ABDOMINAL AORTIC ANEURYSM REPAIR     ? APPENDECTOMY      Perforated- Unknown   ? CARDIAC CATHETERIZATION     ? CARDIAC DEFIBRILLATOR PLACEMENT  7/19/2007   ? CORONARY STENT PLACEMENT  2001    LCX   ? ICD Generator Change  6/18/14   ? PICC AND MIDLINE TEAM LINE INSERTION  2/23/2018        ? RESECTION SMALL BOWEL / CLOSURE ILEOSTOMY     ? VENTRICULAR RESECTION / REPAIR ANEURYSM      Family History   Problem Relation Age of Onset   ? Stroke Mother    ? Thyroid disease Mother    ? Cancer Brother    ? No Medical Problems Son    ? No Medical Problems Son    ? No Medical Problems Son    ? Lung disease Neg Hx     Social History     Social History   ? Marital status:      Spouse name: Eulalia   ? Number of children: N/A   ? Years of education: N/A     Occupational History   ?  Retired          Social History Main Topics   ? Smoking status: Former Smoker     Packs/day: 1.00     Years: 8.00     Types: Cigarettes     Quit date: 5/17/1955   ? Smokeless tobacco: Never Used   ? Alcohol use 0.6 oz/week     1 Glasses of wine per week   ? Drug use: No   ? Sexual activity: Yes     Partners: Female     Other Topics Concern   ? Not on file     Social History Narrative    Lives with his wife, Joann, who he states has advanced arthritis. 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            Medications   Allergies   Current Outpatient Prescriptions   Medication Sig Dispense Refill   ? acetaminophen (TYLENOL EXTRA STRENGTH) 500 MG tablet Take 500 mg by mouth every 6 (six) hours as needed for pain. Indications: Back Pain     ? albuterol (PROVENTIL) 2.5 mg /3 mL (0.083 %) nebulizer solution Take 2.5 mg by nebulization every 4 (four) hours as needed for wheezing.     ? aspirin 81 MG tablet Take 81 mg by mouth daily.     ? budesonide (PULMICORT) 0.5 mg/2 mL nebulizer solution Take 2 mL (0.5 mg total) by nebulization 2 (two) times a day. 120 mL 6   ? fluticasone (FLONASE) 50 mcg/actuation nasal spray Apply 1 spray into each nostril daily.     ? formoterol fumarate (PERFOROMIST) 20 mcg/2 mL nebulizer solution Take 20 mcg by nebulization daily.      ? furosemide (LASIX) 40 MG tablet Take 1 tablet (40 mg total) by mouth 2 (two) times a day at 9am and 6pm. 60 tablet 0   ? guaiFENesin (MUCINEX) 600 mg 12 hr tablet Take 600 mg by mouth 2 (two) times a day as needed for congestion.      ? latanoprost (XALATAN) 0.005 % ophthalmic solution Administer 1 drop to the right eye at bedtime.     ? metoprolol succinate (TOPROL-XL) 25 MG Take 1 tablet (25 mg total) by mouth daily. 90 tablet 2   ? multivitamin therapeutic (THERAGRAN) tablet Take 1 tablet by mouth daily.     ? nebulizer and compressor Radha For chronic cough 1 each 0   ? nitroglycerin (NITROSTAT) 0.4 MG SL tablet Place 0.4 mg under the tongue every 5 (five) minutes as needed for chest pain.     ? nystatin (MYCOSTATIN) 100,000 unit/mL suspension Take 5 mL (500,000 Units total) by mouth 4 (four) times a day for 10 days. 200 mL 0   ? OXYGEN-AIR DELIVERY SYSTEMS MISC into each nostril continuous. 1.5 liters with rest and 3 liters with activity  May use up to 1.5 liters at noc.     ? pantoprazole (PROTONIX) 20 MG tablet Take 1 tablet (20 mg total) by mouth daily. 30 tablet 0   ? predniSONE (DELTASONE) 5 MG tablet Take 1 tablet (5 mg total) by mouth daily. 30 tablet 6   ?  sertraline (ZOLOFT) 50 MG tablet Take 25 mg by mouth at bedtime.      ? simvastatin (ZOCOR) 80 MG tablet Take 80 mg by mouth bedtime.     ? sodium chloride 3 % nebulizer solution Take 3 mL by nebulization as needed. Take 3cc hypertonic saline (3% saline) and mix with albuterol solution and Pulmicort twice daily.     ? warfarin (COUMADIN) 2 MG tablet Take 2-4 mg by mouth See Admin Instructions. Take 1 tablet (2 mg) by mouth on Tuesdays and Thursdays.   Take 2 tablets (4 mg) by mouth on all other days of the week.   Adjust dose based on INR results as directed.       No current facility-administered medications for this visit.       No Known Allergies      Lab Results    Chemistry CBC BNP   Lab Results   Component Value Date    CREATININE 1.68 (H) 09/28/2018    BUN 68 (H) 09/28/2018     09/28/2018    K 4.1 09/28/2018    CL 91 (L) 09/28/2018    CO2 33 (H) 09/28/2018     Creatinine (mg/dL)   Date Value   09/28/2018 1.68 (H)   09/23/2018 1.81 (H)   09/22/2018 1.86 (H)   09/21/2018 1.75 (H)    Lab Results   Component Value Date    WBC 11.9 (H) 09/16/2018    HGB 11.2 (L) 09/16/2018    HCT 37.0 (L) 09/16/2018    MCV 87 09/16/2018     09/16/2018    Lab Results   Component Value Date    BNP 1215 (H) 09/13/2018     BNP (pg/mL)   Date Value   09/13/2018 1215 (H)   09/11/2018 1379 (H)   08/25/2018 1324 (H)            Marielos Landaverde, [facility]   Heart Failure Clinic

## 2021-06-26 NOTE — PROGRESS NOTES
Progress Notes by Vanda Arora PA-C at 11/26/2018 10:30 AM     Author: Vanda Arora PA-C Service: -- Author Type: Physician Assistant    Filed: 11/26/2018 11:54 AM Encounter Date: 11/26/2018 Status: Signed    : Vanda Arora PA-C (Physician Assistant)           Click to link to [facility] HEART [facility] NOTE        Assessment/Recommendations   Problem List Items Addressed This Visit     Essential hypertension (Chronic)    Chronic hypoxemic respiratory failure (H) (Chronic)    Atrial fibrillation (H) [I48.91]    Severe aortic stenosis - Primary    Relevant Orders    HM1 (CBC with Diff) (Completed)      Other Visit Diagnoses     CHF (congestive heart failure) (H)              1.  Aortic Stenosis: This has become severe and is now causing decrease in functional capacity, significant shortness of breath and recurrent hospitalizations.  Because of his other comorbidities and his age, a multidisciplinary team felt he was more appropriate for transcatheter valve replacement.  He has undergone all the required workup for TAVR (transcatheter aortic valve replacement).  We discussed the procedure in detail.   He understands that there is a 4-5% risk of bleeding, infection, stroke, heart block requiring permanent pacemaker, cardiac perforation, aortic root rupture, dissection and death. He wishes to proceed with TAVR.    All questions were answered   Consents were signed and witnessed by me.  He has never had trouble with anesthesia in the past.    Labs today reveal Hgb 12.9, normal WBC, electrolytes WNL and creat 1.54 which is at his baseline    The plan will be for MAC and TTE monitoring.  We will plan on using the sentinel embolic protection device and an Madrid Kd 3 valve.  Myron Sunshine will report tomorrow morning for the procedure and all instructions regarding times and medications were given by TAVR coordinator RN.     2. Acute on chronic systolic  heart failure, NYHA class IV: Short of breath at rest with crackles on lung exam and mild lower extremity edema.  BNP is 499.  Patient is currently taking Lasix 40 mg twice daily, but does increase his dose as needed.  ICD is in place.    3.  Interstitial lung disease with chronic hypoxic respiratory failure: On continuous home oxygen at 2-3 L by nasal cannula.  Treated with nebulizers and inhalers.    4. CAD: With PCI to the RCA in October.  Patient only has chest pressure that is related to his shortness of breath.  He is currently taking atorvastatin 80 mg daily and last LDL was 53 on November 8.  He is currently still on dual antiplatelet therapy with aspirin and Brilinta.  We will plan on stopping the aspirin after 1 month.    5.  Persistent atrial fibrillation: Rate controlled and on metoprolol (patient takes this only occasionally).  Normally anticoagulated with warfarin, but this is currently on hold in anticipation for TAVR.      History of Present Illness    Myron Sunshine is a 87 y.o. male who comes in today for history and physical prior to TAVR (transcatheter aortic valve replacement).  He is with his wife and his son today.    Myron has a history of aortic stenosis, permanent atrial fibrillation, moderate to severe MR, chronic kidney disease, interstitial lung disease and chronic respiratory failure on home oxygen, obstructive sleep apnea with use of CPAP, hypertension, chronic systolic heart failure with several admissions for acute exacerbations, ICD in place and coronary artery disease status post recent stent to the RCA.    Myron has had several admissions in the past year due to acute exacerbation of his chronic systolic heart failure and has had a significant decrease in his functional capacity with increased shortness of breath.  His last admission was from November 5 - November 10 where he was treated for both acute on chronic heart failure as well as acute on chronic respiratory failure.  Most  recent echocardiogram showed a progression of his aortic stenosis to the severe range and he was evaluated by multidisciplinary team regarding recommendations for valve replacement.    Myron Sunshine does have chest pressure when he gets really out of breath, but currently denies palpitations, paroxysmal nocturnal dyspnea, orthopnea, lightheadedness, dizziness, pre-syncope, or syncope.  Myron Sunshine also denies any weight loss, changes in appetite, nausea or vomiting.     Medical, surgical, family, social history, and medications were reviewed and updated as necessary.    Cardiographs/Other testing  EC2018 shows normal sinus rhythm with sinus arrhythmia and occasional PVCs    Echocardiogram: Done on  with aortic valve area 0.6 cm , mean gradient 20 mmHg, peak velocity 3.14 and ejection fraction 42%    Cath: Done on  showing 80% lesion in the diagonal 1 but with MARIBELL-3 flow, severe in-stent restenosis with MARIBELL II flow in the PL and new 80% proximal narrowing in the RCA where drug-eluting stent was placed.       Physical Examination Review of Systems   Vitals:    18 1055   BP: 110/60   Pulse: 80   Resp: 16     Body mass index is 22.74 kg/m .  Wt Readings from Last 3 Encounters:   18 154 lb (69.9 kg)   18 156 lb (70.8 kg)   18 153 lb (69.4 kg)       General Appearance:   Alert, cooperative and in no acute distress   ENT/Mouth: membranes moist, no oral lesions or bleeding gums.      EYES:  no scleral icterus, normal conjunctivae   Neck: no carotid bruits.  Thyroid not visualized   Chest/Lungs:   lungs reveal crackles at the bases, but no rales or wheezing   Cardiovascular:   irregular. Normal first and second heart sounds with 5/6 systolic murmur.  No rubs or gallops; the carotid, radial and posterior tibial pulses are intact, no edema bilaterally    Abdomen:  Soft and nontender. Bowel sounds are present in all quadrants   Extremities: no cyanosis or clubbing    Skin: no xanthelasma, warm.    Neurologic: normal gait, normal  bilateral, no tremors   Psychiatric: Normal mood and affect    General: WNL  Eyes: WNL  Ears/Nose/Throat: WNL  Lungs: WNL  Heart: WNL  Stomach: WNL  Bladder: WNL  Muscle/Joints: WNL  Skin: WNL  Nervous System: WNL  Mental Health: WNL     Blood: WNL     Medical History  Surgical History Family History Social History   Past Medical History:   Diagnosis Date   ? Anxiety    ? Bowel obstruction (H)    ? Chronic hypoxemic respiratory failure (H)    ? Chronic kidney disease     stage 3   ? Coronary artery disease due to lipid rich plaque    ? DNAR (do not attempt resuscitation)    ? Dyslipidemia, goal LDL below 70    ? ED (erectile dysfunction)    ? Essential hypertension    ? GERD (gastroesophageal reflux disease)    ? Ischemic cardiomyopathy     but EF 50-55%   ? Mild aortic stenosis    ? Noncompliance with medication regimen 9/11/2018   ? Paroxysmal VT (H)    ? Persistent atrial fibrillation (H) 8/22/2018   ? Psoriasis    ? Restrictive lung disease    ? Seasonal allergies    ? Sleep apnea    ? TIA (transient ischemic attack) 8/09   ? Ventricular aneurysm     Long-term coumadin therapy s/p ventricular patch 2001   ? Vitamin D deficiency     Past Surgical History:   Procedure Laterality Date   ? ABDOMINAL AORTIC ANEURYSM REPAIR     ? APPENDECTOMY      Perforated- Unknown   ? CARDIAC CATHETERIZATION     ? CARDIAC DEFIBRILLATOR PLACEMENT  7/19/2007   ? CORONARY STENT PLACEMENT  2001    LCX   ? CV CORONARY ANGIOGRAM N/A 10/18/2018    Procedure: Coronary Angiogram;  Surgeon: Elijah Liu MD;  Location: [facility] Cath Lab;  Service:    ? ICD Generator Change  6/18/14   ? PICC AND MIDLINE TEAM LINE INSERTION  2/23/2018        ? RESECTION SMALL BOWEL / CLOSURE ILEOSTOMY     ? THORACENTESIS     ? VENTRICULAR RESECTION / REPAIR ANEURYSM      Family History   Problem Relation Age of Onset   ? Stroke Mother    ? Thyroid disease Mother    ? Cancer Brother     ? No Medical Problems Son    ? No Medical Problems Son    ? No Medical Problems Son    ? Lung disease Neg Hx     Social History     Socioeconomic History   ? Marital status:      Spouse name: Eulalia   ? Number of children: Not on file   ? Years of education: Not on file   ? Highest education level: Not on file   Social Needs   ? Financial resource strain: Not on file   ? Food insecurity - worry: Not on file   ? Food insecurity - inability: Not on file   ? Transportation needs - medical: Not on file   ? Transportation needs - non-medical: Not on file   Occupational History     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     Comment: [employer] [place]   Tobacco Use   ? Smoking status: Former Smoker     Packs/day: 1.00     Years: 8.00     Pack years: 8.00     Types: Cigarettes     Last attempt to quit: 1955     Years since quittin.5   ? Smokeless tobacco: Never Used   Substance and Sexual Activity   ? Alcohol use: Yes     Alcohol/week: 0.6 oz     Types: 1 Glasses of wine per week   ? Drug use: No   ? Sexual activity: Not on file   Other Topics Concern   ? Not on file   Social History Narrative    Lives with his wife, Joann, who he states has advanced arthritis. 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            Medications  Allergies   Current Outpatient Medications   Medication Sig Dispense Refill   ? acetaminophen (TYLENOL EXTRA STRENGTH) 500 MG tablet Take 1 tablet (500 mg total) by mouth every 6 (six) hours as needed for pain.  0   ? albuterol (PROVENTIL) 2.5 mg /3 mL (0.083 %) nebulizer solution Take 3 mL (2.5 mg total) by nebulization every 4 (four) hours as needed for wheezing.  0   ? aspirin 81 MG EC tablet Take 1 tablet (81 mg total) by mouth daily. Stop after 6 weeks  0   ? atorvastatin (LIPITOR) 80 MG tablet Take 1 tablet (80 mg total) by mouth daily. 30 tablet 3   ? budesonide (PULMICORT) 0.5 mg/2 mL nebulizer solution Take 2 mL (0.5 mg total) by nebulization 2 (two) times a day. 120 mL 6   ? formoterol fumarate  (PERFOROMIST) 20 mcg/2 mL nebulizer solution Take 2 mL (20 mcg total) by nebulization daily.  0   ? furosemide (LASIX) 40 MG tablet Take 1 tablet (40 mg total) by mouth 2 (two) times a day at 9am and 6pm. (Patient taking differently: Take 40 mg by mouth daily .      ) 60 tablet 0   ? latanoprost (XALATAN) 0.005 % ophthalmic solution Administer 1 drop to the right eye at bedtime. 2.5 mL 12   ? magnesium hydroxide (MILK OF MAG) 400 mg/5 mL Susp suspension Take 30 mL by mouth daily as needed (constipation).  0   ? metoprolol succinate (TOPROL-XL) 25 MG Take 1 tablet (25 mg total) by mouth daily. 90 tablet 2   ? multivitamin therapeutic (THERAGRAN) tablet Take 1 tablet by mouth daily.  0   ? nebulizer and compressor Radha For chronic cough 1 each 0   ? nitroglycerin (NITROSTAT) 0.4 MG SL tablet Place 1 tablet (0.4 mg total) under the tongue every 5 (five) minutes as needed for chest pain.  0   ? OXYGEN-AIR DELIVERY SYSTEMS MISC into each nostril continuous. 1.5 liters with rest and 3 liters with activity  May use up to 1.5 liters at noc.     ? pantoprazole (PROTONIX) 20 MG tablet Take 1 tablet (20 mg total) by mouth daily. 30 tablet 0   ? polyethylene glycol (MIRALAX) 17 gram packet Take 17 g by mouth daily as needed.     ? potassium chloride (K-DUR,KLOR-CON) 20 MEQ tablet Take 1 tablet (20 mEq total) by mouth 2 (two) times a day.  0   ? sertraline (ZOLOFT) 50 MG tablet Take 0.5 tablets (25 mg total) by mouth at bedtime.  0   ? ticagrelor (BRILINTA) 90 mg Tab Take 1 tablet (90 mg total) by mouth 2 (two) times a day. 180 tablet 11   ? cetirizine (ZYRTEC) 10 MG tablet Take 10 mg by mouth daily.     ? warfarin (COUMADIN) 3 MG tablet Take 1 tablet (3 mg total) by mouth daily. (Patient taking differently: Take 3 mg by mouth daily 11/15/18 INR 2.98  Cont 3mg daily.  Next INR next week with PMD.    11/12/18 INR 2.57 Cont 3mg daily. Next INR 11/15.  11/10/18 INR 2.26 3mg daily.  11/9/18 INR 2.21 3mg  11/8/18 had 4mg.      )  0      No current facility-administered medications for this visit.       Allergies   Allergen Reactions   ? Blood-Group Specific Substance      Anti-Josep/Barnes.  Expect delays in obtaining blood for transfusion.  Collect 2 lavender top tubes and 1 red top tube for all blood bank orders.          Lab Results    Chemistry/lipid CBC Cardiac Enzymes/BNP/TSH/INR   Lab Results   Component Value Date    CHOL 114 11/08/2018    HDL 39 (L) 11/08/2018    LDLCALC 53 11/08/2018    TRIG 109 11/08/2018    CREATININE 1.54 (H) 11/26/2018    BUN 36 (H) 11/26/2018    K 4.0 11/26/2018     11/26/2018     11/26/2018    CO2 29 11/26/2018    Lab Results   Component Value Date    WBC 10.7 11/26/2018    HGB 12.0 (L) 11/26/2018    HCT 40.3 11/26/2018    MCV 85 11/26/2018     11/26/2018    Lab Results   Component Value Date    CKTOTAL 37 02/23/2018    CKMB 5 02/28/2018    TROPONINI 0.60 (HH) 11/07/2018     (H) 11/06/2018    TSH 3.1 09/14/2013    INR 1.23 (H) 11/26/2018          25 minutes were spent with the patient with greater than 50% spent on education and counseling.    This note has been dictated using voice recognition software. Any grammatical or context distortions are unintentional and inherent to the software.    Vanda Arora PA-C, MPAS  Structural Heart Program  [facility]

## 2021-06-26 NOTE — PROGRESS NOTES
Progress Notes by Donna Thakkar MD at 3/28/2018 10:10 AM     Author: Donna Thakkar MD Service: -- Author Type: Physician    Filed: 3/28/2018 10:32 AM Encounter Date: 3/28/2018 Status: Signed    : Donna Thakkar MD (Physician)                  [facility].org/Heart  [phone]         Thank you Dr. Gerardo for asking the [facility] Heart Care team to participate in the care of your patient, Myron Sunshine.     Impression and Plan     1. Coronary artery disease. Myron has known coronary artery disease. Specifically, he had suffered a myocardial infarction in October of 2001 for which he underwent angioplasty and stenting of the circumflex. In addition, shortly thereafter he had repair of a left ventricular aneurysm/pseudoaneurysm.    This is been stable.  Patient denies any angina type chest pain.    2. Cardiomyopathy (ischemic). Most recent imaging by transesophageal echocardiogram  23 February 2018 revealed ejection fraction of 55%. He is appropriately on Cozaar as well as metoprolol therapy.  Parenthetically, doses of these medications were recently reduced due to some tendency toward lower blood pressure. As noted in previous communications he has been maintained on warfarin given aneurysmal appearance of the posterior segment despite prior surgical repair.     Of note, patient does have underlying pulmonary issues as well.  He did undergo pulmonary function testing which revealed FVC 59% predicted and FEV1 61% predicted.  Patient also has history of bronchiectasis. Patient has been seen by Dr. Albina Christensen and is scheduled for follow-up in this regard.  At this time, we will simply continue with the current diuretic and other medical therapy.     3. Aortic stenosis.  This was felt mild to moderate in degree based on recent echocardiogram 2 October 2017 and on repeat imaging by transesophageal echocardiography 23 February 2018.     4.  Hypertension. Blood pressure is very  reasonable in the office today.     5. Dyslipidemia.  Lipid profile within the last year on 23 August 2017 was at/near target with LDL 74 mg/dL and HDL 40 mg/dL.    Plan on follow-up in 4 months.  Patient will be followed intermittently through the device clinic as well per protocol.           History of Present Illness    Once again I would like to thank you again for asking me to participate in the care of your patient, Myron Sunshine.  As you know, but to reiterate for my own records, Myron Sunshine is a 86 y.o. male with known coronary disease. Specifically, he had suffered a myocardial infarction in October of 2001 for which he underwent angioplasty and stenting of the circumflex. In addition, shortly thereafter he had repair of a left ventricular aneurysm/pseudoaneurysm. He has been noted on subsequent echocardiograms to have residual aneurysmal appearance involving the patched area. For this reason, he has been maintained on warfarin since in the past he had been noted to have a suspicion of possible mural thrombus in this region.      He did undergo repeat angiography 21 November 2001 and was found to have patency of his circumflex stents, but a 95% lesion involving the 1st diagonal and this was treated medically.      More recently, patient had been hospitalized with evidence of bacteremia.  He did undergo a transesophageal echocardiogram 23 February 2018 which revealed no evidence of vegetation on his leads or valves.  CT scan did identify a right middle lobe infiltrate.  He was noted to have bronchiectasis for which she has been followed in the pulmonary clinic by Dr. Albina Christensen.    From a cardiovascular standpoint, pulse states that he has been fairly stable.  He denies any angina type chest pain.  He reports no significant palpitations or lightheadedness.  He is volume status has been stable subjectively.    Further review of systems is otherwise negative/noncontributory (medical record and 13  point review of systems reviewed as well and pertinent positives noted).         Cardiac Diagnostics    Transesophageal echocardiogram 23 February 2018:  1. Normal left ventricular size and systolic performance with ejection fraction of 55%.  2. In for a lateral-basilar pseudoaneurysm.  3. Mild to moderate aortic stenosis.  4. Mild aortic insufficiency.  5. Mild to moderate mitral insufficiency.  6. Normal right ventricular size and systolic performance  7. No obvious vegetation.    Echocardiogram 1 October 2017:  1. Moderately reduced left ventricular systolic performance with ejection fraction of 40%.  2. There is a large aneurysm of the basal inferolateral wall repaired by a patch.  3. Evidence of aortic stenosis though degree could not be determined.  4. Normal right ventricular size and systolic performance.  5. Mild left atrial enlargement.  Right atrium of normal dimension.    Echocardiogram 13 November 2015:  1. Normal left ventricular size with mild to moderate depression in left ventricular systolic performance. Ejection fraction 40%.  2. 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 Moderate basal and mid inferior hypokinesis.  3. No significant valvular heart disease.  4. Mild left atrial enlargement.    When compared to the prior echocardiogram 24 November 2014, no significant change.    Echocardiogram 24 November 2014:  1. Normal left ventricular size with mild reduction in left ventricular systolic performance. Ejection fraction estimated at 45-50%.  2. Posterior/inferior basal wall pseudoaneurysm/aneurysm noted with apparent patch closure.  3. No significant valvular heart disease.  4. No significant change from 14 September 2013 echocardiogram.    Echocardiogram performed 2 November 2012:  1. Mild dilation of the left ventricle with ejection fraction of 35%.   2. Basal posterior aneurysm was noted once again. Inferior hypokinesis to akinesis.   3. Mild  "aortic regurgitation.    Device interrogation 6 March 2018 (Medtronic [id number] Yeimi MATTHEW DR device implanted 18 June 2014):  1. Presenting: AP-VS or AS-VS 60's.  2. Lead/Battery Status: Stable.  3. Atrial Arrhythmias: 5 mode switches, <0.1% burden, atrial fibrillation/atrial flutter, longest 74mins, v-rates >/=120bpm ~5%.  4. Vent Arrhythmias: None  5. Anticoagulant: Warfarin  6. Comments: Normal ICD function.    Device interrogation 21 August 2017 (Medtronic device implanted 18 June 2014):  1. Presenting rhythm: AP-VS, rate 60 bpm.  2. Battery/lead status: stable  3. Arrhythmias: since 5/15/17, no AT/AF noted. One nonsustained VT episode detected.  4. Comments: normal ICD function. Patient is on warfarin.    Device interrogation 31 August 2016 (Medtronic device implanted 18 June 2014):  1. Atrial pacing with ventricular sensing. Underlying rhythm sinus at 60 bpm.  2. Stable battery/lead status.  3. Arrhythmias: None since last interrogation.    Device interrogation 1 September 2015 (Medtronic device implanted 18 June 2014):  1. Atrial pacing with ventricular sensing. Underlying rhythm sinus at 60 bpm.  2. Stable battery/lead status.  3. Arrhythmias: None since last interrogation.             Physical Examination       /66 (Patient Site: Left Arm, Patient Position: Sitting, Cuff Size: Adult Regular)  Pulse 80  Resp 20  Ht 5' 9\" (1.753 m)  Wt 173 lb (78.5 kg)  BMI 25.55 kg/m2        Wt Readings from Last 3 Encounters:   03/28/18 173 lb (78.5 kg)   03/06/18 172 lb (78 kg)   03/02/18 174 lb 3 oz (79 kg)     The patient is alert and oriented times three. Sclerae are anicteric. Mucosal membranes are moist. Jugular venous pressure is normal. No significant adenopathy/thyromegally appreciated.  Lungs are diminished.  There are some occasional expiratory wheezes. On cardiovascular exam, the patient has a regular S1 and S2.  There is a 2/6 systolic murmur heard and a/-like distribution with a slight musical " quality.Abdomen is soft and non-tender. Extremities reveal no clubbing, cyanosis, or edema.         Imaging     CT scan of chest, abdomen, and pelvis 17 February 2018:  1. An airspace opacity in the right middle lobe may represent pneumonia.  2. Otherwise no significant change from the prior study. There is cardiomegaly with a peripherally calcified left ventricular aneurysm. There are partially loculated pleural effusions with adjacent round atelectasis.   3. Diffuse bronchial wall thickening can be seen with bronchitis or edema.            Family History/Social History/Risk Factors   Patient does not smoke.  Family history of hypertension.         Medications  Allergies   Current Outpatient Prescriptions   Medication Sig Dispense Refill   ? acetylcysteine (MUCOMYST) 100 mg/mL (10 %) nebulizer solution Take 4 mL by nebulization 2 (two) times a day. 10 vial 0   ? albuterol (PROVENTIL HFA;VENTOLIN HFA) 90 mcg/actuation inhaler Inhale 2 puffs every 6 (six) hours as needed for wheezing.     ? albuterol (PROVENTIL) 2.5 mg /3 mL (0.083 %) nebulizer solution Take 3 mL (2.5 mg total) by nebulization 2 (two) times a day. 10 vial 0   ? aspirin 81 MG tablet Take 81 mg by mouth daily.     ? budesonide (PULMICORT) 0.5 mg/2 mL nebulizer solution Take 2 mL (0.5 mg total) by nebulization 2 (two) times a day. 120 mL 6   ? fluticasone (FLONASE) 50 mcg/actuation nasal spray 2 sprays into each nostril daily as needed for allergies.      ? formoterol fumarate (PERFOROMIST) 20 mcg/2 mL nebulizer solution Take 2 mL (20 mcg total) by nebulization every 12 (twelve) hours. 120 mL 0   ? furosemide (LASIX) 20 MG tablet Take 20 mg by mouth daily as needed.      ? guaiFENesin (MUCINEX) 600 mg 12 hr tablet Take 600 mg by mouth 2 (two) times a day as needed for congestion.      ? ipratropium-albuterol (DUO-NEB) 0.5-2.5 mg/3 mL nebulizer Take 3 mL by nebulization 4 (four) times a day. 360 mL 6   ? latanoprost (XALATAN) 0.005 % ophthalmic solution  Administer 1 drop to the right eye at bedtime.     ? losartan (COZAAR) 25 MG tablet Take 1 tablet (25 mg total) by mouth daily. 90 tablet 2   ? metoprolol succinate (TOPROL-XL) 25 MG Take 1 tablet (25 mg total) by mouth daily. 90 tablet 2   ? multivitamin therapeutic (THERAGRAN) tablet Take 1 tablet by mouth daily.     ? nebulizer and compressor Radha For chronic cough 1 each 0   ? nitroglycerin (NITROSTAT) 0.4 MG SL tablet Place 0.4 mg under the tongue every 5 (five) minutes as needed for chest pain.     ? sertraline (ZOLOFT) 50 MG tablet Take 25 mg by mouth at bedtime. Take 1/2 tablet (25mg) daily.     ? simvastatin (ZOCOR) 80 MG tablet Take 80 mg by mouth bedtime.     ? sodium chloride 3 % nebulizer solution Take 3cc hypertonic saline (3% saline) and mix with albuterol solution and nebulize twice daily. 180 mL 11   ? warfarin (COUMADIN) 2 MG tablet Take 2 mg t and th and 4 mg all other days. 135 tablet 0     No current facility-administered medications for this visit.       No Known Allergies       Lab Results   Lab Results   Component Value Date     03/06/2018    K 4.0 03/06/2018     03/06/2018    CO2 27 03/06/2018    BUN 25 03/06/2018    CREATININE 1.50 (H) 03/06/2018    CALCIUM 9.1 03/06/2018     Lab Results   Component Value Date    WBC 8.5 03/06/2018    WBC 8.8 06/18/2014    HGB 12.7 (L) 03/06/2018    HCT 40.7 03/06/2018    MCV 83 03/06/2018     03/06/2018     Lab Results   Component Value Date    CHOL 136 08/23/2017    TRIG 110 08/23/2017    HDL 40 08/23/2017    LDLCALC 74 08/23/2017     Lab Results   Component Value Date    INR 2.9 03/12/2018    INR 2.29 (H) 02/23/2018    INR 2.90 (!) 11/29/2017     Lab Results   Component Value Date     (H) 02/15/2018     Lab Results   Component Value Date    CKTOTAL 37 02/23/2018    CKTOTAL 32 02/17/2018    CKTOTAL 28 (L) 02/17/2018    CKMB 5 02/28/2018    CKMB 2 09/14/2013    CKMB 2 09/14/2013    TROPONINI 0.09 02/15/2018    TROPONINI 0.13  09/30/2017    TROPONINI 0.05 11/13/2015     Lab Results   Component Value Date    TSH 3.1 09/14/2013

## 2021-06-26 NOTE — PROGRESS NOTES
Progress Notes by Marielos Landaverde CNP at 9/11/2018 10:30 AM     Author: Marielos Landaverde CNP Service: -- Author Type: Nurse Practitioner    Filed: 9/11/2018 11:02 AM Encounter Date: 9/11/2018 Status: Signed    : Marielos Landaverde CNP (Nurse Practitioner)           Click to link to [facility] Heart Care     [facility] HEART CARE NOTE      Assessment/Recommendations   Assessment:    1. Ischemic cardiomyopathy, Heart failure with preserved ejection fraction, ejection fraction 50%: He was recently discharged on August 27 for acute heart failure exacerbation.  Over the past week, his breathing has worsened.  He has shortness of breath with minimal activity.  His weight may be up as much as 10 pounds since discharge.  He admits to not always taking his Lasix as prescribed.  There have been a few days he has only taken 20 mg daily. We reviewed heart failure diagnosis, medications, treatment plan, low sodium diet, weight monitoring, and symptom monitoring.  He met with a heart failure nurse clinician to further discuss.    2.  Persistent atrial fibrillation: He has been in persistent atrial fibrillation since August 22.  He is anticoagulated with warfarin.  I will discuss plan for possible cardioversion with Dr. Cadet.  Atrial fibrillation may be contributing to shortness of breath and acute heart failure.    3.  Restrictive lung disease: He is on oxygen continuously.  He takes prednisone daily.  I recommended following up with his pulmonologist in the next 1-2 months if breathing does not improve.    4.  Acute kidney injury: His creatinine was elevated during hospitalization and losartan was discontinued.    5.  ICD: See full device report from today.    Plan:  1.  BMP and BNP pending  2.  If BNP is more elevated, will increase diuretic for a few days.  Myron is hesitant to increase diuretic due to his kidney disease.  3.  Low-sodium diet and daily weights  4.  I will discuss atrial fibrillation  plan with Dr. Cadet.     History of Present Illness    Mr. Myron Sunshine is a 87 y.o. male seen at [facility] heart failure clinic today for hospital follow-up.  Myron was hospitalized August 25 - August 27 with acute heart failure with preserved ejection fraction, new atrial fibrillation, and acute kidney injury.  He has a history of hypertension, coronary artery disease with PCI to left circumflex in 2001, dyslipidemia, paroxysmal ventricular tachycardia with ICD implant in 2014, restrictive lung disease, obstructive sleep apnea, chronic kidney disease, and left ventricle pseudoaneurysm repair.  Echocardiogram on Ashly 10, 2018 showed ejection fraction of 50%, inferolateral wall pseudoaneurysm stable since February 2018, elevated LV filling pressures, and abnormal right ventricular systolic function.    Myron initially felt stable after discharge.  Over the past week, he has noted increased shortness of breath.  He has shortness of breath with minimal activity.  He denies shortness of breath at rest.  He is on 2 L of oxygen continuously.  He wears a CPAP nightly and denies any orthopnea.  He has occasional abdominal bloating but states that this is GI related.  He denies any lower extremity edema.  He denies lightheadedness and chest pain.      According to the clinic scale, his weight is up 10 pounds since discharge.  However, he states that his home weight has been more stable around 155-157 pounds.  He is trying to gain weight.  He is working on following a low-sodium diet.    ECHO 6/10/18:     Left Ventricle: The estimated left ventricular ejection fraction is 50%. This represents a mildly decreased ejection fraction. Inferolateral wall pseudoaneurysm present. Sizable probable pseudoaneurysm in the inferior posterior wall. This is been as described on the echo from February 19, 2018 appears similar on the current study.E/e' > 15, suggesting elevated LV filling pressures.    The following segments  are aneurysmal: basal inferior, basal inferolateral and mid inferior. All other segments are normal.    Right Ventricle: Normal right ventricular size. The systolic function is mildly reduced. TAPSE is abnormal, which is consistent with abnormal right ventricular systolic function. Pacer lead is present in the ventricle.    Compared to the echocardiogram from February 19, 2018, the findings are similar.     Physical Examination Review of Systems   Vitals:    09/11/18 0946   BP: 108/60   Pulse: 80   Resp: 28     Body mass index is 24.38 kg/(m^2).  Wt Readings from Last 3 Encounters:   09/11/18 165 lb 1.6 oz (74.9 kg)   08/27/18 155 lb 14.4 oz (70.7 kg)   08/17/18 163 lb (73.9 kg)       General Appearance:     Alert, cooperative and in no acute distress.   ENT/Mouth: membranes moist, no oral lesions or bleeding gums.      EYES:  no scleral icterus, normal conjunctivae   Chest/Lungs:    Crackles in bases bilaterally, O2 at 2 L   Cardiovascular:    Irregularly irregular. Normal first and second heart sounds; trace edema bilateral lower extremities    Abdomen:   Mildly distended, bowel sounds present   Extremities: no cyanosis or clubbing   Skin: warm, dry.    Neurologic: mood and affect are appropriate, alert and oriented x3      General: WNL  Eyes: WNL  Ears/Nose/Throat: WNL  Lungs: Shortness of Breath  Heart: Shortness of Breath with activity, Irregular Heartbeat  Stomach: WNL  Bladder: WNL  Muscle/Joints: WNL  Skin: WNL  Nervous System: WNL  Mental Health: WNL     Blood: WNL     Medical History  Surgical History Family History Social History   Past Medical History:   Diagnosis Date   ? Anxiety    ? Aortic stenosis    ? Bowel obstruction    ? Chronic kidney disease     stage 3   ? Coronary artery disease due to lipid rich plaque    ? Dyslipidemia, goal LDL below 70    ? ED (erectile dysfunction)    ? Essential hypertension    ? GERD (gastroesophageal reflux disease)    ? Ischemic cardiomyopathy    ? Paroxysmal VT (H)     ? Psoriasis    ? Pulmonary edema    ? Restrictive lung disease    ? Seasonal allergies    ? Sleep apnea    ? TIA (transient ischemic attack) 8/09   ? Ventricular aneurysm     Long-term coumadin therapy s/p ventricular patch 2001   ? Vitamin D deficiency     Past Surgical History:   Procedure Laterality Date   ? ABDOMINAL AORTIC ANEURYSM REPAIR     ? APPENDECTOMY      Perforated- Unknown   ? CARDIAC CATHETERIZATION     ? CARDIAC DEFIBRILLATOR PLACEMENT  7/19/2007   ? CORONARY STENT PLACEMENT      LCX   ? ICD Generator Change  6/18/14   ? PICC AND MIDLINE TEAM LINE INSERTION  2/23/2018        ? RESECTION SMALL BOWEL / CLOSURE ILEOSTOMY     ? VENTRICULAR RESECTION / REPAIR ANEURYSM      Family History   Problem Relation Age of Onset   ? Stroke Mother    ? Thyroid disease Mother    ? Cancer Brother    ? Lung disease Neg Hx     Social History     Social History   ? Marital status:      Spouse name: Eulalia   ? Number of children: N/A   ? Years of education: N/A     Occupational History   ?  Retired     [place]     Social History Main Topics   ? Smoking status: Former Smoker     Packs/day: 1.00     Years: 8.00     Quit date: 5/17/1955   ? Smokeless tobacco: Never Used   ? Alcohol use 0.6 oz/week     1 Glasses of wine per week   ? Drug use: No   ? Sexual activity: Yes     Partners: Female     Other Topics Concern   ? Not on file     Social History Narrative    Lives with his wife, Joann.  Son Win.  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            Medications  Allergies   Current Outpatient Prescriptions   Medication Sig Dispense Refill   ? acetaminophen (TYLENOL EXTRA STRENGTH) 500 MG tablet Take 500 mg by mouth every 6 (six) hours as needed for pain. Indications: Back Pain     ? albuterol (PROVENTIL) 2.5 mg /3 mL (0.083 %) nebulizer solution Take 2.5 mg by nebulization every 4 (four) hours as needed for wheezing.     ? aspirin 81 MG tablet Take 81 mg by mouth daily.     ? budesonide (PULMICORT) 0.5 mg/2  mL nebulizer solution Take 2 mL (0.5 mg total) by nebulization 2 (two) times a day. 120 mL 6   ? fluticasone (FLONASE) 50 mcg/actuation nasal spray Apply 1 spray into each nostril daily.     ? formoterol fumarate (PERFOROMIST) 20 mcg/2 mL nebulizer solution Take 20 mcg by nebulization daily.      ? furosemide (LASIX) 40 MG tablet Take 1 tablet (40 mg total) by mouth daily. 30 tablet 0   ? guaiFENesin (MUCINEX) 600 mg 12 hr tablet Take 600 mg by mouth 2 (two) times a day as needed for congestion.      ? latanoprost (XALATAN) 0.005 % ophthalmic solution Administer 1 drop to the right eye at bedtime.     ? lidocaine 4 % patch Place 1 patch on the skin daily. Applies to lower left sacrum  Remove and discard patch with 12 hours or as directed by MD.     ? metoprolol succinate (TOPROL-XL) 25 MG Take 1 tablet (25 mg total) by mouth daily. 90 tablet 2   ? multivitamin therapeutic (THERAGRAN) tablet Take 1 tablet by mouth daily.     ? nebulizer and compressor Radha For chronic cough 1 each 0   ? nitroglycerin (NITROSTAT) 0.4 MG SL tablet Place 0.4 mg under the tongue every 5 (five) minutes as needed for chest pain.     ? OXYGEN-AIR DELIVERY SYSTEMS MISC into each nostril continuous. 1.5 liters with rest and 3 liters with activity  May use up to 1.5 liters at noc.     ? pantoprazole (PROTONIX) 20 MG tablet Take 20 mg by mouth daily.     ? predniSONE (DELTASONE) 5 MG tablet Take 1 tablet (5 mg total) by mouth daily. 30 tablet 6   ? sertraline (ZOLOFT) 50 MG tablet Take 25 mg by mouth at bedtime.      ? simvastatin (ZOCOR) 80 MG tablet Take 80 mg by mouth bedtime.     ? sodium chloride 3 % nebulizer solution Take 3 mL by nebulization as needed. Take 3cc hypertonic saline (3% saline) and mix with albuterol solution and Pulmicort twice daily.     ? warfarin (COUMADIN) 2 MG tablet Take 2 mg by mouth See Admin Instructions. Take 1 tablet (2 mg) by mouth on Tuesdays and Thursdays.   Take 2 tablets (4 mg) by mouth on all other days of  the week.   Adjust dose based on INR results as directed.       No current facility-administered medications for this visit.       No Known Allergies      Lab Results    Chemistry CBC BNP   Lab Results   Component Value Date    CREATININE 1.63 (H) 08/30/2018    BUN 45 (H) 08/30/2018     08/30/2018    K 3.6 08/30/2018     08/30/2018    CO2 29 08/30/2018     Creatinine (mg/dL)   Date Value   08/30/2018 1.63 (H)   08/27/2018 1.41 (H)   08/26/2018 1.44 (H)   08/25/2018 1.54 (H)    Lab Results   Component Value Date    WBC 9.5 08/27/2018    HGB 12.5 (L) 08/27/2018    HCT 38.7 (L) 08/27/2018    MCV 82 08/27/2018     08/27/2018    Lab Results   Component Value Date    BNP 1324 (H) 08/25/2018     BNP (pg/mL)   Date Value   08/25/2018 1324 (H)   06/09/2018 647 (H)   02/15/2018 958 (H)            Marielos Landaverde, CNP  [facility]   Heart Failure Clinic

## 2021-06-27 NOTE — PROGRESS NOTES
Progress Notes by Marielos Landaverde CNP at 2/28/2019  7:50 AM     Author: Marielos Landaverde CNP Service: -- Author Type: Nurse Practitioner    Filed: 2/28/2019  8:54 AM Encounter Date: 2/28/2019 Status: Signed    : Marielos Landaverde CNP (Nurse Practitioner)           Click to link to [facility]     [facility] HEART CARE NOTE      Assessment/Recommendations   Assessment:    1. Ischemic cardiomyopathy, heart failure with reduced ejection fraction, ejection fraction 44%: He has had some improvement of his symptoms since increasing Lasix earlier this week.  His weight has decreased 2-3 pounds.  Lower extremity edema and abdominal bloating have improved.  He continues to have shortness of breath with minimal activity.  OptiVol/thoracic impedance were reviewed from yesterday's remote device check.  OptiVol is no longer increasing and thoracic impedance is improving which is reassuring that fluid retention is improving.  We discussed the risks of using prednisone with heart failure.  He is on a low dose and states that this does help with his lung disease.  We will continue at this time since fluid under better control.  Biventricular pacing is decreased.  This was reviewed with device nurse and decrease is consistent with changes made by Dr. Cadet in January.  He will have EKG optimization at March follow-up appointment with Dr. Cadet.    2.  Paroxysmal atrial fibrillation: Remote device check yesterday showed A. fib burden of 1.5% with controlled V rates.  Last INR on February 18 was 2.3.    Plan:  1.   Heart failure medications:  - Beta blocker therapy with metroprolol succinate increased back to 50 mg daily  - He has been unable to tolerate ACEI/ARB due to hypotension  - Diuretic therapy with furosemide 80 mg in the morning and 40 mg in the afternoon  2.  BMP pending.  As long as renal function is stable, continue current Lasix dose.  3.  Low-sodium diet and daily weights    Myron  MOHSEN Sunshine will follow up with Dr. Thakkar on March 7, Dr. Cadet on March 20 and in the heart failure clinic in April.     History of Present Illness    Mr. Myron Sunshine is a 87 y.o. male seen at [facility] heart failure clinic today for continued follow-up.  He has a history of ischemic cardiomyopathy, heart failure with reduced ejection fraction, hypertension, coronary artery disease with PCI to RCA in October 2018, TAVR in November 2018, paroxysmal atrial fibrillation, and chronic kidney disease.  Echocardiogram in November 2018 showed ejection fraction of 22% and echocardiogram in December 2018 showed improvement of ejection fraction to 44%.    He was seen in clinic earlier this week with concerns of fluid retention.  His Lasix was increased to 80 mg in the morning and 40 mg in the afternoon.  He has lost about 2-3 pounds.  Lower extremity edema and abdominal bloating have improved.  He continues to have shortness of breath with minimal activity but denies any change.  He is on oxygen chronically.  He denies lightheadedness, orthopnea and chest pain.  A possible trigger for his increased fluid retention was that he started prednisone about 1-2 weeks ago due to his lung disease.  He is on 5 mg daily.    He continues to follow a low-sodium diet.    ECHO:   Results for orders placed during the hospital encounter of 12/30/18   Echo Complete [ECH10] 12/31/2018    Narrative   The calculated left ventricular ejection fraction is 44%. This   represents a mildly decreased ejection fraction. Cavity is mildly   increased. 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 There is residual leak of LV patch near basal   inferoseptum with systolic flow into aneurysm from the left ventricle.    Dyskinetic: basal inferior, basal inferolateral, basal anterolateral,   mid inferior, mid inferolateral and mid anterolateral.    Right  Ventricle: Normal right ventricular size and systolic function.    Bioprosthetic valve present. There is no aortic insufficiency present.   The prosthetic valve is normal.    When compared to the previous study dated 11/28/2018, no significant   change.           Physical Examination Review of Systems   Vitals:    02/28/19 0807   BP: 110/66   Pulse: 80   Resp: 16     Body mass index is 22.3 kg/m .  Wt Readings from Last 3 Encounters:   02/28/19 151 lb (68.5 kg)   02/25/19 160 lb (72.6 kg)   02/21/19 160 lb 3.2 oz (72.7 kg)       General Appearance:     Alert, cooperative and in no acute distress.   ENT/Mouth: membranes moist, no oral lesions or bleeding gums.      EYES:  no scleral icterus, normal conjunctivae   Chest/Lungs:    No crackles.  Few wheezes. O2    Cardiovascular:   Regular. Normal first and second heart sounds, 1+ edema right ankle, no edema left ankle   Abdomen:  Soft, nontender, nondistended, bowel sounds present   Extremities: no cyanosis or clubbing   Skin: warm, dry.    Neurologic: mood and affect are appropriate, alert and oriented x3      General: Weight Loss  Eyes: WNL  Ears/Nose/Throat: WNL  Lungs: WNL  Heart: WNL  Stomach: WNL  Bladder: WNL  Muscle/Joints: WNL  Skin: WNL  Nervous System: WNL  Mental Health: WNL     Blood: WNL     Medical History  Surgical History Family History Social History   Past Medical History:   Diagnosis Date   ? Anxiety    ? Bowel obstruction (H)    ? Bronchiectasis without acute exacerbation (H) 12/1/2017   ? Chronic hypoxemic respiratory failure (H)    ? CKD (chronic kidney disease) stage 3, GFR 30-59 ml/min (H)    ? Coronary artery disease due to lipid rich plaque    ? DNAR (do not attempt resuscitation)    ? Dyslipidemia, goal LDL below 70    ? ED (erectile dysfunction)    ? Essential hypertension    ? GERD (gastroesophageal reflux disease)    ? Ischemic cardiomyopathy    ? Mild aortic stenosis    ? Noncompliance with medication regimen 9/11/2018   ? Paroxysmal VT  (H)    ? Persistent atrial fibrillation (H) 8/22/2018   ? Psoriasis    ? Restrictive lung disease; moderate by PFT 10/24/2017    FVC 1.9 456% FEV1 1.4 457% ratio 74%.  No significant response to bronchodilators.  TLC 40% predicted DLCO corrected 47% predicted Assessment: No obstruction.  Moderate restriction.  Moderate diffusion defect.   ? Seasonal allergies    ? Sleep apnea    ? TIA (transient ischemic attack) 8/09   ? Ventricular aneurysm     Long-term coumadin therapy s/p ventricular patch 2001   ? Vitamin D deficiency     Past Surgical History:   Procedure Laterality Date   ? ABDOMINAL AORTIC ANEURYSM REPAIR     ? APPENDECTOMY      Perforated   ? CARDIAC CATHETERIZATION     ? CARDIAC DEFIBRILLATOR PLACEMENT  7/19/2007   ? CORONARY STENT PLACEMENT  2001    LCX   ? CV CORONARY ANGIOGRAM N/A 10/18/2018    Procedure: Coronary Angiogram;  Surgeon: Elijah Liu MD;  Location: [facility];  Service:    ? CV TRANSFEMORAL TRANSCATHETER VALVE REPLACEMENT N/A 11/27/2018    Procedure: Transfemoral Transcatheter Aortic Valve Replacement;  Surgeon: Elijah Liu MD;  Location: [facility];  Service: Structural Heart   ? ICD Generator Change  6/18/14   ? IR EXTREMITY VENOGRAM LEFT  12/3/2018   ? PICC  11/27/2018        ? PICC AND MIDLINE TEAM LINE INSERTION  2/23/2018        ? RESECTION SMALL BOWEL / CLOSURE ILEOSTOMY     ? THORACENTESIS     ? VENTRICULAR RESECTION / REPAIR ANEURYSM      Family History   Problem Relation Age of Onset   ? Stroke Mother    ? Thyroid disease Mother    ? Cancer Brother    ? No Medical Problems Son    ? No Medical Problems Son    ? No Medical Problems Son    ? Lung disease Neg Hx     Social History     Socioeconomic History   ? Marital status:      Spouse name: Eulalia   ? Number of children: Not on file   ? Years of education: Not on file   ? Highest education level: Not on file   Occupational History   ? 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 Financial resource strain: Not on file   ? Food insecurity:     Worry: Not on file     Inability: Not on file   ? Transportation needs:     Medical: Not on file     Non-medical: Not on file   Tobacco Use   ? Smoking status: Former Smoker     Packs/day: 1.00     Years: 8.00     Pack years: 8.00     Types: Cigarettes     Last attempt to quit: 1955     Years since quittin.8   ? Smokeless tobacco: Never Used   Substance and Sexual Activity   ? Alcohol use: Yes     Alcohol/week: 0.6 oz     Types: 1 Glasses of wine per week   ? Drug use: No   ? Sexual activity: Not on file   Lifestyle   ? Physical activity:     Days per week: 0 days     Minutes per session: 0 min   ? Stress: Not on file   Relationships   ? Social connections:     Talks on phone: Not on file     Gets together: Not on file     Attends Yarsani service: Not on file     Active member of club or organization: Not on file     Attends meetings of clubs or organizations: Not on file     Relationship status: Not on file   ? Intimate partner violence:     Fear of current or ex partner: Not on file     Emotionally abused: Not on file     Physically abused: Not on file     Forced sexual activity: Not on file   Other Topics Concern   ? Not on file   Social History Narrative    Lives with his wife, Eulalia, who he states has advanced arthritis. 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            Medications  Allergies   Current Outpatient Medications   Medication Sig Dispense Refill   ? acetaminophen (TYLENOL EXTRA STRENGTH) 500 MG tablet Take 1 tablet (500 mg total) by mouth every 6 (six) hours as needed for pain.  0   ? albuterol (PROVENTIL) 2.5 mg /3 mL (0.083 %) nebulizer solution Take 3 mL (2.5 mg total) by nebulization every 4 (four) hours as needed for wheezing. 360 mL 6   ? allopurinol (ZYLOPRIM) 100 MG tablet Take 100 mg by mouth daily.     ? atorvastatin (LIPITOR) 80 MG tablet Take 1 tablet (80 mg total) by  mouth daily. 30 tablet 3   ? budesonide (PULMICORT) 0.5 mg/2 mL nebulizer solution Take 2 mL (0.5 mg total) by nebulization 2 (two) times a day. 120 mL 6   ? cetirizine (ZYRTEC) 10 MG tablet Take 10 mg by mouth daily.     ? clopidogrel (PLAVIX) 75 mg tablet Take 300 mg (4 pills) Friday morning.  Starting Saturday, take 75 mg (1 pill) daily (Patient taking differently: 75 mg daily. Take 300 mg (4 pills) Friday morning.  Starting Saturday, take 75 mg (1 pill) daily      ) 30 tablet 12   ? fluticasone (FLONASE) 50 mcg/actuation nasal spray Apply 1 spray into each nostril daily as needed for rhinitis.     ? formoterol fumarate (PERFOROMIST) 20 mcg/2 mL nebulizer solution Take 2 mL (20 mcg total) by nebulization daily.  0   ? furosemide (LASIX) 80 MG tablet Take 1 tablet (80 mg) every morning, and take 1/2 tablet (40 mg) every afternoon. 30 tablet 0   ? guaiFENesin ER (MUCINEX) 600 mg 12 hr tablet Take 1 tablet (600 mg total) by mouth 2 (two) times a day.  0   ? hydrocortisone 1 % cream Apply 1 application topically every 6 (six) hours as needed.     ? latanoprost (XALATAN) 0.005 % ophthalmic solution Administer 1 drop to the right eye at bedtime. 2.5 mL 12   ? metoprolol succinate (TOPROL XL) 50 MG 24 hr tablet Take 1 tablet (50 mg total) by mouth at bedtime. 90 tablet 3   ? multivitamin therapeutic (THERAGRAN) tablet Take 1 tablet by mouth daily.  0   ? nebulizer and compressor Radha For chronic cough 1 each 0   ? nitroglycerin (NITROSTAT) 0.4 MG SL tablet Place 1 tablet (0.4 mg total) under the tongue every 5 (five) minutes as needed for chest pain.  0   ? OXYGEN-AIR DELIVERY SYSTEMS MISC into each nostril continuous. 1.5 liters with rest and 3 liters with activity  May use up to 1.5 liters at noc.     ? pantoprazole (PROTONIX) 20 MG tablet Take 1 tablet (20 mg total) by mouth daily. (Patient taking differently: Take 20 mg by mouth daily as needed.       ) 30 tablet 0   ? predniSONE (DELTASONE) 5 mg/mL Conc  concentrated solution Take 5 mg by mouth daily.     ? sertraline (ZOLOFT) 50 MG tablet Take 0.5 tablets (25 mg total) by mouth at bedtime.  0   ? warfarin (COUMADIN/JANTOVEN) 2 MG tablet Take 1 tablet (2 mg total) by mouth See Admin Instructions. Take 4 mg daily 180 tablet 0   ? albuterol (PROAIR HFA;PROVENTIL HFA;VENTOLIN HFA) 90 mcg/actuation inhaler Inhale 2 puffs every 6 (six) hours as needed for shortness of breath. 1 Inhaler 11   ? budesonide-formoterol (SYMBICORT) 160-4.5 mcg/actuation inhaler Inhale 2 puffs 2 (two) times a day. 1 Inhaler 12     No current facility-administered medications for this visit.       Allergies   Allergen Reactions   ? Blood-Group Specific Substance      Anti-Josep/Barnes.  Expect delays in obtaining blood for transfusion.  Collect 2 lavender top tubes and 1 red top tube for all blood bank orders.          Lab Results    Chemistry CBC BNP   Lab Results   Component Value Date    CREATININE 1.28 02/25/2019    BUN 39 (H) 02/25/2019     02/25/2019    K 3.9 02/25/2019     02/25/2019    CO2 30 02/25/2019     Creatinine (mg/dL)   Date Value   02/25/2019 1.28   02/21/2019 1.47 (H)   01/21/2019 1.21   01/04/2019 1.65 (H)    Lab Results   Component Value Date    WBC 11.9 (H) 01/08/2019    HGB 10.3 (L) 01/21/2019    HCT 34.9 (L) 01/08/2019    MCV 88 01/08/2019     01/08/2019    Lab Results   Component Value Date    BNP 1,731 (H) 02/25/2019     BNP (pg/mL)   Date Value   02/25/2019 1,731 (H)   02/21/2019 1,139 (H)   12/30/2018 552 (H)              Marielos Landaverde, [facility] [place]   Heart Failure Clinic

## 2021-06-27 NOTE — PROGRESS NOTES
Progress Notes by Donna Thakkar MD at 8/29/2019 11:10 AM     Author: Donna Thakkar MD Service: -- Author Type: Physician    Filed: 8/29/2019  1:09 PM Encounter Date: 8/29/2019 Status: Signed    : Donna Thakkar MD (Physician)                  [email or web address]  [phone]         Thank you Dr. Gerardo for asking the [facility] team to participate in the care of your patient, Myron Sunshine.     Impression and Plan     1. Coronary artery disease. Myron has known coronary artery disease. Specifically, he had suffered a myocardial infarction in October of 2001 for which he underwent angioplasty and stenting of the circumflex. In addition, shortly thereafter he had repair of a left ventricular aneurysm/pseudoaneurysm.     On 18 October 2018 patient underwent PCI with stent placement to right coronary artery (2.5 x 20 mm Synergy drug-eluting stent).     This is been stable.  Patient denies any angina type chest pain.     2. Cardiomyopathy (ischemic). Most recent imaging by echocardiography revealed ejection fraction of 40-45%.  Patient has been intolerant of ACE inhibitor/ARB therapy secondary to hypotension.  Patient is appropriately on beta-blocker therapy.  He appears volume neutral on clinical exam today.     Of note, patient does have underlying pulmonary issues as well.  He did undergo pulmonary function testing which revealed FVC 59% predicted and FEV1 61% predicted.       3.  Status post aortic valve replacement (documented 26 mm Kd 3 valve).  Normal prosthetic valve function on recent ends esophageal echocardiogram 28 March 2019 which is commensurate with exam.     4.  Atrial fibrillation (paroxysmal).  Most recent device interrogation revealed recurrence of atrial fibrillation.  This may account for his subjective decline in exercise tolerance and shortness of breath with certain cavities.  The symptoms, however, are undoubtedly multifactorial.  Patient  would be somewhat suboptimal candidate for a variety of antiarrhythmics.  Specifically, given underlying pulmonary disease I would be reticent to initiate amiodarone.  With his renal impairment and mild increase in QTc I am also reticent to advocate sotalol.      Patient does have follow-up scheduled with Dr. Donal Cadet, electrophysiology, 5 September 2019 and would be interested in garnering his opinion regarding antiarrhythmic therapy.    In the meantime, we will plan on arranging for repeat attempt at direct-current cardioversion after confirmation of 3-4 weeks of therapeutic INRs.His last INR was somewhat subtherapeutic and will obtain an INR today.  Patient also instructed to obtain weekly INRs until cardioversion performed.       5. Dyslipidemia.  Lipid profile 8 November 2018 was favorable with LDL 53 mg/dL and HDL 39 mg/dL.            History of Present Illness    Once again I would like to thank you again for asking me to participate in the care of your patient, Myron Sunshine.  As you know, but to reiterate for my own records, Myron Sunshine is a 88 y.o. male with known coronary disease. Specifically, he had suffered a myocardial infarction in October of 2001 for which he underwent angioplasty and stenting of the circumflex. In addition, shortly thereafter he had repair of a left ventricular aneurysm/pseudoaneurysm. He has been noted on subsequent echocardiograms to have residual aneurysmal appearance involving the patched area. For this reason, he has been maintained on warfarin since in the past he had been noted to have a suspicion of possible mural thrombus in this region.      He did undergo repeat angiography 21 November 2001 and was found to have patency of his circumflex stents, but a 95% lesion involving the 1st diagonal and this was treated medically.      On 18 October 2018 patient underwent PCI with stent placement to right coronary artery (2.5 x 20 mm Synergy drug-eluting stent).      Myron also has a history of atrial fibrillation.  He underwent cardioversion 28 March 2019                        On interview today, Myron reports a subjective diminution in exercise tolerance as well as some subjective shortness of breath with certain activities.  He denies chest pain, however.  He reports no subjective palpitations despite history of atrial fibrillation.  He denies any lightheadedness.    Further review of systems is otherwise negative/noncontributory (medical record and 13 point review of systems reviewed as well and pertinent positives noted).         Cardiac Diagnostics          Transesophageal echocardiogram 28 March 2019:  1. Mild left ventricular enlargement with reduced left ventricular systolic performance (not quantified).  2. Bioprosthetic aortic valve with normal prosthetic valve function (documented 26 mm Kd 3 valve).  Normal valve function.  3. Moderate mitral insufficiency.  4. Mild right ventricular enlargement with mildly reduced right ventricular systolic performance.  5. Severe left atrial enlargement.  Mild right atrial enlargement.    Echocardiogram 31 December 2018:  1. Mild left ventricular enlargement with mild to moderate reduction in left ventricular systolic performance.  Ejection fraction 40-45%.  2. Inferior/posterior aneurysm.  3. Severe concentric increased left ventricular wall thickness  4. Bioprosthetic aortic valve with normal prosthetic valve function (documented 26 mm Kd 3 valve).  5. Mean gradient measured at 11 mmHg with peak velocity of 2.0 m/s.  6. Trace aortic insufficiency.  7. Mild to moderate mitral insufficiency.  8. Normal right ventricular size and systolic performance.  9. Severe left atrial enlargement.  Right atrium of normal dimension.    Transesophageal echocardiogram 23 February 2018:  1. Normal left ventricular size and systolic performance with ejection fraction of 55%.  2. Barrier and lateral-basilar pseudoaneurysm.  3. Mild to  moderate aortic stenosis.  4. Mild aortic insufficiency.  5. Mild to moderate mitral insufficiency.  6. Normal right ventricular size and systolic performance  7. No obvious vegetation.    Echocardiogram 1 October 2017:  1. Moderately reduced left ventricular systolic performance with ejection fraction of 40%.  2. There is a large aneurysm of the basal inferolateral wall repaired by a patch.  3. Evidence of aortic stenosis though degree could not be determined.  4. Normal right ventricular size and systolic performance.  5. Mild left atrial enlargement.  Right atrium of normal dimension.    Echocardiogram 13 November 2015:  1. Normal left ventricular size with mild to moderate depression in left ventricular systolic performance. Ejection fraction 40%.  2. 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 Moderate basal and mid inferior hypokinesis.  3. No significant valvular heart disease.  4. Mild left atrial enlargement.  o When compared to the prior echocardiogram 24 November 2014, no significant change.    Echocardiogram 24 November 2014:  1. Normal left ventricular size with mild reduction in left ventricular systolic performance. Ejection fraction estimated at 45-50%.  2. Posterior/inferior basal wall pseudoaneurysm/aneurysm noted with apparent patch closure.  3. No significant valvular heart disease.  4. No significant change from 14 September 2013 echocardiogram.    Echocardiogram performed 2 November 2012:  1. Mild dilation of the left ventricle with ejection fraction of 35%.   2. Basal posterior aneurysm was noted once again. Inferior hypokinesis to akinesis.   3. Mild aortic regurgitation.    Coronary angiogram 18 October 2018:  1. Left main coronary artery: No significant disease.  2. Left anterior descending coronary artery: Mild diffuse disease.  First diagonal with ostial 80% stenosis.  3. Circumflex coronary artery: Severe in-stent restenosis (unchanged from  previous).  4. Right coronary artery: 80% proximal narrowing.  5. Status post PCI with stent placement of right coronary artery (2.5 x 20 mm Synergy drug-eluting stent).    Device interrogation 23 August 2019 (Medtronic [id number] [employer] MRI Quad C... device implanted 4 December 2018):  1. Type: CRT-D alert remote transmission for long AT/AF.  2. Presenting rhythm: atrial fibrillation with biVP 78 bpm.  3. Battery/lead status: stable.  4. Arrhythmias: since 1 August 2019; one AF episode in progress since 22 August 2019 7:37am, v-rates >/=120bpm 5%, AF burden 7.2%. One NSVT detected.  5. Anticoagulant: warfarin.  6. Comments: appears to be normal ICD function. Device biVP 83.5%( 5.8% ineffective). Routed to device RN for review.  7. Device/lead alerts: none. NIECY. ADD: repeat remote done, shows AF still in Progress since 22 August 2019.     Device interrogation 27 February 2019 (Medtronic [id number] [employer] MRI Quad C... device implanted 4 December 2018):  1. Presenting: Atrial fibrillation with BiV pacing and sensing, rate 70s.  2. Lead/Battery Status: Stable.  3. Atrial Arrhythmias: Since 31 January 2019, 13 mode switches detected, current episode in progress since 0503 T7 February 2019, longest a total of 8.5 hours, patient briefly came out of AT/AF, burden 1.5%, ventricular rates well-controlled, review of EGMs confirm AF.  4. Vent Arrhythmias: Since 31 January 2019, no VT or VF detected.  5. Anticoagulant: Warfarin.  6. Comments: Normal CRT-D function. BiV pacing effective 64.5%, ineffective 13.6%.    Device interrogation 6 March 2018 (Medtronic [id number] Yeimi MATTHEW DR device implanted 18 June 2014):  1. Presenting: AP-VS or AS-VS 60's.  2. Lead/Battery Status: Stable.  3. Atrial Arrhythmias: 5 mode switches, <0.1% burden, atrial fibrillation/atrial flutter, longest 74mins, v-rates >/=120bpm ~5%.  4. Vent Arrhythmias: None  5. Anticoagulant: Warfarin  6. Comments: Normal ICD function.    Device interrogation 21 August 2017  "(Medtronic device implanted 18 June 2014):  1. Presenting rhythm: AP-VS, rate 60 bpm.  2. Battery/lead status: stable  3. Arrhythmias: since 5/15/17, no AT/AF noted. One nonsustained VT episode detected.  4. Comments: normal ICD function. Patient is on warfarin.    Device interrogation 31 August 2016 (Medtronic device implanted 18 June 2014):  1. Atrial pacing with ventricular sensing. Underlying rhythm sinus at 60 bpm.  2. Stable battery/lead status.  3. Arrhythmias: None since last interrogation.    Device interrogation 1 September 2015 (Medtronic device implanted 18 June 2014):  1. Atrial pacing with ventricular sensing. Underlying rhythm sinus at 60 bpm.  2. Stable battery/lead status.  3. Arrhythmias: None since last interrogation.           Physical Examination       /64 (Patient Site: Right Arm, Patient Position: Sitting, Cuff Size: Adult Regular)   Pulse 72   Resp 16   Ht 5' 9\" (1.753 m)   Wt 156 lb (70.8 kg)   BMI 23.04 kg/m          Wt Readings from Last 3 Encounters:   08/29/19 156 lb (70.8 kg)   07/08/19 149 lb (67.6 kg)   06/06/19 149 lb 12.8 oz (67.9 kg)     The patient is alert and oriented times three. Sclerae are anicteric. Mucosal membranes are moist. Jugular venous pressure is normal. No significant adenopathy/thyromegally appreciated. Lungs are clear with good expansion. On cardiovascular exam, the patient has a regular S1 and S2. Abdomen is soft and non-tender. Extremities reveal no clubbing, cyanosis, or edema.         Family History/Social History/Risk Factors   Patient does not smoke.  Family history of hypertension.         Medications  Allergies   Current Outpatient Medications   Medication Sig Dispense Refill   ? acetaminophen (TYLENOL EXTRA STRENGTH) 500 MG tablet Take 1 tablet (500 mg total) by mouth every 6 (six) hours as needed for pain.  0   ? atorvastatin (LIPITOR) 80 MG tablet Take 1 tablet (80 mg total) by mouth daily. 30 tablet 3   ? calcium, as carbonate, (TUMS) 200 mg " calcium (500 mg) chewable tablet Chew 1 tablet daily as needed for heartburn.     ? cetirizine (ZYRTEC) 10 MG tablet Take 10 mg by mouth daily as needed.            ? clopidogrel (PLAVIX) 75 mg tablet Take 1 tablet (75 mg total) by mouth daily. take 75 mg (1 pill) daily     ? fluticasone (FLONASE) 50 mcg/actuation nasal spray Apply 1 spray into each nostril daily as needed for rhinitis.     ? furosemide (LASIX) 80 MG tablet Take 0.5 tablets (40 mg total) by mouth daily. 90 tablet 3   ? hydrocortisone 1 % cream Apply 1 application topically every 6 (six) hours as needed.     ? latanoprost (XALATAN) 0.005 % ophthalmic solution Administer 1 drop to the right eye at bedtime. 2.5 mL 12   ? metoprolol succinate (TOPROL XL) 50 MG 24 hr tablet Take 1.5 tablets (75 mg total) by mouth daily. 45 tablet 11   ? multivit-min/ferrous fumarate (MULTI VITAMIN ORAL) Take 1 tablet by mouth daily.     ? nebulizer and compressor Radha For chronic cough 1 each 0   ? nitroglycerin (NITROSTAT) 0.4 MG SL tablet Place 1 tablet (0.4 mg total) under the tongue every 5 (five) minutes as needed for chest pain.  0   ? OXYGEN-AIR DELIVERY SYSTEMS MISC into each nostril continuous. 1.5 liters with rest and 3 liters with activity  May use up to 1.5 liters at [facility].  [facility]           ? pantoprazole (PROTONIX) 20 MG tablet Take 1 tablet (20 mg total) by mouth daily. (Patient taking differently: Take 20 mg by mouth daily as needed.       ) 30 tablet 0   ? PERFOROMIST 20 mcg/2 mL nebulizer solution USE 1 VIAL IN NEBULIZER EVERY 12 HOURS 120 mL 0   ? predniSONE (DELTASONE) 10 mg tablet Take 10 mg by mouth daily. 30 tablet 6   ? warfarin (COUMADIN/JANTOVEN) 2 MG tablet Take 1-2 tablets (2-4 mg total) by mouth daily. Adjust dose per INR results as instructed. 180 tablet 1   ? albuterol (PROVENTIL) 2.5 mg /3 mL (0.083 %) nebulizer solution Take 3 mL (2.5 mg total) by nebulization every 4 (four) hours as needed for wheezing. 360 mL 6   ? budesonide (PULMICORT)  0.5 mg/2 mL nebulizer solution Take 2 mL (0.5 mg total) by nebulization 2 (two) times a day. 120 mL 6     No current facility-administered medications for this visit.       Allergies   Allergen Reactions   ? Blood-Group Specific Substance      Anti-Josep/Barnes.  Expect delays in obtaining blood for transfusion.  Collect 2 lavender top tubes and 1 red top tube for all blood bank orders.           Lab Results   Lab Results   Component Value Date     07/17/2019    K 4.0 07/17/2019     07/17/2019    CO2 31 07/17/2019    BUN 37 (H) 07/17/2019    CREATININE 1.53 (H) 07/17/2019    CALCIUM 9.5 07/17/2019     Lab Results   Component Value Date    WBC 8.3 04/01/2019    WBC 8.8 06/18/2014    HGB 10.9 (L) 04/01/2019    HCT 36.5 (L) 04/01/2019    MCV 86 04/01/2019     04/01/2019     Lab Results   Component Value Date    CHOL 114 11/08/2018    TRIG 109 11/08/2018    HDL 39 (L) 11/08/2018    LDLCALC 53 11/08/2018     Lab Results   Component Value Date    INR 1.82 (H) 08/29/2019    INR 1.90 (!) 08/14/2019     Lab Results   Component Value Date    BNP 1,298 (H) 04/29/2019     Lab Results   Component Value Date    CKTOTAL 37 02/23/2018    CKTOTAL 32 02/17/2018    CKTOTAL 28 (L) 02/17/2018    CKMB 5 02/28/2018    CKMB 2 09/14/2013    CKMB 2 09/14/2013    TROPONINI 0.19 04/01/2019    TROPONINI 0.19 04/01/2019    TROPONINI 0.17 04/01/2019     Lab Results   Component Value Date    TSH 3.1 09/14/2013

## 2021-06-27 NOTE — PROGRESS NOTES
Progress Notes by Lissette Gordon NP at 4/22/2019 10:30 AM     Author: Lissette Gordon NP Service: -- Author Type: Nurse Practitioner    Filed: 4/22/2019  1:23 PM Encounter Date: 4/22/2019 Status: Signed    : Lissette Gordon NP (Nurse Practitioner)           Click to link to [facility] Heart Care     [facility] HEART CARE NOTE      Assessment/Recommendations   Assessment:    1. Ischemic cardiomyopathy with acute on chronic systolic dysfunction s/p BIV ICD (12/2018), NYHA class III with Ef o f 44%: Decompensated.  He reports increased shortness of breath with mild exertion and occasional abdominal bloating likely secondary to salt indiscretion in his diet.  He reports eating him and soup over the last couple days.  He denies PND orthopnea.  BNP today is elevated up in 1500s.  He reports being compliant with taking his furosemide as prescribed.  He reports his weight stable around 141-142 pounds.    2.  Chronic hypoxemic respiratory failure: O2 dependent-on 3 L per nasal cannula.  Reports increased shortness of breath on exertion.  Oxygen saturation around 95% on room air at rest.    3.  Chronic kidney disease stage III: creatinine level todat 1.32- stable. Mild elevation of Na+ level at 146.    4.  Paroxysmal atrial fibrillation: Rate controlled in 70s.  INR 1.5.  On warfarin therapy.    Plan:  1. Given increased shortness of breath with an elevated BNP, recommended to increase furosemide dose.  Patient was recommended to go to the ED if worsening shortness of breath.        -Reinforced low-sodium diet less than 2 g/day and daily weight monitoring     Heart failure medications:  - Beta blocker therapy with metroprolol succinate 50 mg twice a day  -Not on ACEI/ARB d/t low BP  -  Diuretic therapy with furosemide increased from 80 mg daily to 80 mg in a.m. and 40 mg in p.m.    2. Continue O2 per PCP and Pulmonary recommendation    3. Warfarin dosing per Zohreh MANRIQUE (INR clinic)    4. We will plan on following up in  the heart failure clinic in a week with a repeat BMP     History of Present Illness     Mr. Myron Sunshine is a 87 y.o. male with a significant past medical history of hypertension, paroxysmal atrial fibrillation, chronic kidney disease stage III, severe aortic stenosis with status post TAVR in November 2018, history of ischemic cardiomyopathy with mild systolic dysfunction with left bundle branch block with status post BIV ICD upgrade in December 2018, and coronary artery disease with s/p PCI to proximal RCA in October 2018 who is seen at [facility] heart failure clinic today for continued follow-up.     During last heart failure clinic visit, no medication changes made given stable heart failure symptoms.  Today, he presented to the heart failure clinic accompanied by his daughter.  He reports increased shortness of breath with mild exertion. He denies lightheadedness, shortness of breath, dyspnea on exertion, orthopnea, PND, palpitations, chest pain and lower extremity edema.  He reports occasional abdominal bloating.  He sleeps with couple pillows that is unchanged.  His weight has been stable between 141-142 pounds.    He reports salt indiscretion in his diet over the last couple days.  On chronic O2 3 L per nasal cannula and can walk on the up to 50 feet at a time without having shortness of breath.     ECHO-Reviewed:   Results for orders placed during the hospital encounter of 03/28/19   Echo Transesophageal [ECH01] 03/28/2019    Narrative   Dense spontaneous echo contrast in the atrial appendage.    No evidence of thrombus in the left atrial appendage    Left ventricle ejection fraction is decreased.    The right ventricle is mildly dilated. The systolic function is mildly   reduced.    Moderate mitral regurgitation.        Physical Examination Review of Systems   Vitals:    04/22/19 1040   BP: 108/56   Pulse: 70   Resp: 18     Body mass index is 22 kg/m .  Wt Readings from Last 3 Encounters:    04/22/19 149 lb (67.6 kg)   04/11/19 148 lb (67.1 kg)   04/07/19 143 lb 9.6 oz (65.1 kg)       General Appearance:     Alert, cooperative and in no acute distress.   ENT/Mouth: membranes moist, no oral lesions or bleeding gums.      EYES:  no scleral icterus, normal conjunctivae   Neck: no carotid bruits or thyromegaly   Chest/Lungs:   lungs are clear to auscultation, no rales or wheezing, respirations unlabored except diminished with crackles in bilateral bases   Cardiovascular:   Normal first and second heart sounds with no murmurs, rubs, or gallops; the carotid, radial and posterior tibial pulses are intact, , no edema bilateral lower extremities    Abdomen:  Soft, nontender, nondistended, bowel sounds present   Extremities: no cyanosis or clubbing   Skin: warm, dry.    Neurologic: mood and affect are appropriate, alert and oriented x3      General: WNL  Eyes: WNL  Ears/Nose/Throat: WNL  Lungs: WNL  Heart: WNL  Stomach: WNL  Bladder: WNL  Muscle/Joints: WNL  Skin: WNL  Nervous System: WNL  Mental Health: WNL     Blood: WNL     Medical History  Surgical History Family History Social History   Past Medical History:   Diagnosis Date   ? Anxiety    ? Bowel obstruction (H)    ? Bronchiectasis without acute exacerbation (H) 12/1/2017   ? Chronic hypoxemic respiratory failure (H)    ? CKD (chronic kidney disease) stage 3, GFR 30-59 ml/min (H)    ? Coronary artery disease due to lipid rich plaque    ? DNAR (do not attempt resuscitation)    ? Dyslipidemia, goal LDL below 70    ? ED (erectile dysfunction)    ? Essential hypertension    ? GERD (gastroesophageal reflux disease)    ? Ischemic cardiomyopathy    ? Mild aortic stenosis    ? Noncompliance with medication regimen 9/11/2018   ? Paroxysmal VT (H)    ? Persistent atrial fibrillation (H) 8/22/2018   ? Psoriasis    ? Restrictive lung disease; moderate by PFT 10/24/2017    FVC 1.9 456% FEV1 1.4 457% ratio 74%.  No significant response to bronchodilators.  TLC 40%  predicted DLCO corrected 47% predicted Assessment: No obstruction.  Moderate restriction.  Moderate diffusion defect.   ? Seasonal allergies    ? Sleep apnea    ? TIA (transient ischemic attack) 8/09   ? Ventricular aneurysm     Long-term coumadin therapy s/p ventricular patch 2001   ? Vitamin D deficiency     Past Surgical History:   Procedure Laterality Date   ? ABDOMINAL AORTIC ANEURYSM REPAIR     ? APPENDECTOMY      Perforated   ? CARDIAC CATHETERIZATION     ? CARDIAC DEFIBRILLATOR PLACEMENT  7/19/2007   ? CORONARY STENT PLACEMENT  2001    LCX   ? CV CORONARY ANGIOGRAM N/A 10/18/2018    Procedure: Coronary Angiogram;  Surgeon: Elijah Liu MD;  Location: [facility] Cath Lab;  Service:    ? CV TRANSFEMORAL TRANSCATHETER VALVE REPLACEMENT N/A 11/27/2018    Procedure: Transfemoral Transcatheter Aortic Valve Replacement;  Surgeon: Elijah Liu MD;  Location: [facility] Cath Lab;  Service: Structural Heart   ? ICD Generator Change  6/18/14   ? IR EXTREMITY VENOGRAM LEFT  12/3/2018   ? PICC  11/27/2018        ? PICC AND MIDLINE TEAM LINE INSERTION  2/23/2018        ? RESECTION SMALL BOWEL / CLOSURE ILEOSTOMY     ? THORACENTESIS     ? VENTRICULAR RESECTION / REPAIR ANEURYSM      Family History   Problem Relation Age of Onset   ? Stroke Mother    ? Thyroid disease Mother    ? Cancer Brother    ? No Medical Problems Son    ? No Medical Problems Son    ? No Medical Problems Son    ? Lung disease Neg Hx     Social History     Socioeconomic History   ? Marital status:      Spouse name: Eulalia   ? Number of children: Not on file   ? Years of education: Not on file   ? Highest education level: Not on file   Occupational History   ? 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 Financial resource strain: Not on file   ? Food insecurity:     Worry: Not on file     Inability: Not on file   ? Transportation needs:     Medical: Not on file     Non-medical: Not on file    Tobacco Use   ? Smoking status: Former Smoker     Packs/day: 1.00     Years: 8.00     Pack years: 8.00     Types: Cigarettes     Last attempt to quit: 1955     Years since quittin.9   ? Smokeless tobacco: Never Used   Substance and Sexual Activity   ? Alcohol use: Yes     Alcohol/week: 0.6 oz     Types: 1 Glasses of wine per week     Comment: social   ? Drug use: No   ? Sexual activity: Not on file   Lifestyle   ? Physical activity:     Days per week: 0 days     Minutes per session: 0 min   ? Stress: Not on file   Relationships   ? Social connections:     Talks on phone: Not on file     Gets together: Not on file     Attends Latter day service: Not on file     Active member of club or organization: Not on file     Attends meetings of clubs or organizations: Not on file     Relationship status: Not on file   ? Intimate partner violence:     Fear of current or ex partner: Not on file     Emotionally abused: Not on file     Physically abused: Not on file     Forced sexual activity: Not on file   Other Topics Concern   ? Not on file   Social History Narrative    Lives with his wife, Eulalia, who he states has advanced arthritis. 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            Medications  Allergies   Current Outpatient Medications   Medication Sig Dispense Refill   ? acetaminophen (TYLENOL EXTRA STRENGTH) 500 MG tablet Take 1 tablet (500 mg total) by mouth every 6 (six) hours as needed for pain.  0   ? allopurinol (ZYLOPRIM) 100 MG tablet Take 100 mg by mouth daily.     ? atorvastatin (LIPITOR) 80 MG tablet Take 1 tablet (80 mg total) by mouth daily. 30 tablet 3   ? calcium, as carbonate, (TUMS) 200 mg calcium (500 mg) chewable tablet Chew 1 tablet daily as needed for heartburn.     ? cetirizine (ZYRTEC) 10 MG tablet Take 10 mg by mouth daily as needed.            ? clopidogrel (PLAVIX) 75 mg tablet Take 1 tablet (75 mg total) by mouth daily. take 75 mg (1 pill) daily     ? fluticasone (FLONASE) 50  mcg/actuation nasal spray Apply 1 spray into each nostril daily as needed for rhinitis.     ? formoterol fumarate (PERFOROMIST) 20 mcg/2 mL nebulizer solution Take 2 mL (20 mcg total) by nebulization daily.  0   ? hydrocortisone 1 % cream Apply 1 application topically every 6 (six) hours as needed.     ? latanoprost (XALATAN) 0.005 % ophthalmic solution Administer 1 drop to the right eye at bedtime. 2.5 mL 12   ? metoprolol succinate (TOPROL XL) 50 MG 24 hr tablet Take 1 tablet (50 mg total) by mouth 2 (two) times a day. 90 tablet 3   ? nebulizer and compressor Radha For chronic cough 1 each 0   ? OXYGEN-AIR DELIVERY SYSTEMS MISC into each nostril continuous. 1.5 liters with rest and 3 liters with activity  May use up to 1.5 liters at noc.     ? pantoprazole (PROTONIX) 20 MG tablet Take 1 tablet (20 mg total) by mouth daily. (Patient taking differently: Take 20 mg by mouth daily as needed.       ) 30 tablet 0   ? predniSONE (DELTASONE) 5 MG tablet Take 5 mg by mouth daily.     ? sertraline (ZOLOFT) 50 MG tablet Take 0.5 tablets (25 mg total) by mouth at bedtime.  0   ? warfarin (COUMADIN/JANTOVEN) 2 MG tablet Take 1 tablet (2 mg total) by mouth See Admin Instructions. Take 2 mg daily check INR on 4/9/2019 180 tablet 0   ? albuterol (PROVENTIL) 2.5 mg /3 mL (0.083 %) nebulizer solution Take 3 mL (2.5 mg total) by nebulization every 4 (four) hours as needed for wheezing. 360 mL 6   ? budesonide (PULMICORT) 0.5 mg/2 mL nebulizer solution Take 2 mL (0.5 mg total) by nebulization 2 (two) times a day. 120 mL 6   ? furosemide (LASIX) 80 MG tablet Take 1 tablet (80 mg) every morning.  Take 1/2 tablet (40 mg) every evening. 126 tablet 3   ? nitroglycerin (NITROSTAT) 0.4 MG SL tablet Place 1 tablet (0.4 mg total) under the tongue every 5 (five) minutes as needed for chest pain.  0     No current facility-administered medications for this visit.       Allergies   Allergen Reactions   ? Blood-Group Specific Substance       Anti-Josep/Barnes.  Expect delays in obtaining blood for transfusion.  Collect 2 lavender top tubes and 1 red top tube for all blood bank orders.          Lab Results    Chemistry CBC BNP   Lab Results   Component Value Date    CREATININE 1.32 (H) 04/22/2019    BUN 35 (H) 04/22/2019     (H) 04/22/2019    K 4.1 04/22/2019     04/22/2019    CO2 35 (H) 04/22/2019     Creatinine (mg/dL)   Date Value   04/22/2019 1.32 (H)   04/11/2019 1.32 (H)   04/07/2019 1.09   04/06/2019 1.18    Lab Results   Component Value Date    WBC 8.3 04/01/2019    HGB 10.9 (L) 04/01/2019    HCT 36.5 (L) 04/01/2019    MCV 86 04/01/2019     04/01/2019    Lab Results   Component Value Date    BNP 1,510 (H) 04/22/2019     BNP (pg/mL)   Date Value   04/22/2019 1,510 (H)   04/01/2019 1,666 (H)   02/25/2019 1,731 (H)      Lissette Gordon, [facility]   Heart Failure Clinic           This note has been dictated using voice recognition software. Any grammatical, typographical, or context distortions are unintentional and inherent to the software

## 2021-06-27 NOTE — PROGRESS NOTES
Progress Notes by Lissette Gordon NP at 2/25/2019  7:50 AM     Author: Lissette Gordon NP Service: -- Author Type: Nurse Practitioner    Filed: 2/25/2019 11:18 AM Encounter Date: 2/25/2019 Status: Addendum    : Lissette Gordon NP (Nurse Practitioner)    Related Notes: Original Note by Lissette Gordon NP (Nurse Practitioner) filed at 2/25/2019 11:06 AM           Click to link to [facility] Heart Care     [facility] HEART CARE NOTE      Assessment/Recommendations   Assessment:    1. Acute on chronic systolic heart failure with LBBB s/p BIV ICD (12/2018), NYHA class III with Ef of 44% in (12/2018): Recent weight gain, increased shortness of breath, lower extremity edema, and elevated BNP up 1000's at Pulmonary clinic on 2/21/19.  He has been on higher dose of furosemide 40 mg twice a day since 2/21/2019.  Patient feels about 50% improvement in his symptoms although weight is unchanged at 155 pounds.  He reports recent salt indiscretion in his diet. Per pulmonary, missed his diuretic doses. He denies PND.  He sleeps with head of the bed elevated which is unchanged from baseline.    2. Chronic hypoxemic respiratory failure: On O2 to 2 L per nasal cannula. Oxygen saturation initially was in mid 80s due to exertion and improved to 90-91% on 3 L per nasal cannula.    3.  Hypotension: Blood pressure today is 92/60.  He is asymptomatic.  He is currently on metoprolol succinate 50 mg daily.    Plan:  1. We discussed about obtaining his electrolytes and BNP level. Will consider making further dose adjustment once results available.      Heart failure medications:  - Beta blocker therapy with metroprolol succinate 50 mg daily at at bedtime  - Diuretic therapy with furosemide 40 mg two times a day  (usual dose is 60 mg daily)    2.  Highly encouraged to stay on a low-salt diet less than 2 g/day, daily weight monitoring, and limit fluid intake to 45-50 ounces of fluid or 1-1.5 L/day.    3.  Encouraged to go to the emergency  department if worsening shortness of breath, lightheadedness, dizziness, or oxygen saturation persistently staying less than 88%.    Addendum: BMP stable but BNP has worsened. Will have him cut back on her metoprolol succinate from 50 mg down to 25 mg at at bedtime. Increase furosemide from 40 mg twice a day to 80 mg in a.m. and 40 mg in p.m.  Encouraged him to go to the emergency department if worsening shortness of breath, lightheadedness or dizziness. Stay on diet high in potassium.  Follow-up with Marielos Landaverde CNP as scheduled on 3/1/19.    Follow up with Marielos Landaverde as scheduled on 3/1/19 and Dr. Thakkar in March 2019.     History of Present Illness     Mr. Myron Sunshine is a 87 y.o. male with a significant past medical history of hypertension, paroxysmal atrial fibrillation, chronic kidney disease stage III, severe aortic stenosis with status post TAVR in November 2018, history of ischemic cardiomyopathy with mild systolic dysfunction with left bundle branch block with status post BIV ICD upgrade in December 2018, and coronary artery disease with s/p PCI to proximal RCA in October 2018 who is seen at [facility] heart failure clinic today for continued follow-up.     Patient recently had a follow-up with his pulmonary clinic and was noted to have weight gain of 3 pounds.  He was noted to have an elevated BNP and creatinine.  His furosemide dose was increased from 60 mg to 80 mg.  Patient was recommended to follow-up in heart failure clinic.    Today, patient presented to the heart failure clinic accompanied by his son.  During last heart failure clinic visit, his metoprolol tartrate was switched to medical succinate and has been tolerating it well.  Patient reports 50% improvement in his shortness of breath and lower extremity edema since on higher dose of Furosemide although his weight is unchanged.  He denies lightheadedness, shortness of breath, orthopnea, PND, palpitations, chest pain  and abdominal fullness/bloating.     He is home weight has been around 155 pounds.  His last heart failure clinic visit his weight was staying around 150 pounds. His sedentary.  His chronic O2 dependent (2L/NC).  Per patient, his oxygen saturation drops in 80s with mild exertion and then improves when he increase his oxygen to 3 L per nasal cannula.       ECHO-Reviewed:   Results for orders placed during the hospital encounter of 12/30/18   Echo Complete [ECH10] 12/31/2018    Narrative   The calculated left ventricular ejection fraction is 44%. This   represents a mildly decreased ejection fraction. Cavity is mildly   increased. 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 There is residual leak of LV patch near basal   inferoseptum with systolic flow into aneurysm from the left ventricle.    Dyskinetic: basal inferior, basal inferolateral, basal anterolateral,   mid inferior, mid inferolateral and mid anterolateral.    Right Ventricle: Normal right ventricular size and systolic function.    Bioprosthetic valve present. There is no aortic insufficiency present.   The prosthetic valve is normal.    When compared to the previous study dated 11/28/2018, no significant   change.          Physical Examination Review of Systems   Vitals:    02/25/19 0812   BP: 92/60   Pulse: 68   Resp: 18     Body mass index is 23.63 kg/m .  Wt Readings from Last 3 Encounters:   02/25/19 160 lb (72.6 kg)   02/21/19 160 lb 3.2 oz (72.7 kg)   01/31/19 156 lb (70.8 kg)       General Appearance:     Alert, cooperative and in no acute distress.   ENT/Mouth: membranes moist, no oral lesions or bleeding gums.      EYES:  no scleral icterus, normal conjunctivae   Neck: no carotid bruits or thyromegaly   Chest/Lungs:   lungs are clear to auscultation, no rales or wheezing, respirations unlabored except diminished lung sounds with crackles in bilateral bases    Cardiovascular:   Normal first and second heart sounds with no murmurs, rubs, or gallops; the carotid, radial and posterior tibial pulses are intact, JVP is difficult to assess due to the patient's unable to get on exam chair. 2+ pitting (rt>lt) edema bilateral lower extremities    Abdomen:  Soft, nontender, nondistended, bowel sounds present   Extremities: no cyanosis or clubbing   Skin: warm, dry.    Neurologic: mood and affect are appropriate, alert and oriented x3      General: WNL  Eyes: WNL  Ears/Nose/Throat: WNL  Lungs: Shortness of Breath  Heart: Irregular Heartbeat  Stomach: WNL  Bladder: WNL  Muscle/Joints: WNL  Skin: WNL  Nervous System: WNL  Mental Health: WNL     Blood: WNL     Medical History  Surgical History Family History Social History   Past Medical History:   Diagnosis Date   ? Anxiety    ? Bowel obstruction (H)    ? Bronchiectasis without acute exacerbation (H) 12/1/2017   ? Chronic hypoxemic respiratory failure (H)    ? CKD (chronic kidney disease) stage 3, GFR 30-59 ml/min (H)    ? Coronary artery disease due to lipid rich plaque    ? DNAR (do not attempt resuscitation)    ? Dyslipidemia, goal LDL below 70    ? ED (erectile dysfunction)    ? Essential hypertension    ? GERD (gastroesophageal reflux disease)    ? Ischemic cardiomyopathy    ? Mild aortic stenosis    ? Noncompliance with medication regimen 9/11/2018   ? Paroxysmal VT (H)    ? Persistent atrial fibrillation (H) 8/22/2018   ? Psoriasis    ? Restrictive lung disease; moderate by PFT 10/24/2017    FVC 1.9 456% FEV1 1.4 457% ratio 74%.  No significant response to bronchodilators.  TLC 40% predicted DLCO corrected 47% predicted Assessment: No obstruction.  Moderate restriction.  Moderate diffusion defect.   ? Seasonal allergies    ? Sleep apnea    ? TIA (transient ischemic attack) 8/09   ? Ventricular aneurysm     Long-term coumadin therapy s/p ventricular patch 2001   ? Vitamin D deficiency     Past Surgical History:   Procedure  Laterality Date   ? ABDOMINAL AORTIC ANEURYSM REPAIR     ? APPENDECTOMY      Perforated   ? CARDIAC CATHETERIZATION     ? CARDIAC DEFIBRILLATOR PLACEMENT  2007   ? CORONARY STENT PLACEMENT  2001    LCX   ? CV CORONARY ANGIOGRAM N/A 10/18/2018    Procedure: Coronary Angiogram;  Surgeon: Elijah Liu MD;  Location: [facility] Cath Lab;  Service:    ? CV TRANSFEMORAL TRANSCATHETER VALVE REPLACEMENT N/A 2018    Procedure: Transfemoral Transcatheter Aortic Valve Replacement;  Surgeon: Elijah Liu MD;  Location: [facility] Cath Lab;  Service: Structural Heart   ? ICD Generator Change  14   ? IR EXTREMITY VENOGRAM LEFT  12/3/2018   ? PICC  2018        ? PICC AND MIDLINE TEAM LINE INSERTION  2018        ? RESECTION SMALL BOWEL / CLOSURE ILEOSTOMY     ? THORACENTESIS     ? VENTRICULAR RESECTION / REPAIR ANEURYSM      Family History   Problem Relation Age of Onset   ? Stroke Mother    ? Thyroid disease Mother    ? Cancer Brother    ? No Medical Problems Son    ? No Medical Problems Son    ? No Medical Problems Son    ? Lung disease Neg Hx     Social History     Socioeconomic History   ? Marital status:      Spouse name: Eulalia   ? Number of children: Not on file   ? Years of education: Not on file   ? Highest education level: Not on file   Occupational History   ? 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 Financial resource strain: Not on file   ? Food insecurity:     Worry: Not on file     Inability: Not on file   ? Transportation needs:     Medical: Not on file     Non-medical: Not on file   Tobacco Use   ? Smoking status: Former Smoker     Packs/day: 1.00     Years: 8.00     Pack years: 8.00     Types: Cigarettes     Last attempt to quit: 1955     Years since quittin.8   ? Smokeless tobacco: Never Used   Substance and Sexual Activity   ? Alcohol use: Yes     Alcohol/week: 0.6 oz     Types: 1 Glasses of wine per week   ?  Drug use: No   ? Sexual activity: Not on file   Lifestyle   ? Physical activity:     Days per week: 0 days     Minutes per session: 0 min   ? Stress: Not on file   Relationships   ? Social connections:     Talks on phone: Not on file     Gets together: Not on file     Attends [employer] service: Not on file     Active member of club or organization: Not on file     Attends meetings of clubs or organizations: Not on file     Relationship status: Not on file   ? Intimate partner violence:     Fear of current or ex partner: Not on file     Emotionally abused: Not on file     Physically abused: Not on file     Forced sexual activity: Not on file   Other Topics Concern   ? Not on file   Social History Narrative    Lives with his wife, Eulalia, who he states has advanced arthritis. 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            Medications  Allergies   Current Outpatient Medications   Medication Sig Dispense Refill   ? acetaminophen (TYLENOL EXTRA STRENGTH) 500 MG tablet Take 1 tablet (500 mg total) by mouth every 6 (six) hours as needed for pain.  0   ? albuterol (PROVENTIL) 2.5 mg /3 mL (0.083 %) nebulizer solution Take 3 mL (2.5 mg total) by nebulization every 4 (four) hours as needed for wheezing. 360 mL 6   ? allopurinol (ZYLOPRIM) 100 MG tablet Take 100 mg by mouth daily.     ? atorvastatin (LIPITOR) 80 MG tablet Take 1 tablet (80 mg total) by mouth daily. 30 tablet 3   ? budesonide (PULMICORT) 0.5 mg/2 mL nebulizer solution Take 2 mL (0.5 mg total) by nebulization 2 (two) times a day. 120 mL 6   ? cetirizine (ZYRTEC) 10 MG tablet Take 10 mg by mouth daily.     ? clopidogrel (PLAVIX) 75 mg tablet Take 300 mg (4 pills) Friday morning.  Starting Saturday, take 75 mg (1 pill) daily (Patient taking differently: 75 mg daily. Take 300 mg (4 pills) Friday morning.  Starting Saturday, take 75 mg (1 pill) daily      ) 30 tablet 12   ? fluticasone (FLONASE) 50 mcg/actuation nasal spray Apply 1 spray into each nostril  daily as needed for rhinitis.     ? formoterol fumarate (PERFOROMIST) 20 mcg/2 mL nebulizer solution Take 2 mL (20 mcg total) by nebulization daily.  0   ? furosemide (LASIX) 80 MG tablet Take 0.5 tablets (40 mg total) by mouth daily. (Patient taking differently: Take 40 mg by mouth 2 (two) times a day at 9am and 6pm.       ) 30 tablet 0   ? guaiFENesin ER (MUCINEX) 600 mg 12 hr tablet Take 1 tablet (600 mg total) by mouth 2 (two) times a day.  0   ? hydrocortisone 1 % cream Apply 1 application topically every 6 (six) hours as needed.     ? latanoprost (XALATAN) 0.005 % ophthalmic solution Administer 1 drop to the right eye at bedtime. 2.5 mL 12   ? metoprolol succinate (TOPROL XL) 50 MG 24 hr tablet Take 1 tablet (50 mg total) by mouth at bedtime. 90 tablet 3   ? multivitamin therapeutic (THERAGRAN) tablet Take 1 tablet by mouth daily.  0   ? nebulizer and compressor Radha For chronic cough 1 each 0   ? OXYGEN-AIR DELIVERY SYSTEMS MISC into each nostril continuous. 1.5 liters with rest and 3 liters with activity  May use up to 1.5 liters at noc.     ? pantoprazole (PROTONIX) 20 MG tablet Take 1 tablet (20 mg total) by mouth daily. (Patient taking differently: Take 20 mg by mouth daily as needed.       ) 30 tablet 0   ? predniSONE (DELTASONE) 5 mg/mL Conc concentrated solution Take 5 mg by mouth daily.     ? sertraline (ZOLOFT) 50 MG tablet Take 0.5 tablets (25 mg total) by mouth at bedtime.  0   ? warfarin (COUMADIN/JANTOVEN) 2 MG tablet Take 1 tablet (2 mg total) by mouth See Admin Instructions. Take 4 mg daily 180 tablet 0   ? albuterol (PROAIR HFA;PROVENTIL HFA;VENTOLIN HFA) 90 mcg/actuation inhaler Inhale 2 puffs every 6 (six) hours as needed for shortness of breath. 1 Inhaler 11   ? budesonide-formoterol (SYMBICORT) 160-4.5 mcg/actuation inhaler Inhale 2 puffs 2 (two) times a day. 1 Inhaler 12   ? nitroglycerin (NITROSTAT) 0.4 MG SL tablet Place 1 tablet (0.4 mg total) under the tongue every 5 (five) minutes as  needed for chest pain.  0     No current facility-administered medications for this visit.       Allergies   Allergen Reactions   ? Blood-Group Specific Substance      Anti-Josep/Barnes.  Expect delays in obtaining blood for transfusion.  Collect 2 lavender top tubes and 1 red top tube for all blood bank orders.          Lab Results    Chemistry CBC BNP   Lab Results   Component Value Date    CREATININE 1.47 (H) 02/21/2019    BUN 35 (H) 02/21/2019     02/21/2019    K 4.4 02/21/2019     02/21/2019    CO2 29 02/21/2019     Creatinine (mg/dL)   Date Value   02/21/2019 1.47 (H)   01/21/2019 1.21   01/04/2019 1.65 (H)   01/01/2019 2.12 (H)    Lab Results   Component Value Date    WBC 11.9 (H) 01/08/2019    HGB 10.3 (L) 01/21/2019    HCT 34.9 (L) 01/08/2019    MCV 88 01/08/2019     01/08/2019    Lab Results   Component Value Date    BNP 1,139 (H) 02/21/2019     BNP (pg/mL)   Date Value   02/21/2019 1,139 (H)   12/30/2018 552 (H)   11/28/2018 968 (H)        Lissette Gordon CNP  [facility]   Heart Failure Clinic         This note has been dictated using voice recognition software. Any grammatical, typographical, or context distortions are unintentional and inherent to the software

## 2021-06-27 NOTE — PROGRESS NOTES
Progress Notes by Marielos Landaverde CNP at 12/13/2018  2:50 PM     Author: Marielos Landaverde CNP Service: -- Author Type: Nurse Practitioner    Filed: 12/13/2018  3:51 PM Encounter Date: 12/13/2018 Status: Signed    : Marielos Landaverde CNP (Nurse Practitioner)           Click to link to [facility]     [facility] HEART CARE NOTE      Assessment/Recommendations   Assessment:    1.  Heart failure with reduced ejection fraction, ejection fraction 22%: He has no symptoms of acute heart failure.  His weight has remained stable.  He continues to take metoprolol succinate.  He is not on ACEI due to hypotension.    2.  CRT-D: He had an upgrade to CRT-D on December 4, 2018.  See full device report from today.  Biventricular pacing at 90%.  He has mild swelling of incision site.  He denies any pain.    3.  Paroxysmal atrial fibrillation: Device check today shows 9 episodes of atrial fibrillation but all less than 1 minute.  Dr. Cadet notes that if he has further problems with rate and rhythm, he may need an AV node ablation in the future.  He continues to take warfarin and had an INR checked today.    4.  Aortic stenosis status post TAVR on 11/27/18: Bilateral groins are soft with no hematoma.  Bruising of his legs continues to improve.  Echocardiogram is scheduled on January 3 and follow-up with Dr. Liu on January 8.    5.  Coronary artery disease: PCI to RCA on October 18, 2018.  He denies any chest pain.  It is unclear if he is taking Plavix or Brilinta.  Hospital discharge instructions were for Plavix.  His note at [facility]CU yesterday noted Plavix.  However, paperwork sent with him today from TCU indicates that he is on Brilinta.  I have asked that the nurse at TCU call to clarify.  He is still taking aspirin.  It was recommend that aspirin should be discontinued 6 weeks after PCI.  I reviewed this with JOAN Bruner today and recommend stopping aspirin.     Plan:  1.  INR  2.   Continue low-sodium diet and daily weights  3.  Clarify whether patient is on Plavix or Brilinta with TCU.  My nurse will call to clarify.  Stop aspirin.    Myron Sunshine will follow up with Vanda Arora in 2 weeks.     History of Present Illness    Mr. Myron Sunshine is a 87 y.o. male seen at [facility] heart failure clinic today for hospital follow-up.  He has a history of coronary artery disease with PCI in October 2018, dyslipidemia, atrial fibrillation, obstructive sleep apnea, and interstitial lung disease.  He was hospitalized November 27 - December 5.  He had TAVR on November 27, 2018.  Following procedure, he had atrial fibrillation and continued shortness of breath.  He was seen by Dr. Cadet.  Plan was for AV node ablation but he converted back to sinus rhythm.  Metoprolol was increased.  His ICD was upgraded to a CRT-D on December 4.  Echocardiogram on November 28, 2018 showed an ejection fraction of 22%.    He is currently at TCU but will be discharging home in 2 days.  He feels that he has increased endurance and improvement in his breathing since hospital discharge.  He has no symptoms of acute fluid retention.  He denies lightheadedness, chest pain and lower extremity edema.  He wears oxygen continuously.  He denies shortness of breath walking into clinic today.  Prior to TAVR he was having shortness of breath with walking into clinic.  He has bruising of his legs bilaterally.  He notices some stiffness in his left lower leg but this improves with movement.    He states that his weight has been stable around 158 pounds.  His blood pressures at TCU are typically in the low 100s systolically.      ECHO:   Results for orders placed during the hospital encounter of 11/27/18   Echo Limited [ECH17] 11/27/2018    Narrative   When compared to the previous study dated 11/27/2018, no significant   change.    Left ventricle ejection fraction is moderately decreased. The calculated   left  ventricular ejection fraction is 37%.    Normal right ventricular size and systolic function.    No pericardial effusion    On limited evaluation of valvular structures, the recently placed TAVR   valve appears to have normal function. No obvious change in mitral valve   disease.           Physical Examination Review of Systems   Vitals:    12/13/18 1356   BP: 92/50   Pulse: 82   Resp: 20   Temp: 96.6  F (35.9  C)     Body mass index is 23.45 kg/m .  Wt Readings from Last 3 Encounters:   12/13/18 158 lb 12.8 oz (72 kg)   12/10/18 152 lb (68.9 kg)   12/09/18 156 lb (70.8 kg)       General Appearance:     Alert, cooperative and in no acute distress.   ENT/Mouth: membranes moist, no oral lesions or bleeding gums.      EYES:  no scleral icterus, normal conjunctivae   Chest/Lungs:   lungs are clear to auscultation, no rales or wheezing, respirations unlabored   Cardiovascular:   Regular. Normal first and second heart sounds, no edema bilateral lower extremities    Abdomen:  Soft, nontender, nondistended, bowel sounds present   Extremities: no cyanosis or clubbing   Skin: warm, dry.    Neurologic: mood and affect are appropriate, alert and oriented x3    Bilateral groins soft with no hematoma.  He has healing bruises on his legs bilaterally.    General: WNL  Eyes: WNL  Ears/Nose/Throat: WNL  Lungs: WNL  Heart: WNL  Stomach: WNL  Bladder: WNL  Muscle/Joints: WNL  Skin: WNL  Nervous System: WNL  Mental Health: WNL     Blood: WNL     Medical History  Surgical History Family History Social History   Past Medical History:   Diagnosis Date   ? Anxiety    ? Bowel obstruction (H)    ? Chronic hypoxemic respiratory failure (H)    ? CKD (chronic kidney disease) stage 3, GFR 30-59 ml/min (H)    ? Coronary artery disease due to lipid rich plaque    ? DNAR (do not attempt resuscitation)    ? Dyslipidemia, goal LDL below 70    ? ED (erectile dysfunction)    ? Essential hypertension    ? GERD (gastroesophageal reflux disease)    ?  Ischemic cardiomyopathy    ? Mild aortic stenosis    ? Noncompliance with medication regimen 9/11/2018   ? Paroxysmal VT (H)    ? Persistent atrial fibrillation (H) 8/22/2018   ? Psoriasis    ? Restrictive lung disease; moderate by PFT 10/24/2017    FVC 1.9 456% FEV1 1.4 457% ratio 74%.  No significant response to bronchodilators.  TLC 40% predicted DLCO corrected 47% predicted Assessment: No obstruction.  Moderate restriction.  Moderate diffusion defect.   ? Seasonal allergies    ? Sleep apnea    ? TIA (transient ischemic attack) 8/09   ? Ventricular aneurysm     Long-term coumadin therapy s/p ventricular patch 2001   ? Vitamin D deficiency     Past Surgical History:   Procedure Laterality Date   ? ABDOMINAL AORTIC ANEURYSM REPAIR     ? APPENDECTOMY      Perforated   ? CARDIAC CATHETERIZATION     ? CARDIAC DEFIBRILLATOR PLACEMENT  7/19/2007   ? CORONARY STENT PLACEMENT  2001    LCX   ? CV CORONARY ANGIOGRAM N/A 10/18/2018    Procedure: Coronary Angiogram;  Surgeon: Elijah Liu MD;  Location: [facility];  Service:    ? CV TRANSFEMORAL TRANSCATHETER VALVE REPLACEMENT N/A 11/27/2018    Procedure: Transfemoral Transcatheter Aortic Valve Replacement;  Surgeon: Elijah Liu MD;  Location: [facility];  Service: Structural Heart   ? ICD Generator Change  6/18/14   ? IR EXTREMITY VENOGRAM LEFT  12/3/2018   ? PICC  11/27/2018        ? PICC AND MIDLINE TEAM LINE INSERTION  2/23/2018        ? RESECTION SMALL BOWEL / CLOSURE ILEOSTOMY     ? THORACENTESIS     ? VENTRICULAR RESECTION / REPAIR ANEURYSM      Family History   Problem Relation Age of Onset   ? Stroke Mother    ? Thyroid disease Mother    ? Cancer Brother    ? No Medical Problems Son    ? No Medical Problems Son    ? No Medical Problems Son    ? Lung disease Neg Hx     Social History     Socioeconomic History   ? Marital status:      Spouse name: Eulalia   ? Number of children: Not on file   ? Years of education: Not on file   ?  Highest education level: Not on file   Social Needs   ? Financial resource strain: Not on file   ? Food insecurity - worry: Not on file   ? Food insecurity - inability: Not on file   ? Transportation needs - medical: Not on file   ? Transportation needs - non-medical: Not on file   Occupational History   ? 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 Smoking status: Former Smoker     Packs/day: 1.00     Years: 8.00     Pack years: 8.00     Types: Cigarettes     Last attempt to quit: 1955     Years since quittin.6   ? Smokeless tobacco: Never Used   Substance and Sexual Activity   ? Alcohol use: Yes     Alcohol/week: 0.6 oz     Types: 1 Glasses of wine per week   ? Drug use: No   ? Sexual activity: Not on file   Other Topics Concern   ? Not on file   Social History Narrative    Lives with his wife, Eulalia, who he states has advanced arthritis. 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            Medications  Allergies   Current Outpatient Medications   Medication Sig Dispense Refill   ? acetaminophen (TYLENOL EXTRA STRENGTH) 500 MG tablet Take 1 tablet (500 mg total) by mouth every 6 (six) hours as needed for pain.  0   ? albuterol (PROVENTIL) 2.5 mg /3 mL (0.083 %) nebulizer solution Take 3 mL (2.5 mg total) by nebulization every 4 (four) hours as needed for wheezing.  0   ? aspirin 81 MG EC tablet Take 1 tablet (81 mg total) by mouth daily. Stop after 6 weeks  0   ? atorvastatin (LIPITOR) 80 MG tablet Take 1 tablet (80 mg total) by mouth daily. 30 tablet 3   ? budesonide (PULMICORT) 0.5 mg/2 mL nebulizer solution Take 2 mL (0.5 mg total) by nebulization 2 (two) times a day. 120 mL 6   ? cetirizine (ZYRTEC) 10 MG tablet Take 10 mg by mouth daily.     ? fluticasone (FLONASE) 50 mcg/actuation nasal spray Apply 1 spray into each nostril daily as needed for rhinitis.     ? formoterol fumarate (PERFOROMIST) 20 mcg/2 mL nebulizer solution Take 2 mL (20 mcg total) by  nebulization daily.  0   ? furosemide (LASIX) 80 MG tablet Take 1 tablet (80 mg total) by mouth daily. 30 tablet 0   ? latanoprost (XALATAN) 0.005 % ophthalmic solution Administer 1 drop to the right eye at bedtime. 2.5 mL 12   ? metoprolol succinate (TOPROL-XL) 50 MG 24 hr tablet Take 1 tablet (50 mg total) by mouth daily. (Patient taking differently: Take 50 mg by mouth daily IF HR<65 or SBP<95 or D<62 then give 1/2 tab (25mg).      ) 30 tablet 0   ? multivitamin therapeutic (THERAGRAN) tablet Take 1 tablet by mouth daily.  0   ? nebulizer and compressor Radha For chronic cough 1 each 0   ? nitroglycerin (NITROSTAT) 0.4 MG SL tablet Place 1 tablet (0.4 mg total) under the tongue every 5 (five) minutes as needed for chest pain.  0   ? OXYGEN-AIR DELIVERY SYSTEMS MISC into each nostril continuous. 1.5 liters with rest and 3 liters with activity  May use up to 1.5 liters at noc.     ? pantoprazole (PROTONIX) 20 MG tablet Take 1 tablet (20 mg total) by mouth daily. 30 tablet 0   ? potassium chloride (K-DUR,KLOR-CON) 20 MEQ tablet Take 1 tablet (20 mEq total) by mouth 2 (two) times a day.  0   ? sertraline (ZOLOFT) 50 MG tablet Take 0.5 tablets (25 mg total) by mouth at bedtime.  0   ? ticagrelor (BRILINTA) 90 mg Tab Take 90 mg by mouth 2 (two) times a day.     ? warfarin (COUMADIN) 3 MG tablet Take 1 tablet (3 mg total) by mouth daily. (Patient taking differently: Take 4 mg by mouth daily 12/10/18 INR 1.82 Cont 4mg daily. Next INR 12/17.  12/6/18 INR 1.35 4mg daily. NExt INR 12/10.  11/15/18 INR 2.98  Cont 3mg daily.  Next INR next week with PMD..      )  0   ? allopurinol (ZYLOPRIM) 100 MG tablet Take 100 mg by mouth daily.     ? guaiFENesin ER (MUCINEX) 600 mg 12 hr tablet Take 600 mg by mouth 2 (two) times a day.     ? hydrocortisone 1 % cream Apply 1 application topically every 6 (six) hours as needed.       No current facility-administered medications for this visit.       Allergies   Allergen Reactions   ?  Blood-Group Specific Substance      Anti-Josep/Barnes.  Expect delays in obtaining blood for transfusion.  Collect 2 lavender top tubes and 1 red top tube for all blood bank orders.          Lab Results    Chemistry CBC BNP   Lab Results   Component Value Date    CREATININE 1.38 (H) 12/11/2018    BUN 42 (H) 12/11/2018     12/11/2018    K 4.1 12/11/2018     12/11/2018    CO2 29 12/11/2018     Creatinine (mg/dL)   Date Value   12/11/2018 1.38 (H)   12/05/2018 1.37 (H)   12/04/2018 1.36 (H)   12/03/2018 1.39 (H)    Lab Results   Component Value Date    WBC 10.9 12/11/2018    HGB 9.9 (L) 12/11/2018    HCT 32.9 (L) 12/11/2018    MCV 88 12/11/2018     12/11/2018    Lab Results   Component Value Date     (H) 11/28/2018     BNP (pg/mL)   Date Value   11/28/2018 968 (H)   11/27/2018 770 (H)   11/26/2018 499 (H)          40 minutes were spent with the patient with greater than 50% spent on education and counseling.      Marielos Landaverde, [facility]   Heart Failure Clinic

## 2021-06-27 NOTE — PROGRESS NOTES
Progress Notes by Lissette Gordon NP at 7/8/2019  9:50 AM     Author: Lissette Gordon NP Service: -- Author Type: Nurse Practitioner    Filed: 7/8/2019 11:31 AM Encounter Date: 7/8/2019 Status: Signed    : Lissette Gordon NP (Nurse Practitioner)           Click to link to [facility] Heart Care     [facility] HEART CARE NOTE      Assessment/Recommendations   Assessment:    1. Ischemic cardiomyopathy with systolic dysfunction, NYHA class III: Well compensated with no acute signs and symptoms of heart failure except mild crackles in the right lower lobe.  He always has some shortness of breath at baseline which is unchanged.  He overall feels improvement in his heart failure symptoms.  His home weight is stable between 145 to 147 pounds.  He reports improved appetite.  He follows low-sodium diet.  He has cut back on the metoprolol succinate from 50 mg twice a day down to once a day on his own couple months ago.  He requested to cut back on his Lasix since he is feeling improvement in his shortness of breath with no other heart failure symptoms.  Optivol shows no evidence of fluid retention.    2.  Chronic kidney disease stage III: BMP today showed sodium level of 143, potassium 4.4 and creatinine 1.63.  Creatinine has worsened.    Plan:  1.  Given worsening renal function with no evidence of fluid retention, agreed with patient to cut back Lasix. He was encouraged to call back if persistent weight gain with worsening heart failure symptoms.  Will repeat BMP in 2 weeks    2.  We also discussed about up titrating his beta-blocker which he agreed.  He was encouraged to call back if experiences lightheadedness, dizziness or excessive fatigue.  He will monitor his blood pressure at home and will call us if experiencing symptoms of low blood pressure.      Heart failure medications:  - Beta blocker therapy with metroprolol succinate increased from 50 mg to 75 mg daily  - Not on ACEI/ARB therapy d/t low BP.  - Diuretic  therapy with furosemide decreased from 80 mg to 40 mg daily.    2.  Continue to follow low-sodium diet less than 2 g/day, daily weight monitoring, and stay active as tolerated    3.  Patient will like to strengthen his lower extremities and improve his gait instability.  He was recommended to follow-up with primary care provider for possible referral to physical therapy.  Patient also stated that he used to take Viagra and would like a refill.  He was recommended to discuss this with his PCP.  Patient does have a PRN nitro.  He was instructed to avoid taking nitro with Viagra to prevent significant drug interaction which he agreed.    Follow up with Dr. Cadet in 4 weeks and Dr. Thakkar in September per their recommendation. F/u with me in 3-4 months or sooner if needed in HF clinic.     History of Present Illness     Mr. Myron Sunshine is a 87 y.o. male with a significant past medical history of hypertension, paroxysmal atrial fibrillation, chronic kidney disease stage III, severe aortic stenosis with status post TAVR in November 2018, history of ischemic cardiomyopathy with mild systolic dysfunction with left bundle branch block with status post BIV ICD upgrade in December 2018, and coronary artery disease with s/p PCI to proximal RCA in October 2018 who is seen at [facility] heart failure clinic today for continued follow-up.     Patient saw his Pulmonary in early June 2019. He was noted to have increased weight gain and recommended to increase his diuretic therapy. He had pulmonary CT showed mild progression in Fibrosis.     He saw Dr. Thakkar in mid June and was noted to be doing very well and made no further changes to his medications.    Today, patient tells me that he has been feeling well since I last saw him in the heart failure clinic.  He also reported that he has been taking the lower dose of metoprolol over the last couple months.  He thought he either cut back the dose on his own or by  1 of the provider.  He is also requesting to cut back on his Lasix dose as he is not feeling more shortness of breath with denies any other heart failure symptoms.  He is following low-sodium diet and his home weight has been stable.  He reports improved appetite.  He does feel weak in his legs and would like to do some physical therapy to strengthen his legs and also improve his gait/ stability.  He uses walker at home at times.   He denies lightheadedness, shortness of breath, orthopnea, PND, palpitations, chest pain, abdominal fullness/bloating and lower extremity edema.       Physical Examination Review of Systems   Vitals:    07/08/19 0947   BP: 100/58   Pulse: 80   Resp: 16     Body mass index is 22 kg/m .  Wt Readings from Last 3 Encounters:   07/08/19 149 lb (67.6 kg)   06/06/19 149 lb 12.8 oz (67.9 kg)   06/05/19 151 lb (68.5 kg)       General Appearance:     Alert, cooperative and in no acute distress.   ENT/Mouth: membranes moist, no oral lesions or bleeding gums.      EYES:  no scleral icterus, normal conjunctivae   Neck: no carotid bruits or thyromegaly   Chest/Lungs:   lungs are clear to auscultation, no rales or wheezing, respirations unlabored except mild crackles in right lower lobe   Cardiovascular:   . Normal first and second heart sounds with no murmurs, rubs, or gallops; the carotid, radial and posterior tibial pulses are intact, Jugular venous pressure flat in with no HJR, no  edema bilateral lower extremities    Abdomen:  Soft, nontender, nondistended, bowel sounds present   Extremities: no cyanosis or clubbing   Skin: warm, dry.    Neurologic: mood and affect are appropriate, alert and oriented x3      General: WNL  Eyes: WNL  Ears/Nose/Throat: WNL  Lungs: WNL  Heart: WNL  Stomach: WNL  Bladder: WNL  Muscle/Joints: WNL  Skin: WNL  Nervous System: WNL  Mental Health: WNL     Blood: WNL     Medical History  Surgical History Family History Social History   Past Medical History:   Diagnosis Date    ? Anxiety    ? Bowel obstruction (H)    ? Bronchiectasis without acute exacerbation (H) 12/1/2017   ? Chronic hypoxemic respiratory failure (H)    ? CKD (chronic kidney disease) stage 3, GFR 30-59 ml/min (H)    ? Coronary artery disease due to lipid rich plaque    ? DNAR (do not attempt resuscitation)    ? Dyslipidemia, goal LDL below 70    ? ED (erectile dysfunction)    ? Essential hypertension    ? GERD (gastroesophageal reflux disease)    ? Ischemic cardiomyopathy    ? Mild aortic stenosis    ? Noncompliance with medication regimen 9/11/2018   ? Paroxysmal VT (H)    ? Persistent atrial fibrillation (H) 8/22/2018   ? Psoriasis    ? Restrictive lung disease; moderate by PFT 10/24/2017    FVC 1.9 456% FEV1 1.4 457% ratio 74%.  No significant response to bronchodilators.  TLC 40% predicted DLCO corrected 47% predicted Assessment: No obstruction.  Moderate restriction.  Moderate diffusion defect.   ? Seasonal allergies    ? Sleep apnea    ? TIA (transient ischemic attack) 8/09   ? Ventricular aneurysm     Long-term coumadin therapy s/p ventricular patch 2001   ? Vitamin D deficiency     Past Surgical History:   Procedure Laterality Date   ? ABDOMINAL AORTIC ANEURYSM REPAIR     ? APPENDECTOMY      Perforated   ? CARDIAC CATHETERIZATION     ? CARDIAC DEFIBRILLATOR PLACEMENT  7/19/2007   ? CORONARY STENT PLACEMENT  2001    LCX   ? CV CORONARY ANGIOGRAM N/A 10/18/2018    Procedure: Coronary Angiogram;  Surgeon: Elijah Liu MD;  Location: [facility] Cath Lab;  Service:    ? CV TRANSFEMORAL TRANSCATHETER VALVE REPLACEMENT N/A 11/27/2018    Procedure: Transfemoral Transcatheter Aortic Valve Replacement;  Surgeon: Elijah Liu MD;  Location: [facility] Cath Lab;  Service: Structural Heart   ? ICD Generator Change  6/18/14   ? IR EXTREMITY VENOGRAM LEFT  12/3/2018   ? PICC  11/27/2018        ? PICC AND MIDLINE TEAM LINE INSERTION  2/23/2018        ? RESECTION SMALL BOWEL / CLOSURE ILEOSTOMY     ? THORACENTESIS      ? VENTRICULAR RESECTION / REPAIR ANEURYSM      Family History   Problem Relation Age of Onset   ? Stroke Mother    ? Thyroid disease Mother    ? Cancer Brother    ? No Medical Problems Son    ? No Medical Problems Son    ? No Medical Problems Son    ? Lung disease Neg Hx     Social History     Socioeconomic History   ? Marital status:      Spouse name: Eulalai   ? Number of children: Not on file   ? Years of education: Not on file   ? Highest education level: Not on file   Occupational History   ? 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 Financial resource strain: Not on file   ? Food insecurity:     Worry: Not on file     Inability: Not on file   ? Transportation needs:     Medical: Not on file     Non-medical: Not on file   Tobacco Use   ? Smoking status: Former Smoker     Packs/day: 1.00     Years: 8.00     Pack years: 8.00     Types: Cigarettes     Last attempt to quit: 1955     Years since quittin.1   ? Smokeless tobacco: Never Used   Substance and Sexual Activity   ? Alcohol use: Yes     Alcohol/week: 0.6 oz     Types: 1 Glasses of wine per week     Comment: social   ? Drug use: No   ? Sexual activity: Not on file   Lifestyle   ? Physical activity:     Days per week: 0 days     Minutes per session: 0 min   ? Stress: Not on file   Relationships   ? Social connections:     Talks on phone: Not on file     Gets together: Not on file     Attends Anglican service: Not on file     Active member of club or organization: Not on file     Attends meetings of clubs or organizations: Not on file     Relationship status: Not on file   ? Intimate partner violence:     Fear of current or ex partner: Not on file     Emotionally abused: Not on file     Physically abused: Not on file     Forced sexual activity: Not on file   Other Topics Concern   ? Not on file   Social History Narrative    Lives with his wife, Eulalia, who he states has advanced arthritis. 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            Medications  Allergies   Current Outpatient Medications   Medication Sig Dispense Refill   ? acetaminophen (TYLENOL EXTRA STRENGTH) 500 MG tablet Take 1 tablet (500 mg total) by mouth every 6 (six) hours as needed for pain.  0   ? allopurinol (ZYLOPRIM) 100 MG tablet Take 100 mg by mouth daily.     ? atorvastatin (LIPITOR) 80 MG tablet Take 1 tablet (80 mg total) by mouth daily. 30 tablet 3   ? calcium, as carbonate, (TUMS) 200 mg calcium (500 mg) chewable tablet Chew 1 tablet daily as needed for heartburn.     ? cetirizine (ZYRTEC) 10 MG tablet Take 10 mg by mouth daily as needed.            ? clopidogrel (PLAVIX) 75 mg tablet Take 1 tablet (75 mg total) by mouth daily. take 75 mg (1 pill) daily     ? fluticasone (FLONASE) 50 mcg/actuation nasal spray Apply 1 spray into each nostril daily as needed for rhinitis.     ? furosemide (LASIX) 80 MG tablet Take 80 mg in AM and 40 mg in PM. (Patient taking differently: Take 80 mg by mouth daily.       ) 90 tablet 3   ? hydrocortisone 1 % cream Apply 1 application topically every 6 (six) hours as needed.     ? latanoprost (XALATAN) 0.005 % ophthalmic solution Administer 1 drop to the right eye at bedtime. 2.5 mL 12   ? metoprolol succinate (TOPROL XL) 50 MG 24 hr tablet Take 1.5 tablets (75 mg total) by mouth daily. 45 tablet 11   ? nebulizer and compressor Radha For chronic cough 1 each 0   ? nitroglycerin (NITROSTAT) 0.4 MG SL tablet Place 1 tablet (0.4 mg total) under the tongue every 5 (five) minutes as needed for chest pain.  0   ? OXYGEN-AIR DELIVERY SYSTEMS MISC into each nostril continuous. 1.5 liters with rest and 3 liters with activity  May use up to 1.5 liters at [facility].  [facility]           ? pantoprazole (PROTONIX) 20 MG tablet Take 1 tablet (20 mg total) by mouth daily. (Patient taking differently: Take 20 mg by mouth daily as needed.       ) 30 tablet 0   ? PERFOROMIST 20 mcg/2 mL nebulizer solution USE 1 VIAL IN  NEBULIZER EVERY 12 HOURS 120 mL 0   ? predniSONE (DELTASONE) 5 MG tablet Take 5 mg by mouth daily.     ? sertraline (ZOLOFT) 50 MG tablet Take 0.5 tablets (25 mg total) by mouth at bedtime.  0   ? warfarin (COUMADIN/JANTOVEN) 2 MG tablet Take 1 tablet (2 mg total) by mouth See Admin Instructions. Take 2 mg daily check INR on 4/9/2019 180 tablet 0   ? albuterol (PROVENTIL) 2.5 mg /3 mL (0.083 %) nebulizer solution Take 3 mL (2.5 mg total) by nebulization every 4 (four) hours as needed for wheezing. 360 mL 6   ? budesonide (PULMICORT) 0.5 mg/2 mL nebulizer solution Take 2 mL (0.5 mg total) by nebulization 2 (two) times a day. 120 mL 6     No current facility-administered medications for this visit.       Allergies   Allergen Reactions   ? Blood-Group Specific Substance      Anti-Josep/Barnes.  Expect delays in obtaining blood for transfusion.  Collect 2 lavender top tubes and 1 red top tube for all blood bank orders.          Lab Results    Chemistry CBC BNP   Lab Results   Component Value Date    CREATININE 1.63 (H) 07/08/2019    BUN 40 (H) 07/08/2019     07/08/2019    K 4.4 07/08/2019     07/08/2019    CO2 31 07/08/2019     Creatinine (mg/dL)   Date Value   07/08/2019 1.63 (H)   05/20/2019 1.36 (H)   05/06/2019 1.33 (H)   04/29/2019 1.33 (H)    Lab Results   Component Value Date    WBC 8.3 04/01/2019    HGB 10.9 (L) 04/01/2019    HCT 36.5 (L) 04/01/2019    MCV 86 04/01/2019     04/01/2019    Lab Results   Component Value Date    BNP 1,298 (H) 04/29/2019     BNP (pg/mL)   Date Value   04/29/2019 1,298 (H)   04/22/2019 1,510 (H)   04/01/2019 1,666 (H)      Lissette Gordon CNP  [facility]   Heart Failure Clinic         This note has been dictated using voice recognition software. Any grammatical, typographical, or context distortions are unintentional and inherent to the software

## 2021-06-27 NOTE — PROGRESS NOTES
Progress Notes by Lissette Gordon NP at 4/29/2019  7:50 AM     Author: Lissette Gordon NP Service: -- Author Type: Nurse Practitioner    Filed: 4/29/2019  9:29 AM Encounter Date: 4/29/2019 Status: Signed    : Lissette Gordon NP (Nurse Practitioner)           Click to link to [facility] Heart Care     [facility] HEART CARE NOTE      Assessment/Recommendations   Assessment:    1. Ischemic cardiomyopathy with systolic dysfunction, NYHA class III with s/p CRT-D (EF of 44% in 12/2018): Reports that shortness of breath has improved about 10%.  He has crackles in bilateral lower lobes with diminished lung sounds.  Abdominal bloating has resolved.  His home weight is stable between 141 to 143 pounds. OptiVol fluid index and thoracic impedence shows improvement.    Reports missing 2 doses of afternoon furosemide 40 mg.  Following low-sodium diet.  Denies PND orthopnea.  Blood pressure today is 98/52 and heart rate 70.    2.  Obstructive sleep apnea: Reports using CPAP nightly.  O2 dependent 3 nasal per cannula for chronic hypoxemic respiratory failure.    3.  Chronic chronic kidney disease stage III: Most recent creatinine 1.32 and potassium 4.1, sodium level 146.    Plan:  1.  Heart failure medications:  - Beta blocker therapy with metroprolol succinate 50 mg twice a day  - Not on ACEI/ARB d/t low BP  - Diuretic therapy with furosemide 80 mg in a.m. and 40 mg in p.m.    2.  Reinforced low-sodium diet, daily weight, and stay active as tolerated    3. Obtaining BMP and BNP level today-pending.    4.  Discussed about palliative care given recurrent hospitalization over the last 4 months.  Patient would like to be followed up in the heart failure clinic closely and will let us know when he is interested.    Follow up with me in 2-3 weeks in HF clinic. F/u with Dr. Cadet on 5/29 and Dr. Thakkar on 6/5/19     History of Present Illness     Mr. Myron Sunshine is a 87 y.o. male with a significant past medical history of  hypertension, paroxysmal atrial fibrillation, chronic kidney disease stage III, severe aortic stenosis with status post TAVR in November 2018, history of ischemic cardiomyopathy with mild systolic dysfunction with left bundle branch block with status post BIV ICD upgrade in December 2018, and coronary artery disease with s/p PCI to proximal RCA in October 2018 who is seen at [facility] heart failure clinic today for continued follow-up.     During the last HF clinic visit, he had worsening heart failure symptoms with BNP level in 1500s.  His furosemide dose was increased. Today, Myron reports feeling improvement in his shortness of breath about 10%.  He denies lightheadedness, shortness of breath, orthopnea, PND, palpitations, chest pain, abdominal fullness/bloating and lower extremity edema. He had some issues with spasm in his legs and feet but this has improved with doing some stretching and exercise.  He reports feeling improvement in his sleep quality.  He is watching his salt intake.     ECHO-Reviewed:   Results for orders placed during the hospital encounter of 03/28/19   Echo Transesophageal [ECH01] 03/28/2019    Narrative   Dense spontaneous echo contrast in the atrial appendage.    No evidence of thrombus in the left atrial appendage    Left ventricle ejection fraction is decreased.    The right ventricle is mildly dilated. The systolic function is mildly   reduced.    Moderate mitral regurgitation.        Physical Examination Review of Systems   Vitals:    04/29/19 0801   BP: 98/52   Pulse: 60   Resp: 20     Body mass index is 21.41 kg/m .  Wt Readings from Last 3 Encounters:   04/29/19 145 lb (65.8 kg)   04/22/19 149 lb (67.6 kg)   04/11/19 148 lb (67.1 kg)       General Appearance:     Alert, cooperative and in no acute distress.   ENT/Mouth: membranes moist, no oral lesions or bleeding gums.      EYES:  no scleral icterus, normal conjunctivae   Neck: no carotid bruits or thyromegaly    Chest/Lungs:   lungs are clear to auscultation, no rales or wheezing, respirations unlabored except diminished lung sounds with crackles in bilateral lower lobes   Cardiovascular:    Normal first and second heart sounds with no murmurs, rubs, or gallops; the carotid, radial and posterior tibial pulses are intact, no edema bilateral lower extremities    Abdomen:  Soft, nontender, nondistended, bowel sounds present   Extremities: no cyanosis or clubbing   Skin: warm, dry.    Neurologic: mood and affect are appropriate, alert and oriented x3      General: WNL  Eyes: WNL  Ears/Nose/Throat: WNL  Lungs: WNL  Heart: WNL  Stomach: WNL  Bladder: WNL  Muscle/Joints: WNL  Skin: WNL  Nervous System: WNL  Mental Health: WNL     Blood: WNL     Medical History  Surgical History Family History Social History   Past Medical History:   Diagnosis Date   ? Anxiety    ? Bowel obstruction (H)    ? Bronchiectasis without acute exacerbation (H) 12/1/2017   ? Chronic hypoxemic respiratory failure (H)    ? CKD (chronic kidney disease) stage 3, GFR 30-59 ml/min (H)    ? Coronary artery disease due to lipid rich plaque    ? DNAR (do not attempt resuscitation)    ? Dyslipidemia, goal LDL below 70    ? ED (erectile dysfunction)    ? Essential hypertension    ? GERD (gastroesophageal reflux disease)    ? Ischemic cardiomyopathy    ? Mild aortic stenosis    ? Noncompliance with medication regimen 9/11/2018   ? Paroxysmal VT (H)    ? Persistent atrial fibrillation (H) 8/22/2018   ? Psoriasis    ? Restrictive lung disease; moderate by PFT 10/24/2017    FVC 1.9 456% FEV1 1.4 457% ratio 74%.  No significant response to bronchodilators.  TLC 40% predicted DLCO corrected 47% predicted Assessment: No obstruction.  Moderate restriction.  Moderate diffusion defect.   ? Seasonal allergies    ? Sleep apnea    ? TIA (transient ischemic attack) 8/09   ? Ventricular aneurysm     Long-term coumadin therapy s/p ventricular patch 2001   ? Vitamin D deficiency      Past Surgical History:   Procedure Laterality Date   ? ABDOMINAL AORTIC ANEURYSM REPAIR     ? APPENDECTOMY      Perforated   ? CARDIAC CATHETERIZATION     ? CARDIAC DEFIBRILLATOR PLACEMENT  2007   ? CORONARY STENT PLACEMENT  2001    LCX   ? CV CORONARY ANGIOGRAM N/A 10/18/2018    Procedure: Coronary Angiogram;  Surgeon: Elijah Liu MD;  Location: [facility] Cath Lab;  Service:    ? CV TRANSFEMORAL TRANSCATHETER VALVE REPLACEMENT N/A 2018    Procedure: Transfemoral Transcatheter Aortic Valve Replacement;  Surgeon: Elijah Liu MD;  Location: [facility] Cath Lab;  Service: Structural Heart   ? ICD Generator Change  14   ? IR EXTREMITY VENOGRAM LEFT  12/3/2018   ? PICC  2018        ? PICC AND MIDLINE TEAM LINE INSERTION  2018        ? RESECTION SMALL BOWEL / CLOSURE ILEOSTOMY     ? THORACENTESIS     ? VENTRICULAR RESECTION / REPAIR ANEURYSM      Family History   Problem Relation Age of Onset   ? Stroke Mother    ? Thyroid disease Mother    ? Cancer Brother    ? No Medical Problems Son    ? No Medical Problems Son    ? No Medical Problems Son    ? Lung disease Neg Hx     Social History     Socioeconomic History   ? Marital status:      Spouse name: Eulalia   ? Number of children: Not on file   ? Years of education: Not on file   ? Highest education level: Not on file   Occupational History   ? 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 Financial resource strain: Not on file   ? Food insecurity:     Worry: Not on file     Inability: Not on file   ? Transportation needs:     Medical: Not on file     Non-medical: Not on file   Tobacco Use   ? Smoking status: Former Smoker     Packs/day: 1.00     Years: 8.00     Pack years: 8.00     Types: Cigarettes     Last attempt to quit: 1955     Years since quittin.9   ? Smokeless tobacco: Never Used   Substance and Sexual Activity   ? Alcohol use: Yes     Alcohol/week: 0.6 oz      Types: 1 Glasses of wine per week     Comment: social   ? Drug use: No   ? Sexual activity: Not on file   Lifestyle   ? Physical activity:     Days per week: 0 days     Minutes per session: 0 min   ? Stress: Not on file   Relationships   ? Social connections:     Talks on phone: Not on file     Gets together: Not on file     Attends Mu-ism service: Not on file     Active member of club or organization: Not on file     Attends meetings of clubs or organizations: Not on file     Relationship status: Not on file   ? Intimate partner violence:     Fear of current or ex partner: Not on file     Emotionally abused: Not on file     Physically abused: Not on file     Forced sexual activity: Not on file   Other Topics Concern   ? Not on file   Social History Narrative    Lives with his wife, Eulalia, who he states has advanced arthritis. 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            Medications  Allergies   Current Outpatient Medications   Medication Sig Dispense Refill   ? acetaminophen (TYLENOL EXTRA STRENGTH) 500 MG tablet Take 1 tablet (500 mg total) by mouth every 6 (six) hours as needed for pain.  0   ? allopurinol (ZYLOPRIM) 100 MG tablet Take 100 mg by mouth daily.     ? atorvastatin (LIPITOR) 80 MG tablet Take 1 tablet (80 mg total) by mouth daily. 30 tablet 3   ? calcium, as carbonate, (TUMS) 200 mg calcium (500 mg) chewable tablet Chew 1 tablet daily as needed for heartburn.     ? cetirizine (ZYRTEC) 10 MG tablet Take 10 mg by mouth daily as needed.            ? clopidogrel (PLAVIX) 75 mg tablet Take 1 tablet (75 mg total) by mouth daily. take 75 mg (1 pill) daily     ? fluticasone (FLONASE) 50 mcg/actuation nasal spray Apply 1 spray into each nostril daily as needed for rhinitis.     ? furosemide (LASIX) 80 MG tablet Take 1 tablet (80 mg) every morning.  Take 1/2 tablet (40 mg) every evening. 126 tablet 3   ? hydrocortisone 1 % cream Apply 1 application topically every 6 (six) hours as needed.     ?  latanoprost (XALATAN) 0.005 % ophthalmic solution Administer 1 drop to the right eye at bedtime. 2.5 mL 12   ? metoprolol succinate (TOPROL XL) 50 MG 24 hr tablet Take 1 tablet (50 mg total) by mouth 2 (two) times a day. 90 tablet 3   ? nebulizer and compressor Radha For chronic cough 1 each 0   ? OXYGEN-AIR DELIVERY SYSTEMS MISC into each nostril continuous. 1.5 liters with rest and 3 liters with activity  May use up to 1.5 liters at noc.     ? pantoprazole (PROTONIX) 20 MG tablet Take 1 tablet (20 mg total) by mouth daily. (Patient taking differently: Take 20 mg by mouth daily as needed.       ) 30 tablet 0   ? predniSONE (DELTASONE) 5 MG tablet Take 5 mg by mouth daily.     ? sertraline (ZOLOFT) 50 MG tablet Take 0.5 tablets (25 mg total) by mouth at bedtime.  0   ? warfarin (COUMADIN/JANTOVEN) 2 MG tablet Take 1 tablet (2 mg total) by mouth See Admin Instructions. Take 2 mg daily check INR on 4/9/2019 180 tablet 0   ? albuterol (PROVENTIL) 2.5 mg /3 mL (0.083 %) nebulizer solution Take 3 mL (2.5 mg total) by nebulization every 4 (four) hours as needed for wheezing. 360 mL 6   ? budesonide (PULMICORT) 0.5 mg/2 mL nebulizer solution Take 2 mL (0.5 mg total) by nebulization 2 (two) times a day. 120 mL 6   ? formoterol fumarate (PERFOROMIST) 20 mcg/2 mL nebulizer solution Take 2 mL (20 mcg total) by nebulization daily.  0   ? nitroglycerin (NITROSTAT) 0.4 MG SL tablet Place 1 tablet (0.4 mg total) under the tongue every 5 (five) minutes as needed for chest pain.  0     No current facility-administered medications for this visit.       Allergies   Allergen Reactions   ? Blood-Group Specific Substance      Anti-Josep/Barnes.  Expect delays in obtaining blood for transfusion.  Collect 2 lavender top tubes and 1 red top tube for all blood bank orders.          Lab Results    Chemistry CBC BNP   Lab Results   Component Value Date    CREATININE 1.32 (H) 04/22/2019    BUN 35 (H) 04/22/2019     (H) 04/22/2019    K 4.1  04/22/2019     04/22/2019    CO2 35 (H) 04/22/2019     Creatinine (mg/dL)   Date Value   04/22/2019 1.32 (H)   04/11/2019 1.32 (H)   04/07/2019 1.09   04/06/2019 1.18    Lab Results   Component Value Date    WBC 8.3 04/01/2019    HGB 10.9 (L) 04/01/2019    HCT 36.5 (L) 04/01/2019    MCV 86 04/01/2019     04/01/2019    Lab Results   Component Value Date    BNP 1,510 (H) 04/22/2019     BNP (pg/mL)   Date Value   04/22/2019 1,510 (H)   04/01/2019 1,666 (H)   02/25/2019 1,731 (H)        Lissette Gordon, [facility]   Heart Failure Clinic         This note has been dictated using voice recognition software. Any grammatical, typographical, or context distortions are unintentional and inherent to the software

## 2021-06-27 NOTE — PROGRESS NOTES
Progress Notes by Lissette Gordon NP at 5/13/2019  2:50 PM     Author: Lissette Gordon NP Service: -- Author Type: Nurse Practitioner    Filed: 5/13/2019  4:57 PM Encounter Date: 5/13/2019 Status: Signed    : Lissette Gordon NP (Nurse Practitioner)           Click to link to [facility]     [facility] HEART CARE NOTE      Assessment/Recommendations   Assessment:    1. Ischemic cardiomyopathy with systolic dysfunction, NYHA class III: Weight is up 3 pounds above baseline.  Reports salt indiscretion in his diet.  His baseline weight is 141 to 143 pounds.  He has shortness of breath on exertion which is unchanged from baseline.  He reports occasional abdominal bloating.  Diminished lung sounds with crackles in bilateral bases.  Denies PND orthopnea    2.  Chronic kidney disease stage III: most recent creatinine 1.33    3.  Hypotension: Blood pressure today is 94/52.  He is asymptomatic    Plan:  1.  Increase furosemide from 80 mg daily to 80 mg in a.m. and 40 mg p.m. x2 days.  May repeat extra dose for 1 more day if needed.  Encouraged to call back if no improvement in weight and heart failure symptoms.  Discussed about enrolling in OptiVol monitoring program. The patient is interested.  We will inform this to Lizzette device clinician.    2.  Renal function stable.  We will continue to monitor periodically.    3.  Encouraged adequate hydration.  Reinforced low-sodium diet, daily weight monitoring, and stay active as tolerated    Follow up with Dr. Thakkar in June and follow-up with me in 8 weeks.     History of Present Illness    Mr. Myron Sunshine is a 87 y.o. male with a significant past medical history of hypertension, paroxysmal atrial fibrillation, chronic kidney disease stage III, severe aortic stenosis with status post TAVR in November 2018, history of ischemic cardiomyopathy with mild systolic dysfunction with left bundle branch block with status post BIV ICD upgrade in December 2018, and  coronary artery disease with s/p PCI to proximal RCA in October 2018 who is seen at [facility] heart failure clinic today for continued follow-up.     During last heart failure clinic visit, his BNP was elevated up in 1000's although it has improved a little bit.  He was recommended to stay on same dose of furosemide with 80 mg in a.m. and 40 mg in p.m.  His BNP has remained stable with creatinine of 1.33.  His heart failure symptoms improved and therefore the dose was reduced to previous dose of 80 mg daily.  Lucia, device clinician has suggested to do monthly OptiVol check given his recurrent heart failure symptoms requiring frequent diuretic dose adjustment.    Today, patient tells me that his weight is up 3 pounds above baseline.  He has some shortness of breath at baseline.  He reports fatigue and occasional abdominal bloating.  He reports salt indiscretion in his diet from Mother's Day celebration and he went to [place] [employer] this [place]. He denies lightheadedness, shortness of breath, orthopnea, PND, palpitations, chest pain and lower extremity edema.      He reported that he cut back on the prednisone on his own.  He was recommended to follow-up with the pulmonary and asked for their recommendation.     ECHO-Reviewed:   Results for orders placed during the hospital encounter of 03/28/19   Echo Transesophageal [ECH01] 03/28/2019    Narrative   Dense spontaneous echo contrast in the atrial appendage.    No evidence of thrombus in the left atrial appendage    Left ventricle ejection fraction is decreased.    The right ventricle is mildly dilated. The systolic function is mildly   reduced.    Moderate mitral regurgitation.           Physical Examination Review of Systems   Vitals:    05/13/19 1500   BP: 94/52   Pulse: 62   Resp: 16     Body mass index is 21.86 kg/m .  Wt Readings from Last 3 Encounters:   05/13/19 148 lb (67.1 kg)   05/02/19 145 lb (65.8 kg)   04/29/19 145 lb (65.8 kg)       General  Appearance:     Alert, cooperative and in no acute distress.   ENT/Mouth: membranes moist, no oral lesions or bleeding gums.      EYES:  no scleral icterus, normal conjunctivae   Neck: no carotid bruits or thyromegaly   Chest/Lungs:   lungs are clear to auscultation but diminished globally, no rales or wheezing, respirations unlabored except crackles in bilateral bases with diminished lung sounds   Cardiovascular:    Normal first and second heart sounds with no murmurs, rubs, or gallops; the carotid, radial and posterior tibial pulses are intact, no edema bilateral lower extremities    Abdomen:  Soft, nontender, nondistended, bowel sounds present   Extremities: no cyanosis or clubbing   Skin: warm, dry.    Neurologic: mood and affect are appropriate, alert and oriented x3      General: WNL  Eyes: WNL  Ears/Nose/Throat: WNL  Lungs: WNL  Heart: WNL  Stomach: WNL  Bladder: WNL  Muscle/Joints: WNL  Skin: WNL  Nervous System: WNL  Mental Health: WNL     Blood: WNL     Medical History  Surgical History Family History Social History   Past Medical History:   Diagnosis Date   ? Anxiety    ? Bowel obstruction (H)    ? Bronchiectasis without acute exacerbation (H) 12/1/2017   ? Chronic hypoxemic respiratory failure (H)    ? CKD (chronic kidney disease) stage 3, GFR 30-59 ml/min (H)    ? Coronary artery disease due to lipid rich plaque    ? DNAR (do not attempt resuscitation)    ? Dyslipidemia, goal LDL below 70    ? ED (erectile dysfunction)    ? Essential hypertension    ? GERD (gastroesophageal reflux disease)    ? Ischemic cardiomyopathy    ? Mild aortic stenosis    ? Noncompliance with medication regimen 9/11/2018   ? Paroxysmal VT (H)    ? Persistent atrial fibrillation (H) 8/22/2018   ? Psoriasis    ? Restrictive lung disease; moderate by PFT 10/24/2017    FVC 1.9 456% FEV1 1.4 457% ratio 74%.  No significant response to bronchodilators.  TLC 40% predicted DLCO corrected 47% predicted Assessment: No obstruction.   Moderate restriction.  Moderate diffusion defect.   ? Seasonal allergies    ? Sleep apnea    ? TIA (transient ischemic attack) 8/09   ? Ventricular aneurysm     Long-term coumadin therapy s/p ventricular patch 2001   ? Vitamin D deficiency     Past Surgical History:   Procedure Laterality Date   ? ABDOMINAL AORTIC ANEURYSM REPAIR     ? APPENDECTOMY      Perforated   ? CARDIAC CATHETERIZATION     ? CARDIAC DEFIBRILLATOR PLACEMENT  7/19/2007   ? CORONARY STENT PLACEMENT  2001    LCX   ? CV CORONARY ANGIOGRAM N/A 10/18/2018    Procedure: Coronary Angiogram;  Surgeon: Elijah Liu MD;  Location: [facility] Cath Lab;  Service:    ? CV TRANSFEMORAL TRANSCATHETER VALVE REPLACEMENT N/A 11/27/2018    Procedure: Transfemoral Transcatheter Aortic Valve Replacement;  Surgeon: Elijah Liu MD;  Location: [facility] Cath Lab;  Service: Structural Heart   ? ICD Generator Change  6/18/14   ? IR EXTREMITY VENOGRAM LEFT  12/3/2018   ? PICC  11/27/2018        ? PICC AND MIDLINE TEAM LINE INSERTION  2/23/2018        ? RESECTION SMALL BOWEL / CLOSURE ILEOSTOMY     ? THORACENTESIS     ? VENTRICULAR RESECTION / REPAIR ANEURYSM      Family History   Problem Relation Age of Onset   ? Stroke Mother    ? Thyroid disease Mother    ? Cancer Brother    ? No Medical Problems Son    ? No Medical Problems Son    ? No Medical Problems Son    ? Lung disease Neg Hx     Social History     Socioeconomic History   ? Marital status:      Spouse name: Eulalia   ? Number of children: Not on file   ? Years of education: Not on file   ? Highest education level: Not on file   Occupational History   ? 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 Financial resource strain: Not on file   ? Food insecurity:     Worry: Not on file     Inability: Not on file   ? Transportation needs:     Medical: Not on file     Non-medical: Not on file   Tobacco Use   ? Smoking status: Former Smoker     Packs/day: 1.00      Years: 8.00     Pack years: 8.00     Types: Cigarettes     Last attempt to quit: 1955     Years since quittin.0   ? Smokeless tobacco: Never Used   Substance and Sexual Activity   ? Alcohol use: Yes     Alcohol/week: 0.6 oz     Types: 1 Glasses of wine per week     Comment: social   ? Drug use: No   ? Sexual activity: Not on file   Lifestyle   ? Physical activity:     Days per week: 0 days     Minutes per session: 0 min   ? Stress: Not on file   Relationships   ? Social connections:     Talks on phone: Not on file     Gets together: Not on file     Attends [employer] service: Not on file     Active member of club or organization: Not on file     Attends meetings of clubs or organizations: Not on file     Relationship status: Not on file   ? Intimate partner violence:     Fear of current or ex partner: Not on file     Emotionally abused: Not on file     Physically abused: Not on file     Forced sexual activity: Not on file   Other Topics Concern   ? Not on file   Social History Narrative    Lives with his wife, Eulalia, who he states has advanced arthritis. 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            Medications  Allergies   Current Outpatient Medications   Medication Sig Dispense Refill   ? acetaminophen (TYLENOL EXTRA STRENGTH) 500 MG tablet Take 1 tablet (500 mg total) by mouth every 6 (six) hours as needed for pain.  0   ? allopurinol (ZYLOPRIM) 100 MG tablet Take 100 mg by mouth daily.     ? atorvastatin (LIPITOR) 80 MG tablet Take 1 tablet (80 mg total) by mouth daily. 30 tablet 3   ? calcium, as carbonate, (TUMS) 200 mg calcium (500 mg) chewable tablet Chew 1 tablet daily as needed for heartburn.     ? cetirizine (ZYRTEC) 10 MG tablet Take 10 mg by mouth daily as needed.            ? clopidogrel (PLAVIX) 75 mg tablet Take 1 tablet (75 mg total) by mouth daily. take 75 mg (1 pill) daily     ? fluticasone (FLONASE) 50 mcg/actuation nasal spray Apply 1 spray into each nostril daily as needed  for rhinitis.     ? furosemide (LASIX) 80 MG tablet Take 1 tablet (80 mg total) by mouth daily. 90 tablet 3   ? hydrocortisone 1 % cream Apply 1 application topically every 6 (six) hours as needed.     ? latanoprost (XALATAN) 0.005 % ophthalmic solution Administer 1 drop to the right eye at bedtime. 2.5 mL 12   ? metoprolol succinate (TOPROL XL) 50 MG 24 hr tablet Take 1 tablet (50 mg total) by mouth 2 (two) times a day. 90 tablet 3   ? nebulizer and compressor Radha For chronic cough 1 each 0   ? nitroglycerin (NITROSTAT) 0.4 MG SL tablet Place 1 tablet (0.4 mg total) under the tongue every 5 (five) minutes as needed for chest pain.  0   ? OXYGEN-AIR DELIVERY SYSTEMS MISC into each nostril continuous. 1.5 liters with rest and 3 liters with activity  May use up to 1.5 liters at noc.     ? pantoprazole (PROTONIX) 20 MG tablet Take 1 tablet (20 mg total) by mouth daily. (Patient taking differently: Take 20 mg by mouth daily as needed.       ) 30 tablet 0   ? PERFOROMIST 20 mcg/2 mL nebulizer solution USE 1 VIAL IN NEBULIZER EVERY 12 HOURS 120 mL 0   ? predniSONE (DELTASONE) 5 MG tablet Take 5 mg by mouth daily.     ? sertraline (ZOLOFT) 50 MG tablet Take 0.5 tablets (25 mg total) by mouth at bedtime.  0   ? warfarin (COUMADIN/JANTOVEN) 2 MG tablet Take 1 tablet (2 mg total) by mouth See Admin Instructions. Take 2 mg daily check INR on 4/9/2019 180 tablet 0   ? albuterol (PROVENTIL) 2.5 mg /3 mL (0.083 %) nebulizer solution Take 3 mL (2.5 mg total) by nebulization every 4 (four) hours as needed for wheezing. 360 mL 6   ? budesonide (PULMICORT) 0.5 mg/2 mL nebulizer solution Take 2 mL (0.5 mg total) by nebulization 2 (two) times a day. 120 mL 6     No current facility-administered medications for this visit.       Allergies   Allergen Reactions   ? Blood-Group Specific Substance      Anti-Josep/Barnes.  Expect delays in obtaining blood for transfusion.  Collect 2 lavender top tubes and 1 red top tube for all blood bank  orders.          Lab Results    Chemistry CBC BNP   Lab Results   Component Value Date    CREATININE 1.33 (H) 05/06/2019    BUN 43 (H) 05/06/2019     05/06/2019    K 4.1 05/06/2019     05/06/2019    CO2 35 (H) 05/06/2019     Creatinine (mg/dL)   Date Value   05/06/2019 1.33 (H)   04/29/2019 1.33 (H)   04/22/2019 1.32 (H)   04/11/2019 1.32 (H)    Lab Results   Component Value Date    WBC 8.3 04/01/2019    HGB 10.9 (L) 04/01/2019    HCT 36.5 (L) 04/01/2019    MCV 86 04/01/2019     04/01/2019    Lab Results   Component Value Date    BNP 1,298 (H) 04/29/2019     BNP (pg/mL)   Date Value   04/29/2019 1,298 (H)   04/22/2019 1,510 (H)   04/01/2019 1,666 (H)        Lissette Gordon CNP  [facility]   Heart Failure Clinic           This note has been dictated using voice recognition software. Any grammatical, typographical, or context distortions are unintentional and inherent to the software

## 2021-06-27 NOTE — PROGRESS NOTES
Progress Notes by Lissette Gordon NP at 1/14/2019 10:30 AM     Author: Lissette Gordon NP Service: -- Author Type: Nurse Practitioner    Filed: 1/14/2019 12:19 PM Encounter Date: 1/14/2019 Status: Signed    : Lissette Gordon NP (Nurse Practitioner)           Click to link to [facility] Heart Care     [facility] HEART CARE NOTE      Assessment/Recommendations   Assessment:    1. Ischemic cardiomyopathy with mild systolic dysfunction, NYHA class III with EF of 44% on recent ECHO in December 2018: Recent hospitalization with acute respiratory failure secondary to interstitial lung disease, pulmonary infection, and acute on chronic heart failure.  He was diuresed but developed mild AMARILIS and therefore his furosemide dose was reduced at discharge.    He is well compensated today.  Is following low-sodium diet.  He reports his weight stable around 150 pounds.  Reports appetite average     2. Chronic hypoxemic respiratory failure: on O2 2 L per nasal cannula.  Oxygen saturation is 96% on room air    3. Obstructive sleep apnea-uses CPAP regularly    4. Chronic kidney disease stage III:Most recent sodium level 140, potassium 4.0, BUN 39, and creatinine 1.65-improved and back to baseline.    5.  Hypotension: Blood pressure today is 98/50.  He is asymptomatic.  His metoprolol succinate 50 mg daily was switched to Metroprolol tartrate 25 mg twice a day due to low blood pressure in the hospital.    Plan:  1.Checked with our Pharmacist (Mady). No significant difference on blood pressure with Metoprolol Tartrate vs Succinate. Given he is asymptomatic will consider switching him back to previous dose of Metoprolol Succinate.Patient was encouraged to call if lightheaded or dizzy- we can cut back the dose further.     Heart failure medications:  - Beta blocker therapy switched from Metoprolol Tartrate 25 mg two times a day to back to Metoprolol Succinate 50 mg daily at bedtime.   - Diuretic therapy with furosemide 60 mg daily  (discharge order says 40 mg but he has been taking 60 mg since discharge).    2.  Reinforced low-sodium diet, daily weight, and  adequate fluid intake of 50-60 ounces of fluid per day  3.  Patient was encouraged to call if persistent weight loss with signs of dehydration with dry mouth, lightheadedness or dizziness-will cut back of Lasix further    Follow up with Dr. Thakkar as scheduled in January 2019 and Marielos Landaverde CNP in 5-6 weeks     History of Present Illness    Mr. Myron Sunshine is a 87 y.o. male with a significant past medical history of hypertension, paroxysmal atrial fibrillation, chronic kidney disease stage III, severe aortic stenosis with status post TAVR in November 2018, history of ischemic cardiomyopathy with mild systolic dysfunction with left bundle branch block with status post by V ICD upgrade in December 2018, and coronary artery disease with status post PCI to proximal RCA in October 2018  who is seen at [facility] heart failure clinic today for posthospitalization follow-up.  Patient was recently hospitalized from December 30 through January 2, 2019 and recent hospitalization with acute respiratory failure secondary to interstitial lung disease and acute on chronic congestive heart failure.  He had an elevated white count and pro-calcitonin thought to be due to infection and lungs.  He was treated with an antibiotic.  His chest x-ray showed pulmonary edema consistent with CHF and BNP elevated in 500s.  He was diuresed but developed mild AK I and therefore his furosemide dose was reduced at discharge.  He also had issue with hypotension and therefore his metoprolol succinate was switched to Metroprolol tartrate.    Today, patient presented to the heart failure clinic accompanied by his wife, Joann.  Patient reports feeling significant improvement in his  shortness of breath since discharge from the hospital.  Reports chronic shortness of breath with exertion at baseline. He  denies fatigue, lightheadedness, shortness of breath, orthopnea, PND, palpitations, chest pain, abdominal fullness/bloating and lower extremity edema.      He is monitoring home weights which are stable around 150 pounds.  He is following a low sodium diet.  He is on chronic O2 2 L per nasal cannula.  His sedentary and he does not exercise on a regular basis.  ECHO-Reviewed:   Results for orders placed during the hospital encounter of 12/30/18   Echo Complete [ECH10] 12/31/2018    Narrative   The calculated left ventricular ejection fraction is 44%. This   represents a mildly decreased ejection fraction. Cavity is mildly   increased. 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 There is residual leak of LV patch near basal   inferoseptum with systolic flow into aneurysm from the left ventricle.    Dyskinetic: basal inferior, basal inferolateral, basal anterolateral,   mid inferior, mid inferolateral and mid anterolateral.    Right Ventricle: Normal right ventricular size and systolic function.    Bioprosthetic valve present. There is no aortic insufficiency present.   The prosthetic valve is normal.    When compared to the previous study dated 11/28/2018, no significant   change.         Physical Examination Review of Systems   Vitals:    01/14/19 1052   BP: 98/50   Pulse: 80   Resp: 16     Body mass index is 23.18 kg/m .  Wt Readings from Last 3 Encounters:   01/14/19 157 lb (71.2 kg)   01/08/19 154 lb (69.9 kg)   01/02/19 151 lb 14.4 oz (68.9 kg)       General Appearance:     Alert, cooperative and in no acute distress.   ENT/Mouth: membranes moist, no oral lesions or bleeding gums.      EYES:  no scleral icterus, normal conjunctivae   Neck: no carotid bruits or thyromegaly   Chest/Lungs:   lungs are clear to auscultation, no rales or wheezing, respirations unlabored except diminished lung sounds with crackles in bilateral bases    Cardiovascular:   Normal first and second heart sounds with no murmurs, rubs, or gallops; the carotid, radial and posterior tibial pulses are intact, no edema bilateral lower extremities    Abdomen:  Soft, nontender, nondistended, bowel sounds present   Extremities: no cyanosis or clubbing   Skin: warm, dry.    Neurologic: mood and affect are appropriate, alert and oriented x3      General: WNL  Eyes: WNL  Ears/Nose/Throat: WNL  Lungs: WNL  Heart: WNL  Stomach: WNL  Bladder: WNL  Muscle/Joints: WNL  Skin: WNL  Nervous System: WNL  Mental Health: WNL     Blood: WNL     Medical History  Surgical History Family History Social History   Past Medical History:   Diagnosis Date   ? Anxiety    ? Bowel obstruction (H)    ? Chronic hypoxemic respiratory failure (H)    ? CKD (chronic kidney disease) stage 3, GFR 30-59 ml/min (H)    ? Coronary artery disease due to lipid rich plaque    ? DNAR (do not attempt resuscitation)    ? Dyslipidemia, goal LDL below 70    ? ED (erectile dysfunction)    ? Essential hypertension    ? GERD (gastroesophageal reflux disease)    ? Ischemic cardiomyopathy    ? Mild aortic stenosis    ? Noncompliance with medication regimen 9/11/2018   ? Paroxysmal VT (H)    ? Persistent atrial fibrillation (H) 8/22/2018   ? Psoriasis    ? Restrictive lung disease; moderate by PFT 10/24/2017    FVC 1.9 456% FEV1 1.4 457% ratio 74%.  No significant response to bronchodilators.  TLC 40% predicted DLCO corrected 47% predicted Assessment: No obstruction.  Moderate restriction.  Moderate diffusion defect.   ? Seasonal allergies    ? Sleep apnea    ? TIA (transient ischemic attack) 8/09   ? Ventricular aneurysm     Long-term coumadin therapy s/p ventricular patch 2001   ? Vitamin D deficiency     Past Surgical History:   Procedure Laterality Date   ? ABDOMINAL AORTIC ANEURYSM REPAIR     ? APPENDECTOMY      Perforated   ? CARDIAC CATHETERIZATION     ? CARDIAC DEFIBRILLATOR PLACEMENT  7/19/2007   ? CORONARY STENT  PLACEMENT  2001    LCX   ? CV CORONARY ANGIOGRAM N/A 10/18/2018    Procedure: Coronary Angiogram;  Surgeon: Elijah Liu MD;  Location: [facility] Cath Lab;  Service:    ? CV TRANSFEMORAL TRANSCATHETER VALVE REPLACEMENT N/A 2018    Procedure: Transfemoral Transcatheter Aortic Valve Replacement;  Surgeon: Elijah Liu MD;  Location: [facility] Cath Lab;  Service: Structural Heart   ? ICD Generator Change  14   ? IR EXTREMITY VENOGRAM LEFT  12/3/2018   ? PICC  2018        ? PICC AND MIDLINE TEAM LINE INSERTION  2018        ? RESECTION SMALL BOWEL / CLOSURE ILEOSTOMY     ? THORACENTESIS     ? VENTRICULAR RESECTION / REPAIR ANEURYSM      Family History   Problem Relation Age of Onset   ? Stroke Mother    ? Thyroid disease Mother    ? Cancer Brother    ? No Medical Problems Son    ? No Medical Problems Son    ? No Medical Problems Son    ? Lung disease Neg Hx     Social History     Socioeconomic History   ? Marital status:      Spouse name: Eulalia   ? Number of children: Not on file   ? Years of education: Not on file   ? Highest education level: Not on file   Social Needs   ? Financial resource strain: Not on file   ? Food insecurity - worry: Not on file   ? Food insecurity - inability: Not on file   ? Transportation needs - medical: Not on file   ? Transportation needs - non-medical: Not on file   Occupational History   ? 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 Smoking status: Former Smoker     Packs/day: 1.00     Years: 8.00     Pack years: 8.00     Types: Cigarettes     Last attempt to quit: 1955     Years since quittin.7   ? Smokeless tobacco: Never Used   Substance and Sexual Activity   ? Alcohol use: Yes     Alcohol/week: 0.6 oz     Types: 1 Glasses of wine per week   ? Drug use: No   ? Sexual activity: Not on file   Other Topics Concern   ? Not on file   Social History Narrative    Lives with his wife, Eulalia, who he  states has advanced arthritis. 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            Medications  Allergies   Current Outpatient Medications   Medication Sig Dispense Refill   ? acetaminophen (TYLENOL EXTRA STRENGTH) 500 MG tablet Take 1 tablet (500 mg total) by mouth every 6 (six) hours as needed for pain.  0   ? albuterol (PROAIR HFA;PROVENTIL HFA;VENTOLIN HFA) 90 mcg/actuation inhaler Inhale 2 puffs every 6 (six) hours as needed for shortness of breath. 1 Inhaler 0   ? albuterol (PROVENTIL) 2.5 mg /3 mL (0.083 %) nebulizer solution Take 3 mL (2.5 mg total) by nebulization every 4 (four) hours as needed for wheezing.  0   ? allopurinol (ZYLOPRIM) 100 MG tablet Take 100 mg by mouth daily.     ? atorvastatin (LIPITOR) 80 MG tablet Take 1 tablet (80 mg total) by mouth daily. 30 tablet 3   ? cetirizine (ZYRTEC) 10 MG tablet Take 10 mg by mouth daily.     ? clopidogrel (PLAVIX) 75 mg tablet Take 300 mg (4 pills) Friday morning.  Starting Saturday, take 75 mg (1 pill) daily 30 tablet 12   ? fluticasone (FLONASE) 50 mcg/actuation nasal spray Apply 1 spray into each nostril daily as needed for rhinitis.     ? fluticasone-vilanterol (BREO ELLIPTA) 200-25 mcg/dose DsDv inhaler Inhale 1 puff daily. 1 each 11   ? formoterol fumarate (PERFOROMIST) 20 mcg/2 mL nebulizer solution Take 2 mL (20 mcg total) by nebulization daily.  0   ? furosemide (LASIX) 80 MG tablet Take 0.5 tablets (40 mg total) by mouth daily. 30 tablet 0   ? guaiFENesin ER (MUCINEX) 600 mg 12 hr tablet Take 1 tablet (600 mg total) by mouth 2 (two) times a day.  0   ? hydrocortisone 1 % cream Apply 1 application topically every 6 (six) hours as needed.     ? latanoprost (XALATAN) 0.005 % ophthalmic solution Administer 1 drop to the right eye at bedtime. 2.5 mL 12   ? metoprolol tartrate (LOPRESSOR) 25 MG tablet Take 1 tablet (25 mg total) by mouth 2 (two) times a day. 60 tablet 0   ? multivitamin therapeutic (THERAGRAN) tablet Take 1 tablet by mouth daily.  0    ? nebulizer and compressor Radha For chronic cough 1 each 0   ? OXYGEN-AIR DELIVERY SYSTEMS MISC into each nostril continuous. 1.5 liters with rest and 3 liters with activity  May use up to 1.5 liters at noc.     ? pantoprazole (PROTONIX) 20 MG tablet Take 1 tablet (20 mg total) by mouth daily. (Patient taking differently: Take 20 mg by mouth daily as needed.       ) 30 tablet 0   ? sertraline (ZOLOFT) 50 MG tablet Take 0.5 tablets (25 mg total) by mouth at bedtime.  0   ? warfarin (COUMADIN/JANTOVEN) 2 MG tablet Take 1 tablet (2 mg total) by mouth See Admin Instructions. Take 4 mg daily 180 tablet 0   ? nitroglycerin (NITROSTAT) 0.4 MG SL tablet Place 1 tablet (0.4 mg total) under the tongue every 5 (five) minutes as needed for chest pain.  0     No current facility-administered medications for this visit.     Allergies   Allergen Reactions   ? Blood-Group Specific Substance      Anti-Josep/Barnes.  Expect delays in obtaining blood for transfusion.  Collect 2 lavender top tubes and 1 red top tube for all blood bank orders.          Lab Results    Chemistry CBC BNP   Lab Results   Component Value Date    CREATININE 1.65 (H) 01/04/2019    BUN 39 (H) 01/04/2019     01/04/2019    K 4.0 01/04/2019     01/04/2019    CO2 24 01/04/2019     Creatinine (mg/dL)   Date Value   01/04/2019 1.65 (H)   01/01/2019 2.12 (H)   12/31/2018 1.86 (H)   12/30/2018 1.63 (H)    Lab Results   Component Value Date    WBC 11.9 (H) 01/08/2019    HGB 10.5 (L) 01/08/2019    HCT 34.9 (L) 01/08/2019    MCV 88 01/08/2019     01/08/2019    Lab Results   Component Value Date     (H) 12/30/2018     BNP (pg/mL)   Date Value   12/30/2018 552 (H)   11/28/2018 968 (H)   11/27/2018 770 (H)        Lissette Gordon CNP  [facility]   Heart Failure Clinic         This note has been dictated using voice recognition software. Any grammatical, typographical, or context distortions are unintentional and inherent to the  software

## 2021-06-27 NOTE — PROGRESS NOTES
Progress Notes by Donna Thakkar MD at 3/7/2019  9:50 AM     Author: Donna Thakkar MD Service: -- Author Type: Physician    Filed: 3/7/2019 10:41 AM Encounter Date: 3/7/2019 Status: Signed    : Donna Thakkar MD (Physician)                  [facility]org/Heart  [phone]         Thank you Dr. Gerardo for asking the [facility] team to participate in the care of your patient, Myron Sunshine.     Impression and Plan     1. Coronary artery disease. Myron has known coronary artery disease. Specifically, he had suffered a myocardial infarction in October of 2001 for which he underwent angioplasty and stenting of the circumflex. In addition, shortly thereafter he had repair of a left ventricular aneurysm/pseudoaneurysm.    On 18 October 2018 patient underwent PCI with stent placement to right coronary artery (2.5 x 20 mm Synergy drug-eluting stent).     This is been stable.  Patient denies any angina type chest pain.     2. Cardiomyopathy (ischemic). Most recent imaging by echocardiography revealed ejection fraction of 40-45%.  Patient has been intolerant of ACE inhibitor/ARB therapy secondary to hypotension.  Patient is appropriately on beta-blocker therapy.  He appears volume neutral on clinical exam and his weights have been stable.    Of note, patient does have underlying pulmonary issues as well.  He did undergo pulmonary function testing which revealed FVC 59% predicted and FEV1 61% predicted.  As noted below, he was recently started on a course of prednisone therapy under the auspices of Dr. Albina Christensen.     3.  Status post aortic valve replacement (documented 26 mm Kd 3 valve).  Normal prosthetic valve function on recent echocardiogram which is commensurate with exam.     4.  Atrial fibrillation (paroxysmal).  Last device interrogation revealed atrial fibrillation burden of 1.5%.  Ventricular response when in atrial fibrillation well controlled.  Patient is  maintained on warfarin therapy.    5.  Hypertension. Blood pressure is very reasonable in the office today.     6. Dyslipidemia.  Lipid profile 8 November 2018 was favorable with LDL 53 mg/dL and HDL 39 mg/dL.     Plan on follow-up in six months.  Patient will be followed intermittently through the device clinic as well per protocol.  He is also scheduled to follow-up with 1 of our nurse practitioners in April 2019.            History of Present Illness    Once again I would like to thank you again for asking me to participate in the care of your patient, Myron Sunshine.  As you know, but to reiterate for my own records, Myron Sunshine is a 87 y.o. male with known coronary disease. Specifically, he had suffered a myocardial infarction in October of 2001 for which he underwent angioplasty and stenting of the circumflex. In addition, shortly thereafter he had repair of a left ventricular aneurysm/pseudoaneurysm. He has been noted on subsequent echocardiograms to have residual aneurysmal appearance involving the patched area. For this reason, he has been maintained on warfarin since in the past he had been noted to have a suspicion of possible mural thrombus in this region.      He did undergo repeat angiography 21 November 2001 and was found to have patency of his circumflex stents, but a 95% lesion involving the 1st diagonal and this was treated medically.     On 18 October 2018 patient underwent PCI with stent placement to right coronary artery (2.5 x 20 mm Synergy drug-eluting stent).    On interview today, Myron states that he has been doing well from a cardiovascular standpoint.  He denies any anginal type chest pain.  He has had some increasing shortness of breath though this is felt more pulmonary in nature.  He was just started yesterday on the course of prednisone.  He denies any palpitations or lightheadedness.  He reports no recent fevers, chills, or other constitutional symptoms.    Further review of  systems is otherwise negative/noncontributory (medical record and 13 point review of systems reviewed as well and pertinent positives noted).         Cardiac Diagnostics      Echocardiogram 31 December 2018:  1. Mild left ventricular enlargement with mild to moderate reduction in left ventricular systolic performance.  Ejection fraction 40-45%.  2. Inferior/posterior aneurysm.  3. Severe concentric increased left ventricular wall thickness  4. Bioprosthetic aortic valve with normal prosthetic valve function (documented 26 mm Kd 3 valve).  5. Mean gradient measured at 11 mmHg with peak velocity of 2.0 m/s.  6. Trace aortic insufficiency.  7. Mild to moderate mitral insufficiency.  8. Normal right ventricular size and systolic performance.  9. Severe left atrial enlargement.  Right atrium of normal dimension.    Transesophageal echocardiogram 23 February 2018:  1. Normal left ventricular size and systolic performance with ejection fraction of 55%.  2. Barrier and lateral-basilar pseudoaneurysm.  3. Mild to moderate aortic stenosis.  4. Mild aortic insufficiency.  5. Mild to moderate mitral insufficiency.  6. Normal right ventricular size and systolic performance  7. No obvious vegetation.    Echocardiogram 1 October 2017:  1. Moderately reduced left ventricular systolic performance with ejection fraction of 40%.  2. There is a large aneurysm of the basal inferolateral wall repaired by a patch.  3. Evidence of aortic stenosis though degree could not be determined.  4. Normal right ventricular size and systolic performance.  5. Mild left atrial enlargement.  Right atrium of normal dimension.    Echocardiogram 13 November 2015:  1. Normal left ventricular size with mild to moderate depression in left ventricular systolic performance. Ejection fraction 40%.  2. 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 Moderate basal and mid inferior hypokinesis.  3. No significant  valvular heart disease.  4. Mild left atrial enlargement.  5. When compared to the prior echocardiogram 24 November 2014, no significant change.    Echocardiogram 24 November 2014:  1. Normal left ventricular size with mild reduction in left ventricular systolic performance. Ejection fraction estimated at 45-50%.  2. Posterior/inferior basal wall pseudoaneurysm/aneurysm noted with apparent patch closure.  3. No significant valvular heart disease.  4. No significant change from 14 September 2013 echocardiogram.    Echocardiogram performed 2 November 2012:  1. Mild dilation of the left ventricle with ejection fraction of 35%.   2. Basal posterior aneurysm was noted once again. Inferior hypokinesis to akinesis.   3. Mild aortic regurgitation.    Coronary angiogram 18 October 2018:  1. Left main coronary artery: No significant disease.  2. Left anterior descending coronary artery: Mild diffuse disease.  First diagonal with ostial 80% stenosis.  3. Circumflex coronary artery: Severe in-stent restenosis (unchanged from previous).  4. Right coronary artery: 80% proximal narrowing.  5. Status post PCI with stent placement of right coronary artery (2.5 x 20 mm Synergy drug-eluting stent).    Device interrogation 27 February 2019 ([employer] [id number] Claria MRI Quad C... device implanted for December 2018):  1. Presenting: Atrial fibrillation with BiV pacing and sensing, rate 70s.  2. Lead/Battery Status: Stable.  3. Atrial Arrhythmias: Since 31 January 2019, 13 mode switches detected, current episode in progress since 0503 T7 February 2019, longest a total of 8.5 hours, patient briefly came out of AT/AF, burden 1.5%, ventricular rates well-controlled, review of EGMs confirm AF.  4. Vent Arrhythmias: Since 31 January 2019, no VT or VF detected.  5. Anticoagulant: Warfarin.  6. Comments: Normal CRT-D function. BiV pacing effective 64.5%, ineffective 13.6%.    Device interrogation 6 March 2018 ([employer]Spring [id number] Yeimi MATTHEW DR device  implanted 18 June 2014):  1. Presenting: AP-VS or AS-VS 60's.  2. Lead/Battery Status: Stable.  3. Atrial Arrhythmias: 5 mode switches, <0.1% burden, atrial fibrillation/atrial flutter, longest 74mins, v-rates >/=120bpm ~5%.  4. Vent Arrhythmias: None  5. Anticoagulant: Warfarin  6. Comments: Normal ICD function.    Device interrogation 21 August 2017 (Medtronic device implanted 18 June 2014):  1. Presenting rhythm: AP-VS, rate 60 bpm.  2. Battery/lead status: stable  3. Arrhythmias: since 5/15/17, no AT/AF noted. One nonsustained VT episode detected.  4. Comments: normal ICD function. Patient is on warfarin.    Device interrogation 31 August 2016 (Medtronic device implanted 18 June 2014):  1. Atrial pacing with ventricular sensing. Underlying rhythm sinus at 60 bpm.  2. Stable battery/lead status.  3. Arrhythmias: None since last interrogation.    Device interrogation 1 September 2015 (Medtronic device implanted 18 June 2014):  1. Atrial pacing with ventricular sensing. Underlying rhythm sinus at 60 bpm.  2. Stable battery/lead status.  3. Arrhythmias: None since last interrogation.             Physical Examination       /70 (Patient Site: Left Arm, Patient Position: Sitting, Cuff Size: Adult Regular)   Pulse 80   Resp 18         Wt Readings from Last 3 Encounters:   02/28/19 151 lb (68.5 kg)   02/25/19 160 lb (72.6 kg)   02/21/19 160 lb 3.2 oz (72.7 kg)       The patient is alert and oriented times three. Sclerae are anicteric. Mucosal membranes are moist. Jugular venous pressure is normal. No significant adenopathy/thyromegally appreciated. Lungs diminished bilaterally. On cardiovascular exam, the patient has a regular S1 and S2.  Soft systolic murmur.  Abdomen is soft and non-tender. Extremities reveal no clubbing, cyanosis, or edema.         Family History/Social History/Risk Factors   Patient does not smoke.  Family history of hypertension.       Medications  Allergies   Current Outpatient Medications    Medication Sig Dispense Refill   ? acetaminophen (TYLENOL EXTRA STRENGTH) 500 MG tablet Take 1 tablet (500 mg total) by mouth every 6 (six) hours as needed for pain.  0   ? albuterol (PROVENTIL) 2.5 mg /3 mL (0.083 %) nebulizer solution Take 3 mL (2.5 mg total) by nebulization every 4 (four) hours as needed for wheezing. 360 mL 6   ? allopurinol (ZYLOPRIM) 100 MG tablet Take 100 mg by mouth daily.     ? atorvastatin (LIPITOR) 80 MG tablet Take 1 tablet (80 mg total) by mouth daily. 30 tablet 3   ? budesonide (PULMICORT) 0.5 mg/2 mL nebulizer solution Take 2 mL (0.5 mg total) by nebulization 2 (two) times a day. 120 mL 6   ? cetirizine (ZYRTEC) 10 MG tablet Take 10 mg by mouth daily.     ? clopidogrel (PLAVIX) 75 mg tablet Take 300 mg (4 pills) Friday morning.  Starting Saturday, take 75 mg (1 pill) daily (Patient taking differently: 75 mg daily. Take 300 mg (4 pills) Friday morning.  Starting Saturday, take 75 mg (1 pill) daily      ) 30 tablet 12   ? fluticasone (FLONASE) 50 mcg/actuation nasal spray Apply 1 spray into each nostril daily as needed for rhinitis.     ? formoterol fumarate (PERFOROMIST) 20 mcg/2 mL nebulizer solution Take 2 mL (20 mcg total) by nebulization daily.  0   ? furosemide (LASIX) 80 MG tablet Take 1 tablet (80 mg) every morning, and take 1/2 tablet (40 mg) every afternoon. 30 tablet 0   ? guaiFENesin ER (MUCINEX) 600 mg 12 hr tablet Take 1 tablet (600 mg total) by mouth 2 (two) times a day.  0   ? hydrocortisone 1 % cream Apply 1 application topically every 6 (six) hours as needed.     ? latanoprost (XALATAN) 0.005 % ophthalmic solution Administer 1 drop to the right eye at bedtime. 2.5 mL 12   ? metoprolol succinate (TOPROL XL) 50 MG 24 hr tablet Take 1 tablet (50 mg total) by mouth at bedtime. 90 tablet 3   ? multivitamin therapeutic (THERAGRAN) tablet Take 1 tablet by mouth daily.  0   ? nebulizer and compressor Radha For chronic cough 1 each 0   ? nitroglycerin (NITROSTAT) 0.4 MG SL  tablet Place 1 tablet (0.4 mg total) under the tongue every 5 (five) minutes as needed for chest pain.  0   ? OXYGEN-AIR DELIVERY SYSTEMS MISC into each nostril continuous. 1.5 liters with rest and 3 liters with activity  May use up to 1.5 liters at noc.     ? pantoprazole (PROTONIX) 20 MG tablet Take 1 tablet (20 mg total) by mouth daily. (Patient taking differently: Take 20 mg by mouth daily as needed.       ) 30 tablet 0   ? predniSONE (DELTASONE) 10 mg tablet 40 mg x 3 days, 30 mg x 3 days, 20 mg x 3 days, 10 mg x 3 days. Then back to his 5 mg a day.. 30 tablet 0   ? predniSONE (DELTASONE) 5 mg/mL Conc concentrated solution Take 5 mg by mouth daily.     ? sertraline (ZOLOFT) 50 MG tablet Take 0.5 tablets (25 mg total) by mouth at bedtime.  0   ? warfarin (COUMADIN/JANTOVEN) 2 MG tablet Take 1 tablet (2 mg total) by mouth See Admin Instructions. Take 4 mg daily 180 tablet 0   ? albuterol (PROAIR HFA;PROVENTIL HFA;VENTOLIN HFA) 90 mcg/actuation inhaler Inhale 2 puffs every 6 (six) hours as needed for shortness of breath. 1 Inhaler 11   ? budesonide-formoterol (SYMBICORT) 160-4.5 mcg/actuation inhaler Inhale 2 puffs 2 (two) times a day. 1 Inhaler 12     No current facility-administered medications for this visit.       Allergies   Allergen Reactions   ? Blood-Group Specific Substance      Anti-Josep/Barnes.  Expect delays in obtaining blood for transfusion.  Collect 2 lavender top tubes and 1 red top tube for all blood bank orders.           Lab Results   Lab Results   Component Value Date     02/28/2019    K 3.5 02/28/2019     02/28/2019    CO2 30 02/28/2019    BUN 37 (H) 02/28/2019    CREATININE 1.33 (H) 02/28/2019    CALCIUM 9.0 02/28/2019     Lab Results   Component Value Date    WBC 11.9 (H) 01/08/2019    WBC 8.8 06/18/2014    HGB 10.3 (L) 01/21/2019    HCT 34.9 (L) 01/08/2019    MCV 88 01/08/2019     01/08/2019     Lab Results   Component Value Date    CHOL 114 11/08/2018    TRIG 109  11/08/2018    HDL 39 (L) 11/08/2018    LDLCALC 53 11/08/2018     Lab Results   Component Value Date    INR 2.30 (!) 02/18/2019    INR 2.70 (!) 12/13/2018     Lab Results   Component Value Date    BNP 1,731 (H) 02/25/2019     Lab Results   Component Value Date    CKTOTAL 37 02/23/2018    CKTOTAL 32 02/17/2018    CKTOTAL 28 (L) 02/17/2018    CKMB 5 02/28/2018    CKMB 2 09/14/2013    CKMB 2 09/14/2013    TROPONINI 0.11 12/30/2018    TROPONINI 0.60 (HH) 11/07/2018    TROPONINI 0.81 (HH) 11/06/2018     Lab Results   Component Value Date    TSH 3.1 09/14/2013

## 2021-06-27 NOTE — PROGRESS NOTES
Progress Notes by Marielos Landaverde CNP at 4/11/2019  1:30 PM     Author: Marielos Landaverde CNP Service: -- Author Type: Nurse Practitioner    Filed: 4/11/2019  2:53 PM Encounter Date: 4/11/2019 Status: Signed    : Marielos Landaverde CNP (Nurse Practitioner)           Click to link to [facility] HEART CARE NOTE      Assessment/Recommendations   Assessment:    1. Ischemic cardiomyopathy, heart failure with reduced ejection fraction, ejection fraction 44%, NYHA class III: OptiVol and thoracic impedance today show no fluid retention.  He has chronic shortness of breath but is at baseline.  He has no lower extremity edema and lung sounds are decreased but with no crackles. He has no edema.     2.  Paroxysmal atrial fibrillation: Successful cardioversion on March 28, 2019.  Heart rate is regular today and limited device check today does not indicate any atrial fibrillation.  He continues to take warfarin and INR was checked today.    Plan:  1.   Heart failure medications:  - Beta blocker therapy with metroprolol succinate 50 mg twice daily  - Unable to tolerate ACEI/ARB due to hypotension  - Diuretic therapy with furosemide 80 mg daily  2.  BMP and INR today  3.  Continue low-sodium diet and daily weights    Myron Sunshine will follow up in the heart failure clinic on April 22.  We reviewed heart failure symptoms to monitor for and when to call.     History of Present Illness    Mr. Myron Sunshine is a 87 y.o. male seen at [facility] heart failure clinic today for continued follow-up.  He has a history of ischemic cardiomyopathy, heart failure with reduced ejection fraction, hypertension, coronary artery disease with PCI to RCA in October 2018, TAVR in November 2018, paroxysmal atrial fibrillation, and chronic kidney disease.  Echocardiogram from December 2018 showed an ejection fraction of 44%.  He had a successful cardioversion on March 28, 2019.  He was  hospitalized April 1 - April 7 with shortness of breath related to acute on chronic heart failure.    Myron was discharged on Lasix 80 mg in the morning and 40 mg in the afternoon.  He independently decreased his Lasix to 80 mg daily since he felt he was getting dehydrated.  He has chronic shortness of breath with activity and feels that he is at baseline.  He is on oxygen chronically at 2-3 L.  He denies lightheadedness, orthopnea, chest pain and lower extremity edema.      His home weight has decreased 1-2 pounds since discharge last week.  Home weight is now 141-142 pounds.  He continues to follow a low-sodium diet.    ECHO:   Results for orders placed during the hospital encounter of 03/28/19   Echo Transesophageal [ECH01] 03/28/2019    Narrative   Dense spontaneous echo contrast in the atrial appendage.    No evidence of thrombus in the left atrial appendage    Left ventricle ejection fraction is decreased.    The right ventricle is mildly dilated. The systolic function is mildly   reduced.    Moderate mitral regurgitation.           Physical Examination Review of Systems   Vitals:    04/11/19 1340   BP: 100/60   Pulse: 82   Resp: 18     Body mass index is 21.86 kg/m .  Wt Readings from Last 3 Encounters:   04/11/19 148 lb (67.1 kg)   04/07/19 143 lb 9.6 oz (65.1 kg)   03/28/19 151 lb (68.5 kg)       General Appearance:     Alert, cooperative and in no acute distress.   ENT/Mouth: membranes moist, no oral lesions or bleeding gums.      EYES:  no scleral icterus, normal conjunctivae   Chest/Lungs:   lungs are clear to auscultation, no rales or wheezing, respirations unlabored   Cardiovascular:   Regular. Normal first and second heart sounds, no edema bilateral lower extremities    Abdomen:  Soft, nontender, nondistended, bowel sounds present   Extremities: no cyanosis or clubbing   Skin: warm, dry.    Neurologic: mood and affect are appropriate, alert and oriented x3      General: WNL  Eyes:  WNL  Ears/Nose/Throat: WNL  Lungs: WNL  Heart: WNL  Stomach: WNL  Bladder: WNL  Muscle/Joints: WNL  Skin: WNL  Nervous System: WNL  Mental Health: WNL     Blood: WNL     Medical History  Surgical History Family History Social History   Past Medical History:   Diagnosis Date   ? Anxiety    ? Bowel obstruction (H)    ? Bronchiectasis without acute exacerbation (H) 12/1/2017   ? Chronic hypoxemic respiratory failure (H)    ? CKD (chronic kidney disease) stage 3, GFR 30-59 ml/min (H)    ? Coronary artery disease due to lipid rich plaque    ? DNAR (do not attempt resuscitation)    ? Dyslipidemia, goal LDL below 70    ? ED (erectile dysfunction)    ? Essential hypertension    ? GERD (gastroesophageal reflux disease)    ? Ischemic cardiomyopathy    ? Mild aortic stenosis    ? Noncompliance with medication regimen 9/11/2018   ? Paroxysmal VT (H)    ? Persistent atrial fibrillation (H) 8/22/2018   ? Psoriasis    ? Restrictive lung disease; moderate by PFT 10/24/2017    FVC 1.9 456% FEV1 1.4 457% ratio 74%.  No significant response to bronchodilators.  TLC 40% predicted DLCO corrected 47% predicted Assessment: No obstruction.  Moderate restriction.  Moderate diffusion defect.   ? Seasonal allergies    ? Sleep apnea    ? TIA (transient ischemic attack) 8/09   ? Ventricular aneurysm     Long-term coumadin therapy s/p ventricular patch 2001   ? Vitamin D deficiency     Past Surgical History:   Procedure Laterality Date   ? ABDOMINAL AORTIC ANEURYSM REPAIR     ? APPENDECTOMY      Perforated   ? CARDIAC CATHETERIZATION     ? CARDIAC DEFIBRILLATOR PLACEMENT  7/19/2007   ? CORONARY STENT PLACEMENT  2001    LCX   ? CV CORONARY ANGIOGRAM N/A 10/18/2018    Procedure: Coronary Angiogram;  Surgeon: Elijah Liu MD;  Location: [facility] Cath Lab;  Service:    ? CV TRANSFEMORAL TRANSCATHETER VALVE REPLACEMENT N/A 11/27/2018    Procedure: Transfemoral Transcatheter Aortic Valve Replacement;  Surgeon: Elijah Liu MD;  Location:  [facility]'s Cath Lab;  Service: Structural Heart   ? ICD Generator Change  14   ? IR EXTREMITY VENOGRAM LEFT  12/3/2018   ? PICC  2018        ? PICC AND MIDLINE TEAM LINE INSERTION  2018        ? RESECTION SMALL BOWEL / CLOSURE ILEOSTOMY     ? THORACENTESIS     ? VENTRICULAR RESECTION / REPAIR ANEURYSM      Family History   Problem Relation Age of Onset   ? Stroke Mother    ? Thyroid disease Mother    ? Cancer Brother    ? No Medical Problems Son    ? No Medical Problems Son    ? No Medical Problems Son    ? Lung disease Neg Hx     Social History     Socioeconomic History   ? Marital status:      Spouse name: Eulalia   ? Number of children: Not on file   ? Years of education: Not on file   ? Highest education level: Not on file   Occupational History   ? 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 Financial resource strain: Not on file   ? Food insecurity:     Worry: Not on file     Inability: Not on file   ? Transportation needs:     Medical: Not on file     Non-medical: Not on file   Tobacco Use   ? Smoking status: Former Smoker     Packs/day: 1.00     Years: 8.00     Pack years: 8.00     Types: Cigarettes     Last attempt to quit: 1955     Years since quittin.9   ? Smokeless tobacco: Never Used   Substance and Sexual Activity   ? Alcohol use: Yes     Alcohol/week: 0.6 oz     Types: 1 Glasses of wine per week     Comment: social   ? Drug use: No   ? Sexual activity: Not on file   Lifestyle   ? Physical activity:     Days per week: 0 days     Minutes per session: 0 min   ? Stress: Not on file   Relationships   ? Social connections:     Talks on phone: Not on file     Gets together: Not on file     Attends Anglican service: Not on file     Active member of club or organization: Not on file     Attends meetings of clubs or organizations: Not on file     Relationship status: Not on file   ? Intimate partner violence:     Fear of current or ex  partner: Not on file     Emotionally abused: Not on file     Physically abused: Not on file     Forced sexual activity: Not on file   Other Topics Concern   ? Not on file   Social History Narrative    Lives with his wife, Eulalia, who he states has advanced arthritis. 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            Medications  Allergies   Current Outpatient Medications   Medication Sig Dispense Refill   ? acetaminophen (TYLENOL EXTRA STRENGTH) 500 MG tablet Take 1 tablet (500 mg total) by mouth every 6 (six) hours as needed for pain.  0   ? allopurinol (ZYLOPRIM) 100 MG tablet Take 100 mg by mouth daily.     ? atorvastatin (LIPITOR) 80 MG tablet Take 1 tablet (80 mg total) by mouth daily. 30 tablet 3   ? calcium, as carbonate, (TUMS) 200 mg calcium (500 mg) chewable tablet Chew 1 tablet daily as needed for heartburn.     ? cetirizine (ZYRTEC) 10 MG tablet Take 10 mg by mouth daily as needed.            ? clopidogrel (PLAVIX) 75 mg tablet Take 1 tablet (75 mg total) by mouth daily. take 75 mg (1 pill) daily     ? fluticasone (FLONASE) 50 mcg/actuation nasal spray Apply 1 spray into each nostril daily as needed for rhinitis.     ? formoterol fumarate (PERFOROMIST) 20 mcg/2 mL nebulizer solution Take 2 mL (20 mcg total) by nebulization daily.  0   ? furosemide (LASIX) 80 MG tablet Take 1 tablet (80 mg total) by mouth daily. 90 tablet 3   ? hydrocortisone 1 % cream Apply 1 application topically every 6 (six) hours as needed.     ? latanoprost (XALATAN) 0.005 % ophthalmic solution Administer 1 drop to the right eye at bedtime. 2.5 mL 12   ? metoprolol succinate (TOPROL XL) 50 MG 24 hr tablet Take 1 tablet (50 mg total) by mouth 2 (two) times a day. 90 tablet 3   ? nebulizer and compressor Radha For chronic cough 1 each 0   ? nitroglycerin (NITROSTAT) 0.4 MG SL tablet Place 1 tablet (0.4 mg total) under the tongue every 5 (five) minutes as needed for chest pain.  0   ? OXYGEN-AIR DELIVERY SYSTEMS MISC into each  nostril continuous. 1.5 liters with rest and 3 liters with activity  May use up to 1.5 liters at noc.     ? pantoprazole (PROTONIX) 20 MG tablet Take 1 tablet (20 mg total) by mouth daily. (Patient taking differently: Take 20 mg by mouth daily as needed.       ) 30 tablet 0   ? predniSONE (DELTASONE) 5 MG tablet Take 5 mg by mouth daily.     ? sertraline (ZOLOFT) 50 MG tablet Take 0.5 tablets (25 mg total) by mouth at bedtime.  0   ? warfarin (COUMADIN/JANTOVEN) 2 MG tablet Take 1 tablet (2 mg total) by mouth See Admin Instructions. Take 2 mg daily check INR on 4/9/2019 180 tablet 0   ? albuterol (PROVENTIL) 2.5 mg /3 mL (0.083 %) nebulizer solution Take 3 mL (2.5 mg total) by nebulization every 4 (four) hours as needed for wheezing. 360 mL 6   ? budesonide (PULMICORT) 0.5 mg/2 mL nebulizer solution Take 2 mL (0.5 mg total) by nebulization 2 (two) times a day. 120 mL 6     No current facility-administered medications for this visit.       Allergies   Allergen Reactions   ? Blood-Group Specific Substance      Anti-Josep/Barnes.  Expect delays in obtaining blood for transfusion.  Collect 2 lavender top tubes and 1 red top tube for all blood bank orders.          Lab Results    Chemistry CBC BNP   Lab Results   Component Value Date    CREATININE 1.09 04/07/2019    BUN 38 (H) 04/07/2019     04/07/2019    K 4.0 04/07/2019    CL 96 (L) 04/07/2019    CO2 38 (H) 04/07/2019     Creatinine (mg/dL)   Date Value   04/07/2019 1.09   04/06/2019 1.18   04/05/2019 1.29   04/03/2019 1.12    Lab Results   Component Value Date    WBC 8.3 04/01/2019    HGB 10.9 (L) 04/01/2019    HCT 36.5 (L) 04/01/2019    MCV 86 04/01/2019     04/01/2019    Lab Results   Component Value Date    BNP 1,666 (H) 04/01/2019     BNP (pg/mL)   Date Value   04/01/2019 1,666 (H)   02/25/2019 1,731 (H)   02/21/2019 1,139 (H)            Marielos Landaverde, [facility]   Heart Failure Clinic

## 2021-06-27 NOTE — PROGRESS NOTES
Progress Notes by Ratna Lyn RN at 2/27/2019 12:42 PM     Author: Ratna Lyn RN Service: -- Author Type: Registered Nurse    Filed: 3/10/2019 10:00 PM Encounter Date: 2/27/2019 Status: Signed    : Ratna Lyn RN (Registered Nurse)       Type: Alert remote CRT-D transmission for long AT/AF.   Presenting: Atrial fibrillation with BiV pacing and sensing, rate 70s.   Lead/Battery Status: Stable.     Atrial Arrhythmias: Since 1/31/19, 13 mode switches detected, current episode in progress since 0503 2/27/19, longest a total of 8.5 hours, patient briefly came out of AT/AF, burden 1.5%, ventricular rates well-controlled, review of EGMs confirm AF.   Vent Arrhythmias: Since 1/31/19, no VT or VF detected.   Anticoagulant: Warfarin.   Comments: Normal CRT-D function. BiV pacing effective 64.5%, ineffective 13.6%.   Alerts: None. KLP       Known PAF, on Warfarin with good rate control. An AF episode of AF is currently in progress. Decrease in BiV pacing is consistent with the recent device changes made on 1/31/19 per Dr. Cadet where A-V delays were extended and set to fixed settings.  Per Paceart noted will most likely do EKG optimization at next clinic visit with Dr. Cadet on 3/20/19.  LV pacing configuration was also changed due to high thresholds and PNS. Current LV thresholds appear stable.       OptiVol Fluid index levels slowly rising. Appear to be on the rise before last device setting changes. See snapshot below. Patient has follow up with Marielos GREWAL CNP tomorrow. Will forward results.   Ratna Lyn, JEREMIAS

## 2021-06-27 NOTE — PROGRESS NOTES
Progress Notes by Donna Thakkar MD at 6/5/2019 10:10 AM     Author: Donna Thakkar MD Service: -- Author Type: Physician    Filed: 6/5/2019 10:36 AM Encounter Date: 6/5/2019 Status: Signed    : Donna Thakkar MD (Physician)                  [email or web address]  [phone]         Thank you Dr. Gerardo for asking the [facility] team to participate in the care of your patient, Myron Sunshine.     Impression and Plan     1. Coronary artery disease. Myron has known coronary artery disease. Specifically, he had suffered a myocardial infarction in October of 2001 for which he underwent angioplasty and stenting of the circumflex. In addition, shortly thereafter he had repair of a left ventricular aneurysm/pseudoaneurysm.     On 18 October 2018 patient underwent PCI with stent placement to right coronary artery (2.5 x 20 mm Synergy drug-eluting stent).     This is been stable.  Patient denies any angina type chest pain.     2. Cardiomyopathy (ischemic). Most recent imaging by echocardiography revealed ejection fraction of 40-45%.  Patient has been intolerant of ACE inhibitor/ARB therapy secondary to hypotension.  Patient is appropriately on beta-blocker therapy.  He appears volume neutral on clinical exam and his weights have been stable.  Weight 13 May 2019 was 67.1 kg (148 pounds).  He is within a few pounds of that today.     Of note, patient does have underlying pulmonary issues as well.  He did undergo pulmonary function testing which revealed FVC 59% predicted and FEV1 61% predicted.       3.  Status post aortic valve replacement (documented 26 mm Kd 3 valve).  Normal prosthetic valve function on recent ends esophageal echocardiogram 28 March 2019 which is commensurate with exam.     4.  Atrial fibrillation (paroxysmal).  Last device interrogation to May 2019 revealed atrial pacing with biventricular pacing.  Patient is maintained on warfarin therapy.     5.  Dyslipidemia.  Lipid profile 8 November 2018 was favorable with LDL 53 mg/dL and HDL 39 mg/dL.     Plan on follow-up in three months.  Patient will be followed intermittently through the device clinic as well per protocol.  He is also scheduled to follow-up with Lissette Gordon NP on 8 July 2019.          History of Present Illness    Once again I would like to thank you again for asking me to participate in the care of your patient, Myron Sunshine.  As you know, but to reiterate for my own records, Myron Sunshine is a 87 y.o. male with known coronary disease. Specifically, he had suffered a myocardial infarction in October of 2001 for which he underwent angioplasty and stenting of the circumflex. In addition, shortly thereafter he had repair of a left ventricular aneurysm/pseudoaneurysm. He has been noted on subsequent echocardiograms to have residual aneurysmal appearance involving the patched area. For this reason, he has been maintained on warfarin since in the past he had been noted to have a suspicion of possible mural thrombus in this region.      He did undergo repeat angiography 21 November 2001 and was found to have patency of his circumflex stents, but a 95% lesion involving the 1st diagonal and this was treated medically.      On 18 October 2018 patient underwent PCI with stent placement to right coronary artery (2.5 x 20 mm Synergy drug-eluting stent).    Myron also has a history of atrial fibrillation.  He underwent cardioversion 28 March 2019      On interview today, Myron states that he has been doing fairly well from a cardiovascular standpoint.  He denies any anginal type chest pain.  His breathing although compromised at baseline due to his lung disease, has been stable.  He reports no shortness of breath at night to suggest PND/orthopnea.  His weights at home have been stable.  He states in general he feels better and this has improved his appetite.  He feels he has some mild increase strength and  rafiq since his last visit with us.    Further review of systems is otherwise negative/noncontributory (medical record and 13 point review of systems reviewed as well and pertinent positives noted).         Cardiac Diagnostics       Transesophageal echocardiogram 28 March 2019:  1. Mild left ventricular enlargement with reduced left ventricular systolic performance (not quantified).  2. Bioprosthetic aortic valve with normal prosthetic valve function (documented 26 mm Kd 3 valve).  Normal valve function.  3. Moderate mitral insufficiency.  4. Mild right ventricular enlargement with mildly reduced right ventricular systolic performance.  5. Severe left atrial enlargement.  Mild right atrial enlargement.    Echocardiogram 31 December 2018:  1. Mild left ventricular enlargement with mild to moderate reduction in left ventricular systolic performance.  Ejection fraction 40-45%.  2. Inferior/posterior aneurysm.  3. Severe concentric increased left ventricular wall thickness  4. Bioprosthetic aortic valve with normal prosthetic valve function (documented 26 mm Kd 3 valve).  5. Mean gradient measured at 11 mmHg with peak velocity of 2.0 m/s.  6. Trace aortic insufficiency.  7. Mild to moderate mitral insufficiency.  8. Normal right ventricular size and systolic performance.  9. Severe left atrial enlargement.  Right atrium of normal dimension.    Transesophageal echocardiogram 23 February 2018:  1. Normal left ventricular size and systolic performance with ejection fraction of 55%.  2. Barrier and lateral-basilar pseudoaneurysm.  3. Mild to moderate aortic stenosis.  4. Mild aortic insufficiency.  5. Mild to moderate mitral insufficiency.  6. Normal right ventricular size and systolic performance  7. No obvious vegetation.    Echocardiogram 1 October 2017:  1. Moderately reduced left ventricular systolic performance with ejection fraction of 40%.  2. There is a large aneurysm of the basal inferolateral wall  repaired by a patch.  3. Evidence of aortic stenosis though degree could not be determined.  4. Normal right ventricular size and systolic performance.  5. Mild left atrial enlargement.  Right atrium of normal dimension.    Echocardiogram 13 November 2015:  1. Normal left ventricular size with mild to moderate depression in left ventricular systolic performance. Ejection fraction 40%.  2. 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 Moderate basal and mid inferior hypokinesis.  3. No significant valvular heart disease.  4. Mild left atrial enlargement.    When compared to the prior echocardiogram 24 November 2014, no significant change.    Echocardiogram 24 November 2014:  1. Normal left ventricular size with mild reduction in left ventricular systolic performance. Ejection fraction estimated at 45-50%.  2. Posterior/inferior basal wall pseudoaneurysm/aneurysm noted with apparent patch closure.  3. No significant valvular heart disease.  4. No significant change from 14 September 2013 echocardiogram.    Echocardiogram performed 2 November 2012:  1. Mild dilation of the left ventricle with ejection fraction of 35%.   2. Basal posterior aneurysm was noted once again. Inferior hypokinesis to akinesis.   3. Mild aortic regurgitation.    Coronary angiogram 18 October 2018:  1. Left main coronary artery: No significant disease.  2. Left anterior descending coronary artery: Mild diffuse disease.  First diagonal with ostial 80% stenosis.  3. Circumflex coronary artery: Severe in-stent restenosis (unchanged from previous).  4. Right coronary artery: 80% proximal narrowing.  5. Status post PCI with stent placement of right coronary artery (2.5 x 20 mm Synergy drug-eluting stent).    Device interrogation 27 February 2019 ([employer] [id number] [employer] [facility] Quad C... device implanted for December 2018):  1. Presenting: Atrial fibrillation with BiV pacing and sensing, rate  "70s.  2. Lead/Battery Status: Stable.  3. Atrial Arrhythmias: Since 31 January 2019, 13 mode switches detected, current episode in progress since 0503 T7 February 2019, longest a total of 8.5 hours, patient briefly came out of AT/AF, burden 1.5%, ventricular rates well-controlled, review of EGMs confirm AF.  4. Vent Arrhythmias: Since 31 January 2019, no VT or VF detected.  5. Anticoagulant: Warfarin.  6. Comments: Normal CRT-D function. BiV pacing effective 64.5%, ineffective 13.6%.    Device interrogation 6 March 2018 ([employer] [id number] [employer] TIFF BOWIE device implanted 18 June 2014):  1. Presenting: AP-VS or AS-VS 60's.  2. Lead/Battery Status: Stable.  3. Atrial Arrhythmias: 5 mode switches, <0.1% burden, atrial fibrillation/atrial flutter, longest 74mins, v-rates >/=120bpm ~5%.  4. Vent Arrhythmias: None  5. Anticoagulant: Warfarin  6. Comments: Normal ICD function.    Device interrogation 21 August 2017 (Medtronic device implanted 18 June 2014):  1. Presenting rhythm: AP-VS, rate 60 bpm.  2. Battery/lead status: stable  3. Arrhythmias: since 5/15/17, no AT/AF noted. One nonsustained VT episode detected.  4. Comments: normal ICD function. Patient is on warfarin.    Device interrogation 31 August 2016 (Medtronic device implanted 18 June 2014):  1. Atrial pacing with ventricular sensing. Underlying rhythm sinus at 60 bpm.  2. Stable battery/lead status.  3. Arrhythmias: None since last interrogation.    Device interrogation 1 September 2015 (Medtronic device implanted 18 June 2014):  1. Atrial pacing with ventricular sensing. Underlying rhythm sinus at 60 bpm.  2. Stable battery/lead status.  3. Arrhythmias: None since last interrogation.         Physical Examination       BP 92/50 (Patient Site: Left Arm, Patient Position: Sitting, Cuff Size: Adult Regular)   Pulse 80   Resp 16   Ht 5' 9\" (1.753 m)   Wt 151 lb (68.5 kg)   BMI 22.30 kg/m          Wt Readings from Last 3 Encounters:   06/05/19 151 lb (68.5 kg) "   05/13/19 148 lb (67.1 kg)   05/02/19 145 lb (65.8 kg)       The patient is alert and oriented times three. Sclerae are anicteric. Mucosal membranes are moist. Jugular venous pressure is normal. No significant adenopathy/thyromegally appreciated. Lungs are diminished bilaterally, but fairly clear. On cardiovascular exam, the patient has a regular S1 and S2. Abdomen is soft and non-tender. Extremities reveal no clubbing, cyanosis, or edema.         Family History/Social History/Risk Factors   Patient does not smoke.  Family history of hypertension.         Medications  Allergies   Current Outpatient Medications   Medication Sig Dispense Refill   ? acetaminophen (TYLENOL EXTRA STRENGTH) 500 MG tablet Take 1 tablet (500 mg total) by mouth every 6 (six) hours as needed for pain.  0   ? albuterol (PROVENTIL) 2.5 mg /3 mL (0.083 %) nebulizer solution Take 3 mL (2.5 mg total) by nebulization every 4 (four) hours as needed for wheezing. 360 mL 6   ? atorvastatin (LIPITOR) 80 MG tablet Take 1 tablet (80 mg total) by mouth daily. 30 tablet 3   ? calcium, as carbonate, (TUMS) 200 mg calcium (500 mg) chewable tablet Chew 1 tablet daily as needed for heartburn.     ? cetirizine (ZYRTEC) 10 MG tablet Take 10 mg by mouth daily as needed.            ? clopidogrel (PLAVIX) 75 mg tablet Take 1 tablet (75 mg total) by mouth daily. take 75 mg (1 pill) daily     ? fluticasone (FLONASE) 50 mcg/actuation nasal spray Apply 1 spray into each nostril daily as needed for rhinitis.     ? furosemide (LASIX) 80 MG tablet Take 80 mg in AM and 40 mg in PM. (Patient taking differently: Take 80 mg by mouth daily.       ) 90 tablet 3   ? hydrocortisone 1 % cream Apply 1 application topically every 6 (six) hours as needed.     ? latanoprost (XALATAN) 0.005 % ophthalmic solution Administer 1 drop to the right eye at bedtime. 2.5 mL 12   ? metoprolol succinate (TOPROL XL) 50 MG 24 hr tablet Take 1 tablet (50 mg total) by mouth 2 (two) times a day.  (Patient taking differently: Take 50 mg by mouth daily.       ) 90 tablet 3   ? nebulizer and compressor Radha For chronic cough 1 each 0   ? nitroglycerin (NITROSTAT) 0.4 MG SL tablet Place 1 tablet (0.4 mg total) under the tongue every 5 (five) minutes as needed for chest pain.  0   ? OXYGEN-AIR DELIVERY SYSTEMS MISC into each nostril continuous. 1.5 liters with rest and 3 liters with activity  May use up to 1.5 liters at noc.     ? pantoprazole (PROTONIX) 20 MG tablet Take 1 tablet (20 mg total) by mouth daily. (Patient taking differently: Take 20 mg by mouth daily as needed.       ) 30 tablet 0   ? PERFOROMIST 20 mcg/2 mL nebulizer solution USE 1 VIAL IN NEBULIZER EVERY 12 HOURS 120 mL 0   ? predniSONE (DELTASONE) 5 MG tablet Take 5 mg by mouth daily.     ? sertraline (ZOLOFT) 50 MG tablet Take 0.5 tablets (25 mg total) by mouth at bedtime.  0   ? warfarin (COUMADIN/JANTOVEN) 2 MG tablet Take 1 tablet (2 mg total) by mouth See Admin Instructions. Take 2 mg daily check INR on 4/9/2019 180 tablet 0   ? allopurinol (ZYLOPRIM) 100 MG tablet Take 100 mg by mouth daily.     ? budesonide (PULMICORT) 0.5 mg/2 mL nebulizer solution Take 2 mL (0.5 mg total) by nebulization 2 (two) times a day. 120 mL 6     No current facility-administered medications for this visit.       Allergies   Allergen Reactions   ? Blood-Group Specific Substance      Anti-Josep/Barnes.  Expect delays in obtaining blood for transfusion.  Collect 2 lavender top tubes and 1 red top tube for all blood bank orders.           Lab Results   Lab Results   Component Value Date     05/20/2019    K 4.0 05/20/2019    CL 99 05/20/2019    CO2 33 (H) 05/20/2019    BUN 40 (H) 05/20/2019    CREATININE 1.36 (H) 05/20/2019    CALCIUM 9.7 05/20/2019     Lab Results   Component Value Date    WBC 8.3 04/01/2019    WBC 8.8 06/18/2014    HGB 10.9 (L) 04/01/2019    HCT 36.5 (L) 04/01/2019    MCV 86 04/01/2019     04/01/2019     Lab Results   Component Value  Date    CHOL 114 11/08/2018    TRIG 109 11/08/2018    HDL 39 (L) 11/08/2018    LDLCALC 53 11/08/2018     Lab Results   Component Value Date    INR 2.10 (!) 06/05/2019     Lab Results   Component Value Date    BNP 1,298 (H) 04/29/2019     Lab Results   Component Value Date    CKTOTAL 37 02/23/2018    CKTOTAL 32 02/17/2018    CKTOTAL 28 (L) 02/17/2018    CKMB 5 02/28/2018    CKMB 2 09/14/2013    CKMB 2 09/14/2013    TROPONINI 0.19 04/01/2019    TROPONINI 0.19 04/01/2019    TROPONINI 0.17 04/01/2019     Lab Results   Component Value Date    TSH 3.1 09/14/2013

## 2021-06-27 NOTE — PROGRESS NOTES
Progress Notes by Vanda Arora PA-C at 12/27/2018  3:30 PM     Author: Vanda Arora PA-C Service: -- Author Type: Physician Assistant    Filed: 12/28/2018 11:09 AM Encounter Date: 12/27/2018 Status: Signed    : Vanda Arora PA-C (Physician Assistant)           Click to link to [facility]     [facility] HEART [facility] NOTE      Assessment/Recommendations   Diagnoses and all orders for this visit:    Severe aortic stenosis - S/P TAVR on November 27 using a #26 Kd 3 valve.  Patient stable, although has not seen much difference in shortness of breath (not too surprising given history of interstitial lung disease, on home oxygen).  He has however seen a difference in his endurance/energy level.  He is starting in-home physical therapy this week.  Groins healed and he can resume all activities as tolerated.  He will have a repeat echocardiogram on January 3 with a follow-up visit with Dr. Liu on January 8.  He will then see Dr. Thakkar for a 6-month follow-up visit in May    Coronary artery disease - status post PCI to the RCA with drug-eluting stent in October 2018.  Currently taking Brilinta, but cost is getting prohibitive.  ASA was stopped after 6 weeks of triple therapy.  Will switch him to Plavix with loading dose tomorrow followed by daily dose starting Saturday.  This was sent to his local pharmacy.  Continue beta-blocker and statin therapy.    Paroxysmal atrial fibrillation -rate controlled and on metoprolol.  Anticoagulated with warfarin and last INR 2.6    Ischemic cardiomyopathy, chronic HFrEF, NYHA class IV - euvolemic today. Currently taking lasix 80 mg daily and may actually be a little dry.  Will decrease to 60 mg daily and observe.  He is on appropriate beta-blocker therapy, but no ACE/ARB given history of hypotension.  Device upgrade to CRT-D while in hospital for TAVR.  He's currently pacing at 90% according to last device check.       Thank  you for the opportunity to participate in the care of Myron Sunshine. It's been a pleasure working with him.  Please do not hesitate to call with any questions or concerns.     History of Present Illness    I had the opportunity to see Myron Sunshine at the [facility] for his 2-week follow-up visit after TAVR (transcatheter aortic valve replacement).    Myron is a very pleasant 87-year-old gentleman with history of aortic stenosis, atrial fibrillation, moderate to severe MR, chronic kidney disease, interstitial lung disease, obstructive sleep apnea, home oxygen, heart failure with reduced ejection fraction and coronary artery disease status post recent PCI.  He was having significant decrease in functional capacity, increased shortness of breath and some chest pressure.  Recent echo showed severe aortic stenosis with valve area 0.6 cm  and mean gradient 20 mmHg which represented a low flow low gradient severe aortic stenosis.  He was evaluated by multidisciplinary team.  Because of high STS risk score and significant frailty, he was an ideal transcatheter valve replacement candidate.      He came in on November 27 and had transcatheter valve replacement via the right transfemoral approach.  Postprocedure, patient did have acute respiratory failure and acute on chronic heart failure with need for IV diuretics.  He also reverted to atrial fibrillation and then had upgrade of his device to a CRT-D.  He was eventually discharged to transitional care.  He is now at home and will start in-home physical therapy.  He states that his shortness of breath is about the same but thinks that his endurance is a bit better.  He did meet with his pulmonologist last week and she felt that maybe his chronic lung disease has progressed.    Myron Sunshine complains of dry mouth and also says that the Brilinta is getting costly.  He denies exertional chest discomfort, palpitations, PND, orthopnea, lightheadedness,  dizziness, pre-syncope or syncope.  Myron uSnshine also denies any recent weight loss, changes in appetite, nausea or vomiting.   ____________________________________________________________  Echo: from 11/28 -     Left ventricle ejection fraction is severely decreased. The calculated left ventricular ejection fraction is 22%. 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    Severe left atrial and moderate right atrial enlargement    Normal function of a bioprosthetic aortic valve with a mean gradient of 8 mmHg. Trace paravalvular insufficiency noted.    Moderate to severe mitral regurgitation    When compared to the previous study dated 11/27/2018, there appears to be a decline in overall left ventricular function. There is slight increase in gradient across the bioprosthetic aortic valve. Trace paravalvular insufficiency now seen.     Physical Examination Review of Systems   Vitals:    12/27/18 1457   BP: 100/52   Pulse: 68   Resp: 18     Body mass index is 22.89 kg/m .  Wt Readings from Last 3 Encounters:   12/27/18 155 lb (70.3 kg)   12/20/18 156 lb (70.8 kg)   12/12/18 153 lb (69.4 kg)       General Appearance:   Alert, cooperative and in no acute distress   ENT/Mouth: membranes moist, no oral lesions or bleeding gums.      EYES:  no scleral icterus, normal conjunctivae   Neck: no carotid bruits.  Thyroid not visualized   Chest/Lungs:   Lung sound diminished bilaterally, but no crackles, rales or wheezing   Cardiovascular:   irregular. Normal first and second heart sounds with no murmurs, rubs, or gallops; the carotid, radial and posterior tibial pulses are intact, no edema bilaterally    Abdomen:  Soft and nontender. Bowel sounds are present in all quadrants   Extremities: no cyanosis or clubbing.  Puncture sites healed   Skin: no xanthelasma, warm.    Neurologic: normal gait, normal  bilateral, no tremors   Psychiatric:  Normal mood and affect    General: WNL  Eyes: WNL  Ears/Nose/Throat: WNL  Lungs: WNL  Heart: WNL  Stomach: WNL  Bladder: WNL  Muscle/Joints: WNL  Skin: WNL  Nervous System: WNL  Mental Health: WNL     Blood: WNL     Medical History  Surgical History Family History Social History   Past Medical History:   Diagnosis Date   ? Anxiety    ? Bowel obstruction (H)    ? Chronic hypoxemic respiratory failure (H)    ? CKD (chronic kidney disease) stage 3, GFR 30-59 ml/min (H)    ? Coronary artery disease due to lipid rich plaque    ? DNAR (do not attempt resuscitation)    ? Dyslipidemia, goal LDL below 70    ? ED (erectile dysfunction)    ? Essential hypertension    ? GERD (gastroesophageal reflux disease)    ? Ischemic cardiomyopathy    ? Mild aortic stenosis    ? Noncompliance with medication regimen 9/11/2018   ? Paroxysmal VT (H)    ? Persistent atrial fibrillation (H) 8/22/2018   ? Psoriasis    ? Restrictive lung disease; moderate by PFT 10/24/2017    FVC 1.9 456% FEV1 1.4 457% ratio 74%.  No significant response to bronchodilators.  TLC 40% predicted DLCO corrected 47% predicted Assessment: No obstruction.  Moderate restriction.  Moderate diffusion defect.   ? Seasonal allergies    ? Sleep apnea    ? TIA (transient ischemic attack) 8/09   ? Ventricular aneurysm     Long-term coumadin therapy s/p ventricular patch 2001   ? Vitamin D deficiency     Past Surgical History:   Procedure Laterality Date   ? ABDOMINAL AORTIC ANEURYSM REPAIR     ? APPENDECTOMY      Perforated   ? CARDIAC CATHETERIZATION     ? CARDIAC DEFIBRILLATOR PLACEMENT  7/19/2007   ? CORONARY STENT PLACEMENT  2001    LCX   ? CV CORONARY ANGIOGRAM N/A 10/18/2018    Procedure: Coronary Angiogram;  Surgeon: Elijah Liu MD;  Location: [facility] Cath Lab;  Service:    ? CV TRANSFEMORAL TRANSCATHETER VALVE REPLACEMENT N/A 11/27/2018    Procedure: Transfemoral Transcatheter Aortic Valve Replacement;  Surgeon: Elijah Liu MD;  Location: [facility]  Cath Lab;  Service: Structural Heart   ? ICD Generator Change  14   ? IR EXTREMITY VENOGRAM LEFT  12/3/2018   ? PICC  2018        ? PICC AND MIDLINE TEAM LINE INSERTION  2018        ? RESECTION SMALL BOWEL / CLOSURE ILEOSTOMY     ? THORACENTESIS     ? VENTRICULAR RESECTION / REPAIR ANEURYSM      Family History   Problem Relation Age of Onset   ? Stroke Mother    ? Thyroid disease Mother    ? Cancer Brother    ? No Medical Problems Son    ? No Medical Problems Son    ? No Medical Problems Son    ? Lung disease Neg Hx     Social History     Socioeconomic History   ? Marital status:      Spouse name: Eulalia   ? Number of children: Not on file   ? Years of education: Not on file   ? Highest education level: Not on file   Social Needs   ? Financial resource strain: Not on file   ? Food insecurity - worry: Not on file   ? Food insecurity - inability: Not on file   ? Transportation needs - medical: Not on file   ? Transportation needs - non-medical: Not on file   Occupational History   ? 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 Smoking status: Former Smoker     Packs/day: 1.00     Years: 8.00     Pack years: 8.00     Types: Cigarettes     Last attempt to quit: 1955     Years since quittin.6   ? Smokeless tobacco: Never Used   Substance and Sexual Activity   ? Alcohol use: Yes     Alcohol/week: 0.6 oz     Types: 1 Glasses of wine per week   ? Drug use: No   ? Sexual activity: Not on file   Other Topics Concern   ? Not on file   Social History Narrative    Lives with his wife, Eulalia, who he states has advanced arthritis. 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            Medications  Allergies   Current Outpatient Medications   Medication Sig Dispense Refill   ? acetaminophen (TYLENOL EXTRA STRENGTH) 500 MG tablet Take 1 tablet (500 mg total) by mouth every 6 (six) hours as needed for pain.  0   ? albuterol (PROAIR HFA;PROVENTIL HFA;VENTOLIN  HFA) 90 mcg/actuation inhaler Inhale 2 puffs every 6 (six) hours as needed for shortness of breath. 1 Inhaler 0   ? allopurinol (ZYLOPRIM) 100 MG tablet Take 100 mg by mouth daily.     ? atorvastatin (LIPITOR) 80 MG tablet Take 1 tablet (80 mg total) by mouth daily. 30 tablet 3   ? cetirizine (ZYRTEC) 10 MG tablet Take 10 mg by mouth daily.     ? fluticasone (FLONASE) 50 mcg/actuation nasal spray Apply 1 spray into each nostril daily as needed for rhinitis.     ? fluticasone-vilanterol (BREO ELLIPTA) 200-25 mcg/dose DsDv inhaler Inhale 1 puff daily. 1 each 11   ? furosemide (LASIX) 80 MG tablet Take 1 tablet (80 mg total) by mouth daily. 30 tablet 0   ? hydrocortisone 1 % cream Apply 1 application topically every 6 (six) hours as needed.     ? latanoprost (XALATAN) 0.005 % ophthalmic solution Administer 1 drop to the right eye at bedtime. 2.5 mL 12   ? metoprolol succinate (TOPROL-XL) 50 MG 24 hr tablet Take 1 tablet (50 mg total) by mouth daily. (Patient taking differently: Take 50 mg by mouth daily IF HR<65 or SBP<95 or D<62 then give 1/2 tab (25mg).      ) 30 tablet 0   ? multivitamin therapeutic (THERAGRAN) tablet Take 1 tablet by mouth daily.  0   ? nebulizer and compressor Radha For chronic cough 1 each 0   ? OXYGEN-AIR DELIVERY SYSTEMS MISC into each nostril continuous. 1.5 liters with rest and 3 liters with activity  May use up to 1.5 liters at noc.     ? pantoprazole (PROTONIX) 20 MG tablet Take 1 tablet (20 mg total) by mouth daily. 30 tablet 0   ? potassium chloride (K-DUR,KLOR-CON) 20 MEQ tablet Take 1 tablet (20 mEq total) by mouth 2 (two) times a day.  0   ? sertraline (ZOLOFT) 50 MG tablet Take 0.5 tablets (25 mg total) by mouth at bedtime.  0   ? warfarin (COUMADIN/JANTOVEN) 2 MG tablet Take 4 mg daily 180 tablet 0   ? albuterol (PROVENTIL) 2.5 mg /3 mL (0.083 %) nebulizer solution Take 3 mL (2.5 mg total) by nebulization every 4 (four) hours as needed for wheezing.  0   ? budesonide (PULMICORT) 0.5  mg/2 mL nebulizer solution Take 2 mL (0.5 mg total) by nebulization 2 (two) times a day. 120 mL 6   ? clopidogrel (PLAVIX) 75 mg tablet Take 300 mg (4 pills) Friday morning.  Starting Saturday, take 75 mg (1 pill) daily 30 tablet 12   ? formoterol fumarate (PERFOROMIST) 20 mcg/2 mL nebulizer solution Take 2 mL (20 mcg total) by nebulization daily.  0   ? furosemide (LASIX) 20 MG tablet Take 1 tablet (20 mg total) by mouth daily. 30 tablet 5   ? nitroglycerin (NITROSTAT) 0.4 MG SL tablet Place 1 tablet (0.4 mg total) under the tongue every 5 (five) minutes as needed for chest pain.  0     No current facility-administered medications for this visit.       Allergies   Allergen Reactions   ? Blood-Group Specific Substance      Anti-Josep/Barnes.  Expect delays in obtaining blood for transfusion.  Collect 2 lavender top tubes and 1 red top tube for all blood bank orders.          Lab Results    Chemistry/lipid CBC Cardiac Enzymes/BNP/TSH/INR   Lab Results   Component Value Date    CHOL 114 11/08/2018    HDL 39 (L) 11/08/2018    LDLCALC 53 11/08/2018    TRIG 109 11/08/2018    CREATININE 1.47 (H) 12/19/2018    BUN 35 (H) 12/19/2018    K 4.1 12/19/2018     12/19/2018     12/19/2018    CO2 30 12/19/2018    Lab Results   Component Value Date    WBC 10.9 12/11/2018    HGB 9.9 (L) 12/11/2018    HCT 32.9 (L) 12/11/2018    MCV 88 12/11/2018     12/11/2018    Lab Results   Component Value Date    CKTOTAL 37 02/23/2018    CKMB 5 02/28/2018    TROPONINI 0.60 (HH) 11/07/2018     (H) 11/28/2018    TSH 3.1 09/14/2013    INR 2.6 12/26/2018          25 minutes were spent with the patient with greater than 50% spent on education and counseling.    This note has been dictated using voice recognition software. Any grammatical or context distortions are unintentional and inherent to the software.    Vanda Arora PA-C, MPAS  Structural Heart Program  [facility]

## 2021-06-28 NOTE — PROGRESS NOTES
Progress Notes by Lissette Gordon NP at 10/7/2019  9:50 AM     Author: Lissette Gordon NP Service: -- Author Type: Nurse Practitioner    Filed: 10/7/2019 11:28 AM Encounter Date: 10/7/2019 Status: Signed    : Lissette Gordon NP (Nurse Practitioner)           Click to link to [facility]     [facility] HEART CARE NOTE      Assessment/Recommendations   Assessment:    1. Ischemic cardiomyopathy with acute on chronic systolic dysfunction with status post biventricular ICD, NYHA class III: Recent BNP  elevated at 1500's with wt up at 5 lbs with worsening heart failure symptoms. Furosemide dose was increased from 80 mg daily to 80 mg in AM and 40 mg in PM.He is chronic O2 dependent for acute hypoxemic respiratory failure.    Decompensated. His weight is still up 5 lbs. He felt some improvement in shortness of breath but continues have shortness of breath and fatigue with mild exertion. Leg edema has improved. He denies PND or orthopnea.    His blood pressure is stable at  102/70. He is asymptomatic    2. Persistent atrial fibrillation: Per recent device check.  Amiodarone was discontinued due to side effect.  He is on warfarin therapy for stroke prevention.  His INR was supra therapeutic at 3.4. No bleeding complications. His INR today is 3.30 (supra-therapeutic).     3. Chronic kidney disease stage III: BMP drawn today showed Na+ 146, K+ 4.4 and Cr 1.77- stable    Plan:  1. BNP level today-pending. Will consider increasing his diuretic therapy. Discussed about palliative care briefly given recent hospitalization with acute heart failure with known chronic hypoxemic respiratory failure and restrictive lung disease and patient expressed concern about his anxiety with breathing and what to expect as disease advance.       Heart failure medications:  - Beta blocker therapy with metroprolol succinate 50 mg in a.m. and 25 mg in p.m.  - Not on ACEI/ARB therapy d/t low BP hypotension in the past  - Diuretic  therapy with furosemide 80 mg in Am and 40 mg in PM    2.  Reinforced low-sodium diet < 1500mg/day, limit fluid intake to < 1500 ml per day, and daily weight monitoring    3.   Planning cardioversion in  once INR therapeutic- [place], nurse clinician is monitoring his INR readings.    Follow up with Dr. Thakkar as scheduled on 10/30/2019.  Follow-up with me in the heart failure clinic in 1 week     History of Present Illness     Mr. Myron Sunshine is a 87 y.o. male with a significant past medical history of hypertension, paroxysmal atrial fibrillation, chronic kidney disease stage III, severe aortic stenosis with status post TAVR in November 2018, history of ischemic cardiomyopathy with mild systolic dysfunction with left bundle branch block with status post BIV ICD upgrade in December 2018, and coronary artery disease with s/p PCI to proximal RCA in October 2018 who is seen at [facility] heart failure clinic today for continued heart failure follow-up.      Myron was recently hospitalized late August with heart failure exacerbation.  His symptoms improved with IV diuretic therapy and his oral furosemide home dose was increased at discharge.  He recently saw Dr. Cadet and noted persistent atrial fibrillation on his device check.  Amiodarone was tried but  discontinued due to side effect.  His heart failure symptoms are thought to be due to persistent atrial fibrillation.  He was recommended cardioversion once INR therapeutic in 3 weeks.    During last heart failure clinic visit, his weight was up 5 pounds  with increased shortness of breath and fatigue with occasional lightheadedness. His BNP was elevated up in 1500s. He declined ED visit or hospitalization for further management. His furosemide dose was increased.    Patient is here accompanied by his wife.  He reports slight improvement in his breathing but does get shortness of breath and fatigue with mild exertion. Reports occasional abdominal  bloating. He reports anxious about his breathing with his heart and lung condition. He started taking his Sertraline again for his depression and anxiety. He denies shortness of breath at rest, PND orthopnea or heart palpitation, chest pain. He reports fair appetite.   He sleeps with one wedge and pillow which is unchanged for a long time.     Physical Examination Review of Systems   Vitals:    10/07/19 0950   BP: 102/70   Pulse: 80   Resp: 16     Body mass index is 23.04 kg/m .  Wt Readings from Last 3 Encounters:   10/07/19 156 lb (70.8 kg)   09/30/19 156 lb (70.8 kg)   09/10/19 153 lb (69.4 kg)       General Appearance:     Alert, cooperative and in no acute distress.   ENT/Mouth: membranes moist, no oral lesions or bleeding gums.      EYES:  no scleral icterus, normal conjunctivae   Neck: no carotid bruits or thyromegaly   Chest/Lungs:   lungs are clear to auscultation, no rales or wheezing, respirations unlabored except diminished in lower lobes with crackles   Cardiovascular:   Normal first and second heart sounds with no murmurs, rubs, or gallops; the carotid, radial and posterior tibial pulses are intact, Jugular venous pressure flat in with no HJR, trace edema bilateral lower extremities    Abdomen:  Soft, nontender, nondistended, bowel sounds present   Extremities: no cyanosis or clubbing   Skin: warm, dry.    Neurologic: mood and affect are appropriate, alert and oriented x3      General: WNL  Eyes: WNL  Ears/Nose/Throat: WNL  Lungs: WNL  Heart: WNL  Stomach: WNL  Bladder: WNL  Muscle/Joints: WNL  Skin: WNL  Nervous System: WNL  Mental Health: WNL     Blood: WNL     Medical History  Surgical History Family History Social History   Past Medical History:   Diagnosis Date   ? Anxiety    ? Bowel obstruction (H)    ? Bronchiectasis without acute exacerbation (H) 12/1/2017   ? Chronic hypoxemic respiratory failure (H)    ? CKD (chronic kidney disease) stage 3, GFR 30-59 ml/min (H)    ? Coronary artery disease  due to lipid rich plaque    ? DNAR (do not attempt resuscitation)    ? Dyslipidemia, goal LDL below 70    ? ED (erectile dysfunction)    ? Essential hypertension    ? GERD (gastroesophageal reflux disease)    ? Gout flare 12/13/2018   ? Ischemic cardiomyopathy    ? Mild aortic stenosis    ? Noncompliance with medication regimen 9/11/2018   ? Paroxysmal VT (H)    ? Persistent atrial fibrillation 8/22/2018   ? Psoriasis    ? Restrictive lung disease; moderate by PFT 10/24/2017    FVC 1.9 456% FEV1 1.4 457% ratio 74%.  No significant response to bronchodilators.  TLC 40% predicted DLCO corrected 47% predicted Assessment: No obstruction.  Moderate restriction.  Moderate diffusion defect.   ? 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   ? Seasonal allergies    ? Severe aortic stenosis 10/10/2018    Added automatically from request for surgery [id number]   ? Sleep apnea    ? TIA (transient ischemic attack) 8/09   ? Ventricular aneurysm     Long-term coumadin therapy s/p ventricular patch 2001   ? Vitamin D deficiency    ? VT (ventricular tachycardia) (H)     Past Surgical History:   Procedure Laterality Date   ? ABDOMINAL AORTIC ANEURYSM REPAIR     ? APPENDECTOMY      Perforated   ? CARDIAC CATHETERIZATION     ? CARDIAC DEFIBRILLATOR PLACEMENT  7/19/2007   ? CORONARY STENT PLACEMENT  2001    LCX   ? CV CORONARY ANGIOGRAM N/A 10/18/2018    Procedure: Coronary Angiogram;  Surgeon: Elijah Liu MD;  Location: [facility] Cath Lab;  Service:    ? CV TRANSFEMORAL TRANSCATHETER VALVE REPLACEMENT N/A 11/27/2018    Transfemoral Transcatheter Aortic Valve Replacement;  Surgeon: Elijah Liu MD;  Location: [facility] Cath Lab;  Service: Structural Heart   ? ICD Generator Change  6/18/14   ? IR EXTREMITY VENOGRAM LEFT  12/3/2018   ? PICC  11/27/2018        ? PICC AND MIDLINE TEAM LINE INSERTION  2/23/2018        ? RESECTION SMALL BOWEL / CLOSURE ILEOSTOMY     ? THORACENTESIS     ? VENTRICULAR RESECTION /  REPAIR ANEURYSM      Family History   Problem Relation Age of Onset   ? Stroke Mother    ? Thyroid disease Mother    ? Cancer Brother    ? No Medical Problems Son    ? No Medical Problems Son    ? No Medical Problems Son    ? Lung disease Neg Hx     Social History     Socioeconomic History   ? Marital status:      Spouse name: Eulalia   ? Number of children: Not on file   ? Years of education: Not on file   ? Highest education level: Not on file   Occupational History   ? 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 Financial resource strain: Not on file   ? Food insecurity:     Worry: Not on file     Inability: Not on file   ? Transportation needs:     Medical: Not on file     Non-medical: Not on file   Tobacco Use   ? Smoking status: Former Smoker     Packs/day: 1.00     Years: 8.00     Pack years: 8.00     Types: Cigarettes     Last attempt to quit: 1955     Years since quittin.4   ? Smokeless tobacco: Never Used   Substance and Sexual Activity   ? Alcohol use: Yes     Alcohol/week: 1.0 standard drinks     Types: 1 Glasses of wine per week     Comment: social   ? Drug use: No   ? Sexual activity: Not on file   Lifestyle   ? Physical activity:     Days per week: 0 days     Minutes per session: 0 min   ? Stress: Not on file   Relationships   ? Social connections:     Talks on phone: Not on file     Gets together: Not on file     Attends Taoism service: Not on file     Active member of club or organization: Not on file     Attends meetings of clubs or organizations: Not on file     Relationship status: Not on file   ? Intimate partner violence:     Fear of current or ex partner: Not on file     Emotionally abused: Not on file     Physically abused: Not on file     Forced sexual activity: Not on file   Other Topics Concern   ? Not on file   Social History Narrative    Lives with his wife, Eulalia, who he states has advanced arthritis. 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            Medications  Allergies   Current Outpatient Medications   Medication Sig Dispense Refill   ? acetaminophen (TYLENOL EXTRA STRENGTH) 500 MG tablet Take 1 tablet (500 mg total) by mouth every 6 (six) hours as needed for pain.  0   ? atorvastatin (LIPITOR) 80 MG tablet Take 1 tablet (80 mg total) by mouth daily. 30 tablet 3   ? calcium, as carbonate, (TUMS) 200 mg calcium (500 mg) chewable tablet Chew 2-4 tablets as needed for heartburn. Do not take more than 15 tablets in 24 hours.           ? cetirizine (ZYRTEC) 10 MG tablet Take 10 mg by mouth daily as needed.            ? clopidogrel (PLAVIX) 75 mg tablet Take 1 tablet (75 mg total) by mouth daily. take 75 mg (1 pill) daily     ? fluticasone (FLONASE) 50 mcg/actuation nasal spray Apply 1 spray into each nostril daily as needed for rhinitis.     ? formoterol fumarate (PERFOROMIST) 20 mcg/2 mL nebulizer solution Take 2 mL (20 mcg total) by nebulization every 12 (twelve) hours. 160 mL 11   ? furosemide (LASIX) 40 MG tablet Take 3 tablets (120 mg total) by mouth daily. Take 80 mg in AM and 40 mg in PM 90 tablet 0   ? hydrocortisone 1 % cream Apply 1 application topically daily as needed.            ? Lactobacillus acidophilus (PROBIOTIC ORAL) Take 1 capsule by mouth daily.     ? latanoprost (XALATAN) 0.005 % ophthalmic solution Administer 1 drop to the right eye at bedtime. 2.5 mL 12   ? metoprolol succinate (TOPROL-XL) 50 MG 24 hr tablet Take 25-50 mg by mouth see administration instructions. Take 50 mg in the morning and 25 mg in the evening.     ? multivitamin with minerals (THERA-M) 9 mg iron-400 mcg Tab tablet Take 1 tablet by mouth daily.     ? nebulizer and compressor Radha For chronic cough 1 each 0   ? nitroglycerin (NITROSTAT) 0.4 MG SL tablet Place 1 tablet (0.4 mg total) under the tongue every 5 (five) minutes as needed for chest pain.  0   ? OXYGEN-AIR DELIVERY SYSTEMS MISC into each nostril continuous. 1.5 liters  with rest and 3 liters with activity  May use up to 1.5 liters at [facility].  [facility]           ? pantoprazole (PROTONIX) 20 MG tablet Take 1 tablet (20 mg total) by mouth daily. 30 tablet 0   ? PERFOROMIST 20 mcg/2 mL nebulizer solution USE 1 VIAL IN NEBULIZER EVERY 12 HOURS 120 mL 0   ? predniSONE (DELTASONE) 5 MG tablet Take 1 tablet by mouth daily. May increase to 2 tabs daily if needed.. (Patient taking differently: Take 10 mg by mouth daily Take 1 tablet by mouth daily. May increase to 2 tabs daily if needed..) 60 tablet 1   ? sertraline (ZOLOFT) 25 MG tablet Take 25 mg by mouth daily.     ? warfarin (COUMADIN/JANTOVEN) 2 MG tablet Take 2-4 mg by mouth See Admin Instructions. Take 2 mg one day weekly (on Tuesdays) and 4 mg six days weekly (on all other days of the week). Adjust dose based on INR results as directed.     ? albuterol (PROVENTIL) 2.5 mg /3 mL (0.083 %) nebulizer solution Take 3 mL (2.5 mg total) by nebulization every 4 (four) hours as needed for wheezing. 360 mL 6   ? budesonide (PULMICORT) 0.5 mg/2 mL nebulizer solution Take 2 mL (0.5 mg total) by nebulization 2 (two) times a day. 120 mL 6     No current facility-administered medications for this visit.       Allergies   Allergen Reactions   ? Bumex [Bumetanide] Anxiety   ? Blood-Group Specific Substance      Anti-Josep/Barnes.  Expect delays in obtaining blood for transfusion.  Collect 2 lavender top tubes and 1 red top tube for all blood bank orders.          Lab Results    Chemistry CBC BNP   Lab Results   Component Value Date    CREATININE 1.77 (H) 10/07/2019    BUN 45 (H) 10/07/2019     (H) 10/07/2019    K 4.4 10/07/2019     10/07/2019    CO2 37 (H) 10/07/2019     Creatinine (mg/dL)   Date Value   10/07/2019 1.77 (H)   09/30/2019 1.71 (H)   09/02/2019 1.73 (H)   09/01/2019 1.89 (H)    Lab Results   Component Value Date    WBC 13.3 (H) 08/30/2019    HGB 13.6 (L) 08/30/2019    HCT 43.8 08/30/2019    MCV 90 08/30/2019     (L)  08/30/2019    Lab Results   Component Value Date    BNP 1,585 (H) 09/30/2019     BNP (pg/mL)   Date Value   09/30/2019 1,585 (H)   08/30/2019 1,669 (H)   04/29/2019 1,298 (H)        Lissette Gordon [facility]   Heart Failure Clinic         This note has been dictated using voice recognition software. Any grammatical, typographical, or context distortions are unintentional and inherent to the software

## 2021-06-28 NOTE — PROGRESS NOTES
Progress Notes by Donna Thakkar MD at 2/7/2020 10:30 AM     Author: Donna Thakkar MD Service: -- Author Type: Physician    Filed: 2/7/2020 11:12 AM Encounter Date: 2/7/2020 Status: Signed    : Donna Thakkar MD (Physician)                                       Thank you Dr. Gerardo for asking the [facility] team to participate in the care of your patient, Myron Sunshine.     Impression and Plan     1. Coronary artery disease. Myron has known coronary artery disease. Specifically, he had suffered a myocardial infarction in October of 2001 for which he underwent angioplasty and stenting of the circumflex. 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     On 18 October 2018 patient underwent PCI with stent placement to right coronary artery (2.5 x 20 mm Synergy drug-eluting stent).    Patient most recently underwent repeat angiography 17 January 2020 after presenting with possible non-ST elevation myocardial infarction (marginal troponin elevation in setting of renal insufficiency) and coronary anatomy was felt unchanged from 2018 and continued medical therapy was advocated by his Interventionalist, Dr. Owen Tubbs.     Patient denies any angina type chest pain.  Indeed, patient is wondering given his lack of chest pain symptoms whether he needs to continue the isosorbide.  I felt that it would be reasonable to discontinue particularly given some tendency toward lower blood pressure given his lack of anginal symptoms.    As aforementioned, will discontinue isosorbide mononitrate.    Continue clopidogrel in place of aspirin vis-à-vis recent troponin rise/possible non-ST elevation myocardial infarction.    2. Cardiomyopathy (ischemic). Most recent imaging by echocardiography revealed ejection fraction of 40%.  Patient has been intolerant of ACE inhibitor/ARB therapy secondary to hypotension.  Patient is  appropriately on beta-blocker therapy.  Recent device interrogation reveals a decrease in his biventricular pacing.  Normally would consider increasing beta-blocker therapy further, however, his lower blood pressure precludes this.    Patient did report some mild increase in his weight.  He also had some worsening in his breathing though this has improved somewhat over the last 48 hours.  His weight on his home scale was 152 pounds.  On clinical exam, jugular venous pressure does appear somewhat elevated.    I discussed with Myron possible admission with trial of IV furosemide for diuresis.  Patient was somewhat reticent to be admitted.  Given some subjective improvement over the last 48 hours, it was jointly decided to continue to try outpatient management with close follow-up.  Plan:    Increase furosemide to 80 mg twice daily until weight down approximately 3 pounds.  Patient reports that this has been effective in the past.    Will obtain a basic metabolic panel today.    We will also obtain a B-type natriuretic peptide (if elevated, may not be all that helpful vis-à-vis renal insufficiency though if lower would be reassuring).    We will make arrangements for follow-up in the Heart Failure clinic with one of our Nurse Practitioners next week.    Patient instructed that if his symptoms worsen or he fails to improve that he may ultimately need to present for possible admission.     3.  Status post aortic valve replacement (documented 26 mm Kd 3 valve).  Echocardiogram 16 January 2020 revealed normal prosthetic valve function.     4.  Atrial fibrillation.  Rate control strategy has been recommended.  Is felt unlikely that patient would maintain sinus rhythm if cardioversion pursued.      5. Dyslipidemia.  Lipid profile 8 November 2018 was favorable with LDL 53 mg/dL and HDL 39 mg/dL.     6.  COPD. Of note, patient does have underlying pulmonary issues as well.  He did undergo pulmonary function testing which  revealed FVC 59% predicted and FEV1 61% predicted.  Patient has been followed in the Pulmonary Clinic by Dr. Albina Christensen.           History of Present Illness    Once again I would like to thank you again for asking me to participate in the care of your patient, Myron Sunshine.  As you know, but to reiterate for my own records, Myron Sunshine is a 88 y.o. male with known coronary disease. Specifically, he had suffered a myocardial infarction in October of 2001 for which he underwent angioplasty and stenting of the circumflex. In addition, shortly thereafter he had repair of a left ventricular aneurysm/pseudoaneurysm. He has been noted on subsequent echocardiograms to have residual aneurysmal appearance involving the patched area. For this reason, he has been maintained on warfarin since in the past he had been noted to have a suspicion of possible mural thrombus in this region.      He did undergo repeat angiography 21 November 2001 and was found to have patency of his circumflex stents, but a 95% lesion involving the 1st diagonal and this was treated medically.      On 18 October 2018 patient underwent PCI with stent placement to right coronary artery (2.5 x 20 mm Synergy drug-eluting stent).    Patient most recently underwent repeat angiography 17 January 2020 and coronary anatomy was felt unchanged from 2018 and continued medical therapy was advocated by his Interventionalists.     Myron also has a history of atrial fibrillation.  He underwent cardioversion 28 March 2019 and again 14 October 2019.  Patient, over, he has had recurrence and ultimately it was felt unlikely that he would maintain sinus rhythm even with more aggressive antiarrhythmic therapy and rate control strategy was advocated.                        On interview today, Myron reports that his breathing since recent dismissal from hospital he has worsened somewhat.  He does report some mild improvement over the last couple of days, however.  Still  not quite at baseline.  He denies chest pain.    Further review of systems is otherwise negative/noncontributory (medical record and 13 point review of systems reviewed as well and pertinent positives noted).         Cardiac Diagnostics      Echocardiogram 16 January 2020:  1. Normal left ventricular size with moderate depression left ventricular systolic performance.  Ejection fraction 40%.  2. The left ventricular wall thickness is normal. Inferolateral wall pseudoaneurysm present. Left ventricular diastolic dysfunction.  3. Aortic Valve: There is a bioprosthetic valve present. There is mild paravalvular insufficiency present. The prosthetic valve is normal.  4. Moderate mitral insufficiency.  5. Normal right ventricular size with reduced right ventricular systolic performance (not quantified in report).  6. Severe left atrial enlargement.  Mild right atrial enlargement.    Echocardiogram 30 December 2019:  1. Severe left ventricular enlargement with mild-moderate reduction left ventricular systolic performance.  Ejection fraction 40-45%.  2. There is mild global hypokinesis with aneurysmal akinesis of the basal to mid inferior, inferolateral, and anterolateral wall segments that appears to be covered with a patch.  3. There is a 26 mm Kd 3 TAVR valve in the aortic position with normal trans-prosthetic gradients and trace perivalvular regurgitation.  4. Normal right ventricular size with moderate reduction in right ventricular systolic performance.  5. Severe left atrial enlargement.  Mild right atrial enlargement.    When compared to prior echocardiogram 31 August 2019, no significant change.    Echocardiogram 31 August 2019:  1. Mildly reduced left ventricular systolic performance with ejection fraction of 45%.  Prior left ventricular aneurysm patch repair.  2. Severe hypertrophy.  3. Bioprosthetic aortic valve (documented 26 mm Kd 3 TFX).    Mean gradient documented at 11 mmHg with peak velocity of 2.2  m/s.  4. Moderate mitral insufficiency.  5. Mild to moderate tricuspid insufficiency.  6. Normal right ventricular size and systolic performance.  7. Severe left atrial enlargement.  Right atrium normal dimension.  8. Mild enlargement of proximal ascending aorta.    Transesophageal echocardiogram 28 March 2019:  1. Mild left ventricular enlargement with reduced left ventricular systolic performance (not quantified).  2. Bioprosthetic aortic valve with normal prosthetic valve function (documented 26 mm Kd 3 valve).  Normal valve function.  3. Moderate mitral insufficiency.  4. Mild right ventricular enlargement with mildly reduced right ventricular systolic performance.  5. Severe left atrial enlargement.  Mild right atrial enlargement.    Echocardiogram 31 December 2018:  1. Mild left ventricular enlargement with mild to moderate reduction in left ventricular systolic performance.  Ejection fraction 40-45%.  2. Inferior/posterior aneurysm.  3. Severe concentric increased left ventricular wall thickness  4. Bioprosthetic aortic valve with normal prosthetic valve function (documented 26 mm Kd 3 valve).  5. Mean gradient measured at 11 mmHg with peak velocity of 2.0 m/s.  6. Trace aortic insufficiency.  7. Mild to moderate mitral insufficiency.  8. Normal right ventricular size and systolic performance.  9. Severe left atrial enlargement.  Right atrium of normal dimension.    Transesophageal echocardiogram 23 February 2018:  1. Normal left ventricular size and systolic performance with ejection fraction of 55%.  2. Barrier and lateral-basilar pseudoaneurysm.  3. Mild to moderate aortic stenosis.  4. Mild aortic insufficiency.  5. Mild to moderate mitral insufficiency.  6. Normal right ventricular size and systolic performance  7. No obvious vegetation.    Echocardiogram 1 October 2017:  1. Moderately reduced left ventricular systolic performance with ejection fraction of 40%.  2. There is a large aneurysm of  the basal inferolateral wall repaired by a patch.  3. Evidence of aortic stenosis though degree could not be determined.  4. Normal right ventricular size and systolic performance.  5. Mild left atrial enlargement.  Right atrium of normal dimension.    Echocardiogram 13 November 2015:  1. Normal left ventricular size with mild to moderate depression in left ventricular systolic performance. Ejection fraction 40%.  2. 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 Moderate basal and mid inferior hypokinesis.  3. No significant valvular heart disease.  4. Mild left atrial enlargement.    When compared to the prior echocardiogram 24 November 2014, no significant change.    Echocardiogram 24 November 2014:  1. Normal left ventricular size with mild reduction in left ventricular systolic performance. Ejection fraction estimated at 45-50%.  2. Posterior/inferior basal wall pseudoaneurysm/aneurysm noted with apparent patch closure.  3. No significant valvular heart disease.  4. No significant change from 14 September 2013 echocardiogram.    Echocardiogram performed 2 November 2012:  1. Mild dilation of the left ventricle with ejection fraction of 35%.   2. Basal posterior aneurysm was noted once again. Inferior hypokinesis to akinesis.   3. Mild aortic regurgitation.    Coronary angiogram 17 January 2020:  1. Prox RCA lesion is 50% stenosed.  2. 1st Diag lesion is 80% stenosed.  3. Prox Cx to Dist Cx lesion is 99% stenosed.  4. Unable to cross aortic valve  5. Marked difficulty with catherer manipulation in aortic root  6. Complex anatomy.  LCX and 1 Diag are unchanged from 2018.  Moderate restenosis of RCA.  7. Given anatomy would favor medical therapy.    Coronary angiogram 18 October 2018:  1. Left main coronary artery: No significant disease.  2. Left anterior descending coronary artery: Mild diffuse disease.  First diagonal with ostial 80% stenosis.  3. Circumflex coronary  artery: Severe in-stent restenosis (unchanged from previous).  4. Right coronary artery: 80% proximal narrowing.  5. Status post PCI with stent placement of right coronary artery (2.5 x 20 mm Synergy drug-eluting stent).    Device interrogation 23 January 2020 (Medtronic [id number] Claria MRI Quad C... device implanted 4 December 2018):  1. Presenting: Atrial fibrillation (AF) with BiV pacing and ventricular sensing, rate ~70s.  2. Lead/Battery Status: Stable.  3. Atrial Arrhythmias: Since 14 January 2020, 100% AF burden (noted to be in progress since 1/13/20), intermittent functional undersigning noted, v-rates during AF are >/=120bpm<5%.  4. Vent Arrhythmias: Since 14 January 2020, none detected.  5. Anticoagulant: Per EMR, patient is taking Warfarin.  6. Comments: Normal device function. BiV pacing at 68.5%. Routed to Device RN for review.  7. Alerts: None. CAH ADD: BIVP continues to dip, recently reviewed by Zo Dudley CNP on 16 January 2020. This is likely due to persistent AF and she is recommending rate control. Rates are controlled but competing with BIVP. Patient has upcoming appt with Dr. Thakkar on 7 February 2020, will route note to him. HF diagnostics remain stable.     Device interrogation 14 October 2019 (Medtronic [id number] Claria MRI Quad C... device implanted 4 December 2018):  1. Presenting Rhythm: AF  2. Lead/Battery status: Stable.  3. Arrhythmias: AF  4. Anticoagulant: warfarin  5. Comments: Successful CV, exiting rhythm documented as AP-. MDT Rep TRINA Bernardo. AF alerts (24hrs) are on in device and remote monitoring.     Device interrogation 23 August 2019 (Medtronic [id number] Claria MRI Quad C... device implanted 4 December 2018):  1. Type: CRT-D alert remote transmission for long AT/AF.  2. Presenting rhythm: atrial fibrillation with biVP 78 bpm.  3. Battery/lead status: stable.  4. Arrhythmias: since 1 August 2019; one AF episode in progress since 22 August 2019 7:37am, v-rates >/=120bpm 5%, AF  burden 7.2%. One NSVT detected.  5. Anticoagulant: warfarin.  6. Comments: appears to be normal ICD function. Device biVP 83.5%( 5.8% ineffective). Routed to device RN for review.  7. Device/lead alerts: none. NIECY. ADD: repeat remote done, shows AF still in Progress since 22 August 2019.     Device interrogation 27 February 2019 (Medtronic [id number] Claria MRI Quad C... device implanted 4 December 2018):  1. Presenting: Atrial fibrillation with BiV pacing and sensing, rate 70s.  2. Lead/Battery Status: Stable.  3. Atrial Arrhythmias: Since 31 January 2019, 13 mode switches detected, current episode in progress since 0503 T7 February 2019, longest a total of 8.5 hours, patient briefly came out of AT/AF, burden 1.5%, ventricular rates well-controlled, review of EGMs confirm AF.  4. Vent Arrhythmias: Since 31 January 2019, no VT or VF detected.  5. Anticoagulant: Warfarin.  6. Comments: Normal CRT-D function. BiV pacing effective 64.5%, ineffective 13.6%.    Device interrogation 6 March 2018 ([employer] [id number] Yeimi TIFF BOWIE device implanted 18 June 2014):  1. Presenting: AP-VS or AS-VS 60's.  2. Lead/Battery Status: Stable.  3. Atrial Arrhythmias: 5 mode switches, <0.1% burden, atrial fibrillation/atrial flutter, longest 74mins, v-rates >/=120bpm ~5%.  4. Vent Arrhythmias: None  5. Anticoagulant: Warfarin  6. Comments: Normal ICD function.    Device interrogation 21 August 2017 (Medtronic device implanted 18 June 2014):  1. Presenting rhythm: AP-VS, rate 60 bpm.  2. Battery/lead status: stable  3. Arrhythmias: since 5/15/17, no AT/AF noted. One nonsustained VT episode detected.  4. Comments: normal ICD function. Patient is on warfarin.    Device interrogation 31 August 2016 (Medtronic device implanted 18 June 2014):  1. Atrial pacing with ventricular sensing. Underlying rhythm sinus at 60 bpm.  2. Stable battery/lead status.  3. Arrhythmias: None since last interrogation.    Device interrogation 1 September 2015 (Medtronic  "device implanted 18 June 2014):  1. Atrial pacing with ventricular sensing. Underlying rhythm sinus at 60 bpm.  2. Stable battery/lead status.  3. Arrhythmias: None since last interrogation.             Physical Examination       BP (!) 86/64 (Patient Site: Left Arm, Patient Position: Sitting, Cuff Size: Adult Regular)   Pulse 60   Resp 18   Ht 5' 9\" (1.753 m)   Wt 162 lb (73.5 kg)   BMI 23.92 kg/m          Wt Readings from Last 3 Encounters:   02/07/20 162 lb (73.5 kg)   01/20/20 152 lb 4.8 oz (69.1 kg)   01/07/20 158 lb 1.6 oz (71.7 kg)       The patient is alert and oriented times three. Sclerae are anicteric. Mucosal membranes are moist.  Estimated at 11-12 cm. No significant adenopathy/thyromegally appreciated. Lungs are diminished bilaterally with some fine crackles at the bases.. On cardiovascular exam, the patient has a regular S1 and S2. Abdomen is soft and non-tender. Extremities reveal no clubbing, cyanosis, with mild to moderate lower extremity edema bilaterally.         Imaging     Chest radiograph 15 January 2020:  1. Poststernotomy changes with multi lead left subclavian venous pacer and TAVR.    2. Mild basilar pulmonary fibrosis is unchanged.   3. Heart remains enlarged.   4. No overt signs of edema.   5. Pulmonary vascularity is normal.   6. Multiple old healed, right-sided rib fractures.            Family History/Social History/Risk Factors   Patient does not smoke.  Family history reviewed, and family history includes Cancer in his brother; No Medical Problems in his son, son, and son; Stroke in his mother; Thyroid disease in his mother.          Medications  Allergies   Current Outpatient Medications   Medication Sig Dispense Refill   ? acetaminophen (TYLENOL EXTRA STRENGTH) 500 MG tablet Take 1 tablet (500 mg total) by mouth every 6 (six) hours as needed for pain.  0   ? albuterol (PROVENTIL) 2.5 mg /3 mL (0.083 %) nebulizer solution Take 2.5 mg by nebulization every 4 (four) hours as " needed for shortness of breath or wheezing. Take 3mL (2.5mg total) by Neb Q4H prn for wheezing  Indications: chronic obstructive pulmonary disease     ? atorvastatin (LIPITOR) 80 MG tablet Take 1 tablet (80 mg total) by mouth daily. (Patient taking differently: Take 80 mg by mouth every morning.       ) 30 tablet 3   ? budesonide (PULMICORT) 0.5 mg/2 mL nebulizer solution Take 0.5 mg by nebulization 2 (two) times a day. Take 2mL (0.5mg total) by nebulization BID.  Indications: worsening of debilitating chronic lung disease called COPD     ? calcium, as carbonate, (TUMS) 200 mg calcium (500 mg) chewable tablet Chew 2-4 tablets as needed for heartburn. Do not take more than 15 tablets in 24 hours.           ? fluticasone (FLONASE) 50 mcg/actuation nasal spray Apply 1 spray into each nostril daily as needed for rhinitis.     ? formoterol fumarate (PERFOROMIST) 20 mcg/2 mL nebulizer solution Take 20 mcg by nebulization every 12 (twelve) hours. Take 2mL (20mcg total) by nebulization Q12H  Indications: bronchospasm prevention with COPD     ? hydrocortisone 1 % cream Apply 1 application topically daily as needed.            ? Lactobacillus acidophilus (PROBIOTIC ORAL) Take 1 capsule by mouth as needed.            ? latanoprost (XALATAN) 0.005 % ophthalmic solution Administer 1 drop to the right eye at bedtime. 2.5 mL 12   ? magnesium 30 mg tablet Take 30 mg by mouth as needed (leg cramps).     ? metoprolol succinate (TOPROL-XL) 50 MG 24 hr tablet Take 75 mg by mouth daily. Take 75 mg (one and half tablet of 50 mg) daily AM     ? multivitamin with minerals (THERA-M) 9 mg iron-400 mcg Tab tablet Take 1 tablet by mouth daily.     ? nebulizer and compressor Radha For chronic cough 1 each 0   ? nitroglycerin (NITROSTAT) 0.4 MG SL tablet Place 1 tablet (0.4 mg total) under the tongue every 5 (five) minutes as needed for chest pain.  0   ? OXYGEN-AIR DELIVERY SYSTEMS MISC into each nostril continuous. 1.5 liters with rest and 3  liters with activity  May use up to 1.5 liters at [facility].  [employer]           ? pantoprazole (PROTONIX) 20 MG tablet Take 1 tablet (20 mg total) by mouth daily. (Patient taking differently: Take 20 mg by mouth daily as needed. ) 30 tablet 0   ? predniSONE (DELTASONE) 10 mg tablet Take 10 mg by mouth daily. 30 tablet 3   ? predniSONE (DELTASONE) 5 MG tablet Take 1 tablet by mouth daily. May increase to 2 tabs daily if needed.. (Patient taking differently: Take 10 mg by mouth daily .) 60 tablet 1   ? sertraline (ZOLOFT) 25 MG tablet Take 25 mg by mouth at bedtime.            ? warfarin (COUMADIN/JANTOVEN) 2 MG tablet Take 2 mg on Sundays and Wednesdays and 3 mg all other days of the week. Adjust dose based on INR results as directed.     ? albuterol (PROVENTIL) 2.5 mg /3 mL (0.083 %) nebulizer solution Take 3 mL (2.5 mg total) by nebulization every 4 (four) hours as needed for wheezing. 360 mL 6   ? budesonide (PULMICORT) 0.5 mg/2 mL nebulizer solution Take 2 mL (0.5 mg total) by nebulization 2 (two) times a day. 120 mL 6   ? cetirizine (ZYRTEC) 10 MG tablet Take 10 mg by mouth daily as needed for allergies.     ? clopidogrel (PLAVIX) 75 mg tablet Take 1 tablet (75 mg total) by mouth daily. take 75 mg (1 pill) daily (Patient taking differently: Take 75 mg by mouth every morning. 75mg PO QD (am)  Indications: blood clot prevention following percutaneous coronary intervention)     ? formoterol fumarate (PERFOROMIST) 20 mcg/2 mL nebulizer solution Take 2 mL (20 mcg total) by nebulization every 12 (twelve) hours. 160 mL 11   ? furosemide (LASIX) 80 MG tablet Take 1 tablet (80 mg total) by mouth 2 (two) times a day.  0     No current facility-administered medications for this visit.       Allergies   Allergen Reactions   ? Bumex [Bumetanide] Anxiety   ? Amiodarone Other (See Comments)     Shakiness and headache   ? Blood-Group Specific Substance      Anti-Josep/Barnes.  Expect delays in obtaining blood for transfusion.   Collect 2 lavender top tubes and 1 red top tube for all blood bank orders.           Lab Results   Lab Results   Component Value Date     01/23/2020    K 4.2 01/23/2020     01/23/2020    CO2 32 (H) 01/23/2020    BUN 59 (H) 01/23/2020    CREATININE 1.70 (H) 01/23/2020    CALCIUM 8.8 01/23/2020     Lab Results   Component Value Date    WBC 10.3 01/16/2020    WBC 8.8 06/18/2014    HGB 11.7 (L) 01/18/2020    HCT 38.5 (L) 01/16/2020    MCV 89 01/16/2020     (L) 01/18/2020     Lab Results   Component Value Date    CHOL 114 11/08/2018    TRIG 109 11/08/2018    HDL 39 (L) 11/08/2018    LDLCALC 53 11/08/2018     Lab Results   Component Value Date    INR 2.50 (!) 02/07/2020    INR 3.8 (!) 01/27/2020     Lab Results   Component Value Date    BNP 2,071 (H) 01/15/2020     Lab Results   Component Value Date    CKTOTAL 37 02/23/2018    CKTOTAL 32 02/17/2018    CKTOTAL 28 (L) 02/17/2018    CKMB 5 02/28/2018    CKMB 2 09/14/2013    CKMB 2 09/14/2013    TROPONINI 0.39 (HH) 01/16/2020    TROPONINI 0.44 (HH) 01/16/2020    TROPONINI 0.38 (HH) 01/15/2020     Lab Results   Component Value Date    TSH 3.1 09/14/2013

## 2021-06-28 NOTE — PROGRESS NOTES
Progress Notes by Vanda Arora PA-C at 12/30/2019  2:50 PM     Author: Vanda Arora PA-C Service: -- Author Type: Physician Assistant    Filed: 12/31/2019 10:09 AM Encounter Date: 12/30/2019 Status: Signed    : Vanda Arora PA-C (Physician Assistant)           Assessment/Recommendations   Diagnoses and all orders for this visit:    S/P TAVR (transcatheter aortic valve replacement) -he is now 1 year status post his procedure.  Doing well in terms of functional status from the valve, however his breathing from his chronic hypoxemic respiratory failure limits him greatly.  He continues on Plavix therapy secondary to stent placement last October, but this will be transitioned to aspirin 81 mg daily, indefinitely along with his warfarin.  Echo has been reviewed and I will call patient with results. Results were not dictated during his visit.     He is scheduled to see Dr. Thakkar on February 7 and unless there are issues in the future, we do not need to see him back for another valve clinic follow-up visit.    Chronic systolic congestive heart failure, NYHA class III (H) -currently compensated.  He should continue on Lasix 80 mg in the a.m. and 40 mg in the p.m. he is on appropriate beta-blocker therapy.  No ACE/ARB secondary to chronic kidney disease and hypotension.  Biventricular ICD (implantable cardioverter-defibrillator) in place.    Persistent atrial fibrillation -underlying AV paced rhythm, and thus controlled ventricular response.  He is anticoagulated with warfarin and seems to tolerate this well.    Coronary artery disease due to calcified coronary lesion -with stent placed in October 2018.  He is now finished year of Plavix therapy, but will check with Dr. Thakkar before switching him to Plavix given he saw him after 1 year of Plavix and kept him on it.  He should continue on beta-blocker and high intensity statin therapy    Mitral insufficiency - could possibly be  contributing to his breathing, but with his severe COPD, not sure that he is a candidate for any further intervention    Interstitial lung disease with chronic hypoxic respiratory failure -follow-up with pulmonary next week.  He is on several inhalers and also prednisone.  Uses continuous oxygen by nasal cannula at 2 to 3 L.  He has chronic shortness of breath, which limits him significantly.  Thank you for the opportunity to participate in the care of Myron Sunshine. It's been a pleasure working with him.  Please do not hesitate to call with any questions or concerns.       History of Present Illness/Subjective    I had the opportunity to see Myron Sunshine at the [facility] for his one-year follow-up visit after transcatheter aortic valve replacement.    Myron is a very pleasant 88-year-old gentleman with history of aortic stenosis, heart failure with reduced ejection fraction, coronary artery disease, chronic hypoxic respiratory failure with use of continuous home oxygen, chronic kidney disease, moderate to severe MR, atrial fibrillation and obstructive sleep apnea.  He underwent transcatheter aortic valve replacement on November 27, 2018.    Since valve replacement, patient states that he has felt better and thinks that if it was not for his lungs, he would be feeling 100%.  Unfortunately he is on continuous home oxygen and even getting up from a chair makes him significantly short of breath.  He is quite frustrated and wishes we could do something with his lungs.    Myron Sunshine denies exertional chest discomfort, palpitations, PND, orthopnea, lightheadedness, dizziness, pre-syncope or syncope.  Myron Sunshine also denies any recent weight loss, changes in appetite, nausea or vomiting.   ____________________________________________________________  1-year echo from 12/30/19:    Left ventricle is severely dilated (due to aneurysm) with moderate systolic dysfunction.    There is mild global  hypokinesis with aneurysmal akinesis of the basal to mid inferior, inferolateral, and anterolateral wall segments that appears to be covered with a patch.    Estimated left ventricular ejection fraction is around 40-45%.    Right ventricle is normal size with moderate systolic dysfunction.    There is a 26 mm Kd 3 TAVR valve in the aortic position with normal trans-prosthetic gradients and trace perivalvular regurgitation.    Mitral valve annulus is dilated with reduced motion of the posterior leaflet resulting in moderate (3+) regurgitation.    When directly compared to the previous study dated 8/31/2019, there has been no significant change.    EKG (personally reviewed): paced rhythm, appears Afib underlying      Physical Examination Review of Systems   Vitals:    12/30/19 1428   BP: 98/46   Pulse: 70   Resp: 18   SpO2: (!) 88%     There is no height or weight on file to calculate BMI.  Wt Readings from Last 3 Encounters:   12/11/19 154 lb (69.9 kg)   12/06/19 154 lb (69.9 kg)   11/12/19 153 lb (69.4 kg)       General Appearance:   Alert, cooperative and in no acute distress   ENT/Mouth: membranes moist, no oral lesions or bleeding gums.      EYES:  no scleral icterus, normal conjunctivae   Neck: Supple without lymphadenopathy.  Thyroid not visualized   Chest/Lungs:   lungs are clear to auscultation, no rales or wheezing   Cardiovascular:   Regular. Normal first and second heart sounds with no murmurs, rubs, or gallops; the carotid, radial and posterior tibial pulses are intact,  edema bilaterally    Abdomen:  Soft and nontender. Bowel sounds are present in all quadrants   Extremities: no cyanosis or clubbing.    Skin: no xanthelasma, warm.    Neurologic: normal gait, normal  bilateral, no tremors   Psychiatric: Normal mood and affect    General: WNL  Eyes: WNL  Ears/Nose/Throat: WNL  Lungs: WNL  Heart: WNL  Stomach: WNL  Bladder: WNL  Muscle/Joints: WNL  Skin: WNL  Nervous System: WNL  Mental Health:  WNL     Blood: WNL     Medical History  Surgical History Family History Social History   Past Medical History:   Diagnosis Date   ? Anxiety    ? Bowel obstruction (H)    ? Bronchiectasis without acute exacerbation (H) 12/1/2017   ? Chronic hypoxemic respiratory failure (H)    ? CKD (chronic kidney disease) stage 3, GFR 30-59 ml/min (H)    ? Coronary artery disease due to lipid rich plaque    ? DNAR (do not attempt resuscitation)    ? Dyslipidemia, goal LDL below 70    ? ED (erectile dysfunction)    ? Essential hypertension    ? GERD (gastroesophageal reflux disease)    ? Gout flare 12/13/2018   ? Ischemic cardiomyopathy    ? Mild aortic stenosis    ? Noncompliance with medication regimen 9/11/2018   ? Paroxysmal VT (H)    ? Persistent atrial fibrillation 8/22/2018   ? Psoriasis    ? Restrictive lung disease; moderate by PFT 10/24/2017    FVC 1.9 456% FEV1 1.4 457% ratio 74%.  No significant response to bronchodilators.  TLC 40% predicted DLCO corrected 47% predicted Assessment: No obstruction.  Moderate restriction.  Moderate diffusion defect.   ? 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   ? Seasonal allergies    ? Severe aortic stenosis 10/10/2018    Added automatically from request for surgery [id number]   ? Sleep apnea    ? TIA (transient ischemic attack) 8/09   ? Ventricular aneurysm     Long-term coumadin therapy s/p ventricular patch 2001   ? Vitamin D deficiency    ? VT (ventricular tachycardia) (H)     Past Surgical History:   Procedure Laterality Date   ? ABDOMINAL AORTIC ANEURYSM REPAIR     ? APPENDECTOMY      Perforated   ? CARDIAC CATHETERIZATION     ? CARDIAC DEFIBRILLATOR PLACEMENT  7/19/2007   ? CARDIOVERSION  10/14/2019   ? CORONARY STENT PLACEMENT  2001    LCX   ? CV CORONARY ANGIOGRAM N/A 10/18/2018    Procedure: Coronary Angiogram;  Surgeon: Elijah Liu MD;  Location: [facility] Cath Lab;  Service:    ? CV TRANSFEMORAL TRANSCATHETER VALVE REPLACEMENT N/A 11/27/2018     Transfemoral Transcatheter Aortic Valve Replacement;  Surgeon: Elijah Liu MD;  Location: [facility] Cath Lab;  Service: Structural Heart   ? ICD Generator Change  14   ? IR EXTREMITY VENOGRAM LEFT  12/3/2018   ? PICC  2018        ? PICC AND MIDLINE TEAM LINE INSERTION  2018        ? RESECTION SMALL BOWEL / CLOSURE ILEOSTOMY     ? THORACENTESIS     ? VENTRICULAR RESECTION / REPAIR ANEURYSM      Family History   Problem Relation Age of Onset   ? Stroke Mother    ? Thyroid disease Mother    ? Cancer Brother    ? No Medical Problems Son    ? No Medical Problems Son    ? No Medical Problems Son    ? Lung disease Neg Hx     Social History     Socioeconomic History   ? Marital status:      Spouse name: Eulalia   ? Number of children: Not on file   ? Years of education: Not on file   ? Highest education level: Not on file   Occupational History   ? 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 Financial resource strain: Not on file   ? Food insecurity:     Worry: Not on file     Inability: Not on file   ? Transportation needs:     Medical: Not on file     Non-medical: Not on file   Tobacco Use   ? Smoking status: Former Smoker     Packs/day: 1.00     Years: 8.00     Pack years: 8.00     Types: Cigarettes     Last attempt to quit: 1955     Years since quittin.6   ? Smokeless tobacco: Never Used   Substance and Sexual Activity   ? Alcohol use: Yes     Alcohol/week: 1.0 standard drinks     Types: 1 Glasses of wine per week     Comment: social   ? Drug use: No   ? Sexual activity: Not on file   Lifestyle   ? Physical activity:     Days per week: 0 days     Minutes per session: 0 min   ? Stress: Not on file   Relationships   ? Social connections:     Talks on phone: Not on file     Gets together: Not on file     Attends Tenriism service: Not on file     Active member of club or organization: Not on file     Attends meetings of clubs or  organizations: Not on file     Relationship status: Not on file   ? Intimate partner violence:     Fear of current or ex partner: Not on file     Emotionally abused: Not on file     Physically abused: Not on file     Forced sexual activity: Not on file   Other Topics Concern   ? Not on file   Social History Narrative    Lives with his wife, Eulalia, who he states has advanced arthritis. 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            Medications  Allergies   Current Outpatient Medications   Medication Sig Dispense Refill   ? atorvastatin (LIPITOR) 80 MG tablet Take 1 tablet (80 mg total) by mouth daily. (Patient taking differently: Take 80 mg by mouth every morning.       ) 30 tablet 3   ? calcium, as carbonate, (TUMS) 200 mg calcium (500 mg) chewable tablet Chew 2-4 tablets as needed for heartburn. Do not take more than 15 tablets in 24 hours.           ? cetirizine (ZYRTEC) 10 MG tablet Take 10 mg by mouth daily as needed.            ? clopidogrel (PLAVIX) 75 mg tablet Take 1 tablet (75 mg total) by mouth daily. take 75 mg (1 pill) daily (Patient taking differently: Take 75 mg by mouth every morning. take 75 mg (1 pill) daily      )     ? fluticasone (FLONASE) 50 mcg/actuation nasal spray Apply 1 spray into each nostril daily as needed for rhinitis.     ? furosemide (LASIX) 40 MG tablet Take 2 tablets (80 mg total) by mouth daily. Take 80mg dose extra in pm if wt up 2-3 lb in a day. Call clinic if used more that 2 day. (Patient taking differently: Take 80 mg by mouth daily. Takes 80 mg alternate with 80 mg in AM and 40 mg in PM) 100 tablet 1   ? hydrocortisone 1 % cream Apply 1 application topically daily as needed.            ? Lactobacillus acidophilus (PROBIOTIC ORAL) Take 1 capsule by mouth as needed.            ? latanoprost (XALATAN) 0.005 % ophthalmic solution Administer 1 drop to the right eye at bedtime. 2.5 mL 12   ? metoprolol succinate (TOPROL-XL) 50 MG 24 hr tablet Take 75 mg by mouth see  administration instructions. Take 75 mg (one and half tablet of 50 mg) daily after supper           ? multivitamin with minerals (THERA-M) 9 mg iron-400 mcg Tab tablet Take 1 tablet by mouth daily.     ? nebulizer and compressor Radha For chronic cough 1 each 0   ? OXYGEN-AIR DELIVERY SYSTEMS MISC into each nostril continuous. 1.5 liters with rest and 3 liters with activity  May use up to 1.5 liters at [facility].  [facility]           ? pantoprazole (PROTONIX) 20 MG tablet Take 1 tablet (20 mg total) by mouth daily. 30 tablet 0   ? predniSONE (DELTASONE) 5 MG tablet Take 1 tablet by mouth daily. May increase to 2 tabs daily if needed.. (Patient taking differently: Take 10 mg by mouth daily Take 1 tablet by mouth daily. May increase to 2 tabs daily if needed..) 60 tablet 1   ? sertraline (ZOLOFT) 25 MG tablet Take 25 mg by mouth at bedtime.            ? warfarin (COUMADIN/JANTOVEN) 2 MG tablet Take 2-4 mg by mouth See Admin Instructions. Take 2 mg one day weekly (on Tuesdays and Friday) and 4 mg five days weekly (on all other days of the week). Adjust dose based on INR results as directed.           ? acetaminophen (TYLENOL EXTRA STRENGTH) 500 MG tablet Take 1 tablet (500 mg total) by mouth every 6 (six) hours as needed for pain.  0   ? albuterol (PROVENTIL) 2.5 mg /3 mL (0.083 %) nebulizer solution Take 3 mL (2.5 mg total) by nebulization every 4 (four) hours as needed for wheezing. 360 mL 6   ? budesonide (PULMICORT) 0.5 mg/2 mL nebulizer solution Take 2 mL (0.5 mg total) by nebulization 2 (two) times a day. 120 mL 6   ? formoterol fumarate (PERFOROMIST) 20 mcg/2 mL nebulizer solution Take 2 mL (20 mcg total) by nebulization every 12 (twelve) hours. 160 mL 11   ? nitroglycerin (NITROSTAT) 0.4 MG SL tablet Place 1 tablet (0.4 mg total) under the tongue every 5 (five) minutes as needed for chest pain.  0     No current facility-administered medications for this visit.     Allergies   Allergen Reactions   ? Bumex [Bumetanide]  Anxiety   ? Amiodarone Other (See Comments)     Shakiness and headache   ? Blood-Group Specific Substance      Anti-Josep/Barnes.  Expect delays in obtaining blood for transfusion.  Collect 2 lavender top tubes and 1 red top tube for all blood bank orders.          Lab Results    Chemistry/lipid CBC Cardiac Enzymes/BNP/TSH/INR   Lab Results   Component Value Date    CHOL 114 11/08/2018    HDL 39 (L) 11/08/2018    LDLCALC 53 11/08/2018    TRIG 109 11/08/2018    CREATININE 1.65 (H) 12/02/2019    BUN 49 (H) 12/02/2019    K 3.9 12/02/2019     12/02/2019     12/02/2019    CO2 37 (H) 12/02/2019    Lab Results   Component Value Date    WBC 14.1 (H) 12/02/2019    HGB 13.7 (L) 12/02/2019    HCT 44.2 12/02/2019    MCV 90 12/02/2019     (L) 12/02/2019    Lab Results   Component Value Date    CKTOTAL 37 02/23/2018    CKMB 5 02/28/2018    TROPONINI 0.26 08/31/2019    BNP 1,871 (H) 10/31/2019    TSH 3.1 09/14/2013    INR 3.20 (!) 12/30/2019        This note has been dictated using voice recognition software. Any grammatical or context distortions are unintentional and inherent to the software.

## 2021-06-28 NOTE — PROGRESS NOTES
Progress Notes by Lissette Gordon NP at 10/16/2019 12:50 PM     Author: Lissette Gordon NP Service: -- Author Type: Nurse Practitioner    Filed: 10/16/2019  2:13 PM Encounter Date: 10/16/2019 Status: Signed    : Lissette Gordon NP (Nurse Practitioner)           Assessment/Recommendations   Assessment:    1. Ischemic cardiomyopathy with acute on chronic systolic dysfunction with status post biventricular ICD, NYHA class III: Last BNP was still elevated up in 1400s.  Furosemide dose was increased to 80 mg twice a day.      Myron feels some improvement in his shortness of breath.  His weight is down 5 pounds.  He still has fatigue.  He denies shortness of breath at rest, PND orthopnea.  He wakes up frequently during the night due to urinary frequency.  His home weight is 148 pounds.  His baseline weight is 145 to 147 pounds.    He is chronic O2 dependent for acute hypoxemic respiratory failure.  Myron expressed interest in meeting with the palliative care team.      His blood pressure today is 92/66- asymptomatic except fatigue.     2. Persistent atrial fibrillation: Successful cardioversion on 10/14/19.  He feels no difference with his symptoms since cardioversion. He is on warfarin therapy for stroke prevention.  His INR was supra therapeutic at 3.80. No bleeding complications.       3. Chronic kidney disease stage III: BMP drawn today showed Na+ 146, K+ 4.4 and Cr 1.77- stable     Plan:  1.  We will recheck his BMP and BNP level today-pending     2.  Referral placed for palliative care consult    3.  Recommended to take metoprolol succinate 75 mg at bedtime instead to see if this improves his fatigue.    4. Reinforced low-sodium diet < 1500mg/day, limit fluid intake to < 1500 ml per day, and daily weight monitoring       Heart failure medications:  - Beta blocker therapy with metroprolol succinate  75 mg at bedtime  - Not on ACEI/ARB therapy d/t low BP hypotension in the past  - Diuretic therapy with furosemide  80 mg twice daily     Follow up with Dr. Thakkar  in October, Zo Dudley CNP as scheduled in November and follow-up with me in 8 wks      History of Present Illness/Subjective    Mr. Myron Sunshine is a 87 y.o. male with a significant past medical history of hypertension, paroxysmal atrial fibrillation, chronic kidney disease stage III, severe aortic stenosis with status post TAVR in November 2018, history of ischemic cardiomyopathy with mild systolic dysfunction with left bundle branch block with status post BIV ICD upgrade in December 2018, and coronary artery disease with s/p PCI to proximal RCA in October 2018 who is seen at [facility] heart failure clinic today for continued heart failure follow-up.      During last heart failure clinic visit, his weight was still up 5 pounds although felt slight improvement in his shortness of breath. His BNP remained elevated at 1400's. His Furosemide dose was increased further. He underwent successful cardioversion on 1014/19.  His INR was found supra-therapeutic. His warfarin dose was adjusted and recommended repeat INR in 1 week.    Today, patient reports that he felt improvement in his breathing but he continues to have fatigue more so after he takes his morning medications.  He occasionally feels bloating.  He did not feel any significant difference since he had a cardioversion.  During his last clinic visit, he reports feeling anxious about his breathing and his lung and heart condition.  He has been taking sertraline for his anxiety and depression.  I discussed with him about the palliative care consult.  Today he expressed that he is interested in meeting with the palliative care team.  Otherwise, he denies chest pain, heart palpitation, shortness of breath at rest, PND, orthopnea or lower extremity edema.  He reports fair appetite.  He sleeps with one wedge and pillow which is unchanged.  He has been compliant with taking his furosemide as prescribed  except he took only 40 mg of Lasix on the day of cardioversion and he skipped his afternoon dose.    He is following low-sodium diet.  He is keeping track of his weight regularly.     Physical Examination Review of Systems   Vitals:    10/16/19 1255   BP: 92/60   Pulse: 68   Resp: 20     Body mass index is 22.89 kg/m .  Wt Readings from Last 3 Encounters:   10/16/19 155 lb (70.3 kg)   10/14/19 154 lb (69.9 kg)   10/07/19 156 lb (70.8 kg)     General Appearance:   no distress, normal body habitus   ENT/Mouth: membranes moist, no oral lesions or bleeding gums.      EYES:  no scleral icterus, normal conjunctivae   Neck: no carotid bruits or thyromegaly   Chest/Lungs:   lungs are clear to auscultation, no rales or wheezing, equal chest wall expansion except diminished in right lower lobes and has crackles in left lower lobe.   Cardiovascular:    Normal first and second heart sounds with no murmurs, rubs, or gallops; the carotid, radial and posterior tibial pulses are intact, trace edema bilaterally    Abdomen:  no organomegaly, masses, bruits, or tenderness; bowel sounds are present   Extremities: no cyanosis or clubbing   Skin: no xanthelasma, warm.    Neurologic: normal  bilateral, no tremors     Psychiatric: alert and oriented x3, calm     General: WNL  Eyes: WNL  Ears/Nose/Throat: WNL  Lungs: WNL  Heart: WNL  Stomach: WNL  Bladder: WNL  Muscle/Joints: WNL  Skin: WNL  Nervous System: WNL  Mental Health: WNL     Blood: WNL     Medical History  Surgical History Family History Social History   Past Medical History:   Diagnosis Date   ? Anxiety    ? Bowel obstruction (H)    ? Bronchiectasis without acute exacerbation (H) 12/1/2017   ? Chronic hypoxemic respiratory failure (H)    ? CKD (chronic kidney disease) stage 3, GFR 30-59 ml/min (H)    ? Coronary artery disease due to lipid rich plaque    ? DNAR (do not attempt resuscitation)    ? Dyslipidemia, goal LDL below 70    ? ED (erectile dysfunction)    ? Essential  hypertension    ? GERD (gastroesophageal reflux disease)    ? Gout flare 12/13/2018   ? Ischemic cardiomyopathy    ? Mild aortic stenosis    ? Noncompliance with medication regimen 9/11/2018   ? Paroxysmal VT (H)    ? Persistent atrial fibrillation 8/22/2018   ? Psoriasis    ? Restrictive lung disease; moderate by PFT 10/24/2017    FVC 1.9 456% FEV1 1.4 457% ratio 74%.  No significant response to bronchodilators.  TLC 40% predicted DLCO corrected 47% predicted Assessment: No obstruction.  Moderate restriction.  Moderate diffusion defect.   ? 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   ? Seasonal allergies    ? Severe aortic stenosis 10/10/2018    Added automatically from request for surgery [id number]   ? Sleep apnea    ? TIA (transient ischemic attack) 8/09   ? Ventricular aneurysm     Long-term coumadin therapy s/p ventricular patch 2001   ? Vitamin D deficiency    ? VT (ventricular tachycardia) (H)     Past Surgical History:   Procedure Laterality Date   ? ABDOMINAL AORTIC ANEURYSM REPAIR     ? APPENDECTOMY      Perforated   ? CARDIAC CATHETERIZATION     ? CARDIAC DEFIBRILLATOR PLACEMENT  7/19/2007   ? CARDIOVERSION  10/14/2019   ? CORONARY STENT PLACEMENT  2001    LCX   ? CV CORONARY ANGIOGRAM N/A 10/18/2018    Procedure: Coronary Angiogram;  Surgeon: Elijah Liu MD;  Location: [facility] Cath Lab;  Service:    ? CV TRANSFEMORAL TRANSCATHETER VALVE REPLACEMENT N/A 11/27/2018    Transfemoral Transcatheter Aortic Valve Replacement;  Surgeon: Elijah Liu MD;  Location: [facility] Cath Lab;  Service: Structural Heart   ? ICD Generator Change  6/18/14   ? IR EXTREMITY VENOGRAM LEFT  12/3/2018   ? PICC  11/27/2018        ? PICC AND MIDLINE TEAM LINE INSERTION  2/23/2018        ? RESECTION SMALL BOWEL / CLOSURE ILEOSTOMY     ? THORACENTESIS     ? VENTRICULAR RESECTION / REPAIR ANEURYSM      Family History   Problem Relation Age of Onset   ? Stroke Mother    ? Thyroid disease Mother    ?  Cancer Brother    ? No Medical Problems Son    ? No Medical Problems Son    ? No Medical Problems Son    ? Lung disease Neg Hx     Social History     Socioeconomic History   ? Marital status:      Spouse name: Eulalia   ? Number of children: Not on file   ? Years of education: Not on file   ? Highest education level: Not on file   Occupational History   ? 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 Financial resource strain: Not on file   ? Food insecurity:     Worry: Not on file     Inability: Not on file   ? Transportation needs:     Medical: Not on file     Non-medical: Not on file   Tobacco Use   ? Smoking status: Former Smoker     Packs/day: 1.00     Years: 8.00     Pack years: 8.00     Types: Cigarettes     Last attempt to quit: 1955     Years since quittin.4   ? Smokeless tobacco: Never Used   Substance and Sexual Activity   ? Alcohol use: Yes     Alcohol/week: 1.0 standard drinks     Types: 1 Glasses of wine per week     Comment: social   ? Drug use: No   ? Sexual activity: Not on file   Lifestyle   ? Physical activity:     Days per week: 0 days     Minutes per session: 0 min   ? Stress: Not on file   Relationships   ? Social connections:     Talks on phone: Not on file     Gets together: Not on file     Attends [employer] service: Not on file     Active member of club or organization: Not on file     Attends meetings of clubs or organizations: Not on file     Relationship status: Not on file   ? Intimate partner violence:     Fear of current or ex partner: Not on file     Emotionally abused: Not on file     Physically abused: Not on file     Forced sexual activity: Not on file   Other Topics Concern   ? Not on file   Social History Narrative    Lives with his wife, Eulalia, who he states has advanced arthritis. 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            Medications  Allergies   Current Outpatient Medications   Medication Sig Dispense  Refill   ? acetaminophen (TYLENOL EXTRA STRENGTH) 500 MG tablet Take 1 tablet (500 mg total) by mouth every 6 (six) hours as needed for pain.  0   ? atorvastatin (LIPITOR) 80 MG tablet Take 1 tablet (80 mg total) by mouth daily. (Patient taking differently: Take 80 mg by mouth every morning.       ) 30 tablet 3   ? calcium, as carbonate, (TUMS) 200 mg calcium (500 mg) chewable tablet Chew 2-4 tablets as needed for heartburn. Do not take more than 15 tablets in 24 hours.           ? cetirizine (ZYRTEC) 10 MG tablet Take 10 mg by mouth daily as needed.            ? clopidogrel (PLAVIX) 75 mg tablet Take 1 tablet (75 mg total) by mouth daily. take 75 mg (1 pill) daily (Patient taking differently: Take 75 mg by mouth every morning. take 75 mg (1 pill) daily      )     ? fluticasone (FLONASE) 50 mcg/actuation nasal spray Apply 1 spray into each nostril daily as needed for rhinitis.     ? formoterol fumarate (PERFOROMIST) 20 mcg/2 mL nebulizer solution Take 2 mL (20 mcg total) by nebulization every 12 (twelve) hours. 160 mL 11   ? furosemide (LASIX) 40 MG tablet Take 4 tablets (160 mg total) by mouth daily. Take 80 mg in AM and 80mg in PM starting 10/7/19 90 tablet 0   ? hydrocortisone 1 % cream Apply 1 application topically daily as needed.            ? Lactobacillus acidophilus (PROBIOTIC ORAL) Take 1 capsule by mouth as needed.            ? latanoprost (XALATAN) 0.005 % ophthalmic solution Administer 1 drop to the right eye at bedtime. 2.5 mL 12   ? metoprolol succinate (TOPROL-XL) 50 MG 24 hr tablet Take 75 mg by mouth see administration instructions. Take 75 mg (one and half tablet of 50 mg) daily after supper           ? multivitamin with minerals (THERA-M) 9 mg iron-400 mcg Tab tablet Take 1 tablet by mouth daily.     ? nebulizer and compressor Radha For chronic cough 1 each 0   ? nitroglycerin (NITROSTAT) 0.4 MG SL tablet Place 1 tablet (0.4 mg total) under the tongue every 5 (five) minutes as needed for chest  pain.  0   ? OXYGEN-AIR DELIVERY SYSTEMS MISC into each nostril continuous. 1.5 liters with rest and 3 liters with activity  May use up to 1.5 liters at [facility].  [facility]           ? pantoprazole (PROTONIX) 20 MG tablet Take 1 tablet (20 mg total) by mouth daily. 30 tablet 0   ? predniSONE (DELTASONE) 5 MG tablet Take 1 tablet by mouth daily. May increase to 2 tabs daily if needed.. (Patient taking differently: Take 10 mg by mouth daily Take 1 tablet by mouth daily. May increase to 2 tabs daily if needed..) 60 tablet 1   ? sertraline (ZOLOFT) 25 MG tablet Take 25 mg by mouth at bedtime.            ? warfarin (COUMADIN/JANTOVEN) 2 MG tablet Take 2-4 mg by mouth See Admin Instructions. Take 2 mg one day weekly (on Tuesdays and Friday) and 4 mg five days weekly (on all other days of the week). Adjust dose based on INR results as directed.           ? albuterol (PROVENTIL) 2.5 mg /3 mL (0.083 %) nebulizer solution Take 3 mL (2.5 mg total) by nebulization every 4 (four) hours as needed for wheezing. 360 mL 6   ? budesonide (PULMICORT) 0.5 mg/2 mL nebulizer solution Take 2 mL (0.5 mg total) by nebulization 2 (two) times a day. 120 mL 6     No current facility-administered medications for this visit.     Allergies   Allergen Reactions   ? Bumex [Bumetanide] Anxiety   ? Amiodarone Other (See Comments)     Shakiness and headache   ? Blood-Group Specific Substance      Anti-Josep/Barnes.  Expect delays in obtaining blood for transfusion.  Collect 2 lavender top tubes and 1 red top tube for all blood bank orders.          Lab Results    Chemistry/lipid CBC Cardiac Enzymes/BNP/TSH/INR   Lab Results   Component Value Date    CHOL 114 11/08/2018    HDL 39 (L) 11/08/2018    LDLCALC 53 11/08/2018    TRIG 109 11/08/2018    CREATININE 1.77 (H) 10/07/2019    BUN 45 (H) 10/07/2019    K 4.1 10/14/2019     (H) 10/07/2019     10/07/2019    CO2 37 (H) 10/07/2019    Lab Results   Component Value Date    WBC 13.3 (H) 08/30/2019     HGB 13.6 (L) 08/30/2019    HCT 43.8 08/30/2019    MCV 90 08/30/2019     (L) 08/30/2019    Lab Results   Component Value Date    CKTOTAL 37 02/23/2018    CKMB 5 02/28/2018    TROPONINI 0.26 08/31/2019    BNP 1,448 (H) 10/07/2019    TSH 3.1 09/14/2013    INR 2.90 (!) 10/16/2019        This note has been dictated using voice recognition software. Any grammatical, typographical, or context distortions are unintentional and inherent to the software

## 2021-06-28 NOTE — PROGRESS NOTES
Progress Notes by Donna Thakkar MD at 10/31/2019 10:30 AM     Author: Donna Thakkar MD Service: -- Author Type: Physician    Filed: 10/31/2019 10:46 AM Encounter Date: 10/31/2019 Status: Signed    : Donna Thakkar MD (Physician)                                       Thank you Dr. Gerardo for asking the [facility] team to participate in the care of your patient, Myron Sunshine.     Impression and Plan     1. Coronary artery disease. Myron has known coronary artery disease. Specifically, he had suffered a myocardial infarction in October of 2001 for which he underwent angioplasty and stenting of the circumflex. In addition, shortly thereafter he had repair of a left ventricular aneurysm/pseudoaneurysm.     On 18 October 2018 patient underwent PCI with stent placement to right coronary artery (2.5 x 20 mm Synergy drug-eluting stent).     This is been stable.  Patient denies any angina type chest pain.     2. Cardiomyopathy (ischemic). Most recent imaging by echocardiography revealed ejection fraction of 40-45%.  Patient has been intolerant of ACE inhibitor/ARB therapy secondary to hypotension.  Patient is appropriately on beta-blocker therapy. When he was last seen in our clinic, his furosemide was increased to 80 mg twice daily.  Patient states that he felt subjective improvement and was actually thinking he was losing a bit too much weight and reverted back to his previous dose of 80 mg a.m. and 40 mg p.m. His home weight is 148 pounds and he has been maintaining this as of late.  He appears volume neutral on clinical exam today.     Will obtain not only a basic metabolic panel today, but also BNP.      3.  Status post aortic valve replacement (documented 26 mm Kd 3 valve).  Echocardiogram 31 August 2019 revealed normal bioprosthetic valve function with mean gradient of 13 mmHg.     4.  Atrial fibrillation (paroxysmal).  He underwent cardioversion 28 March 2019  and again 14 October 2019.  Myron does indicate that at first he did not notice much change post cardioversion, however, now feels he has had some increasing stamina and a bit less shortness of breath since the procedure.     5. Dyslipidemia.  Lipid profile 8 November 2018 was favorable with LDL 53 mg/dL and HDL 39 mg/dL.    6.  COPD. Of note, patient does have underlying pulmonary issues as well.  He did undergo pulmonary function testing which revealed FVC 59% predicted and FEV1 61% predicted.  He is scheduled to follow-up with Dr. lAbina Christensen 12 December 2019.    Follow-up scheduled Zo Dudley CNP in the atrial fibrillation clinic on 12 November 2019 and follow-up with Lissette Gordon CNP as scheduled 11 December 2019.         History of Present Illness    Once again I would like to thank you again for asking me to participate in the care of your patient, Myron Sunshine.  As you know, but to reiterate for my own records, Myron Sunshine is a 88 y.o. male with known coronary disease. Specifically, he had suffered a myocardial infarction in October of 2001 for which he underwent angioplasty and stenting of the circumflex. In addition, shortly thereafter he had repair of a left ventricular aneurysm/pseudoaneurysm. He has been noted on subsequent echocardiograms to have residual aneurysmal appearance involving the patched area. For this reason, he has been maintained on warfarin since in the past he had been noted to have a suspicion of possible mural thrombus in this region.      He did undergo repeat angiography 21 November 2001 and was found to have patency of his circumflex stents, but a 95% lesion involving the 1st diagonal and this was treated medically.      On 18 October 2018 patient underwent PCI with stent placement to right coronary artery (2.5 x 20 mm Synergy drug-eluting stent).     Myron also has a history of atrial fibrillation.  He underwent cardioversion 28 March 2019 and again 14 October 2019.                         On interview today, Myron reports that his breathing has been at his usual baseline.  His weights have been stable.  When he was last seen in our clinic, his furosemide was increased to 80 mg twice daily.  Patient states that he felt subjective improvement and was actually thinking he was losing a bit too much weight and reverted back to his previous dose of 80 mg a.m. and 40 mg p.m.  He has been doing well in this regard.    Further review of systems is otherwise negative/noncontributory (medical record and 13 point review of systems reviewed as well and pertinent positives noted).         Cardiac Diagnostics      Echocardiogram 31 August 2019:  1. Mildly reduced left ventricular systolic performance with ejection fraction of 45%.  Prior left ventricular aneurysm patch repair.  2. Severe hypertrophy.  3. Bioprosthetic aortic valve (documented 26 mm Kd 3 TFX).  4. Mean gradient documented at 11 mmHg with peak velocity of 2.2 m/s.  5. Moderate mitral insufficiency.  6. Mild to moderate tricuspid insufficiency.  7. Normal right ventricular size and systolic performance.  8. Severe left atrial enlargement.  Right atrium normal dimension.  9. Mild enlargement of proximal ascending aorta.    Transesophageal echocardiogram 28 March 2019:  1. Mild left ventricular enlargement with reduced left ventricular systolic performance (not quantified).  2. Bioprosthetic aortic valve with normal prosthetic valve function (documented 26 mm Kd 3 valve).  Normal valve function.  3. Moderate mitral insufficiency.  4. Mild right ventricular enlargement with mildly reduced right ventricular systolic performance.  5. Severe left atrial enlargement.  Mild right atrial enlargement.    Echocardiogram 31 December 2018:  1. Mild left ventricular enlargement with mild to moderate reduction in left ventricular systolic performance.  Ejection fraction 40-45%.  2. Inferior/posterior aneurysm.  3. Severe concentric increased  left ventricular wall thickness  4. Bioprosthetic aortic valve with normal prosthetic valve function (documented 26 mm Kd 3 valve).  5. Mean gradient measured at 11 mmHg with peak velocity of 2.0 m/s.  6. Trace aortic insufficiency.  7. Mild to moderate mitral insufficiency.  8. Normal right ventricular size and systolic performance.  9. Severe left atrial enlargement.  Right atrium of normal dimension.    Transesophageal echocardiogram 23 February 2018:  1. Normal left ventricular size and systolic performance with ejection fraction of 55%.  2. Barrier and lateral-basilar pseudoaneurysm.  3. Mild to moderate aortic stenosis.  4. Mild aortic insufficiency.  5. Mild to moderate mitral insufficiency.  6. Normal right ventricular size and systolic performance  7. No obvious vegetation.    Echocardiogram 1 October 2017:  1. Moderately reduced left ventricular systolic performance with ejection fraction of 40%.  2. There is a large aneurysm of the basal inferolateral wall repaired by a patch.  3. Evidence of aortic stenosis though degree could not be determined.  4. Normal right ventricular size and systolic performance.  5. Mild left atrial enlargement.  Right atrium of normal dimension.    Echocardiogram 13 November 2015:  1. Normal left ventricular size with mild to moderate depression in left ventricular systolic performance. Ejection fraction 40%.  2. 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 Moderate basal and mid inferior hypokinesis.  3. No significant valvular heart disease.  4. Mild left atrial enlargement.  o When compared to the prior echocardiogram 24 November 2014, no significant change.    Echocardiogram 24 November 2014:  1. Normal left ventricular size with mild reduction in left ventricular systolic performance. Ejection fraction estimated at 45-50%.  2. Posterior/inferior basal wall pseudoaneurysm/aneurysm noted with apparent patch closure.  3. No  significant valvular heart disease.  4. No significant change from 14 September 2013 echocardiogram.    Echocardiogram performed 2 November 2012:  1. Mild dilation of the left ventricle with ejection fraction of 35%.   2. Basal posterior aneurysm was noted once again. Inferior hypokinesis to akinesis.   3. Mild aortic regurgitation.    Coronary angiogram 18 October 2018:  1. Left main coronary artery: No significant disease.  2. Left anterior descending coronary artery: Mild diffuse disease.  First diagonal with ostial 80% stenosis.  3. Circumflex coronary artery: Severe in-stent restenosis (unchanged from previous).  4. Right coronary artery: 80% proximal narrowing.  5. Status post PCI with stent placement of right coronary artery (2.5 x 20 mm Synergy drug-eluting stent).    1. Device interrogation 14 October 2019 (Medtronic [id number] Claria MRI Quad C... device implanted 4 December 2018):  2. Presenting Rhythm: AF  3. Lead/Battery status: Stable.  4. Arrhythmias: AF  5. Anticoagulant: warfarin  6. Comments: Successful CV, exiting rhythm documented as AP-. MDT Rep TRINA Bernardo. AF alerts (24hrs) are on in device and remote monitoring.     Device interrogation 23 August 2019 (Medtronic [id number] Claria MRI Quad C... device implanted 4 December 2018):  1. Type: CRT-D alert remote transmission for long AT/AF.  2. Presenting rhythm: atrial fibrillation with biVP 78 bpm.  3. Battery/lead status: stable.  4. Arrhythmias: since 1 August 2019; one AF episode in progress since 22 August 2019 7:37am, v-rates >/=120bpm 5%, AF burden 7.2%. One NSVT detected.  5. Anticoagulant: warfarin.  6. Comments: appears to be normal ICD function. Device biVP 83.5%( 5.8% ineffective). Routed to device RN for review.  7. Device/lead alerts: none. NIECY. ADD: repeat remote done, shows AF still in Progress since 22 August 2019.     Device interrogation 27 February 2019 (Medtronic [id number] Claria MRI Quad C... device implanted 4 December  "2018):  1. Presenting: Atrial fibrillation with BiV pacing and sensing, rate 70s.  2. Lead/Battery Status: Stable.  3. Atrial Arrhythmias: Since 31 January 2019, 13 mode switches detected, current episode in progress since 0503 T7 February 2019, longest a total of 8.5 hours, patient briefly came out of AT/AF, burden 1.5%, ventricular rates well-controlled, review of EGMs confirm AF.  4. Vent Arrhythmias: Since 31 January 2019, no VT or VF detected.  5. Anticoagulant: Warfarin.  6. Comments: Normal CRT-D function. BiV pacing effective 64.5%, ineffective 13.6%.    Device interrogation 6 March 2018 (Medtronic [id number] Yeimi TIFF BOWIE device implanted 18 June 2014):  1. Presenting: AP-VS or AS-VS 60's.  2. Lead/Battery Status: Stable.  3. Atrial Arrhythmias: 5 mode switches, <0.1% burden, atrial fibrillation/atrial flutter, longest 74mins, v-rates >/=120bpm ~5%.  4. Vent Arrhythmias: None  5. Anticoagulant: Warfarin  6. Comments: Normal ICD function.    Device interrogation 21 August 2017 (Medtronic device implanted 18 June 2014):  1. Presenting rhythm: AP-VS, rate 60 bpm.  2. Battery/lead status: stable  3. Arrhythmias: since 5/15/17, no AT/AF noted. One nonsustained VT episode detected.  4. Comments: normal ICD function. Patient is on warfarin.    Device interrogation 31 August 2016 (Medtronic device implanted 18 June 2014):  1. Atrial pacing with ventricular sensing. Underlying rhythm sinus at 60 bpm.  2. Stable battery/lead status.  3. Arrhythmias: None since last interrogation.    Device interrogation 1 September 2015 (Medtronic device implanted 18 June 2014):  1. Atrial pacing with ventricular sensing. Underlying rhythm sinus at 60 bpm.  2. Stable battery/lead status.  3. Arrhythmias: None since last interrogation.           Physical Examination       /58 (Patient Site: Right Arm, Patient Position: Sitting, Cuff Size: Adult Regular)   Pulse 72   Resp 16   Ht 5' 9\" (1.753 m)   Wt 153 lb (69.4 kg)   BMI 22.59 " kg/m          Wt Readings from Last 3 Encounters:   10/31/19 153 lb (69.4 kg)   10/16/19 155 lb (70.3 kg)   10/14/19 154 lb (69.9 kg)     The patient is alert and oriented times three. Sclerae are anicteric. Mucosal membranes are moist. Jugular venous pressure is normal. No significant adenopathy/thyromegally appreciated. Lungs are diminished bilaterally, but fairly clear. On cardiovascular exam, the patient has a regular S1 and S2. Abdomen is soft and non-tender. Extremities reveal no clubbing, cyanosis, or edema.       Family History/Social History/Risk Factors   Patient does not smoke.  Family history reviewed, and family history includes Cancer in his brother; No Medical Problems in his son, son, and son; Stroke in his mother; Thyroid disease in his mother.          Medications  Allergies   Current Outpatient Medications   Medication Sig Dispense Refill   ? acetaminophen (TYLENOL EXTRA STRENGTH) 500 MG tablet Take 1 tablet (500 mg total) by mouth every 6 (six) hours as needed for pain.  0   ? albuterol (PROVENTIL) 2.5 mg /3 mL (0.083 %) nebulizer solution Take 3 mL (2.5 mg total) by nebulization every 4 (four) hours as needed for wheezing. 360 mL 6   ? atorvastatin (LIPITOR) 80 MG tablet Take 1 tablet (80 mg total) by mouth daily. (Patient taking differently: Take 80 mg by mouth every morning.       ) 30 tablet 3   ? budesonide (PULMICORT) 0.5 mg/2 mL nebulizer solution Take 2 mL (0.5 mg total) by nebulization 2 (two) times a day. 120 mL 6   ? calcium, as carbonate, (TUMS) 200 mg calcium (500 mg) chewable tablet Chew 2-4 tablets as needed for heartburn. Do not take more than 15 tablets in 24 hours.           ? cetirizine (ZYRTEC) 10 MG tablet Take 10 mg by mouth daily as needed.            ? clopidogrel (PLAVIX) 75 mg tablet Take 1 tablet (75 mg total) by mouth daily. take 75 mg (1 pill) daily (Patient taking differently: Take 75 mg by mouth every morning. take 75 mg (1 pill) daily      )     ? fluticasone  (FLONASE) 50 mcg/actuation nasal spray Apply 1 spray into each nostril daily as needed for rhinitis.     ? formoterol fumarate (PERFOROMIST) 20 mcg/2 mL nebulizer solution Take 2 mL (20 mcg total) by nebulization every 12 (twelve) hours. 160 mL 11   ? furosemide (LASIX) 40 MG tablet Take 4 tablets (160 mg total) by mouth daily. Take 80 mg in AM and 80mg in PM starting 10/7/19 (Patient taking differently: Take by mouth daily. Take 80 mg in AM and 40mg in PM starting      ) 90 tablet 0   ? hydrocortisone 1 % cream Apply 1 application topically daily as needed.            ? Lactobacillus acidophilus (PROBIOTIC ORAL) Take 1 capsule by mouth as needed.            ? latanoprost (XALATAN) 0.005 % ophthalmic solution Administer 1 drop to the right eye at bedtime. 2.5 mL 12   ? metoprolol succinate (TOPROL-XL) 50 MG 24 hr tablet Take 75 mg by mouth see administration instructions. Take 75 mg (one and half tablet of 50 mg) daily after supper           ? multivitamin with minerals (THERA-M) 9 mg iron-400 mcg Tab tablet Take 1 tablet by mouth daily.     ? nebulizer and compressor Radha For chronic cough 1 each 0   ? nitroglycerin (NITROSTAT) 0.4 MG SL tablet Place 1 tablet (0.4 mg total) under the tongue every 5 (five) minutes as needed for chest pain.  0   ? OXYGEN-AIR DELIVERY SYSTEMS MISC into each nostril continuous. 1.5 liters with rest and 3 liters with activity  May use up to 1.5 liters at [facility].  [facility]           ? pantoprazole (PROTONIX) 20 MG tablet Take 1 tablet (20 mg total) by mouth daily. 30 tablet 0   ? predniSONE (DELTASONE) 5 MG tablet Take 1 tablet by mouth daily. May increase to 2 tabs daily if needed.. (Patient taking differently: Take 10 mg by mouth daily Take 1 tablet by mouth daily. May increase to 2 tabs daily if needed..) 60 tablet 1   ? sertraline (ZOLOFT) 25 MG tablet Take 25 mg by mouth at bedtime.            ? warfarin (COUMADIN/JANTOVEN) 2 MG tablet Take 2-4 mg by mouth See Admin Instructions. Take 2  mg one day weekly (on Tuesdays and Friday) and 4 mg five days weekly (on all other days of the week). Adjust dose based on INR results as directed.             No current facility-administered medications for this visit.       Allergies   Allergen Reactions   ? Bumex [Bumetanide] Anxiety   ? Amiodarone Other (See Comments)     Shakiness and headache   ? Blood-Group Specific Substance      Anti-Josep/Barnes.  Expect delays in obtaining blood for transfusion.  Collect 2 lavender top tubes and 1 red top tube for all blood bank orders.           Lab Results   Lab Results   Component Value Date     10/16/2019    K 3.8 10/16/2019    CL 99 10/16/2019    CO2 32 (H) 10/16/2019    BUN 52 (H) 10/16/2019    CREATININE 1.63 (H) 10/16/2019    CALCIUM 9.1 10/16/2019     Lab Results   Component Value Date    WBC 13.3 (H) 08/30/2019    WBC 8.8 06/18/2014    HGB 13.6 (L) 08/30/2019    HCT 43.8 08/30/2019    MCV 90 08/30/2019     (L) 08/30/2019     Lab Results   Component Value Date    CHOL 114 11/08/2018    TRIG 109 11/08/2018    HDL 39 (L) 11/08/2018    LDLCALC 53 11/08/2018     Lab Results   Component Value Date    INR 3.30 (!) 10/23/2019     Lab Results   Component Value Date    BNP 1,091 (H) 10/16/2019     Lab Results   Component Value Date    CKTOTAL 37 02/23/2018    CKTOTAL 32 02/17/2018    CKTOTAL 28 (L) 02/17/2018    CKMB 5 02/28/2018    CKMB 2 09/14/2013    CKMB 2 09/14/2013    TROPONINI 0.26 08/31/2019    TROPONINI 0.22 08/30/2019    TROPONINI 0.23 08/30/2019     Lab Results   Component Value Date    TSH 3.1 09/14/2013

## 2021-06-28 NOTE — PROGRESS NOTES
Progress Notes by Lissette Gordon NP at 12/11/2019 10:30 AM     Author: Lissette Gordon NP Service: -- Author Type: Nurse Practitioner    Filed: 12/11/2019 11:57 AM Encounter Date: 12/11/2019 Status: Signed    : Lissette Gordon NP (Nurse Practitioner)           Assessment/Recommendations   Assessment:    1. Ischemic cardiomyopathy with acute on chronic systolic dysfunction with status post biventricular ICD, NYHA class III: He is well compensated with no acute signs symptoms of heart failure.  He does have some shortness of breath at baseline which is unchanged.  His home weight has been stable between 146 to 148 pounds.    He is currently taking furosemide 80 mg daily alternate with 80 mg in a.m. and 40 mg in p.m.  He states this regimen is working well for him.  He follows low-sodium diet.  He denies shortness of breath at rest, PND orthopnea.    Last BNP was still elevated up in 1400s.  Furosemide dose was increased to 80 mg twice a day.      He is chronic O2 dependent 2 to 3 L per nasal cannula for acute hypoxemic respiratory failure.        His blood pressure today is 96/66 and heart rate 70.  He is asymptomatic    2. Persistent atrial fibrillation:  Heart rate controlled in 70s.  He is asymptomatic.  He is on warfarin therapy for stroke prevention.    Most recent INR is therapeutic.  He denies complications.    3. Chronic kidney disease stage III:  Most recent BMP on 12/2/2019 showed sodium level 145, potassium 3.8, and creatinine 1.65.     Plan:  1. Continue current heart failure regimen. Reinforced low-sodium diet < 1500mg/day, limit fluid intake to < 1500 ml per day, and daily weight monitoring.  He was highly encouraged to call if persistent worsening heart failure symptoms.       Heart failure medications:  - Beta blocker therapy with metroprolol succinate  75 mg at bedtime  - Not on ACEI/ARB therapy d/t low BP hypotension in the past  - Diuretic therapy with furosemide 80 mg daily alternate with 80  mg in a.m. and 40 mg p.m.     2.  Continue current A. fib regimen    F/u with Dr. Thakkar  in 8 weeks, Dr. Cadet in March and f/u  in heart failure clinic in 4 months or sooner if needed     History of Present Illness/Subjective    Mr. Myron Sunshine is a 87 y.o. male with a significant past medical history of hypertension, paroxysmal atrial fibrillation, chronic kidney disease stage III, severe aortic stenosis with status post TAVR in November 2018, history of ischemic cardiomyopathy with mild systolic dysfunction with left bundle branch block with status post BIV ICD upgrade in December 2018, and coronary artery disease with s/p PCI to proximal RCA in October 2018 who is seen at [facility] heart failure clinic today for continued heart failure follow-up.        He had a follow-up with KATERINA Pathak in A. fib clinic.  She recommended trial of rate control for his atrial fibrillation and retrying amiodarone only if he becomes significantly symptomatic.    Patient saw Dr. Cadet recently.  Found he converted to normal sinus rhythm spontaneously and feeling better.  His OptiVol level was found stable  He was recommended to follow-up back with him in 3 months.during last heart failure clinic visit, he expresses interest in palliative care.  Referral was placed.  Patient reported today that now that he is feeling better, he change his mind and decided not to meet with the palliative care at this time.    Today, patient denies having any chest pain, heart palpitation, abdominal bloating, shortness of breath at rest, PND or orthopnea.  He denies bleeding complications.  He overall feels better with his breathing.  He reports improved appetite and sleeping better.  He has a follow-up appointment coming up with pulmonary soon.    He monitors his weight and following low-sodium diet.     Physical Examination Review of Systems   Vitals:    12/11/19 1026   BP: 96/66   Pulse: 70   Resp: 16     Body mass index is  22.74 kg/m .  Wt Readings from Last 3 Encounters:   12/11/19 154 lb (69.9 kg)   12/06/19 154 lb (69.9 kg)   11/12/19 153 lb (69.4 kg)     General Appearance:   no distress, normal body habitus   ENT/Mouth: membranes moist, no oral lesions or bleeding gums.      EYES:  no scleral icterus, normal conjunctivae   Neck: no carotid bruits or thyromegaly   Chest/Lungs:   lungs are clear to auscultation, no rales or wheezing, equal chest wall expansion except diminished in right lower lobes and has crackles in left lower lobe (chronic-unchanged).   Cardiovascular:    Normal first and second heart sounds with no murmurs, rubs, or gallops; the carotid, radial and posterior tibial pulses are intact, trace edema bilaterally    Abdomen:  no organomegaly, masses, bruits, or tenderness; bowel sounds are present   Extremities: no cyanosis or clubbing   Skin: no xanthelasma, warm.    Neurologic: normal  bilateral, no tremors     Psychiatric: alert and oriented x3, calm     General: WNL  Eyes: WNL  Ears/Nose/Throat: WNL  Lungs: WNL  Heart: WNL  Stomach: WNL  Bladder: WNL  Muscle/Joints: WNL  Skin: WNL  Nervous System: WNL  Mental Health: WNL     Blood: WNL     Medical History  Surgical History Family History Social History   Past Medical History:   Diagnosis Date   ? Anxiety    ? Bowel obstruction (H)    ? Bronchiectasis without acute exacerbation (H) 12/1/2017   ? Chronic hypoxemic respiratory failure (H)    ? CKD (chronic kidney disease) stage 3, GFR 30-59 ml/min (H)    ? Coronary artery disease due to lipid rich plaque    ? DNAR (do not attempt resuscitation)    ? Dyslipidemia, goal LDL below 70    ? ED (erectile dysfunction)    ? Essential hypertension    ? GERD (gastroesophageal reflux disease)    ? Gout flare 12/13/2018   ? Ischemic cardiomyopathy    ? Mild aortic stenosis    ? Noncompliance with medication regimen 9/11/2018   ? Paroxysmal VT (H)    ? Persistent atrial fibrillation 8/22/2018   ? Psoriasis    ? Restrictive  lung disease; moderate by PFT 10/24/2017    FVC 1.9 456% FEV1 1.4 457% ratio 74%.  No significant response to bronchodilators.  TLC 40% predicted DLCO corrected 47% predicted Assessment: No obstruction.  Moderate restriction.  Moderate diffusion defect.   ? 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   ? Seasonal allergies    ? Severe aortic stenosis 10/10/2018    Added automatically from request for surgery [id number]   ? Sleep apnea    ? TIA (transient ischemic attack) 8/09   ? Ventricular aneurysm     Long-term coumadin therapy s/p ventricular patch 2001   ? Vitamin D deficiency    ? VT (ventricular tachycardia) (H)     Past Surgical History:   Procedure Laterality Date   ? ABDOMINAL AORTIC ANEURYSM REPAIR     ? APPENDECTOMY      Perforated   ? CARDIAC CATHETERIZATION     ? CARDIAC DEFIBRILLATOR PLACEMENT  7/19/2007   ? CARDIOVERSION  10/14/2019   ? CORONARY STENT PLACEMENT  2001    LCX   ? CV CORONARY ANGIOGRAM N/A 10/18/2018    Procedure: Coronary Angiogram;  Surgeon: Elijah Liu MD;  Location: [facility];  Service:    ? CV TRANSFEMORAL TRANSCATHETER VALVE REPLACEMENT N/A 11/27/2018    Transfemoral Transcatheter Aortic Valve Replacement;  Surgeon: Elijah Liu MD;  Location: [facility];  Service: Structural Heart   ? ICD Generator Change  6/18/14   ? IR EXTREMITY VENOGRAM LEFT  12/3/2018   ? PICC  11/27/2018        ? PICC AND MIDLINE TEAM LINE INSERTION  2/23/2018        ? RESECTION SMALL BOWEL / CLOSURE ILEOSTOMY     ? THORACENTESIS     ? VENTRICULAR RESECTION / REPAIR ANEURYSM      Family History   Problem Relation Age of Onset   ? Stroke Mother    ? Thyroid disease Mother    ? Cancer Brother    ? No Medical Problems Son    ? No Medical Problems Son    ? No Medical Problems Son    ? Lung disease Neg Hx     Social History     Socioeconomic History   ? Marital status:      Spouse name: Eulalia   ? Number of children: Not on file   ? Years of education: Not on  file   ? Highest education level: Not on file   Occupational History   ? 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 Financial resource strain: Not on file   ? Food insecurity:     Worry: Not on file     Inability: Not on file   ? Transportation needs:     Medical: Not on file     Non-medical: Not on file   Tobacco Use   ? Smoking status: Former Smoker     Packs/day: 1.00     Years: 8.00     Pack years: 8.00     Types: Cigarettes     Last attempt to quit: 1955     Years since quittin.6   ? Smokeless tobacco: Never Used   Substance and Sexual Activity   ? Alcohol use: Yes     Alcohol/week: 1.0 standard drinks     Types: 1 Glasses of wine per week     Comment: social   ? Drug use: No   ? Sexual activity: Not on file   Lifestyle   ? Physical activity:     Days per week: 0 days     Minutes per session: 0 min   ? Stress: Not on file   Relationships   ? Social connections:     Talks on phone: Not on file     Gets together: Not on file     Attends Taoist service: Not on file     Active member of club or organization: Not on file     Attends meetings of clubs or organizations: Not on file     Relationship status: Not on file   ? Intimate partner violence:     Fear of current or ex partner: Not on file     Emotionally abused: Not on file     Physically abused: Not on file     Forced sexual activity: Not on file   Other Topics Concern   ? Not on file   Social History Narrative    Lives with his wife, Eulalia, who he states has advanced arthritis. 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            Medications  Allergies   Current Outpatient Medications   Medication Sig Dispense Refill   ? acetaminophen (TYLENOL EXTRA STRENGTH) 500 MG tablet Take 1 tablet (500 mg total) by mouth every 6 (six) hours as needed for pain.  0   ? atorvastatin (LIPITOR) 80 MG tablet Take 1 tablet (80 mg total) by mouth daily. (Patient taking differently: Take 80 mg by mouth every morning.        ) 30 tablet 3   ? calcium, as carbonate, (TUMS) 200 mg calcium (500 mg) chewable tablet Chew 2-4 tablets as needed for heartburn. Do not take more than 15 tablets in 24 hours.           ? cetirizine (ZYRTEC) 10 MG tablet Take 10 mg by mouth daily as needed.            ? clopidogrel (PLAVIX) 75 mg tablet Take 1 tablet (75 mg total) by mouth daily. take 75 mg (1 pill) daily (Patient taking differently: Take 75 mg by mouth every morning. take 75 mg (1 pill) daily      )     ? fluticasone (FLONASE) 50 mcg/actuation nasal spray Apply 1 spray into each nostril daily as needed for rhinitis.     ? furosemide (LASIX) 40 MG tablet Take 2 tablets (80 mg total) by mouth daily. Take 80mg dose extra in pm if wt up 2-3 lb in a day. Call clinic if used more that 2 day. (Patient taking differently: Take 80 mg by mouth daily. Takes 80 mg alternate with 80 mg in AM and 40 mg in PM) 100 tablet 1   ? hydrocortisone 1 % cream Apply 1 application topically daily as needed.            ? Lactobacillus acidophilus (PROBIOTIC ORAL) Take 1 capsule by mouth as needed.            ? latanoprost (XALATAN) 0.005 % ophthalmic solution Administer 1 drop to the right eye at bedtime. 2.5 mL 12   ? metoprolol succinate (TOPROL-XL) 50 MG 24 hr tablet Take 75 mg by mouth see administration instructions. Take 75 mg (one and half tablet of 50 mg) daily after supper           ? multivitamin with minerals (THERA-M) 9 mg iron-400 mcg Tab tablet Take 1 tablet by mouth daily.     ? nebulizer and compressor Radha For chronic cough 1 each 0   ? nitroglycerin (NITROSTAT) 0.4 MG SL tablet Place 1 tablet (0.4 mg total) under the tongue every 5 (five) minutes as needed for chest pain.  0   ? OXYGEN-AIR DELIVERY SYSTEMS MISC into each nostril continuous. 1.5 liters with rest and 3 liters with activity  [facility]           ? pantoprazole (PROTONIX) 20 MG tablet Take 1 tablet (20 mg total) by mouth daily. 30 tablet 0   ? predniSONE  (DELTASONE) 5 MG tablet Take 1 tablet by mouth daily. May increase to 2 tabs daily if needed.. (Patient taking differently: Take 10 mg by mouth daily Take 1 tablet by mouth daily. May increase to 2 tabs daily if needed..) 60 tablet 1   ? sertraline (ZOLOFT) 25 MG tablet Take 25 mg by mouth at bedtime.            ? warfarin (COUMADIN/JANTOVEN) 2 MG tablet Take 2-4 mg by mouth See Admin Instructions. Take 2 mg one day weekly (on Tuesdays and Friday) and 4 mg five days weekly (on all other days of the week). Adjust dose based on INR results as directed.           ? albuterol (PROVENTIL) 2.5 mg /3 mL (0.083 %) nebulizer solution Take 3 mL (2.5 mg total) by nebulization every 4 (four) hours as needed for wheezing. 360 mL 6   ? budesonide (PULMICORT) 0.5 mg/2 mL nebulizer solution Take 2 mL (0.5 mg total) by nebulization 2 (two) times a day. 120 mL 6   ? formoterol fumarate (PERFOROMIST) 20 mcg/2 mL nebulizer solution Take 2 mL (20 mcg total) by nebulization every 12 (twelve) hours. 160 mL 11     No current facility-administered medications for this visit.     Allergies   Allergen Reactions   ? Bumex [Bumetanide] Anxiety   ? Amiodarone Other (See Comments)     Shakiness and headache   ? Blood-Group Specific Substance      Anti-Josep/Barnes.  Expect delays in obtaining blood for transfusion.  Collect 2 lavender top tubes and 1 red top tube for all blood bank orders.          Lab Results    Chemistry/lipid CBC Cardiac Enzymes/BNP/TSH/INR   Lab Results   Component Value Date    CHOL 114 11/08/2018    HDL 39 (L) 11/08/2018    LDLCALC 53 11/08/2018    TRIG 109 11/08/2018    CREATININE 1.65 (H) 12/02/2019    BUN 49 (H) 12/02/2019    K 3.9 12/02/2019     12/02/2019     12/02/2019    CO2 37 (H) 12/02/2019    Lab Results   Component Value Date    WBC 14.1 (H) 12/02/2019    HGB 13.7 (L) 12/02/2019    HCT 44.2 12/02/2019    MCV 90 12/02/2019     (L) 12/02/2019    Lab Results   Component Value Date    CKTOTAL 37  02/23/2018    CKMB 5 02/28/2018    TROPONINI 0.26 08/31/2019    BNP 1,871 (H) 10/31/2019    TSH 3.1 09/14/2013    INR 2.00 (!) 12/06/2019        This note has been dictated using voice recognition software. Any grammatical, typographical, or context distortions are unintentional and inherent to the software

## 2021-06-28 NOTE — PROGRESS NOTES
Progress Notes by Donal Cadet MD at 9/10/2019  9:30 AM     Author: Donal Cadet MD Service: -- Author Type: Physician    Filed: 9/10/2019 11:17 AM Encounter Date: 9/10/2019 Status: Signed    : Donal Cadet MD (Physician)       Message   Received: Today   Message Contents   Jerome Christensen MD Granrud, Gregory A, MD             Saw Mr. Sunshine today. He has not been taking the amio due to side effects. Wanted to make sure you were aware due to planned cardioversion next month. IN addition, he has considerable pulmonary fibrosis/ILD in the background of all of this so if amio is restarted, we should monitor this closely due to his minimal pulmonary reserve.   Thanks, Jerome Christensen [phone]      Patient is very symptomatic in atrial fibrillation-lots of  CHF  Want to get him out of atrial fibrillation  Poor candidate for sotalol with renal insufficiency likewise for Tikosyn  Thought we would put him on amiodarone but he took 1 dose of amiodarone and felt side effects and stopped taking medication  Has quite limited pulmonary reserve so not a good candidate for long-term amiodarone  Continue preparing patient for cardioversion  Would do cardioversion once he has INRs have been therapeutic for 3 weeks  No further amiodarone  Earlier, I suggested resuming the amiodarone but now with the pulmonary concerns, no amiodarone

## 2021-06-28 NOTE — PROGRESS NOTES
Progress Notes by Ivory Portillo RN at 9/6/2019  2:31 PM     Author: Ivory Portillo RN Service: -- Author Type: Registered Nurse    Filed: 9/10/2019 10:58 AM Encounter Date: 9/6/2019 Status: Signed    : Ivory Portillo RN (Registered Nurse)       Attempted to contact pt, voicemail message was left with contact information and instructing pt to call back.  9/6/2019 2:31 PM  JEREMIAS Byrnes Jessica L, RN  P [facility] Ep Rn Support Pool          Previous Messages      ----- Message -----   From: Donal Cadet MD   Sent: 9/6/2019   8:45 AM   To: Ivory Portillo RN     Started patient on amiodarone 200 mg twice daily   Should have weekly INR   Scheduled for a cardioversion in 3 weeks with me   Will need to have rep present as; I believe he has a [employer] system

## 2021-06-28 NOTE — PROGRESS NOTES
Progress Notes by Lissette Gordon NP at 9/30/2019  2:50 PM     Author: Lissette Gordon NP Service: -- Author Type: Nurse Practitioner    Filed: 9/30/2019  4:29 PM Encounter Date: 9/30/2019 Status: Addendum    : Lissette Gordon NP (Nurse Practitioner)    Related Notes: Original Note by Lissette Gordon NP (Nurse Practitioner) filed at 9/30/2019  3:45 PM           Click to link to [facility]     [facility] HEART CARE NOTE      Assessment/Recommendations   Assessment:    1. Ischemic cardiomyopathy with acute on chronic systolic dysfunction with status post biventricular ICD, NYHA class III: Recent hospitalization with acute systolic heart failure.  His BNP was elevated up in 1600s.  He was successfully treated with IV diuretic therapy and was discharged home on increased dose of furosemide.    He is decompensated.  His weight is up at least 5 pounds since discharge from the hospital.  He reports increased shortness of breath and fatigue with mild exertion.  He has mild lower extremity edema.  He denies PND orthopnea.  He is chronic O2 dependent for acute hypoxemic respiratory failure.    His blood pressure is stable at 110/70 and heart rate 80.    2.  Persistent atrial fibrillation: Recent device check showed persistent atrial fibrillation.  Amiodarone was started but discontinued due to side effect.  He is on warfarin therapy for stroke prevention.  His INR is supratherapeutic  At 3.4.    3. Chronic kidney disease stage III: Most recent BMP showed sodium 138, potassium 3.9, BUN 59 and creatinine 1.73-improving.    Plan:  1.  We will obtain BMP and BNP level today-pending.  Will consider increasing his diuretic therapy if elevated BNP.  Given worsening renal function with no evidence of fluid retention, agreed with patient to cut back Lasix. He was encouraged to call back if persistent weight gain with worsening heart failure symptoms.  Will repeat BMP in 2 weeks      Heart failure medications:  - Beta  blocker therapy with metroprolol succinate 50 mg in a.m. and 25 mg in p.m.  - Not on ACEI/ARB therapy d/t low BP hypotension in the past  - Diuretic therapy with furosemide 80 mg daily    2.  Reinforced low-sodium diet < 2 g/day and daily weight monitoring    3.   Planning cardioversion in 3 weeks once INR therapeutic.    Addendum: 9/30/2019 at 4:28 PM- BMP showed stable with creatinine of 1.71.  BNP elevated in 1500s.  Increased furosemide from 80 mg in a.m. to 80 mg in a.m. and 40 mg in p.m.  He will take his first dose of 40 mg this evening.  He was encouraged to go to the ER if worsening shortness of breath and fatigue or lightheadedness.  Encouraged patient to call back in 3 to 5 days if no improvement.    Follow up with Dr. Thakkar in 4 weeks. Follow up with me in 1 week     History of Present Illness     Mr. Myron Sunshine is a 87 y.o. male with a significant past medical history of hypertension, paroxysmal atrial fibrillation, chronic kidney disease stage III, severe aortic stenosis with status post TAVR in November 2018, history of ischemic cardiomyopathy with mild systolic dysfunction with left bundle branch block with status post BIV ICD upgrade in December 2018, and coronary artery disease with s/p PCI to proximal RCA in October 2018 who is seen at [facility] heart failure clinic today for post hospitalization follow-up.  Patient was recently admitted from August 30 through September 2, 2019 with shortness of breath secondary to acute systolic heart failure.  Cardiology was consulted and he was given IV diuretic with good result.  He did not tolerate Bumex and therefore given IV Lasix and was discharged home on increased dose of oral furosemide.  He was also found in acute kidney injury likely secondary to cardiorenal but improved with IV diuretic.  He was also found in subtherapeutic INR.  He was bridged with Lovenox for possible PE with his shortness of breath.  Patient recently saw   [facility].  Recent device check showed persistent atrial fibrillation.  He was trialed on amiodarone but discontinued due to side effect.  His heart failure symptoms are thought to be due to persistent atrial fibrillation.  He was recommended cardioversion once INR therapeutic in 3 weeks.    Today, patient reports weight gain of 5 pounds since recent hospitalization with increased shortness of breath and fatigue.  He occasionally feels lightheadedness.  He denies shortness of breath at rest, PND orthopnea or heart palpitation, chest pain, or lower extremity edema.  He has occasional abdominal bloating but resolves with taking probiotic.  He reports fair appetite.  He is here with his wife today.  He is afraid that he is building up fluid again.  He sleeps with one wedge and pillow which is unchanged for a long time.     Physical Examination Review of Systems   Vitals:    09/30/19 1505   BP: 110/70   Pulse: 80   Resp: 16     Body mass index is 23.04 kg/m .  Wt Readings from Last 3 Encounters:   09/30/19 156 lb (70.8 kg)   09/10/19 153 lb (69.4 kg)   09/05/19 148 lb (67.1 kg)       General Appearance:     Alert, cooperative and in no acute distress.   ENT/Mouth: membranes moist, no oral lesions or bleeding gums.      EYES:  no scleral icterus, normal conjunctivae   Neck: no carotid bruits or thyromegaly   Chest/Lungs:   lungs are clear to auscultation, no rales or wheezing, respirations unlabored except diminished in lower lobes with some crackles   Cardiovascular:   . Normal first and second heart sounds with no murmurs, rubs, or gallops; the carotid, radial and posterior tibial pulses are intact, Jugular venous pressure flat in with no HJR, trace edema bilateral lower extremities    Abdomen:  Soft, nontender, nondistended, bowel sounds present   Extremities: no cyanosis or clubbing   Skin: warm, dry.    Neurologic: mood and affect are appropriate, alert and oriented x3      General: WNL  Eyes: WNL  Ears/Nose/Throat:  WNL  Lungs: Shortness of Breath  Heart: Shortness of Breath with activity  Stomach: WNL  Bladder: WNL  Muscle/Joints: Muscle Weakness  Skin: WNL  Nervous System: Dizziness  Mental Health: Depression     Blood: Easy Bruising     Medical History  Surgical History Family History Social History   Past Medical History:   Diagnosis Date   ? Anxiety    ? Bowel obstruction (H)    ? Bronchiectasis without acute exacerbation (H) 12/1/2017   ? Chronic hypoxemic respiratory failure (H)    ? CKD (chronic kidney disease) stage 3, GFR 30-59 ml/min (H)    ? Coronary artery disease due to lipid rich plaque    ? DNAR (do not attempt resuscitation)    ? Dyslipidemia, goal LDL below 70    ? ED (erectile dysfunction)    ? Essential hypertension    ? GERD (gastroesophageal reflux disease)    ? Gout flare 12/13/2018   ? Ischemic cardiomyopathy    ? Mild aortic stenosis    ? Noncompliance with medication regimen 9/11/2018   ? Paroxysmal VT (H)    ? Persistent atrial fibrillation (H) 8/22/2018   ? Psoriasis    ? Restrictive lung disease; moderate by PFT 10/24/2017    FVC 1.9 456% FEV1 1.4 457% ratio 74%.  No significant response to bronchodilators.  TLC 40% predicted DLCO corrected 47% predicted Assessment: No obstruction.  Moderate restriction.  Moderate diffusion defect.   ? 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   ? Seasonal allergies    ? Severe aortic stenosis 10/10/2018    Added automatically from request for surgery [id number]   ? Sleep apnea    ? TIA (transient ischemic attack) 8/09   ? Ventricular aneurysm     Long-term coumadin therapy s/p ventricular patch 2001   ? Vitamin D deficiency    ? VT (ventricular tachycardia) (H)     Past Surgical History:   Procedure Laterality Date   ? ABDOMINAL AORTIC ANEURYSM REPAIR     ? APPENDECTOMY      Perforated   ? CARDIAC CATHETERIZATION     ? CARDIAC DEFIBRILLATOR PLACEMENT  7/19/2007   ? CORONARY STENT PLACEMENT  2001    LCX   ? CV CORONARY ANGIOGRAM N/A 10/18/2018     Procedure: Coronary Angiogram;  Surgeon: Elijah Liu MD;  Location: [facility] Cath Lab;  Service:    ? CV TRANSFEMORAL TRANSCATHETER VALVE REPLACEMENT N/A 2018    Transfemoral Transcatheter Aortic Valve Replacement;  Surgeon: Elijah Liu MD;  Location: [facility] Cath Lab;  Service: Structural Heart   ? ICD Generator Change  14   ? IR EXTREMITY VENOGRAM LEFT  12/3/2018   ? PICC  2018        ? PICC AND MIDLINE TEAM LINE INSERTION  2018        ? RESECTION SMALL BOWEL / CLOSURE ILEOSTOMY     ? THORACENTESIS     ? VENTRICULAR RESECTION / REPAIR ANEURYSM      Family History   Problem Relation Age of Onset   ? Stroke Mother    ? Thyroid disease Mother    ? Cancer Brother    ? No Medical Problems Son    ? No Medical Problems Son    ? No Medical Problems Son    ? Lung disease Neg Hx     Social History     Socioeconomic History   ? Marital status:      Spouse name: Eulalia   ? Number of children: Not on file   ? Years of education: Not on file   ? Highest education level: Not on file   Occupational History   ? 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 Financial resource strain: Not on file   ? Food insecurity:     Worry: Not on file     Inability: Not on file   ? Transportation needs:     Medical: Not on file     Non-medical: Not on file   Tobacco Use   ? Smoking status: Former Smoker     Packs/day: 1.00     Years: 8.00     Pack years: 8.00     Types: Cigarettes     Last attempt to quit: 1955     Years since quittin.4   ? Smokeless tobacco: Never Used   Substance and Sexual Activity   ? Alcohol use: Yes     Alcohol/week: 1.0 standard drinks     Types: 1 Glasses of wine per week     Comment: social   ? Drug use: No   ? Sexual activity: Not on file   Lifestyle   ? Physical activity:     Days per week: 0 days     Minutes per session: 0 min   ? Stress: Not on file   Relationships   ? Social connections:     Talks on phone: Not on  file     Gets together: Not on file     Attends Restoration service: Not on file     Active member of club or organization: Not on file     Attends meetings of clubs or organizations: Not on file     Relationship status: Not on file   ? Intimate partner violence:     Fear of current or ex partner: Not on file     Emotionally abused: Not on file     Physically abused: Not on file     Forced sexual activity: Not on file   Other Topics Concern   ? Not on file   Social History Narrative    Lives with his wife, Eulalia, who he states has advanced arthritis. 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            Medications  Allergies   Current Outpatient Medications   Medication Sig Dispense Refill   ? acetaminophen (TYLENOL EXTRA STRENGTH) 500 MG tablet Take 1 tablet (500 mg total) by mouth every 6 (six) hours as needed for pain.  0   ? albuterol (PROVENTIL) 2.5 mg /3 mL (0.083 %) nebulizer solution Take 3 mL (2.5 mg total) by nebulization every 4 (four) hours as needed for wheezing. 360 mL 6   ? atorvastatin (LIPITOR) 80 MG tablet Take 1 tablet (80 mg total) by mouth daily. 30 tablet 3   ? budesonide (PULMICORT) 0.5 mg/2 mL nebulizer solution Take 2 mL (0.5 mg total) by nebulization 2 (two) times a day. 120 mL 6   ? calcium, as carbonate, (TUMS) 200 mg calcium (500 mg) chewable tablet Chew 2-4 tablets as needed for heartburn. Do not take more than 15 tablets in 24 hours.           ? cetirizine (ZYRTEC) 10 MG tablet Take 10 mg by mouth daily as needed.            ? clopidogrel (PLAVIX) 75 mg tablet Take 1 tablet (75 mg total) by mouth daily. take 75 mg (1 pill) daily     ? fluticasone (FLONASE) 50 mcg/actuation nasal spray Apply 1 spray into each nostril daily as needed for rhinitis.     ? furosemide (LASIX) 40 MG tablet Take 2 tablets (80 mg total) by mouth daily. 60 tablet 0   ? hydrocortisone 1 % cream Apply 1 application topically daily as needed.            ? Lactobacillus acidophilus (PROBIOTIC ORAL) Take 1 capsule  by mouth daily.     ? latanoprost (XALATAN) 0.005 % ophthalmic solution Administer 1 drop to the right eye at bedtime. 2.5 mL 12   ? metoprolol succinate (TOPROL-XL) 50 MG 24 hr tablet Take 25-50 mg by mouth see administration instructions. Take 50 mg in the morning and 25 mg in the evening.     ? multivitamin with minerals (THERA-M) 9 mg iron-400 mcg Tab tablet Take 1 tablet by mouth daily.     ? nebulizer and compressor Radha For chronic cough 1 each 0   ? nitroglycerin (NITROSTAT) 0.4 MG SL tablet Place 1 tablet (0.4 mg total) under the tongue every 5 (five) minutes as needed for chest pain.  0   ? OXYGEN-AIR DELIVERY SYSTEMS MISC into each nostril continuous. 1.5 liters with rest and 3 liters with activity  May use up to 1.5 liters at [facility].  [facility]           ? pantoprazole (PROTONIX) 20 MG tablet Take 1 tablet (20 mg total) by mouth daily. 30 tablet 0   ? PERFOROMIST 20 mcg/2 mL nebulizer solution USE 1 VIAL IN NEBULIZER EVERY 12 HOURS 120 mL 0   ? predniSONE (DELTASONE) 5 MG tablet Take 1 tablet by mouth daily. May increase to 2 tabs daily if needed.. 60 tablet 1   ? warfarin (COUMADIN/JANTOVEN) 2 MG tablet Take 2-4 mg by mouth See Admin Instructions. Take 2 mg one day weekly (on Tuesdays) and 4 mg six days weekly (on all other days of the week). Adjust dose based on INR results as directed.       No current facility-administered medications for this visit.       Allergies   Allergen Reactions   ? Bumex [Bumetanide] Anxiety   ? Blood-Group Specific Substance      Anti-Josep/Barnes.  Expect delays in obtaining blood for transfusion.  Collect 2 lavender top tubes and 1 red top tube for all blood bank orders.          Lab Results    Chemistry CBC BNP   Lab Results   Component Value Date    CREATININE 1.73 (H) 09/02/2019    BUN 59 (H) 09/02/2019     09/02/2019    K 3.9 09/02/2019    CL 99 09/02/2019    CO2 30 09/02/2019     Creatinine (mg/dL)   Date Value   09/02/2019 1.73 (H)   09/01/2019 1.89 (H)    08/31/2019 1.74 (H)   08/30/2019 1.90 (H)    Lab Results   Component Value Date    WBC 13.3 (H) 08/30/2019    HGB 13.6 (L) 08/30/2019    HCT 43.8 08/30/2019    MCV 90 08/30/2019     (L) 08/30/2019    Lab Results   Component Value Date    BNP 1,669 (H) 08/30/2019     BNP (pg/mL)   Date Value   08/30/2019 1,669 (H)   04/29/2019 1,298 (H)   04/22/2019 1,510 (H)        Lissette Gordon, [facility]   Heart Failure Clinic         This note has been dictated using voice recognition software. Any grammatical, typographical, or context distortions are unintentional and inherent to the software

## 2021-06-28 NOTE — PROGRESS NOTES
Progress Notes by Zo Dudley CNP at 11/12/2019  3:30 PM     Author: Zo Dudley CNP Service: -- Author Type: Nurse Practitioner    Filed: 11/12/2019  5:09 PM Encounter Date: 11/12/2019 Status: Signed    : Zo Dudley CNP (Nurse Practitioner)             Assessment/Recommendations   Assessment/Plan:    1.  Persistent atrial Fibrillation: Recurrence of atrial fibrillation with controlled ventricular response since yesterday afternoon.  He has no awareness of arrhythmia and continues to feel relatively well.  We had a lengthy discussion of the physiology and natural progression of atrial fibrillation and treatment options including rate control versus rhythm control depending upon the presence of symptoms.  We discussed that due to his comorbidities, in particular that of chronic oxygen dependent hypoxemic respiratory failure, that maintenance of sinus rhythm may not be possible.  We also discussed that due to his lung disease and chronic kidney disease he is not a good candidate for antiarrhythmic medications or ablation.  It was previously thought that his heart failure was related to A. fib, though his CRT-D did not show evidence of significant fluid retention while he was in A. fib.  Recommend doing a trial of rate control and retrying amiodarone only if he is significantly symptomatic.  Continue metoprolol succinate 50 mg in the morning and 25 mg in the evening.    He was reassured that atrial fibrillation is not life-threatening, but carries an increased risk for stroke.  He has a [id number] score of 4 for age >75, CAD, and CHF.  Continue warfarin for stroke prophylaxis as managed through the anticoagulation clinic.    2.  Ischemic cardiomyopathy with chronic heart failure with reduced ejection fraction and bundle branch block status post ICD implant and upgrade to CRT-D: He appears well compensated with no sign of acute fluid overload on exam today.  Continue furosemide and metoprolol.  Blood  pressure will not allow for any further up titration of metoprolol.    The CRT-D was interrogated and is functioning appropriately.  The battery shows estimated longevity of 7.1 years.  Atrial and ventricular lead sensing, impedance, and pacing thresholds are stable and within normal limits.      3.  Coronary artery disease: PCI to the RCA in October 2018.  Continue Plavix and statin.    Follow up with Dr. Cadet in 2 months     History of Present Illness/Subjective    Mr. Myron Sunshine is a 88 y.o. male who comes in today for EP follow-up of atrial fibrillation.  He has a history of paroxysmal atrial fibrillation, recently noted to be in persistent atrial fibrillation.  He also has a history of severe aortic stenosis status post TAVR in November 2018, ischemic cardiomyopathy with chronic heart failure with reduced ejection fraction and left bundle branch block status post upgrade from ICD to CRT-D December 2018, coronary artery disease with PCI to the RCA in October 2018, stage III chronic kidney disease, and chronic hypoxemic respiratory failure for which he is on chronic oxygen at 1 to 3 L via nasal cannula.  He was initially started on amiodarone, but was unable to tolerate this due to side effects.  His heart failure exacerbation is thought to be due to A. fib, despite controlled ventricular rates.  He underwent cardioversion again on 10/14/2019. He remains on warfarin for stroke prophylaxis.     Myron states that he has gradually felt better since cardioversion and feels well now.  He has a baseline of shortness of breath due to his significant lung disease.  He denies chest discomfort, palpitations, abdominal fullness/bloating or peripheral edema, shortness of breath at rest, paroxysmal nocturnal dyspnea, orthopnea, lightheadedness, dizziness, pre-syncope, or syncope.    Cardiographics (personally reviewed):  ECG done 10/14/2019, shows atrial sensing with ventricular pacing at 72 bpm, QT/QTc interval  measures 490/536 ms    CRT-D Interrogation (MDT):  Pacing mode: DDD   Presenting rhythm: AF-BiVP , occasional PVCs  Underlying rhythm: AF   A-paced: 82.9%.  BiV-paced 88.4 (previous 72%).  Battery: estimated longevity 7.1 years.  Atrial and Ventricular leads: Stable with good sensing, impedance, and pacing thresholds  Arrhythmias: 5 AT/AF, AF with CVR since 11/11/2019  [place]: 3.9% (previous 100%)  Histograms: Good rate distribution  HF diagnostics: No indication of fluid retention.  Of note, no indication of fluid retention during previous episode of A. Fib.    ECHO done 8/31/2019:    Left ventricular ejection fraction is mildly decreased. The estimated left ventricular ejection fraction is 45%. Prior left ventricular aneurysm patch.    Dyskinetic: basal inferior, basal inferolateral, basal anterolateral, mid inferior, mid inferolateral and mid anterolateral.    Normal right ventricular size and systolic function.    Bioprosthetic Kd 3 valve is present. There is no aortic regurgitation. The prosthetic valve function is normal.    Moderate mitral regurgitation.    Left atrial volume is severely increased.    When compared to the previous study dated 12/31/2018, no significant change.     Cardiac catheterization done 10/18/2018:  LM:no obstruction  LAD:mild diffuse disease. D1 has a known ostial disease, ~80%, somewhat progressed from prior angio but w/ MARIBELL 3 flow  Lcx:severe ISR w/ TIMI2 flow in PLV, unchanged from prior  RCA:new 80% proximal narrowing.  CASSIDY.       Physical Examination Review of Systems   Vitals:    11/12/19 1429   BP: 96/54   Pulse: 64   Resp: 18   SpO2: 92%     Body mass index is 22.59 kg/m .  Wt Readings from Last 3 Encounters:   11/12/19 153 lb (69.4 kg)   10/31/19 153 lb (69.4 kg)   10/16/19 155 lb (70.3 kg)     General Appearance:   Alert, well-appearing and in no acute distress.   HEENT: Atraumatic, normocephalic.  No scleral icterus, normal conjunctivae, EOMs intact,  PERRL.  Mucous membranes pink and moist.     Chest/Lungs:   Chest symmetric, spine straight.  Respirations unlabored.  Lungs are diminished but clear to auscultation.  Wearing supplemental oxygen via nasal cannula.   Cardiovascular:   Irregular rate and rhythm.  Normal first and second heart sounds with no murmurs, rubs, or gallops; radial and posterior tibial pulses are intact, No JVD, no edema.   Abdomen:  Soft, nontender, nondistended, bowel sounds present.   Extremities: No cyanosis or clubbing.   Musculoskeletal: Moves all extremities.  Gait unsteady, ambulates with rolling walker.   Skin: Warm, dry, intact.    Neurologic: Mood and affect are appropriate.  Alert and oriented to person, place, time, and situation.    General: WNL  Eyes: WNL  Ears/Nose/Throat: WNL  Lungs: WNL  Heart: WNL  Stomach: WNL  Bladder: WNL  Muscle/Joints: WNL  Skin: WNL  Nervous System: WNL  Mental Health: WNL     Blood: WNL       Medical History  Surgical History Family History Social History   Past Medical History:   Diagnosis Date   ? Anxiety    ? Bowel obstruction (H)    ? Bronchiectasis without acute exacerbation (H) 12/1/2017   ? Chronic hypoxemic respiratory failure (H)    ? CKD (chronic kidney disease) stage 3, GFR 30-59 ml/min (H)    ? Coronary artery disease due to lipid rich plaque    ? DNAR (do not attempt resuscitation)    ? Dyslipidemia, goal LDL below 70    ? ED (erectile dysfunction)    ? Essential hypertension    ? GERD (gastroesophageal reflux disease)    ? Gout flare 12/13/2018   ? Ischemic cardiomyopathy    ? Mild aortic stenosis    ? Noncompliance with medication regimen 9/11/2018   ? Paroxysmal VT (H)    ? Persistent atrial fibrillation 8/22/2018   ? Psoriasis    ? Restrictive lung disease; moderate by PFT 10/24/2017    FVC 1.9 456% FEV1 1.4 457% ratio 74%.  No significant response to bronchodilators.  TLC 40% predicted DLCO corrected 47% predicted Assessment: No obstruction.  Moderate restriction.  Moderate  diffusion defect.   ? S/P TAVR (transcatheter aortic valve replacement) 2018   ? Seasonal allergies    ? Severe aortic stenosis 10/10/2018    Added automatically from request for surgery [id number]   ? Sleep apnea    ? TIA (transient ischemic attack)    ? Ventricular aneurysm     Long-term coumadin therapy s/p ventricular patch    ? Vitamin D deficiency    ? VT (ventricular tachycardia) (H)     Past Surgical History:   Procedure Laterality Date   ? ABDOMINAL AORTIC ANEURYSM REPAIR     ? APPENDECTOMY      Perforated   ? CARDIAC CATHETERIZATION     ? CARDIAC DEFIBRILLATOR PLACEMENT  2007   ? CARDIOVERSION  10/14/2019   ? CORONARY STENT PLACEMENT  2001    LCX   ? CV CORONARY ANGIOGRAM N/A 10/18/2018    Procedure: Coronary Angiogram;  Surgeon: Elijah Liu MD;  Location: [facility];  Service:    ? CV TRANSFEMORAL TRANSCATHETER VALVE REPLACEMENT N/A 2018    Transfemoral Transcatheter Aortic Valve Replacement;  Surgeon: Elijah Liu MD;  Location: [facility];  Service: Structural Heart   ? ICD Generator Change  14   ? IR EXTREMITY VENOGRAM LEFT  12/3/2018   ? PICC  2018        ? PICC AND MIDLINE TEAM LINE INSERTION  2018        ? RESECTION SMALL BOWEL / CLOSURE ILEOSTOMY     ? THORACENTESIS     ? VENTRICULAR RESECTION / REPAIR ANEURYSM      Family History   Problem Relation Age of Onset   ? Stroke Mother    ? Thyroid disease Mother    ? Cancer Brother    ? No Medical Problems Son    ? No Medical Problems Son    ? No Medical Problems Son    ? Lung disease Neg Hx     Social History     Tobacco Use   ? Smoking status: Former Smoker     Packs/day: 1.00     Years: 8.00     Pack years: 8.00     Types: Cigarettes     Last attempt to quit: 1955     Years since quittin.5   ? Smokeless tobacco: Never Used   Substance Use Topics   ? Alcohol use: Yes     Alcohol/week: 1.0 standard drinks     Types: 1 Glasses of wine per week     Comment: social   ? Drug use:  No          Medications  Allergies   Current Outpatient Medications   Medication Sig Dispense Refill   ? acetaminophen (TYLENOL EXTRA STRENGTH) 500 MG tablet Take 1 tablet (500 mg total) by mouth every 6 (six) hours as needed for pain.  0   ? albuterol (PROVENTIL) 2.5 mg /3 mL (0.083 %) nebulizer solution Take 3 mL (2.5 mg total) by nebulization every 4 (four) hours as needed for wheezing. 360 mL 6   ? atorvastatin (LIPITOR) 80 MG tablet Take 1 tablet (80 mg total) by mouth daily. (Patient taking differently: Take 80 mg by mouth every morning.       ) 30 tablet 3   ? budesonide (PULMICORT) 0.5 mg/2 mL nebulizer solution Take 2 mL (0.5 mg total) by nebulization 2 (two) times a day. 120 mL 6   ? calcium, as carbonate, (TUMS) 200 mg calcium (500 mg) chewable tablet Chew 2-4 tablets as needed for heartburn. Do not take more than 15 tablets in 24 hours.           ? cetirizine (ZYRTEC) 10 MG tablet Take 10 mg by mouth daily as needed.            ? clopidogrel (PLAVIX) 75 mg tablet Take 1 tablet (75 mg total) by mouth daily. take 75 mg (1 pill) daily (Patient taking differently: Take 75 mg by mouth every morning. take 75 mg (1 pill) daily      )     ? fluticasone (FLONASE) 50 mcg/actuation nasal spray Apply 1 spray into each nostril daily as needed for rhinitis.     ? formoterol fumarate (PERFOROMIST) 20 mcg/2 mL nebulizer solution Take 2 mL (20 mcg total) by nebulization every 12 (twelve) hours. 160 mL 11   ? furosemide (LASIX) 40 MG tablet Take 4 tablets (160 mg total) by mouth daily. Take 80 mg in AM and 80mg in PM starting 10/7/19 (Patient taking differently: Take by mouth daily. Take 80 mg in AM and 40mg in PM starting      ) 90 tablet 0   ? hydrocortisone 1 % cream Apply 1 application topically daily as needed.            ? Lactobacillus acidophilus (PROBIOTIC ORAL) Take 1 capsule by mouth as needed.            ? latanoprost (XALATAN) 0.005 % ophthalmic solution Administer 1 drop to the right eye at bedtime. 2.5 mL  12   ? metoprolol succinate (TOPROL-XL) 50 MG 24 hr tablet Take 75 mg by mouth see administration instructions. Take 75 mg (one and half tablet of 50 mg) daily after supper           ? multivitamin with minerals (THERA-M) 9 mg iron-400 mcg Tab tablet Take 1 tablet by mouth daily.     ? nebulizer and compressor Radha For chronic cough 1 each 0   ? nitroglycerin (NITROSTAT) 0.4 MG SL tablet Place 1 tablet (0.4 mg total) under the tongue every 5 (five) minutes as needed for chest pain.  0   ? OXYGEN-AIR DELIVERY SYSTEMS MISC into each nostril continuous. 1.5 liters with rest and 3 liters with activity  May use up to 1.5 liters at [facility].  [employer]           ? pantoprazole (PROTONIX) 20 MG tablet Take 1 tablet (20 mg total) by mouth daily. 30 tablet 0   ? predniSONE (DELTASONE) 5 MG tablet Take 1 tablet by mouth daily. May increase to 2 tabs daily if needed.. (Patient taking differently: Take 10 mg by mouth daily Take 1 tablet by mouth daily. May increase to 2 tabs daily if needed..) 60 tablet 1   ? sertraline (ZOLOFT) 25 MG tablet Take 25 mg by mouth at bedtime.            ? warfarin (COUMADIN/JANTOVEN) 2 MG tablet Take 2-4 mg by mouth See Admin Instructions. Take 2 mg one day weekly (on Tuesdays and Friday) and 4 mg five days weekly (on all other days of the week). Adjust dose based on INR results as directed.             No current facility-administered medications for this visit.     Allergies   Allergen Reactions   ? Bumex [Bumetanide] Anxiety   ? Amiodarone Other (See Comments)     Shakiness and headache   ? Blood-Group Specific Substance      Anti-Josep/Barnes.  Expect delays in obtaining blood for transfusion.  Collect 2 lavender top tubes and 1 red top tube for all blood bank orders.          Lab Results    Chemistry/lipid CBC Cardiac Enzymes/BNP/TSH/INR   Lab Results   Component Value Date    CREATININE 1.68 (H) 10/31/2019    BUN 51 (H) 10/31/2019     10/31/2019    K 4.0 10/31/2019    CL 99 10/31/2019    CO2  37 (H) 10/31/2019     Creatinine (mg/dL)   Date Value   10/31/2019 1.68 (H)   10/16/2019 1.63 (H)   10/07/2019 1.77 (H)   09/30/2019 1.71 (H)       Lab Results   Component Value Date    CHOL 114 11/08/2018    HDL 39 (L) 11/08/2018      Lab Results   Component Value Date    WBC 13.3 (H) 08/30/2019    HGB 13.6 (L) 08/30/2019    HCT 43.8 08/30/2019    MCV 90 08/30/2019     (L) 08/30/2019    Lab Results   Component Value Date    CKTOTAL 37 02/23/2018    CKMB 5 02/28/2018    TROPONINI 0.26 08/31/2019    BNP 1,871 (H) 10/31/2019    TSH 3.1 09/14/2013    INR 2.00 (!) 11/12/2019        This note has been dictated using voice recognition software. Any grammatical, typographical, or context distortions are unintentional and inherent to the software.

## 2021-07-03 NOTE — ADDENDUM NOTE
Addendum Note by Brenda Mon RN at 5/24/2018 11:32 AM     Author: Brenda Mon RN Service: -- Author Type: Registered Nurse    Filed: 5/24/2018 11:32 AM Encounter Date: 5/24/2018 Status: Signed    : Brenda Mon RN (Registered Nurse)    Addended by: BRENDA MON on: 5/24/2018 11:32 AM        Modules accepted: Orders

## 2021-07-03 NOTE — ADDENDUM NOTE
Addendum Note by Amber Beck NP at 9/30/2019  2:50 PM     Author: Amber Beck NP Service: -- Author Type: Nurse Practitioner    Filed: 9/30/2019  4:17 PM Encounter Date: 9/30/2019 Status: Signed    : Amber Beck NP (Nurse Practitioner)    Addended by: AMBER BECK on: 9/30/2019 04:17 PM        Modules accepted: Orders

## 2021-07-03 NOTE — ADDENDUM NOTE
Addendum Note by Agustín Christensen MD at 1/7/2020  8:30 AM     Author: Agustín Christensen MD Service: -- Author Type: Physician    Filed: 2/4/2020  9:13 AM Encounter Date: 1/7/2020 Status: Signed    : Agustín Christensen MD (Physician)    Addended by: AGUSTÍN CHRISTENSEN on: 2/4/2020 09:13 AM        Modules accepted: Orders

## 2021-07-03 NOTE — ADDENDUM NOTE
Addendum Note by Agustín Christensen MD at 5/4/2018  1:02 PM     Author: Agustín Christensen MD Service: -- Author Type: Physician    Filed: 5/4/2018  1:02 PM Encounter Date: 5/4/2018 Status: Signed    : Agustín Christensen MD (Physician)    Addended by: AGUSTÍN CHRISTENSEN on: 5/4/2018 01:02 PM        Modules accepted: Orders

## 2021-07-14 PROBLEM — J96.02 ACUTE RESPIRATORY FAILURE WITH HYPOXIA AND HYPERCAPNIA (H): Status: RESOLVED | Noted: 2018-11-05 | Resolved: 2018-11-11

## 2021-07-14 PROBLEM — I95.9 HYPOTENSION: Status: RESOLVED | Noted: 2018-11-13 | Resolved: 2019-01-01

## 2021-07-14 PROBLEM — J96.21 ACUTE ON CHRONIC RESPIRATORY FAILURE WITH HYPOXIA (H): Status: RESOLVED | Noted: 2018-06-10 | Resolved: 2018-12-03

## 2021-07-14 PROBLEM — J96.01 ACUTE RESPIRATORY FAILURE WITH HYPOXIA AND HYPERCAPNIA (H): Status: RESOLVED | Noted: 2018-11-05 | Resolved: 2018-11-11

## 2021-07-14 PROBLEM — J96.90 RESPIRATORY FAILURE (H): Status: RESOLVED | Noted: 2018-11-05 | Resolved: 2018-12-03

## 2021-07-14 PROBLEM — J45.20 MILD INTERMITTENT ASTHMA WITHOUT COMPLICATION: Status: RESOLVED | Noted: 2017-10-12 | Resolved: 2018-09-14

## 2021-08-03 PROBLEM — M46.1 SACROILIITIS (H): Status: RESOLVED | Noted: 2018-07-02 | Resolved: 2018-08-20

## 2021-08-03 PROBLEM — J47.1 BRONCHIECTASIS WITH (ACUTE) EXACERBATION (H): Status: RESOLVED | Noted: 2018-06-09 | Resolved: 2018-08-20

## 2021-08-03 PROBLEM — A41.9 SEVERE SEPSIS (H): Status: RESOLVED | Noted: 2018-11-05 | Resolved: 2018-11-11

## 2021-08-03 PROBLEM — R65.20 SEVERE SEPSIS (H): Status: RESOLVED | Noted: 2018-11-05 | Resolved: 2018-11-11

## 2021-08-03 PROBLEM — J47.9 BRONCHIECTASIS WITHOUT ACUTE EXACERBATION (H): Chronic | Status: RESOLVED | Noted: 2017-12-01 | Resolved: 2019-02-25

## 2022-06-17 NOTE — PROGRESS NOTES
[facility] Note    Assessment:    Myron Sunshine is a 87 y.o. old male with HFREF, AS, MR, CAD s/p PCI to Lcx '01, LV pseudoaneurysm s/p patch repai, HTN, HL interstitial lung disease, bronchiectasis, asthma, obstructive sleep apnea, and chronic kidney disease here for evaluation of his valvular disease.         Plan:  # Valvular disease - severe low flow-low gradient AS w/ P/M 40/20 NII 0.6, DI 0.2 SVI 21, severe MR  - we have discussed long term prognosis of untreated severe AS, as well as risks/benefots of medical therapy, TAVR, and SAVR in this scenario  - if the surgeons agree w/ my sense that he is at a high or prohibitive risk for OHS/SAVR, I would favor proceeding w/ CTA, angiography, PFT's, w/ a plan for TAVR  - as for the MR, it seems reasonable to re-address after TAVR, and if resolved, could manage that medically, mariluz LORENZANA  [facility]  [phone]  ______________________________________________________________________    Subjective:  CC: AS    I had the opportunity to see Myron Sunshine at the [facility]. Myron Sunshine is a 87 y.o. male with a known history of HFREF, AS, MR, CAD s/p PCI to Lcx '01, LV pseudoaneurysm s/p patch repai, HTN, HL interstitial lung disease, bronchiectasis, asthma, obstructive sleep apnea, and chronic kidney disease here for evaluation of his valvular disease.     Pt. Has had HF for a few years now w/ gradually progressive dyspnea, now to the point of needing home O2 and having had 5 admissions for ADHF over the past year or so. He can still get around w/ the help of a cane or a walker and since having thoracentesis and adequate diuresis has even gained some of his strength back, although the overall clinical trajectory have been declining. He denies CP/orthopnea/PND/edema/syncope at this point but has had a lot of edema and dyspnea prior to the last hospital stay and diuretic titration. No N/V/D/F/C/wt.  Changes.   TTE demonstrated LVEF 40's (although I think this is exaggerated due to pseudoaneurysm imaging issues) w/ AS P/M 40/20 NII 0.6, DI 0.2 SVI 21, severe MR.  ______________________________________________________________________      Review of Systems:   As noted in HPI, all others reviewed and are negative      Problem List:  Patient Active Problem List   Diagnosis     Stage 3 chronic kidney disease (H)     ICD (implantable cardioverter-defibrillator) in place     Essential hypertension     Dyslipidemia, goal LDL below 70     Coronary artery disease due to lipid rich plaque     Left ventricular aneurysm     Bronchiectasis (H)     Restrictive lung disease     Acute kidney injury superimposed on chronic kidney disease (H)     Persistent atrial fibrillation (H)     DNAR (do not attempt resuscitation)     Chronic hypoxemic respiratory failure (H)     Mitral valve regurgitation     Acute combined systolic and diastolic congestive heart failure (H)     Atrial fibrillation (H) [I48.91]     Aortic stenosis     Medical History:  Past Medical History:   Diagnosis Date     Anxiety      Bowel obstruction (H)      Chronic hypoxemic respiratory failure (H)      Chronic kidney disease     stage 3     Coronary artery disease due to lipid rich plaque      DNAR (do not attempt resuscitation)      Dyslipidemia, goal LDL below 70      ED (erectile dysfunction)      Essential hypertension      GERD (gastroesophageal reflux disease)      Ischemic cardiomyopathy     but EF 50-55%     Mild aortic stenosis      Noncompliance with medication regimen 9/11/2018     Paroxysmal VT (H)      Persistent atrial fibrillation (H) 8/22/2018     Psoriasis      Restrictive lung disease      Seasonal allergies      Sleep apnea      TIA (transient ischemic attack) 8/09     Ventricular aneurysm     Long-term coumadin therapy s/p ventricular patch 2001     Vitamin D deficiency      Surgical History:  Past Surgical History:   Procedure Laterality  Date     ABDOMINAL AORTIC ANEURYSM REPAIR       APPENDECTOMY      Perforated- Unknown     CARDIAC CATHETERIZATION       CARDIAC DEFIBRILLATOR PLACEMENT  7/19/2007     CORONARY STENT PLACEMENT  2001    LCX     ICD Generator Change  6/18/14     PICC AND MIDLINE TEAM LINE INSERTION  2/23/2018          RESECTION SMALL BOWEL / CLOSURE ILEOSTOMY       VENTRICULAR RESECTION / REPAIR ANEURYSM       Social History:  Social History     Social History     Marital status:      Spouse name: Eulalia     Number of children: N/A     Years of education: N/A     Occupational History      Retired     [employer]     Social History Main Topics     Smoking status: Former Smoker     Packs/day: 1.00     Years: 8.00     Types: Cigarettes     Quit date: 5/17/1955     Smokeless tobacco: Never Used     Alcohol use 0.6 oz/week     1 Glasses of wine per week     Drug use: No     Sexual activity: Yes     Partners: Female     Other Topics Concern     Not on file     Social History Narrative    Lives with his wife, Joann, who he states has advanced arthritis. 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             Family History:  Family History   Problem Relation Age of Onset     Stroke Mother      Thyroid disease Mother      Cancer Brother      No Medical Problems Son      No Medical Problems Son      No Medical Problems Son      Lung disease Neg Hx          Allergies:  No Known Allergies    Medications:  Current Outpatient Prescriptions   Medication Sig Dispense Refill     acetaminophen (TYLENOL EXTRA STRENGTH) 500 MG tablet Take 500 mg by mouth every 6 (six) hours as needed for pain. Indications: Back Pain       albuterol (PROVENTIL) 2.5 mg /3 mL (0.083 %) nebulizer solution Take 2.5 mg by nebulization every 4 (four) hours as needed for wheezing.       aspirin 81 MG tablet Take 81 mg by mouth daily.       budesonide (PULMICORT) 0.5 mg/2 mL nebulizer solution Take 2 mL (0.5 mg total) by nebulization 2 (two) times a day. 120 mL 6      fluticasone (FLONASE) 50 mcg/actuation nasal spray Apply 1 spray into each nostril daily.       formoterol fumarate (PERFOROMIST) 20 mcg/2 mL nebulizer solution Take 20 mcg by nebulization daily.        furosemide (LASIX) 40 MG tablet Take 1 tablet (40 mg total) by mouth 2 (two) times a day at 9am and 6pm. 60 tablet 0     guaiFENesin (MUCINEX) 600 mg 12 hr tablet Take 600 mg by mouth 2 (two) times a day as needed for congestion.        latanoprost (XALATAN) 0.005 % ophthalmic solution Administer 1 drop to the right eye at bedtime.       metoprolol succinate (TOPROL-XL) 25 MG Take 1 tablet (25 mg total) by mouth daily. 90 tablet 2     multivitamin therapeutic (THERAGRAN) tablet Take 1 tablet by mouth daily.       nebulizer and compressor Radha For chronic cough 1 each 0     nitroglycerin (NITROSTAT) 0.4 MG SL tablet Place 0.4 mg under the tongue every 5 (five) minutes as needed for chest pain.       OXYGEN-AIR DELIVERY SYSTEMS MISC into each nostril continuous. 1.5 liters with rest and 3 liters with activity  May use up to 1.5 liters at noc.       pantoprazole (PROTONIX) 20 MG tablet Take 1 tablet (20 mg total) by mouth daily. 30 tablet 0     predniSONE (DELTASONE) 5 MG tablet Take 1 tablet (5 mg total) by mouth daily. 30 tablet 6     sertraline (ZOLOFT) 50 MG tablet Take 25 mg by mouth at bedtime.        simvastatin (ZOCOR) 80 MG tablet Take 80 mg by mouth bedtime.       sodium chloride 3 % nebulizer solution Take 3 mL by nebulization as needed. Take 3cc hypertonic saline (3% saline) and mix with albuterol solution and Pulmicort twice daily.       warfarin (COUMADIN) 2 MG tablet Take 2-4 mg by mouth See Admin Instructions. Take 1 tablet (2 mg) by mouth on Tuesdays and Thursdays.   Take 2 tablets (4 mg) by mouth on all other days of the week.   Adjust dose based on INR results as directed.       No current facility-administered medications for this visit.        Objective:   Vital signs:  /60 (Patient Site: Left  "Arm, Patient Position: Sitting, Cuff Size: Adult Regular)  Pulse 88  Resp 16  Ht 5' 9\" (1.753 m)  Wt 156 lb 3.2 oz (70.9 kg)  BMI 23.07 kg/m2      Physical Exam:    GENERAL APPEARANCE: Alert, cooperative and in no acute distress.   HEENT: No scleral icterus. Oral mucuos membranes pink and moist.   NECK: JVP 7. No Hepatojugular reflux. Thyroid not visualized. No lymphadenopathy   CHEST: clear to auscultation   CARDIOVASCULAR: S1, S2 without murmur ,clicks or rubs. Brachial, radial and posterior tibial pulses are intact and symetric. No carotid bruits noted. No edema  ABDOMEN: Nontender. BS+. No bruits.   SKIN: No Xanthelasma   Musculoskeletal: No cyanosis, clubbing or swelling.      Lab Results:  LIPIDS:  Lab Results   Component Value Date    CHOL 136 08/23/2017    CHOL 133 09/19/2016     Lab Results   Component Value Date    HDL 40 08/23/2017    HDL 39 (L) 09/19/2016     Lab Results   Component Value Date    LDLCALC 74 08/23/2017    LDLCALC 69 09/19/2016     Lab Results   Component Value Date    TRIG 110 08/23/2017    TRIG 125 09/19/2016     No components found for: CHOLHDL    BMP:  Lab Results   Component Value Date    CREATININE 1.34 (H) 10/04/2018    BUN 42 (H) 10/04/2018     10/04/2018    K 4.5 10/04/2018    CL 96 (L) 10/04/2018    CO2 34 (H) 10/04/2018         [facility] HEART [facility]                  " [see HPI] : see HPI [Negative] : Heme/Lymph